# Patient Record
Sex: FEMALE | Race: WHITE | NOT HISPANIC OR LATINO | Employment: FULL TIME
[De-identification: names, ages, dates, MRNs, and addresses within clinical notes are randomized per-mention and may not be internally consistent; named-entity substitution may affect disease eponyms.]

---

## 2017-12-24 ENCOUNTER — HEALTH MAINTENANCE LETTER (OUTPATIENT)
Age: 22
End: 2017-12-24

## 2020-03-10 ENCOUNTER — HEALTH MAINTENANCE LETTER (OUTPATIENT)
Age: 25
End: 2020-03-10

## 2020-11-13 ENCOUNTER — HOSPITAL ENCOUNTER (INPATIENT)
Facility: CLINIC | Age: 25
LOS: 5 days | Discharge: HOME | DRG: 885 | End: 2020-11-18
Attending: EMERGENCY MEDICINE | Admitting: PSYCHIATRY & NEUROLOGY
Payer: COMMERCIAL

## 2020-11-13 DIAGNOSIS — F60.3 BORDERLINE PERSONALITY DISORDER (H): ICD-10-CM

## 2020-11-13 DIAGNOSIS — F41.9 ANXIETY: ICD-10-CM

## 2020-11-13 DIAGNOSIS — R45.851 SUICIDAL IDEATION: ICD-10-CM

## 2020-11-13 DIAGNOSIS — F33.9 EPISODE OF RECURRENT MAJOR DEPRESSIVE DISORDER, UNSPECIFIED DEPRESSION EPISODE SEVERITY (H): Primary | ICD-10-CM

## 2020-11-13 LAB
ALBUMIN SERPL-MCNC: 4.1 G/DL (ref 3.4–5)
ALP SERPL-CCNC: 55 U/L (ref 40–150)
ALT SERPL W P-5'-P-CCNC: 42 U/L (ref 0–50)
ANION GAP SERPL CALCULATED.3IONS-SCNC: 8 MMOL/L (ref 3–14)
AST SERPL W P-5'-P-CCNC: 27 U/L (ref 0–45)
BASOPHILS # BLD AUTO: 0.1 10E9/L (ref 0–0.2)
BASOPHILS NFR BLD AUTO: 0.6 %
BILIRUB SERPL-MCNC: 0.5 MG/DL (ref 0.2–1.3)
BUN SERPL-MCNC: 6 MG/DL (ref 7–30)
CALCIUM SERPL-MCNC: 9.3 MG/DL (ref 8.5–10.1)
CHLORIDE SERPL-SCNC: 104 MMOL/L (ref 94–109)
CO2 SERPL-SCNC: 26 MMOL/L (ref 20–32)
CREAT SERPL-MCNC: 0.67 MG/DL (ref 0.52–1.04)
DIFFERENTIAL METHOD BLD: NORMAL
EOSINOPHIL # BLD AUTO: 0.2 10E9/L (ref 0–0.7)
EOSINOPHIL NFR BLD AUTO: 1.8 %
ERYTHROCYTE [DISTWIDTH] IN BLOOD BY AUTOMATED COUNT: 11.9 % (ref 10–15)
GFR SERPL CREATININE-BSD FRML MDRD: >90 ML/MIN/{1.73_M2}
GLUCOSE SERPL-MCNC: 75 MG/DL (ref 70–99)
HCT VFR BLD AUTO: 42.3 % (ref 35–47)
HGB BLD-MCNC: 14.4 G/DL (ref 11.7–15.7)
IMM GRANULOCYTES # BLD: 0 10E9/L (ref 0–0.4)
IMM GRANULOCYTES NFR BLD: 0.3 %
LYMPHOCYTES # BLD AUTO: 3.9 10E9/L (ref 0.8–5.3)
LYMPHOCYTES NFR BLD AUTO: 37.9 %
MCH RBC QN AUTO: 30.2 PG (ref 26.5–33)
MCHC RBC AUTO-ENTMCNC: 34 G/DL (ref 31.5–36.5)
MCV RBC AUTO: 89 FL (ref 78–100)
MONOCYTES # BLD AUTO: 0.7 10E9/L (ref 0–1.3)
MONOCYTES NFR BLD AUTO: 6.6 %
NEUTROPHILS # BLD AUTO: 5.5 10E9/L (ref 1.6–8.3)
NEUTROPHILS NFR BLD AUTO: 52.8 %
NRBC # BLD AUTO: 0 10*3/UL
NRBC BLD AUTO-RTO: 0 /100
PLATELET # BLD AUTO: 358 10E9/L (ref 150–450)
POTASSIUM SERPL-SCNC: 3.8 MMOL/L (ref 3.4–5.3)
PROT SERPL-MCNC: 7.7 G/DL (ref 6.8–8.8)
RBC # BLD AUTO: 4.77 10E12/L (ref 3.8–5.2)
SARS-COV-2 RNA SPEC QL NAA+PROBE: NORMAL
SODIUM SERPL-SCNC: 138 MMOL/L (ref 133–144)
SPECIMEN SOURCE: NORMAL
TSH SERPL DL<=0.005 MIU/L-ACNC: 2.05 MU/L (ref 0.4–4)
WBC # BLD AUTO: 10.4 10E9/L (ref 4–11)

## 2020-11-13 PROCEDURE — U0003 INFECTIOUS AGENT DETECTION BY NUCLEIC ACID (DNA OR RNA); SEVERE ACUTE RESPIRATORY SYNDROME CORONAVIRUS 2 (SARS-COV-2) (CORONAVIRUS DISEASE [COVID-19]), AMPLIFIED PROBE TECHNIQUE, MAKING USE OF HIGH THROUGHPUT TECHNOLOGIES AS DESCRIBED BY CMS-2020-01-R: HCPCS | Performed by: EMERGENCY MEDICINE

## 2020-11-13 PROCEDURE — C9803 HOPD COVID-19 SPEC COLLECT: HCPCS

## 2020-11-13 PROCEDURE — 36415 COLL VENOUS BLD VENIPUNCTURE: CPT | Performed by: PSYCHIATRY & NEUROLOGY

## 2020-11-13 PROCEDURE — 99222 1ST HOSP IP/OBS MODERATE 55: CPT | Performed by: PHYSICIAN ASSISTANT

## 2020-11-13 PROCEDURE — 85025 COMPLETE CBC W/AUTO DIFF WBC: CPT | Performed by: PSYCHIATRY & NEUROLOGY

## 2020-11-13 PROCEDURE — 84443 ASSAY THYROID STIM HORMONE: CPT | Performed by: PSYCHIATRY & NEUROLOGY

## 2020-11-13 PROCEDURE — 80053 COMPREHEN METABOLIC PANEL: CPT | Performed by: EMERGENCY MEDICINE

## 2020-11-13 PROCEDURE — 90791 PSYCH DIAGNOSTIC EVALUATION: CPT

## 2020-11-13 PROCEDURE — 124N000001 HC R&B MH

## 2020-11-13 PROCEDURE — 250N000013 HC RX MED GY IP 250 OP 250 PS 637: Performed by: PSYCHIATRY & NEUROLOGY

## 2020-11-13 PROCEDURE — 99285 EMERGENCY DEPT VISIT HI MDM: CPT | Mod: 25

## 2020-11-13 RX ORDER — MULTIVIT-MIN/IRON/FOLIC ACID/K 18-600-40
1000 CAPSULE ORAL DAILY
Status: ON HOLD | COMMUNITY
End: 2021-07-20

## 2020-11-13 RX ORDER — DULOXETIN HYDROCHLORIDE 30 MG/1
30 CAPSULE, DELAYED RELEASE ORAL AT BEDTIME
Status: ON HOLD | COMMUNITY
End: 2020-11-18

## 2020-11-13 RX ORDER — QUETIAPINE FUMARATE 50 MG/1
50 TABLET, FILM COATED ORAL 3 TIMES DAILY PRN
Status: DISCONTINUED | OUTPATIENT
Start: 2020-11-13 | End: 2020-11-16

## 2020-11-13 RX ORDER — DULOXETIN HYDROCHLORIDE 60 MG/1
60 CAPSULE, DELAYED RELEASE ORAL DAILY
Status: ON HOLD | COMMUNITY
End: 2020-11-18

## 2020-11-13 RX ORDER — DULOXETIN HYDROCHLORIDE 30 MG/1
30 CAPSULE, DELAYED RELEASE ORAL AT BEDTIME
Status: DISCONTINUED | OUTPATIENT
Start: 2020-11-13 | End: 2020-11-14

## 2020-11-13 RX ORDER — LURASIDONE HYDROCHLORIDE 80 MG/1
80 TABLET, FILM COATED ORAL AT BEDTIME
Status: DISCONTINUED | OUTPATIENT
Start: 2020-11-13 | End: 2020-11-18 | Stop reason: HOSPADM

## 2020-11-13 RX ORDER — LURASIDONE HYDROCHLORIDE 80 MG/1
80 TABLET, FILM COATED ORAL AT BEDTIME
Status: ON HOLD | COMMUNITY
End: 2021-07-20

## 2020-11-13 RX ORDER — DULOXETIN HYDROCHLORIDE 60 MG/1
60 CAPSULE, DELAYED RELEASE ORAL AT BEDTIME
Status: DISCONTINUED | OUTPATIENT
Start: 2020-11-13 | End: 2020-11-18 | Stop reason: HOSPADM

## 2020-11-13 RX ORDER — GABAPENTIN 300 MG/1
300-600 CAPSULE ORAL 3 TIMES DAILY PRN
Status: ON HOLD | COMMUNITY
End: 2021-06-28

## 2020-11-13 RX ORDER — GABAPENTIN 300 MG/1
300-600 CAPSULE ORAL
Status: DISCONTINUED | OUTPATIENT
Start: 2020-11-13 | End: 2020-11-14

## 2020-11-13 RX ORDER — POLYETHYLENE GLYCOL 3350 17 G/17G
17 POWDER, FOR SOLUTION ORAL DAILY
Status: DISCONTINUED | OUTPATIENT
Start: 2020-11-14 | End: 2020-11-18 | Stop reason: HOSPADM

## 2020-11-13 RX ORDER — POLYETHYLENE GLYCOL 3350 17 G/17G
17 POWDER, FOR SOLUTION ORAL DAILY
COMMUNITY
End: 2021-06-08

## 2020-11-13 RX ORDER — VITAMIN B COMPLEX
1000 TABLET ORAL DAILY
Status: DISCONTINUED | OUTPATIENT
Start: 2020-11-14 | End: 2020-11-18 | Stop reason: HOSPADM

## 2020-11-13 RX ADMIN — LURASIDONE HYDROCHLORIDE 80 MG: 80 TABLET, FILM COATED ORAL at 21:48

## 2020-11-13 RX ADMIN — GABAPENTIN 600 MG: 300 CAPSULE ORAL at 23:14

## 2020-11-13 RX ADMIN — DULOXETINE HYDROCHLORIDE 60 MG: 60 CAPSULE, DELAYED RELEASE ORAL at 21:48

## 2020-11-13 RX ADMIN — DULOXETINE HYDROCHLORIDE 30 MG: 30 CAPSULE, DELAYED RELEASE ORAL at 21:47

## 2020-11-13 ASSESSMENT — MIFFLIN-ST. JEOR
SCORE: 1205.81
SCORE: 1216.69

## 2020-11-13 ASSESSMENT — ACTIVITIES OF DAILY LIVING (ADL)
DRESS: SCRUBS (BEHAVIORAL HEALTH)
HYGIENE/GROOMING: INDEPENDENT
ORAL_HYGIENE: INDEPENDENT

## 2020-11-13 ASSESSMENT — ENCOUNTER SYMPTOMS: DYSPHORIC MOOD: 1

## 2020-11-13 NOTE — ED PROVIDER NOTES
History   Chief Complaint  Suicidal     The history is provided by the patient.      Corinne Nesmith is a 25 year old female with a history of anxiety, anorexia, self injurious behavior, borderline personality disorder, and depression who presents for evaluation of increased suicidal ideation over the last day. The patient describes that she saw her psychiatrist yesterday who cornered her into either telling her  to purge her stock pile of pills (that she plans to use to suicide attempt) or present to the ED. She decided to tell her  about the pills and throw them away. However, she was very upset by this interaction. Last night she began cutting herself over her wrists more and starting planning other ways to commit suicide (including cutting and jumping off a bridge). Her  and her called the crisis hotline today who recommended she come to the ED. 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 He has a history of anorexia with a goal weight of 115 lbs and her current weight is 113 lbs. Last week her psychiatrist increased one of her medication doses, but does not feel this has effected her suicidal thoughts. She denies any physical symptoms or COVID-19 symptoms. She has previously been hospitalized for suicidal thoughts.    Allergies  Morphine  Amoxicillin  Sulfa Drugs    Medications  Abilify  Bupropion  Effexor   Cymbalta  Gabapentin  Latuda       Past Medical History  OCD  Anxiety  Anorexia  Self injurious behavior  Borderline personality disorder  Depression    Past Surgical History  Cleft palate repair x12    Family History  Father - CHING  Mother - diabetes     Social History  The patient was accompanied to the ED by her .  Smoking Status: never  Smokeless Tobacco: never  Alcohol Use: no  Drug Use: no    Review of Systems   Psychiatric/Behavioral: Positive for dysphoric mood, self-injury and suicidal ideas.   All other systems reviewed and are negative.      Physical Exam     Patient  "Vitals for the past 24 hrs:   BP Temp Temp src Pulse Resp SpO2 Height Weight   11/13/20 1638 (!) 140/88 97.2  F (36.2  C) Temporal 76 16 99 % 1.6 m (5' 3\") 51.3 kg (113 lb)       Physical Exam  GENERAL: well developed, pleasant, thin  HEAD: atraumatic  EYES: pupils reactive, extraocular muscles intact, conjunctivae normal  ENT:  mucus membranes moist  NECK:  trachea midline, normal range of motion  RESPIRATORY: no tachypnea, breath sounds clear to auscultation   CVS: normal S1/S2, no murmurs, intact distal pulses  ABDOMEN: soft, nontender, nondistention  MUSCULOSKELETAL: no deformities  SKIN: a few small superficial cuts to right wrist, warm and dry, no acute rashes  NEURO: GCS 15, cranial nerves intact, alert and oriented x3  PSYCH:  Flat affect, no response to external stimuli    Emergency Department Course   Laboratory:  Laboratory findings were communicated with the patient who voiced understanding of the findings.    Asymptomatic COVID-19 virus by PCR: pending    CMP: BUN 6 (L) o/w WNL (Creatinine 0.67)    Emergency Department Course:  Past medical records, nursing notes, and vitals reviewed.    1640 I physically examined the patient as documented above.    1813 I consulted with DEC.     IV was inserted and blood was drawn for laboratory testing, results above.    A Nasopharyngeal swab was obtained here in the ED.     Findings and plan explained to the Patient who consents to admission. I personally reviewed the laboratory results with the Patient and answered all related questions prior to admission.     Impression & Plan   Covid-19  Corinne Nesmith was evaluated during a global COVID-19 pandemic, which necessitated consideration that the patient might be at risk for infection with the SARS-CoV-2 virus that causes COVID-19.   Applicable protocols for evaluation were followed during the patient's care.   COVID-19 was considered as part of the patient's evaluation. The plan for testing is:  a test was obtained " during this visit.    Medical Decision Making:  Patient with suicidal thoughts and notes psychiatry suggesting being admitted yesterday as she was having thoughts of overdosing and saving up pills and is now With new ideas for suicide including jumping off a bridge or cutting.  Patient was seen by DEC as well as myself.  She was placed on a 72-hour hold.  Be admitted here to [address].    Diagnosis:    ICD-10-CM    1. Suicidal ideation  R45.851      Disposition:  Admitted to mental health bed.    Scribe Disclosure:  AILEEN, Annette Harmon, am serving as a scribe at 5:49 PM on 11/13/2020 to document services personally performed by Wesley Mathis MD based on my observations and the provider's statements to me.      Wesley Mathis MD  11/13/20 8970

## 2020-11-14 LAB
LABORATORY COMMENT REPORT: NORMAL
SARS-COV-2 RNA SPEC QL NAA+PROBE: NEGATIVE
SPECIMEN SOURCE: NORMAL

## 2020-11-14 PROCEDURE — 99207 PR NO CHARGE LOS: CPT | Performed by: PHYSICIAN ASSISTANT

## 2020-11-14 PROCEDURE — 124N000001 HC R&B MH

## 2020-11-14 PROCEDURE — 250N000013 HC RX MED GY IP 250 OP 250 PS 637: Performed by: PSYCHIATRY & NEUROLOGY

## 2020-11-14 RX ORDER — LAMOTRIGINE 25 MG/1
25 TABLET ORAL DAILY
Status: DISCONTINUED | OUTPATIENT
Start: 2020-11-14 | End: 2020-11-18 | Stop reason: HOSPADM

## 2020-11-14 RX ORDER — GABAPENTIN 300 MG/1
300-600 CAPSULE ORAL 3 TIMES DAILY
Status: DISCONTINUED | OUTPATIENT
Start: 2020-11-14 | End: 2020-11-14

## 2020-11-14 RX ORDER — GABAPENTIN 300 MG/1
300 CAPSULE ORAL 3 TIMES DAILY PRN
Status: DISCONTINUED | OUTPATIENT
Start: 2020-11-14 | End: 2020-11-16

## 2020-11-14 RX ORDER — ACETAMINOPHEN 325 MG/1
TABLET ORAL
Status: DISPENSED
Start: 2020-11-14 | End: 2020-11-15

## 2020-11-14 RX ORDER — ACETAMINOPHEN 325 MG/1
650 TABLET ORAL EVERY 6 HOURS PRN
Status: DISCONTINUED | OUTPATIENT
Start: 2020-11-14 | End: 2020-11-18 | Stop reason: HOSPADM

## 2020-11-14 RX ORDER — LORAZEPAM 0.5 MG/1
0.5 TABLET ORAL 3 TIMES DAILY PRN
Status: DISCONTINUED | OUTPATIENT
Start: 2020-11-14 | End: 2020-11-18 | Stop reason: HOSPADM

## 2020-11-14 RX ADMIN — LAMOTRIGINE 25 MG: 25 TABLET ORAL at 09:25

## 2020-11-14 RX ADMIN — ACETAMINOPHEN 650 MG: 325 TABLET, FILM COATED ORAL at 14:41

## 2020-11-14 RX ADMIN — LURASIDONE HYDROCHLORIDE 80 MG: 80 TABLET, FILM COATED ORAL at 21:23

## 2020-11-14 RX ADMIN — POLYETHYLENE GLYCOL 3350 17 G: 17 POWDER, FOR SOLUTION ORAL at 09:25

## 2020-11-14 RX ADMIN — Medication 1000 UNITS: at 09:25

## 2020-11-14 RX ADMIN — GABAPENTIN 600 MG: 300 CAPSULE ORAL at 09:30

## 2020-11-14 RX ADMIN — DULOXETINE HYDROCHLORIDE 60 MG: 60 CAPSULE, DELAYED RELEASE ORAL at 21:23

## 2020-11-14 RX ADMIN — QUETIAPINE FUMARATE 50 MG: 50 TABLET ORAL at 18:00

## 2020-11-14 ASSESSMENT — ACTIVITIES OF DAILY LIVING (ADL)
ORAL_HYGIENE: INDEPENDENT
HYGIENE/GROOMING: INDEPENDENT
DRESS: STREET CLOTHES

## 2020-11-14 NOTE — ED NOTES
Corinne N Palm  November 13, 2020    25 year old female reporting worsening depression, anxiety, eating disorder (anoerexia) with SI ideation.  Patient saw psychiatrist yesterday and admitted to having stockpile of medications.  Psychiatrist told her yesterday to get rid of pills or was going to hospitalize her.  She and her  got rid of pills, but patient having SI presently of wanting to jump a bridge or cut herself.  Is in DBT and using skills, but not effective to help with current emotional dsyregulation. Patient has hx of cutting in the past.  2 previous inpatient psychiatric admissions.  Patient being admitted on 72 hour Emergency Hold to psychiatry.       Current Suicidal Ideation/Self-Injurious Concerns/Methods: Cutting and Jumping (from building, into traffic, etc.)    Inappropriate Sexual Behavior: No    Aggression/Homicidal Ideation: None - N/A      For additional details see full DEC assessment.       Issac Aviles

## 2020-11-14 NOTE — PLAN OF CARE
Patient pleasant and cooperative. Spent some time in [place] playing [employer] and [employer] with peer. Requested that her gabapentin be three times a day as that is how she has been taking it. MD changed order to reflect that. Went to [facility] on [facility]. Complained of headache, rated pain '5'. Tylenol given per prn order.

## 2020-11-14 NOTE — PROGRESS NOTES
Hospitalist chart check    Labs reviewed and unremarkable TSH, CBC. COVID19 neg.    Hospitalist to sign off.     Lesley Fleming), PA-C  Hospitalist [facility]ERT

## 2020-11-14 NOTE — H&P
"Admitted:     11/13/2020      PSYCHIATRY HOSPITAL ADMISSION NOTE      CHIEF COMPLAINT:  Depression and suicidal ideation.      HISTORY OF PRESENT ILLNESS:  Ms. 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  Notably, she had had an appointment with her outpatient psychiatrist before admission.  Admitted to having stockpiled medications.  Her  was going to dispose of this, but this was somewhat destabilizing for the patient as she had some psychological comfort from the idea of having the stockpile on hand.  Described that she started having more suicidal ideation including thoughts about jumping off a bridge or cutting herself.  She and her  called the crisis line.  She was eventually brought to the emergency room.  She was quite ambivalent about admission and was placed on a 72-hour hold as she was deemed to meet criteria despite her ambivalence about coming into the hospital.  She was admitted to station 77 in the [facility].      On interview, the patient reports that she has had worsening depression over the past 2-3 months.  She acknowledges might be in the setting of ongoing stressors as well as the seasonal change.  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  They did have a patient suicide in recent weeks that was quite \"triggering\" for her.  She notes that she has been struggling with depression, anhedonia, low energy and motivation, excessive sleep and poor appetite.  Reports that she has anxiety with weekly panic attacks and generalized worry as well as social anxiety.  She denies any history of pedro phase symptoms historically or any history of psychosis symptoms.  She acknowledges that she has been " diagnosed with cluster B borderline personality traits, but states that psychological testing did not show a full disorder.  However, she does acknowledge having substantial abandonment sensitivity, chronic suicidal thoughts and self-harm ideation, chronic empty mood and identity instability as well as relationship instability.  She has had prior cutting attempts at suicide.  She was hospitalized at [facility] a few times in the last few years and acknowledges that she would cut while in the hospital as a suicide attempt.  At this time here during the current admission, she is having ongoing suicidal thoughts that come and go including thoughts about pouring water in the electrical outlets (or outlets are off in her room).  At this time, she is reporting that she would be able to notify nursing if she does feel unsafe, but she remains ambivalent about staying in the hospital, would like to go home so that she can work on Sunday or Monday.      PAST PSYCHIATRIC HISTORY:  Prior diagnoses of depression, anxiety, anorexia.  She follows with a psychiatrist at [place], Dr. Gómez.  She has a therapist at [place] as well as an individual therapist and a DBT program through [facility].  She has been in the program for the last year,   there for about a year and a half.  She did have some initial benefit with her DBT program.  She has had numerous medication trials including several of the SSRIs, she has been on Effexor as well as duloxetine (recently increased to 90 mg a few weeks ago).  She has tried Lamictal, although potentially not a dose above 100 or 50 mg.  Notably, it was discontinued about 3 months ago, so we discussed the possibility that it might have contributed to some recent worsening depression.  No trials of lithium, MAOIs, TCAs or ECT or TMS.  She has tried Abilify, quetiapine and currently is on Latuda.      PAST MEDICAL HISTORY:  No significant medical history noted in the EMR.      PAST SURGICAL HISTORY:   Cleft palate with numerous repairs, x 12.   The patient actually notes that there is a component of medical trauma from her several repeated surgeries including noted that she had surgeries against her will when she was younger.      FAMILY HISTORY:  Father with obstructive sleep and mother diabetes.      SOCIAL HISTORY:  Resides with her  whom she has been with for 12 years and  for the past 2.  They do not have any children.  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.  She works in their partial hospitalization program.  She does consider work somewhat stressful, especially because she has been a patient of her colleagues in the past.  No alcohol use or drug use and is a nonsmoker with no nicotine or tobacco use.  No  experience.      REVIEW OF SYSTEMS:  Ten-point review of systems negative other than described in HPI.      VITAL SIGNS:  Blood pressure 122/83, pulse 97.  BMI 19.53.  SpO2 of 100% on room air, temperature 99.1.      MENTAL STATUS EXAMINATION:  She is dressed in hospital gown, seated upright in her bed.  She is cooperative.  She is not demonstrating any aggression or agitated behavior.  No tremulousness or involuntary movements.  Speech is nonpressured, but has increased latency and flat prosody and slow rate.  Mood is depressed.  Affect is markedly dysphoric, somewhat tense from anxiety as well.  Thought form linear, logical, and goal directed.  No flight of ideas.  Not responding to internal stimuli.  Denies perceptual disturbances.  No paranoid ideation.  She is having intermittent suicidal ideation with thoughts of ways to harm herself here in the hospital.  She is willing to notify nursing if this increases.  Denies thoughts of harming others.  There is no fluctuation in cognition or deficits in cognitive processing.  Short and long-term memory intact based on conversation.  Insight and judgment poor in the setting of recent depression and suicidal  ideation, perhaps improving a bit as she is willing to collaborate this morning in terms of making a plan with her depression care.      IMPRESSION, REPORT, PLAN:   1.  Major depressive disorder, recurrent, severe with anxious distress.   2.  Generalized anxiety disorder.   3.  Anorexia nervosa.   4.  Borderline personality disorder.      FORMULATION:  This is a 25-year-old young woman, , working as a music therapist at [employer], history of anorexia, depression with borderline personality features, presents in the setting of worsening suicidal ideation with thoughts of overdosing and then later having thoughts of jumping off a bridge, cutting after her  dispose of her medication stash.  She has several symptoms consistent with both major depressive disorder that has been treatment resistant with features of dysthymia, but also has features consistent with borderline personality disorder.  She has had partial benefit with DBT treatment in the past year at [facility].  She was on lamotrigine up until a few months ago and it was discontinued due to concern that maybe it was not effective for her.  However, she is not certain she was on a dose above 100 or 150 mg and she acknowledges the possibility that discontinuing that several months ago might have coincided as well with worsening depression over the same time period.  She is an agnostic about whether or not the duloxetine is effective.  It was increased to 90 mg a few weeks ago, so we agreed to lower that back to 60 mg in case it has become over activating.        RISK LEVEL:  Elevated in the setting of recent suicidal ideation with some intent and planning in the setting of worsening depression and history of prior suicide attempts.  Recent stressors include season changing and they had a patient suicide at their program that she works at that was very upsetting and triggered her own suicidal ideation.  Denies access to firearms.  Denies thoughts of harming  others or history of being violent toward others, so acute risk of violence to others is assessed to be low.      RESULTS REVIEW:  Complete metabolic panel unremarkable.  CBC unremarkable.  TSH 2.05.  HCG negative.  COVID not yet resulted.  No urine drug screen available at this time.      PLAN:     1.  Lower Cymbalta to 60 mg daily in case the 90 mg dosing was over activating.   2.  Reinitiate lamotrigine 25 mg daily.  Reviewed the risks of Ocasio-Walter syndrome again in detail, although the patient's current depression episode might have coincided with going off this medication several months ago.  She is very resistant to considering options such as lithium.  Much of this due to concern that she has want to be on anything that could cause weight gain, although we discussed in detail that lithium should be considered over time for her chronic suicidal ideation and chronic treatment resistant depression.   3.  Continue Latuda 80 mg daily.   4.  Will initiate Ativan p.r.n. for anxiety, which is quite high at this time.   5.  Estimated length of stay 3-7 days.  She is already in a DBT program, likely will be able to go back to that once she is discharged.  Hopefully will coordinate with her psychiatry team during the week to establish a discharge plan.  She remains on a 72-hour hold at this time.         CATALINA MONAHAN MD             D: 2020   T: 2020   MT: DARLING      Name:     PALM, CORINNE   MRN:      [id number]        Account:      [id number]   :      1995        Admitted:     2020                   Document: [id number]       cc: Catalina Monahan MD

## 2020-11-14 NOTE — PROGRESS NOTES
11/13/20 2017   Patient Belongings   Did you bring any home meds/supplements to the hospital?  No   Patient Belongings locker   Patient Belongings Put in Hospital Secure Location (Security or Locker, etc.) other (see comments)   Belongings Search Yes   Clothing Search Yes   Second Staff Rehana VELASCO     Phone in case  Watch   Mask  Bandar pins  Purse  Wallet lanyard with keychains and 2 keys  [place] 's license   Insurance cards  [employer] student ID  Various business cards and rewards card  Notebook   Lotion x3  chapstick  Pens  Tweezers  Phone   Sweater with string  Leggings  Thinking putty   Gloves  Underwear   Pepper spray  Socks  Shoes  Sweater with string  Bra  Jacket  Leggings    Security Envelope #[id number]  [employer] Giftcard [phone]  [employer] Giftcard [phone]  [employer] giftcard [phone]  mastercard [phone]  Visa [phone]  Visa [phone]  redcard [phone]  Visa [phone]    Admission:  I am responsible for any personal items that are not sent to the safe or pharmacy.  [facility] is not responsible for loss, theft or damage of any property in my possession.    Signature:  _________________________________ Date: _______  Time: _____                                              Staff Signature:  ____________________________ Date: ________  Time: _____      2nd Staff person, if patient is unable/unwilling to sign:    Signature: ________________________________ Date: ________  Time: _____     Discharge:  [facility] has returned all of my personal belongings:    Signature: _________________________________ Date: ________  Time: _____                                          Staff Signature:  ____________________________ Date: ________  Time: _____

## 2020-11-14 NOTE — PROGRESS NOTES
Pt woke up once around 4:30am and requested to write in her journal. Sat by the nursing station for visibility. Pt returned to her room half an hour later with journal in hand. No acute distress. Staff will continue to monitor for safety.

## 2020-11-14 NOTE — H&P
[facility]    History and Physical  Hospitalist       Date of Admission:  11/13/2020    Assessment & Plan   Corinne N Palm is a 25 year old female who presents with suicidal ideation.    Suicidal ideation.  OCD.  Anorexia.  Chart review diagnosis cluster B traits.  History and labs obtained thus far do not suggest organic cause for mental health decompensation.  Denies illicit drug use.  Recent dose increase of duloxetine approximately 1 week ago.  -Obtain thyroid studies, urine pregnancy, and will ensure no blood dyscrasias on CBC.  Barring no unexpected / significant abnormalities hospitalist service will sign off, please contact if acute concerns arise.  -Management per inpatient psychiatry.    Asymptomatic COVID-19 admission screening.  Obtained 11/13.    DVT Prophylaxis: Low Risk/Ambulatory with no VTE prophylaxis indicated  Code Status: Full Code    This patient was discussed with Dr. Antoine of the Hospitalist Service who agrees with current plans as outlined above.    Disposition: discharge per psych.    JoAnna K. Barthell, PA-C    Primary Care Physician   Physician No Ref-Primary    Chief Complaint   Suicidal ideation     History is obtained from the patient and chart review    History of Present Illness   Corinne N Palm is a 25 year old female with chart review diagnoses OCD, anorexia, cluster B traits, and depression/anxiety who is admitted under care of inpatient psychiatry with worsening thoughts of suicidal ideation.  Patient informs she has chronic thoughts of suicidal ideation and admitted to her psychiatrist that she had a stockpile of pills with the intent of overdosing.  She discarded the pills but then cut her right wrist.    Recent dose increase of her duloxetine approximately 1 week ago otherwise no medication changes.  Compliant with her duloxetine, gabapentin, Latuda.  She follows with a psychiatrist and therapist through the [facility] through [facility].  Denies chance of pregnancy.    Denies illicit drug use and alcohol use.  No recent fever, chills, myalgias, URI symptoms, chest pain, coughing, difficulty breathing, abdominal pain, nausea, vomiting, diarrhea.  States she uses MiraLAX daily to keep her bowel movements regular and her last BM was on the day prior to admission.    PAST MEDICAL HISTORY  Anorexia.  OCD.  Cluster B personality traits.  Depression/anxiety.    PAST SURGICAL HISTORY  Cleft lip and palate repair.    HOME MEDICATIONS  Prior to Admission medications    Medication Sig Last Dose Taking? Auth Provider   DULoxetine (CYMBALTA) 30 MG capsule Take 30 mg by mouth At Bedtime Give with 60mg = 90mg 11/12/2020 at hs Yes Unknown, Entered By History   DULoxetine (CYMBALTA) 60 MG capsule Take 60 mg by mouth daily Give with 30mg = 90mg 11/12/2020 at hs Yes Unknown, Entered By History   gabapentin (NEURONTIN) 300 MG capsule Take 300-600 mg by mouth nightly as needed (anxiety) prn Yes Unknown, Entered By History   lurasidone (LATUDA) 80 MG TABS tablet Take 80 mg by mouth At Bedtime 11/12/2020 at hs Yes Unknown, Entered By History   polyethylene glycol (MIRALAX) 17 GM/Dose powder Take 17 g by mouth daily 11/13/2020 at am Yes Unknown, Entered By History   Vitamin D, Cholecalciferol, 25 MCG (1000 UT) TABS Take 1,000 Units by mouth daily  11/13/2020 at am Yes Unknown, Entered By History       ALLERGIES  Allergies   Allergen Reactions     Morphine Other (See Comments)     Twitching     Amoxicillin Rash     Sulfa Drugs Rash       SOCIAL HISTORY   reports that she has never smoked. She does not have any smokeless tobacco history on file. She reports that she does not drink alcohol or use drugs.    FAMILY HISTORY   maternal uncle passed away from pancreatic cancer.    REVIEW OF SYSTEMS  A 10 point ROS was negative other than the symptoms noted above in the HPI.    Physical Exam   Nursing Notes Reviewed.  /86   Pulse 93   Temp 99.5  F (37.5  C) (Oral)   Resp 16  "  Ht 1.6 m (5' 3\")   Wt 50.2 kg (110 lb 9.6 oz)   SpO2 100%   BMI 19.59 kg/m     General:  Appears stated age.  Crying underneath bed covers.  Cooperative with removing covers and answering questions.  Skin:  Warm, dry.  2 superficial appearing cuts to right wrist approximately 1-1.5 cm in length each.  No underlying swelling or skin discoloration.  HEENT:  Normocephalic, atraumatic; EOMs grossly intact.  Neck:  Supple.  Chest:  Breath sounds CTA and no increased work of breathing.  Cardiovascular:  RRR, no rub or murmur. No peripheral edema.  Abdomen:  Soft, non-tender, non-distended.  Musculoskeletal:  Moves all four extremities.  Neurological:  CN 2-12 grossly intact.    Data   Data reviewed today:  I personally reviewed no images or EKG's today.  Recent Labs   Lab 11/13/20  1854      POTASSIUM 3.8   CHLORIDE 104   CO2 26   BUN 6*   CR 0.67   ANIONGAP 8   OSCAR 9.3   GLC 75   ALBUMIN 4.1   PROTTOTAL 7.7   BILITOTAL 0.5   ALKPHOS 55   ALT 42   AST 27       Imaging:  No results found for this or any previous visit (from the past 24 hour(s)).    "

## 2020-11-14 NOTE — PHARMACY-ADMISSION MEDICATION HISTORY
Pharmacy Medication History  Admission medication history interview status for the 11/13/2020  admission is complete. See EPIC admission navigator for prior to admission medications       Medication history sources: Patient and Care Everywhere  Location of interview: phone  Medication history source reliability: Good  Adherence assessment: unable to assess    Significant changes made to the medication list:  Added all meds to list. Removed abilify, wellbutrin, effexor      Additional medication history information:   none    Medication reconciliation completed by provider prior to medication history? No    Time spent in this activity: 10 minutes      Prior to Admission medications    Medication Sig Last Dose Taking? Auth Provider   DULoxetine (CYMBALTA) 30 MG capsule Take 30 mg by mouth At Bedtime Give with 60mg = 90mg 11/12/2020 at hs Yes Unknown, Entered By History   DULoxetine (CYMBALTA) 60 MG capsule Take 60 mg by mouth daily Give with 30mg = 90mg 11/12/2020 at hs Yes Unknown, Entered By History   gabapentin (NEURONTIN) 300 MG capsule Take 300-600 mg by mouth nightly as needed (anxiety) prn Yes Unknown, Entered By History   lurasidone (LATUDA) 80 MG TABS tablet Take 80 mg by mouth At Bedtime 11/12/2020 at hs Yes Unknown, Entered By History   polyethylene glycol (MIRALAX) 17 GM/Dose powder Take 17 g by mouth daily 11/13/2020 at am Yes Unknown, Entered By History   Vitamin D, Cholecalciferol, 25 MCG (1000 UT) TABS Take 1,000 Units by mouth daily  11/13/2020 at am Yes Unknown, Entered By History

## 2020-11-14 NOTE — PLAN OF CARE
Pt was admitted on a 72 hour hold from [facility] ED for suicidal ideation.  Recent suicidal ideation well documented in ED note. Reports her plan is to cut or jump from something now that she had to get rid of the meds she was stockpiling. Pt has a history of suicide attempts X 2 via cutting, once while she was hospitalized at [facility] where she broke an item to cut.  Hx of cutting.  She has 2 small cuts on her right wrist.  Verbally contracts for safety while IP.  Denies any auditory or visual hallucinations.  Pt does not feel as though this is real and dissociative. Reports feeling like this when she is stressed.  Pt does not feel that she is holdable. Sad, anxious, and seemed tearful but with no tears.  Curled up in her bed. Denies any recent substance use, UTOX still needs to be collected.  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     Denied any pain or any other current physical complaints.    Nursing assessment complete including patient and medication profiles. Risk assessments completed addressing suicide,fall,skin,nutrition and safety issues. Care plan initiated. Assessments reviewed with physician and admit orders received. Video monitoring in progress, Patient Informed.  Welcome packet reviewed with patient. Information reviewed includes getting emergency help, preventing infections, understanding your care, using medication safely, reducing falls, preventing pressure ulcers, smoking cessation, powerful choices and Patients Bill of Rights. Pt. given tour of the unit and instruction on use of facility including emergency call light. Program schedule reviewed with patient. Questions regarding the unit addressed. Pt. Search completed and belongings inventoried.

## 2020-11-15 LAB
ALBUMIN UR-MCNC: NEGATIVE MG/DL
AMPHETAMINES UR QL SCN: NEGATIVE
APPEARANCE UR: CLEAR
BARBITURATES UR QL: NEGATIVE
BENZODIAZ UR QL: NEGATIVE
BILIRUB UR QL STRIP: NEGATIVE
CANNABINOIDS UR QL SCN: NEGATIVE
COCAINE UR QL: NEGATIVE
COLOR UR AUTO: YELLOW
GLUCOSE UR STRIP-MCNC: NEGATIVE MG/DL
HCG UR QL: NEGATIVE
HGB UR QL STRIP: NEGATIVE
KETONES UR STRIP-MCNC: 10 MG/DL
LEUKOCYTE ESTERASE UR QL STRIP: NEGATIVE
NITRATE UR QL: NEGATIVE
OPIATES UR QL SCN: NEGATIVE
PCP UR QL SCN: NEGATIVE
PH UR STRIP: 6.5 PH (ref 5–7)
SOURCE: ABNORMAL
SP GR UR STRIP: 1.02 (ref 1–1.03)
UROBILINOGEN UR STRIP-MCNC: 2 MG/DL (ref 0–2)

## 2020-11-15 PROCEDURE — 124N000001 HC R&B MH

## 2020-11-15 PROCEDURE — 81003 URINALYSIS AUTO W/O SCOPE: CPT | Performed by: PSYCHIATRY & NEUROLOGY

## 2020-11-15 PROCEDURE — 250N000013 HC RX MED GY IP 250 OP 250 PS 637: Performed by: PSYCHIATRY & NEUROLOGY

## 2020-11-15 PROCEDURE — 80307 DRUG TEST PRSMV CHEM ANLYZR: CPT | Performed by: PSYCHIATRY & NEUROLOGY

## 2020-11-15 PROCEDURE — 81025 URINE PREGNANCY TEST: CPT | Performed by: PSYCHIATRY & NEUROLOGY

## 2020-11-15 RX ADMIN — LURASIDONE HYDROCHLORIDE 80 MG: 80 TABLET, FILM COATED ORAL at 21:31

## 2020-11-15 RX ADMIN — DULOXETINE HYDROCHLORIDE 60 MG: 60 CAPSULE, DELAYED RELEASE ORAL at 21:31

## 2020-11-15 RX ADMIN — Medication 1000 UNITS: at 08:44

## 2020-11-15 RX ADMIN — LORAZEPAM 0.5 MG: 0.5 TABLET ORAL at 16:06

## 2020-11-15 RX ADMIN — LAMOTRIGINE 25 MG: 25 TABLET ORAL at 08:44

## 2020-11-15 RX ADMIN — GABAPENTIN 300 MG: 300 CAPSULE ORAL at 11:56

## 2020-11-15 RX ADMIN — POLYETHYLENE GLYCOL 3350 17 G: 17 POWDER, FOR SOLUTION ORAL at 08:44

## 2020-11-15 ASSESSMENT — ACTIVITIES OF DAILY LIVING (ADL)
ORAL_HYGIENE: INDEPENDENT
HYGIENE/GROOMING: INDEPENDENT
DRESS: SCRUBS (BEHAVIORAL HEALTH)

## 2020-11-15 ASSESSMENT — MIFFLIN-ST. JEOR: SCORE: 1205.35

## 2020-11-15 NOTE — PLAN OF CARE
Pt slept for most of the shift. No acute distress noted. Staff will continue to monitor for safety.

## 2020-11-15 NOTE — PROGRESS NOTES
[facility] Psychiatric Progress Note      Interval History:   Pt seen, team meeting held with nursing staff. According to nursing staff the patient did have an episode of cutting superficially yesterday afternoon/evening.  We have kept her on a more strict monitoring protocol keeping staff in the [facility] able to visually see into her room although you have held off a one-to-one so as not to exacerbate the underlying borderline personality disorder.  My interview this morning the patient reports that she is feeling slightly better today.  Reports that mood and anxiety are slightly improved.  Denies that she is feeling suicidal today.  Self-harm urges reported as improved today.  She is more concerned about her eating disorder symptoms noting that she tends to struggle with maintaining adequate intake while she is in the hospital.  She was interested in finding out more about when she could anticipate discharging as she again reiterates that she would like to keep her admission as brief as possible.  When we discussed possible discharge Monday evening or Tuesday she became noticeably more engaged in the discussion and affect was much improved.     Review of systems:   10 point Review of Systems conducted by Dr Aviles is negative, other than noted in the HPI     Medications:       DULoxetine  60 mg Oral At Bedtime     lamoTRIgine  25 mg Oral Daily     lurasidone  80 mg Oral At Bedtime     polyethylene glycol  17 g Oral Daily     Vitamin D3  1,000 Units Oral Daily     acetaminophen, gabapentin, LORazepam, QUEtiapine    Mental Status Examination:     Appearance:  awake, alert  Eye Contact:  fair  Speech:  clear, coherent  Language:Normal  Psychomotor Behavior:  no evidence of tardive dyskinesia, dystonia, or tics  Mood:  better  Affect:  mood congruent, restricted range and improved with treatment planning  Thought Process:  logical, linear and goal oriented no loose associations  Thought Content:  no  evidence of suicidal ideation or homicidal ideation  Oriented to:  time, person, and place  Attention Span and Concentration:  intact  Recent and Remote Memory:  intact  Fund of Knowledge: appropriate  Muscle Strength and Tone: normal  Gait and Station: Normal  Insight:  partial  Judgment:  fair        Labs/Vitals:   No results found for this or any previous visit (from the past 24 hour(s)).  B/P: 113/79, T: 98.1, P: 112, R: 16  1.  Major depressive disorder, recurrent, severe with anxious distress.   2.  Generalized anxiety disorder.   3.  Anorexia nervosa.   4.  Borderline personality disorder.      FORMULATION:  This is a 25-year-old young woman, , working as a music therapist at [employer], history of anorexia, depression with borderline personality features, presents in the setting of worsening suicidal ideation with thoughts of overdosing and then later having thoughts of jumping off a bridge, cutting after her  dispose of her medication stash.  She has several symptoms consistent with both major depressive disorder that has been treatment resistant with features of dysthymia, but also has features consistent with borderline personality disorder.  She has had partial benefit with DBT treatment in the past year at [facility].  She was on lamotrigine up until a few months ago and it was discontinued due to concern that maybe it was not effective for her.  However, she is not certain she was on a dose above 100 or 150 mg and she acknowledges the possibility that discontinuing that several months ago might have coincided as well with worsening depression over the same time period.  She is an agnostic about whether or not the duloxetine is effective.  It was increased to 90 mg a few weeks ago, so we agreed to lower that back to 60 mg in case it has become over activating.                 Plan:   1.   Cymbalta previously lowered to 60 mg daily in case the 90 mg dosing was over activating.   2.  Reinitiated  lamotrigine 25 mg daily on 11/14/20.  Reviewed potential for more robust efficacy if she is able to advance to target dose of 200 mg over the typical 5 week titration schedule  3.  Continue Latuda 80 mg daily.   4.    Initiated Ativan p.r.n. for anxiety, which was quite high at admission. Anxiety improved so far in the hospital  5.  Estimated length of stay: At this time with manifestation of Borderline personality disorder presenting itself brief stay would be preferred. Ideally we could try to collaborate with her outpatient psychiatrist and DBT therapist on Monday and consider her progress over the weekend and discuss discharge planning. I believe Monday afternoon/evening or sometime on Tuesday is possible if she has steady improvement though did not guarantee as we agreed the crisis would need to have stabilized prior to discharging.          Attestation:  Patient has been seen and evaluated by me,  Ry Aviles MD

## 2020-11-15 NOTE — PROGRESS NOTES
Pt sat in tv area for duration of evening, stated feeling tired, sluggish and like everything was in slow motion since taking Seroquel.  Gave some crackers and juice and took vitals.  Vital WNL and stated felt a little better after the snack.  Will continue to monitor.

## 2020-11-15 NOTE — PLAN OF CARE
"[id number]. VSS. A&O. Denies pain. Reports of SI but contracts for safety. Denies hallucinations. Pt asked to speak to a staff member. Pt requested to go home. States she feels she would do better at home since she has not been eating since she has been here. She report of \"cutting\" herself with a plastic knife that she got from her food tray. Pt informed RN that she hid the knife in her admission folder. RN removed knife from room, assessed skin where pt report of \"cutting.\" Right lower arm appeared to have three pink scratch marks. No blood or puncture marks noted. A small, healing scabbing jessica was also noted. Pt was asked to come sit in the lounge. Staff member completed room search. Pt's bedroom and all bathroom locked. MD notified. Food orders changed to finger foods. Increased staff.         "

## 2020-11-15 NOTE — PROGRESS NOTES
Around 1930 pt notified staff that she felt groggy, tired and that her stomach hurt; feels like it was from taking Seroquel earlier in the day. Nurse took vitals and all looked normal. Monitor situation.

## 2020-11-15 NOTE — PROGRESS NOTES
Pt presented with calm mood and flat affect. Spent most of the shift in the lounge watching tv and socializing with staff and peers. Around  noon, she asked to talk to the staff in her room. She reported she is feeling trapped and her self harm thoughts are so strong that she doesn't know what to do about them. She was encouraged to stay in the lounge and keep herself beasy. She agreed and spent the rest of the shift in the lounge. She had one egg white and some apple juice for breakfast and only a cup of coffee for lunch. She reported she still endorses suicidal thoughts.

## 2020-11-16 PROCEDURE — 250N000013 HC RX MED GY IP 250 OP 250 PS 637: Performed by: PSYCHIATRY & NEUROLOGY

## 2020-11-16 PROCEDURE — 250N000013 HC RX MED GY IP 250 OP 250 PS 637: Performed by: NURSE PRACTITIONER

## 2020-11-16 PROCEDURE — 124N000001 HC R&B MH

## 2020-11-16 RX ORDER — OLANZAPINE 10 MG/2ML
5-10 INJECTION, POWDER, FOR SOLUTION INTRAMUSCULAR EVERY 6 HOURS PRN
Status: DISCONTINUED | OUTPATIENT
Start: 2020-11-16 | End: 2020-11-18 | Stop reason: HOSPADM

## 2020-11-16 RX ORDER — OLANZAPINE 5 MG/1
5-10 TABLET, ORALLY DISINTEGRATING ORAL EVERY 6 HOURS PRN
Status: DISCONTINUED | OUTPATIENT
Start: 2020-11-16 | End: 2020-11-18 | Stop reason: HOSPADM

## 2020-11-16 RX ADMIN — LURASIDONE HYDROCHLORIDE 80 MG: 80 TABLET, FILM COATED ORAL at 20:27

## 2020-11-16 RX ADMIN — OLANZAPINE 5 MG: 5 TABLET, ORALLY DISINTEGRATING ORAL at 18:48

## 2020-11-16 RX ADMIN — POLYETHYLENE GLYCOL 3350 17 G: 17 POWDER, FOR SOLUTION ORAL at 08:23

## 2020-11-16 RX ADMIN — Medication 1000 UNITS: at 08:23

## 2020-11-16 RX ADMIN — DULOXETINE HYDROCHLORIDE 60 MG: 60 CAPSULE, DELAYED RELEASE ORAL at 20:27

## 2020-11-16 RX ADMIN — LORAZEPAM 0.5 MG: 0.5 TABLET ORAL at 15:05

## 2020-11-16 RX ADMIN — LAMOTRIGINE 25 MG: 25 TABLET ORAL at 08:23

## 2020-11-16 RX ADMIN — LORAZEPAM 0.5 MG: 0.5 TABLET ORAL at 08:23

## 2020-11-16 ASSESSMENT — ACTIVITIES OF DAILY LIVING (ADL)
ORAL_HYGIENE: INDEPENDENT
HYGIENE/GROOMING: INDEPENDENT
DRESS: SCRUBS (BEHAVIORAL HEALTH)
ORAL_HYGIENE: INDEPENDENT
HYGIENE/GROOMING: INDEPENDENT
DRESS: SCRUBS (BEHAVIORAL HEALTH)
LAUNDRY: UNABLE TO COMPLETE

## 2020-11-16 NOTE — PLAN OF CARE
"Pt presenting with depressed, flat affect, isolative, tearful and hiding under blanket in room this AM. Pt engaging in self-injurious cutting behavior x2 this shift, once at beginning of shift with earring, then again this afternoon with piece of plastic off of butter container at lunch. She disclosed both occurrences to staff after attending OT group, tearful, \"I feel so guilty, I just can't get out of my own head. I can't stop looking for things and thinking of how I would use them. I keep thinking of things to do to myself in here (referring to her room), like slamming my head against the wall or doing pushups till I pass out\". Asking to be locked out of room, stating \"this is where I get myself in trouble\". Endorses suicidal ideation, \"I keep thinking that if I were to leave right now I'd jump off of a bridge\", \"I'm telling you all of this with a clear, level mind, but I'll probably regret it later. But if this is how it's going to be for me, I just don't want to live\". Verbally bryanna for safety at this time. Continues to refuse food, drinking coffee and apple juice with encouragement, order obtained for Boost shake for nutrition. Small pink scratches visible on R wrist and medial leg above ankle.   "

## 2020-11-16 NOTE — PLAN OF CARE
BEHAVIORAL TEAM DISCUSSION    Participants:MD, OT, RN, SW  Progress: superficial cutting, wants discharge, states eating disorder getting worse inpatient  Anticipated length of stay: one day  Continued Stay Criteria/Rationale:  must show she can have 24 hours with no cutting, medication management  Medical/Physical: NA  Precautions:   Behavioral Orders   Procedures    Code 1 - Restrict to Unit    Routine Programming     As clinically indicated    Self Injury Precaution    Status 15     Every 15 minutes.    Suicide precautions     Patients on Suicide Precautions should have a Combination Diet ordered that includes a Diet selection(s) AND a Behavioral Tray selection for Safe Tray - with utensils, or Safe Tray - NO utensils       Plan: one to one when in group, medication management  Rationale for change in precautions or plan: NA

## 2020-11-16 NOTE — PLAN OF CARE
"[id number]. Has spent most of time in the lounge watching t.v. She denies hallucinations. Reports of having SI and thinking of \"throwing water into the outlet.\" Pt  had empty plastic medication cup in room, cup appeared to have been folded causing it to have sharp edges. RN removed plastic cup. She reports of thinking of \"different things\" she can use to cut. She appears to be in better mood later on the shift. 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 She says she feels weird because one of the pt here has been a pt at the facility she works at.   "

## 2020-11-16 NOTE — PROGRESS NOTES
"[facility], [place]   Psychiatric Progress Note  Hospital Day: 3        Interim History:   The patient's care was discussed with the treatment team during the daily team meeting and/or staff's chart notes were reviewed.  Staff report patient patient has appeared anxious, feeling trapped, having ongoing self-harm thoughts. This morning, scratched self with her earrings.     Patient seen in her room today for interview. Reports feeling \"trapped,\" stating that being locked in the hospital is leading to self-harm thoughts and urges, although acknowledges that she had been having these thoughts prior to coming into the hospital. She is unsure what has been helpful during previous hospitalizations. Does not appear to have insight into skills she could utilize. Says this year has been quite difficult, given her changes at work. 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 Says they had a patient suicide a few weeks ago. Says other staff were crying about the patient; she was unsure how to feel about this. Says the situation was triggering to her. She asks about when she will be discharged. Discussed that she needs to be able to demonstrate safe behavior. Encouraged to maintain safe behavior for 24 hours prior to discharge. Tolerating duloxetine and lamotrigine well.           Medications:       DULoxetine  60 mg Oral At Bedtime     lamoTRIgine  25 mg Oral Daily     lurasidone  80 mg Oral At Bedtime     polyethylene glycol  17 g Oral Daily     Vitamin D3  1,000 Units Oral Daily          Allergies:     Allergies   Allergen Reactions     Morphine Other (See Comments)     Twitching     Amoxicillin Rash     Sulfa Drugs Rash          Labs:     Recent Results (from the past 48 hour(s))   HCG qualitative urine    Collection Time: 11/15/20  8:30 PM   Result Value Ref Range    HCG Qual Urine Negative NEG^Negative   Drug abuse screen 77 " "urine (FL, RH, SH)    Collection Time: 11/15/20  8:30 PM   Result Value Ref Range    Amphetamine Qual Urine Negative NEG^Negative    Barbiturates Qual Urine Negative NEG^Negative    Benzodiazepine Qual Urine Negative NEG^Negative    Cannabinoids Qual Urine Negative NEG^Negative    Cocaine Qual Urine Negative NEG^Negative    Opiates Qualitative Urine Negative NEG^Negative    PCP Qual Urine Negative NEG^Negative   UA reflex to Microscopic    Collection Time: 11/15/20  8:30 PM   Result Value Ref Range    Color Urine Yellow     Appearance Urine Clear     Glucose Urine Negative NEG^Negative mg/dL    Bilirubin Urine Negative NEG^Negative    Ketones Urine 10 (A) NEG^Negative mg/dL    Specific Gravity Urine 1.018 1.003 - 1.035    Blood Urine Negative NEG^Negative    pH Urine 6.5 5.0 - 7.0 pH    Protein Albumin Urine Negative NEG^Negative mg/dL    Urobilinogen mg/dL 2.0 0.0 - 2.0 mg/dL    Nitrite Urine Negative NEG^Negative    Leukocyte Esterase Urine Negative NEG^Negative    Source Midstream Urine           Psychiatric Examination:     /85   Pulse 68   Temp 99.2  F (37.3  C) (Oral)   Resp 14   Ht 1.6 m (5' 3\")   Wt 50.1 kg (110 lb 8 oz)   SpO2 98%   BMI 19.57 kg/m    Weight is 110 lbs 8 oz  Body mass index is 19.57 kg/m .    Orthostatic Vitals     None        Appearance: age-appearing, wearing hospital scrubs, adequate hygiene and grooming, neat hair, in no acute distress  Behavior: cooperative and calm, demonstrating intermittent eye contact  Orientation: alert and oriented to time, place and person  Movements: did not observe abnormal or involuntary muscle movements; gait and station within normal limits.  Mood: \"I feel trapped\"  Affect: mood-congruent, mildly anxious, restricted in range  Speech: Normal rate, rhythm, tone  Memory: no impairment in immediate, recent, or remote memory based on conversation  Intellectual capacity: Average for development based on word choice and education  Concentration: " "attentive to interview  Thought process and content: organized, coherent; no loosening of associations. Denies auditory or visual hallucinations. No delusions or paranoia endorsed or elicited on exam.   Insight: fair  Judgement: poor, given ongoing self-harm  Safety: continues to have self harm urges, thinking about crushing self-with table or sticking wet finger into an outlet. She did scratch with an earring earlier today. No thoughts to harm others.          Precautions:     Behavioral Orders   Procedures     Code 1 - Restrict to Unit     Routine Programming     As clinically indicated     Self Injury Precaution     Status 15     Every 15 minutes.     Suicide precautions     Patients on Suicide Precautions should have a Combination Diet ordered that includes a Diet selection(s) AND a Behavioral Tray selection for Safe Tray - with utensils, or Safe Tray - NO utensils            Diagnoses:   1.  Major depressive disorder, recurrent, severe with anxious distress.   2.  Generalized anxiety disorder.   3.  Anorexia nervosa.   4.  Borderline personality disorder.          Assessment & Plan:   Assessment and hospital summary:  Ms. Lawrence is a 25-year-old female with history of anorexia, depression, borderline personality traits, and anxiety, who presents with worsening suicidal ideation with thoughts of overdosing and then later having thoughts to jump off bridge.  apparently disposed of her medication stash, which patient described as destabilizing. Is currently in outpatient treatment through [facility] for her eating disorder and is also enrolled in a DBT program through [facility].     Currently, Ms. Lawrence is asking about discharge, stating she feels \"trapped\" here and this makes her want to self-harm. She had been scratching with earrings this morning, which were taken away. Discussed with her that if she can demonstrate 24 hours of safe behaviors she can be discharged. Will target tomorrow for discharge if able to " maintain safety.    In terms of medications, duloxetine has been decreased from 90 mg to 60 mg due to concern that 90 mg may be leading to activation. In addition, lamotrigine was initiated over the weekend for mood stabilization. She had been on this previously but does not appear she had taken a dose over 100 mg. Would target 150 to 200 mg dose.     Plan  1) Continue duloxetine 60 mg daily  2) Continue lamotrigine 25 mg daily x 2 weeks, then increase to 50 mg daily x 2 weeks, then increase to 100 mg. Further titration per her outpatient psychiatrist.   3) Patient to follow-up with outpatient providers at [facility] and DBT program  4) Target 11/17 for discharge    Medical Problems and Treatments:  N/A    Behavioral/Psychological/Social:  Encourage unit programming  SW consult    Disposition Plan   Reason for ongoing admission: poses an imminent risk to self  Discharge location: home with family  Discharge Medications: not ordered  Follow-up Appointments: not scheduled  Legal Status: 72 hour hold  Entered by: Marcellus Méndez on 11/16/2020 at 1:27 PM

## 2020-11-16 NOTE — PROGRESS NOTES
[id number] Pt in [facility] watching T.V. and conversing with staff member.  Talked with  on phone, sated he is very supportive.  Reports having SI r/t feeling isolated and trapped in the hospital here and along with the quarantine type atmosphere outside the hospital.  Still struggling with eating disorder especially in the hospital.  Stated Qian has been helping and felt better not taking Seroquel which she had a bad reaction to yesterday.

## 2020-11-16 NOTE — PROGRESS NOTES
"   11/16/20 1400   General Information   Date Initially Attended OT 11/16/20     With MIN encouragement, pt participated in therapeutic group activity and discussion addressing illness management through healthy coping. Educated pt on benefits of therapeutic journaling with pt verbalizing understanding. With task set up, pt created personalized journal for use in the future. Facilitated use of journal with pt writing response to the prompt \"write the words you need to hear.\" More observation needed to complete initial evaluation at this time. Pt will continue to benefit from OT intervention to address implementation of positive functional coping skills, role performance, and community reintegration.         "

## 2020-11-17 PROCEDURE — 124N000001 HC R&B MH

## 2020-11-17 PROCEDURE — 250N000013 HC RX MED GY IP 250 OP 250 PS 637: Performed by: NURSE PRACTITIONER

## 2020-11-17 PROCEDURE — 250N000013 HC RX MED GY IP 250 OP 250 PS 637: Performed by: PSYCHIATRY & NEUROLOGY

## 2020-11-17 RX ADMIN — LURASIDONE HYDROCHLORIDE 80 MG: 80 TABLET, FILM COATED ORAL at 20:42

## 2020-11-17 RX ADMIN — LORAZEPAM 0.5 MG: 0.5 TABLET ORAL at 10:42

## 2020-11-17 RX ADMIN — LORAZEPAM 0.5 MG: 0.5 TABLET ORAL at 15:36

## 2020-11-17 RX ADMIN — OLANZAPINE 5 MG: 5 TABLET, ORALLY DISINTEGRATING ORAL at 18:54

## 2020-11-17 RX ADMIN — Medication 1000 UNITS: at 08:48

## 2020-11-17 RX ADMIN — LAMOTRIGINE 25 MG: 25 TABLET ORAL at 08:48

## 2020-11-17 RX ADMIN — DULOXETINE HYDROCHLORIDE 60 MG: 60 CAPSULE, DELAYED RELEASE ORAL at 20:41

## 2020-11-17 RX ADMIN — POLYETHYLENE GLYCOL 3350 17 G: 17 POWDER, FOR SOLUTION ORAL at 08:47

## 2020-11-17 ASSESSMENT — ACTIVITIES OF DAILY LIVING (ADL)
LAUNDRY: WITH SUPERVISION
ORAL_HYGIENE: INDEPENDENT
HYGIENE/GROOMING: INDEPENDENT
DRESS: SCRUBS (BEHAVIORAL HEALTH);INDEPENDENT

## 2020-11-17 ASSESSMENT — MIFFLIN-ST. JEOR: SCORE: 1189.93

## 2020-11-17 NOTE — PROGRESS NOTES
"Pt was tearful and felt \"hopeless\" this morning, but has since brightened and been more visible on the unit. She continues to experience persistent suicidal thoughts, such as ideas to \"jump off a bridge\", but with no intent to act. Her insight and judgement are poor. Thought processes are organized. Pt talked about her work and the happiness it brings her, and was able to identify some of her positive coping strategies. She hopes to be discharged soon, but understands that she will remain here due to her safety. Med-compliant.   "

## 2020-11-17 NOTE — PROGRESS NOTES
Behavioral Health  Note    Behavioral Health  Spirituality Group Note    UNIT [address]    Name: Corinne N Palm YOB: 1995   MRN: [id number] Age: 25 year old      Patient attended -led group, which included discussion of spirituality, coping with illness and building resilience.    Patient attended group for 1.0 hrs.    The patient actively participated in group discussion and patient demonstrated an appreciation of topic's application for their personal circumstances.      Annie Foster  Associate   Pager  [phone]  Phone [phone]

## 2020-11-17 NOTE — PROGRESS NOTES
Patient was in her room and 2 staff members were talking with her.  She voluntarily gave staff a straw she had hid in her pillow case.

## 2020-11-17 NOTE — PROGRESS NOTES
"Patient informed me during one of her \"very sad episode\" that she had done a \"bad thing\", that she hid a plastic knife in her pillow case. Ativan second PRN dose given. The \"episodes\" happened at around 10.30 am, 3.30 pm and sometime 7 pm.  "

## 2020-11-17 NOTE — PLAN OF CARE
"Patient is A&O. Presents as sad and anxious in mood, with flat affect. Speech is soft spoken. Thought process is evidence of self harm urges. Patient was observed hiding utensils and container parts from her meal tray, especially plastic edge of a single serving of butter container. Patient admitted this was with intention of cutting herself. She was encouraged to be visible in the [facility] lounge where staff can better monitor her activity. Patient was agreeable and has been visible on the unit and minimally social with peers. She did not attend any unit programming tonight. Insight and judgment remain limited. Patient endorses chronic suicidal ideation. Says she \"would jump off a bridge if I were to get out of here right now.\" She verbally contracts for safety. Patient denied any visual or auditory hallucinations. She signed ROIs for her outpatient DBT providers as well as her significant other. Patient only had minimal food and fluid intake this evening. Only took 2 -3 sips of lemonade and did not eat her dinner. She was offered boost, but the patient declined, then changed her mind and drank half of the container. Staff kept promoting fluid intake. Nursing will continue to monitor for safety.  "

## 2020-11-17 NOTE — PLAN OF CARE
Work Completed: Participated in team meeting  Met with patient in-person   Provided Acceptance and Commitment Therapy as well as CBT handouts to rehab staff to give to patient as daily homework   Chart review  Completed initial psychosocial assessment  Started AVS    Discharge plan or goal: Follow up with outpatient providers                Barriers to discharge: Sx stabilization, safe discharge plan

## 2020-11-17 NOTE — PLAN OF CARE
Initial Psychosocial Assessment    I have reviewed the chart, met with the patient, and developed Care Plan.  Information for assessment was obtained from: Patient and Medical chart    Presenting Problem:  Admitted on a 72 hour hold to  [facility] on 11/13/20 (note created 11/17 due to gaps in coverage) due to suicidal ideation    She endorsed worsening depression (over 2-3 months), anxiety, eating disorder (anoerexia) with SI ideation.  Pt saw psychiatrist day before admission.  While there admitted having stockpile of medications. Pt/ got rid of pills.  SI continued with plan to jump a bridge or cut herself         History of Mental Health and Chemical Dependency:  Dx hx includes: OCD, Anxiety (with weekly panic attacks), Anorexia, Self injurious behavior, Borderline personality disorder, Depression.  Hospitalized at [facility] a few times in the last few years and acknowledges that she would cut while in the hospital as a suicide attempt. ongoing suicidal thoughts that come and go including thoughts about pouring water in the electrical outlets (or outlets are off in her room).  No hx of TMS or ECT.  Hospitalist assessment/labs this admit do not suggest organic cause for mental health decompensation.  Denies illicit drug use. Utox given at admit: NEG    Family Description (Constellation, Family Psychiatric History):  . No kids.      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     Living Situation:  Lives in [facility]). Resides with her  whom she has been with for 12 years and  for the past 2.    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 Dalia Newberry  DBT Therapy: [facility]  Individual therapy: [facility]    Social Service Assessment/Plan:  Patient will have psychiatric assessment and medication management by the psychiatrist. Medications will be reviewed and adjusted per MD as indicated. The treatment team will continue to assess and stabilize the patient's mental health symptoms with the use of medications and therapeutic programming. Hospital staff will provide a safe environment and a therapeutic milieu. Staff will continue to assess patient as needed. Patient will participate in unit groups and activities. Patient will receive individual and group support on the unit.     CTC will do individual inpatient treatment planning and after care planning. CTC will discuss options for increasing community supports with the patient. CTC will coordinate with outpatient providers and will place referrals to ensure appropriate follow up care is in place.

## 2020-11-17 NOTE — PROGRESS NOTES
"[facility], [place]   Psychiatric Progress Note  Hospital Day: 4        Interim History:   The patient's care was discussed with the treatment team during the daily team meeting and/or staff's chart notes were reviewed.  Staff report patient patient has appeared anxious, complaining of ongoing suicidal ideation with a plan to jump off a bridge. Up several times during the night.     On interview today, patient is seen lying in bed. She reports that she wants to go home but continues to endorse thoughts of suicide with a plan to jump off a bridge by the [facility] after her , Robin, goes to sleep. She says if that plan fell through, she would plan to starve herself. She replies, \"I don't know\" when asked what DBT skills she could be using. Denies current plan to end her life here in the hospital but continues to have self harm urges. Continues to report feeling trapped. Encouraged her to review and practice her skills. Encouraged deep breathing, mindfulness, and relaxation techniques. She appeared to become increasingly anxious as the interview progressed.     Spoke to her , Robin, by phone. He reports that patient has been struggling over the last year. Has been struggling with not being able to see her family during the pandemic. Has been dealing with work stress. He is unable to identify other stressors that could be contributing to her presentation. Reports that her suicidal ideation and intent had been worsening over the past 1-2 weeks prior to admission. He does not quite feel safe with her at home. Would like a good safety plan in place. Encouraged Robin to give patient encouragement to utilize her skills when he speaks to her.           Medications:       DULoxetine  60 mg Oral At Bedtime     lamoTRIgine  25 mg Oral Daily     lurasidone  80 mg Oral At Bedtime     polyethylene glycol  17 g Oral Daily     Vitamin D3  1,000 Units Oral Daily          Allergies:     Allergies " "  Allergen Reactions     Morphine Other (See Comments)     Twitching     Amoxicillin Rash     Sulfa Drugs Rash          Labs:     Recent Results (from the past 48 hour(s))   HCG qualitative urine    Collection Time: 11/15/20  8:30 PM   Result Value Ref Range    HCG Qual Urine Negative NEG^Negative   Drug abuse screen 77 urine (FL, RH, SH)    Collection Time: 11/15/20  8:30 PM   Result Value Ref Range    Amphetamine Qual Urine Negative NEG^Negative    Barbiturates Qual Urine Negative NEG^Negative    Benzodiazepine Qual Urine Negative NEG^Negative    Cannabinoids Qual Urine Negative NEG^Negative    Cocaine Qual Urine Negative NEG^Negative    Opiates Qualitative Urine Negative NEG^Negative    PCP Qual Urine Negative NEG^Negative   UA reflex to Microscopic    Collection Time: 11/15/20  8:30 PM   Result Value Ref Range    Color Urine Yellow     Appearance Urine Clear     Glucose Urine Negative NEG^Negative mg/dL    Bilirubin Urine Negative NEG^Negative    Ketones Urine 10 (A) NEG^Negative mg/dL    Specific Gravity Urine 1.018 1.003 - 1.035    Blood Urine Negative NEG^Negative    pH Urine 6.5 5.0 - 7.0 pH    Protein Albumin Urine Negative NEG^Negative mg/dL    Urobilinogen mg/dL 2.0 0.0 - 2.0 mg/dL    Nitrite Urine Negative NEG^Negative    Leukocyte Esterase Urine Negative NEG^Negative    Source Midstream Urine           Psychiatric Examination:     /87   Pulse 106   Temp 97.7  F (36.5  C) (Oral)   Resp 16   Ht 1.6 m (5' 3\")   Wt 48.6 kg (107 lb 1.6 oz)   SpO2 99%   BMI 18.97 kg/m    Weight is 107 lbs 1.6 oz  Body mass index is 18.97 kg/m .    Orthostatic Vitals     None        Appearance: age-appearing, wearing hospital scrubs, adequate hygiene and grooming, in no acute distress  Behavior: overall cooperative, but becoming increasingly anxious, demonstrating poor eye contact  Orientation: alert and oriented to time, place and person  Movements: did not observe abnormal or involuntary muscle movements; gait " "and station within normal limits; normal muscle strength and tone on observation  Mood: \"I don't know\"  Affect: mood-congruent, anxious, restricted in range  Speech: Normal rate, rhythm, tone  Memory: no impairment in immediate, recent, or remote memory based on conversation  Intellectual capacity: Average for development based on word choice and education  Concentration: attentive to interview  Thought process and content: organized, coherent; no loosening of associations. Denies auditory or visual hallucinations. No delusions or paranoia endorsed or elicited on exam.   Insight: limited   Judgement: limited  Safety: continues to have self harm urges. Continues to endorse SI with plan to jump off bridge or starve self. Endorses intent if she were to leave the hospital today. No thoughts to harm others.          Precautions:     Behavioral Orders   Procedures     Code 1 - Restrict to Unit     Routine Programming     As clinically indicated     Self Injury Precaution     Status 15     Every 15 minutes.     Suicide precautions     Patients on Suicide Precautions should have a Combination Diet ordered that includes a Diet selection(s) AND a Behavioral Tray selection for Safe Tray - with utensils, or Safe Tray - NO utensils            Diagnoses:   1.  Major depressive disorder, recurrent, severe with anxious distress.   2.  Generalized anxiety disorder.   3.  Anorexia nervosa.   4.  Borderline personality disorder.          Assessment & Plan:   Assessment and hospital summary:  Ms. Lawrence is a 25-year-old female with history of anorexia, depression, borderline personality traits, and anxiety, who presents with worsening suicidal ideation with thoughts of overdosing and then later having thoughts to jump off bridge.  apparently disposed of her medication stash, which patient described as destabilizing. Is currently in outpatient treatment through [facility] for her eating disorder and is also enrolled in a DBT program " through [facility].     Ms. Lawrence continues to struggle with suicidal ideation. Reports intent on my interview today, states she would jump off the bridge by the [facility] or starve herself. After telling me this, she then says she wants to go home. She was encouraged to utilize her DBT skills. Discussed finding ways to manage her suicidal ideation without needing to act.     Spoke to . He is not sure if he feels safe with patient coming home with active suicidal ideation with intent. Would like a good safety plan in place. Encouraged Robin to call patient, give encouragement to utilize her skills.     No med changes today.     Plan  1) Continue duloxetine 60 mg daily  2) Continue lamotrigine 25 mg daily x 2 weeks, then increase to 50 mg daily x 2 weeks, then increase to 100 mg. Further titration per her outpatient psychiatrist.   3) Encourage patient to utilize coping strategies. Encourage groups as staffing allows. Needs supervision in groups. Encourage intense individual work.   4) Patient to follow-up with outpatient providers at [facility] and DBT program  5) Target 11/18 for discharge if suicidal intent has resolved    Medical Problems and Treatments:  N/A    Behavioral/Psychological/Social:  Encourage unit programming  SW consult    Disposition Plan   Reason for ongoing admission: poses an imminent risk to self  Discharge location: home with family  Discharge Medications: not ordered  Follow-up Appointments: not scheduled  Legal Status: 72 hour hold  Entered by: Marcellus Méndez on 11/16/2020 at 1:40 PM

## 2020-11-18 VITALS
OXYGEN SATURATION: 97 % | HEART RATE: 88 BPM | HEIGHT: 63 IN | WEIGHT: 107.1 LBS | DIASTOLIC BLOOD PRESSURE: 78 MMHG | TEMPERATURE: 97.5 F | BODY MASS INDEX: 18.98 KG/M2 | RESPIRATION RATE: 16 BRPM | SYSTOLIC BLOOD PRESSURE: 112 MMHG

## 2020-11-18 PROCEDURE — 250N000013 HC RX MED GY IP 250 OP 250 PS 637: Performed by: NURSE PRACTITIONER

## 2020-11-18 PROCEDURE — 99207 PR CDG-CODE INCORRECT PER BILLING BASED ON TIME: CPT | Performed by: NURSE PRACTITIONER

## 2020-11-18 PROCEDURE — 250N000013 HC RX MED GY IP 250 OP 250 PS 637: Performed by: PSYCHIATRY & NEUROLOGY

## 2020-11-18 PROCEDURE — 99238 HOSP IP/OBS DSCHRG MGMT 30/<: CPT | Performed by: NURSE PRACTITIONER

## 2020-11-18 RX ORDER — DULOXETIN HYDROCHLORIDE 60 MG/1
60 CAPSULE, DELAYED RELEASE ORAL AT BEDTIME
Qty: 30 CAPSULE | Refills: 0 | Status: ON HOLD | OUTPATIENT
Start: 2020-11-18 | End: 2021-06-28

## 2020-11-18 RX ORDER — LAMOTRIGINE 25 MG/1
25 TABLET ORAL DAILY
Qty: 51 TABLET | Refills: 0 | Status: SHIPPED | OUTPATIENT
Start: 2020-11-19 | End: 2021-06-08

## 2020-11-18 RX ADMIN — POLYETHYLENE GLYCOL 3350 17 G: 17 POWDER, FOR SOLUTION ORAL at 08:08

## 2020-11-18 RX ADMIN — Medication 1000 UNITS: at 08:08

## 2020-11-18 RX ADMIN — OLANZAPINE 5 MG: 5 TABLET, ORALLY DISINTEGRATING ORAL at 13:52

## 2020-11-18 RX ADMIN — LAMOTRIGINE 25 MG: 25 TABLET ORAL at 08:08

## 2020-11-18 ASSESSMENT — ACTIVITIES OF DAILY LIVING (ADL)
ORAL_HYGIENE: INDEPENDENT
DRESS: INDEPENDENT
DRESS: INDEPENDENT
ORAL_HYGIENE: INDEPENDENT
LAUNDRY: WITH SUPERVISION
HYGIENE/GROOMING: INDEPENDENT
LAUNDRY: WITH SUPERVISION
HYGIENE/GROOMING: INDEPENDENT

## 2020-11-18 NOTE — DISCHARGE SUMMARY
[facility]  Psychiatric Discharge Summary    Corinne N Palm MRN# [id number]   Age: 25 year old YOB: 1995     Date of Admission:  11/13/2020  Date of Discharge:  11/18/2020  Admitting Physician:  Ry Aviles MD  Discharge Physician:  Marcellus Méndez CNP (Contact: [phone])         Event Leading to Hospitalization:   Ms. 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  Notably, she had had an appointment with her outpatient psychiatrist before admission.  Admitted to having stockpiled medications.  Her  was going to dispose of this, but this was somewhat destabilizing for the patient as she had some psychological comfort from the idea of having the stockpile on hand.  Described that she started having more suicidal ideation including thoughts about jumping off a bridge or cutting herself.  She and her  called the crisis line.  She was eventually brought to the emergency room.  She was quite ambivalent about admission and was placed on a 72-hour hold as she was deemed to meet criteria despite her ambivalence about coming into the hospital.  She was admitted to station 77 in the [facility].          DIagnoses:   1.  Major depressive disorder, recurrent, severe with anxious distress.   2.  Generalized anxiety disorder.   3.  Anorexia nervosa.   4.  Borderline personality disorder.          Labs:     Results for orders placed or performed during the hospital encounter of 11/13/20   Asymptomatic COVID-19 Virus (Coronavirus) by PCR     Status: None    Specimen: Nasopharyngeal   Result Value Ref Range    COVID-19 Virus PCR to [facility] - Source Nasopharyngeal     COVID-19 Virus PCR to [facility] - Result       Test received-See reflex to IDDL  test SARS CoV2 (COVID-19) Virus RT-PCR   Comprehensive metabolic panel     Status: Abnormal   Result Value Ref Range    Sodium 138 133 - 144 mmol/L    Potassium 3.8 3.4 - 5.3 mmol/L    Chloride 104 94 - 109 mmol/L    Carbon Dioxide 26 20 - 32 mmol/L    Anion Gap 8 3 - 14 mmol/L    Glucose 75 70 - 99 mg/dL    Urea Nitrogen 6 (L) 7 - 30 mg/dL    Creatinine 0.67 0.52 - 1.04 mg/dL    GFR Estimate >90 >60 mL/min/[1.73_m2]    GFR Estimate If Black >90 >60 mL/min/[1.73_m2]    Calcium 9.3 8.5 - 10.1 mg/dL    Bilirubin Total 0.5 0.2 - 1.3 mg/dL    Albumin 4.1 3.4 - 5.0 g/dL    Protein Total 7.7 6.8 - 8.8 g/dL    Alkaline Phosphatase 55 40 - 150 U/L    ALT 42 0 - 50 U/L    AST 27 0 - 45 U/L   HCG qualitative urine     Status: None   Result Value Ref Range    HCG Qual Urine Negative NEG^Negative   TSH with free T4 reflex and/or T3 as indicated     Status: None   Result Value Ref Range    TSH 2.05 0.40 - 4.00 mU/L   CBC with platelets differential     Status: None   Result Value Ref Range    WBC 10.4 4.0 - 11.0 10e9/L    RBC Count 4.77 3.8 - 5.2 10e12/L    Hemoglobin 14.4 11.7 - 15.7 g/dL    Hematocrit 42.3 35.0 - 47.0 %    MCV 89 78 - 100 fl    MCH 30.2 26.5 - 33.0 pg    MCHC 34.0 31.5 - 36.5 g/dL    RDW 11.9 10.0 - 15.0 %    Platelet Count 358 150 - 450 10e9/L    Diff Method Automated Method     % Neutrophils 52.8 %    % Lymphocytes 37.9 %    % Monocytes 6.6 %    % Eosinophils 1.8 %    % Basophils 0.6 %    % Immature Granulocytes 0.3 %    Nucleated RBCs 0 0 /100    Absolute Neutrophil 5.5 1.6 - 8.3 10e9/L    Absolute Lymphocytes 3.9 0.8 - 5.3 10e9/L    Absolute Monocytes 0.7 0.0 - 1.3 10e9/L    Absolute Eosinophils 0.2 0.0 - 0.7 10e9/L    Absolute Basophils 0.1 0.0 - 0.2 10e9/L    Abs Immature Granulocytes 0.0 0 - 0.4 10e9/L    Absolute Nucleated RBC 0.0    SARS-CoV-2 COVID-19 Virus (Coronavirus) RT-PCR Nasopharyngeal     Status: None    Specimen: Nasopharyngeal   Result Value Ref Range    SARS-CoV-2 Virus Specimen Source  Nasopharyngeal     SARS-CoV-2 PCR Result NEGATIVE     SARS-CoV-2 PCR Comment       Testing was performed using the Aptima SARS-CoV-2 Assay on the [employer] Instrument System.   Additional information about this Emergency Use Authorization (EUA) assay can be found via   the Lab Guide.     Drug abuse screen 77 urine (FL, RH, SH)     Status: None   Result Value Ref Range    Amphetamine Qual Urine Negative NEG^Negative    Barbiturates Qual Urine Negative NEG^Negative    Benzodiazepine Qual Urine Negative NEG^Negative    Cannabinoids Qual Urine Negative NEG^Negative    Cocaine Qual Urine Negative NEG^Negative    Opiates Qualitative Urine Negative NEG^Negative    PCP Qual Urine Negative NEG^Negative   UA reflex to Microscopic     Status: Abnormal   Result Value Ref Range    Color Urine Yellow     Appearance Urine Clear     Glucose Urine Negative NEG^Negative mg/dL    Bilirubin Urine Negative NEG^Negative    Ketones Urine 10 (A) NEG^Negative mg/dL    Specific Gravity Urine 1.018 1.003 - 1.035    Blood Urine Negative NEG^Negative    pH Urine 6.5 5.0 - 7.0 pH    Protein Albumin Urine Negative NEG^Negative mg/dL    Urobilinogen mg/dL 2.0 0.0 - 2.0 mg/dL    Nitrite Urine Negative NEG^Negative    Leukocyte Esterase Urine Negative NEG^Negative    Source Midstream Urine               Consults:   No consultations were requested during this admission         Hospital Course:   Corinne N Palm was admitted to [facility] with attending Marcellus Méndez CNP on a 72 hour mental health hold. The patient was placed under status 15 (15 minute checks) to ensure patient safety. CBC, BMP, TSH and utox obtained. Ms. Lawrence did participate in groups and was visible in the milieu. During her hospitalization, duloxetine was decreased from 90 mg to 60 mg out of concern that this could be causing more activation. Lamotrigine was initiated at 25 mg daily for mood stabilization. Ms. Lawrence tolerated these changes well without report of side effects.  Lamotrigine will need to be titrated. After 2 weeks at 25 mg, the dose will be increased to 50 mg daily. After two weeks at 50 mg daily, the dose may be increased to 100 mg daily. Further adjustments will occur at the direction of Ms. Lawrence's outpatient provider. Recommend a dose of around 200 mg daily of lamotrigine. She was counseled on the risk Ocasio-Walter syndrome upon initiation of the medication. Ms. Lawrence did have several episodes of self-injurious behavior on the unit. After extensive conversation with Ms. Lawrence and her , it was determined that her needs would be best met at home while attending her outpatient DBT groups and eating disorder treatment.  agrees to remain with patient for the next week. Patient was provided with a return to work letter with a return to work date of 11/29/20.     Ms. Lawrence was released to home with . At the time of discharge, a safety plan was procured and Ms. Lawrence was bryanna for safety, agreeing to reach out to supports if suicidal ideation worsened. Suicidal risk mitigated by commitment to family, ability to volunteer a safety plan, history of seeking help when needed, and robust outpatient supports.         Discharge Medications:     Current Discharge Medication List      START taking these medications    Details   lamoTRIgine (LAMICTAL) 25 MG tablet Take 1 tablet (25 mg) by mouth daily for 9 days, then increase to 2 tablets (50 mg) by mouth daily.  Qty: 51 tablet, Refills: 0    Associated Diagnoses: Suicidal ideation; Episode of recurrent major depressive disorder, unspecified depression episode severity (H); Borderline personality disorder (H); Anxiety         CONTINUE these medications which have CHANGED    Details   DULoxetine (CYMBALTA) 60 MG capsule Take 1 capsule (60 mg) by mouth At Bedtime  Qty: 30 capsule, Refills: 0    Associated Diagnoses: Suicidal ideation; Episode of recurrent major depressive disorder, unspecified depression episode  "severity (H); Borderline personality disorder (H); Anxiety         CONTINUE these medications which have NOT CHANGED    Details   gabapentin (NEURONTIN) 300 MG capsule Take 300-600 mg by mouth nightly as needed (anxiety)      lurasidone (LATUDA) 80 MG TABS tablet Take 80 mg by mouth At Bedtime      polyethylene glycol (MIRALAX) 17 GM/Dose powder Take 17 g by mouth daily      Vitamin D, Cholecalciferol, 25 MCG (1000 UT) TABS Take 1,000 Units by mouth daily                   Psychiatric Examination:   Appearance: age-appearing, wearing hospital scrubs, adequate hygiene and grooming, in no acute distress  Behavior: overall calm, cooperative, demonstrating fair eye contact  Orientation: alert and oriented to time, place and person  Movements: did not observe abnormal or involuntary muscle movements; gait and station within normal limits; normal muscle strength and tone on observation  Mood: \"I don't know\"  Affect: mood-congruent, stable, restricted in range  Speech: Normal rate, rhythm, tone  Memory: no impairment in immediate, recent, or remote memory based on conversation  Intellectual capacity: Average for development based on word choice and education  Concentration: attentive to interview  Thought process and content: organized, coherent, future-oriented; no loosening of associations. Denies auditory or visual hallucinations. No delusions or paranoia endorsed or elicited on exam.   Insight: fair  Judgement: fair  Safety: continues to have self harm urges. Continues to endorse SI with plan to jump off bridge. Intent fluctuates. She agrees to reach out for help if this were to worsen. She is future-oriented and will be discharging to her  with a safety plan in place.          Discharge Plan:   Health Care Follow-up Appointments:   Psychiatry  Date/Time:  Monday Nov 30th at 8:40am.  This is a video appointment.      Provider: Dr Giulia Gómez at [facility]  Address: [facility] [address], [place], [place] " [address]  Phone: [phone]      Fax: [phone]     Individual Therapy  Therapist:   Deandra Banda  Date/Time: Thursday Nov 19th at 9am with Deandra Banda     Provider: Dr Gómez at [facility]  Address: [address] [place] [address] , [place] [address]  Phone: [phone]      DBT therapy at [facility] 3 day/week groups       Attestation:  The patient has been seen and evaluated by me,  Marcellus Méndez, CNP

## 2020-11-18 NOTE — PROGRESS NOTES
Pt slept intermittently during the night. No acute distress noted. Staff will continue to monitor for safety.

## 2020-11-18 NOTE — PROGRESS NOTES
Patient told staff that while in her room  to scratched her left wrist with a lose sharp nail sticking out from the vent in her room. Left inner wrist was cleaned with alcohol and some bacitracin applied to area. Pt also told staff that she had thought of slamming her head on the door with intention her self. Pt also attempted hitting her ankle and wrist against the door to break the bones but did not do it hard enough she told staff.

## 2020-11-18 NOTE — DISCHARGE INSTRUCTIONS
Behavioral Discharge Planning and Instructions      Summary:  You were admitted to [facility] at [facility] on 11/13/2020  due to Suicidal Ideations and Self Injurious Behaviors.  You were treated by Marcellus Méndez CNP and discharged on 11/18/20 to home, in [place].       Diagnoses:   1.  Major depressive disorder, recurrent, severe with anxious distress.   2.  Generalized anxiety disorder.   3.  Anorexia nervosa.   4.  Borderline personality disorder.     Health Care Follow-up Appointments:   Psychiatry  Date/Time:  Monday Nov 30th at 8:40am.  This is a video appointment.      Provider: Dr Giulia Gómez at [facility]  Address: [facility] [address], [place] [address]  Phone: [phone]      Fax: [phone]    Individual Therapy  Therapist:   Deandra Banda  Date/Time: Thursday Nov 19th at 9am with Deandra Banda     Provider: Dr Gómez [facility]  Address: [facility] [address], [place] [address]  Phone: [phone]     DBT therapy      Attend all scheduled appointments with your outpatient providers. Call at least 24 hours in advance if you need to reschedule an appointment to ensure continued access to your outpatient providers.   Major Treatments, Procedures and Findings:  You were provided with: {Major Treatments:835298}    Symptoms to Report: feeling more aggressive, increased confusion, losing more sleep, mood getting worse or thoughts of suicide    Early warning signs can include: increased depression or anxiety sleep disturbances increased thoughts or behaviors of suicide or self-harm  increased unusual thinking, such as paranoia or hearing voices    Safety and Wellness:  Take all medicines as directed.  Make no changes unless your doctor suggests them.      Follow treatment recommendations.  Refrain from alcohol and non-prescribed drugs.  Ask your support system to help you reduce your access to items that could harm yourself or others. If there is a concern for safety, call  "911.    Resources:   Crisis Intervention: 892.198.5789 or 456-038-8306 (TTY: 691.192.6596).  Call anytime for help.  National [place] on Mental Illness (www.[place].tanja.org): 763.394.2324 or 619-182-0018.  Suicide Awareness Voices of Education (SAVE) (www.save.org): 589-546-SAVE (8581)  Mental Health Consumer/Survivor Network of [place] (www.mhcsn.net): 183.910.6108 or 922-605-7810  Mental Health Association of [place] (www.mentalhealth.org): 592.580.7966 or 174-427-2093  [facility] Crisis Response 003-067-0876  Crisis text line: Text \"[place]\" to 140963. Free, confidential, 24/7.  Crisis Intervention: 883.417.6994 or 596-275-4468. Call anytime for help.       The treatment team has appreciated the opportunity to work with you.     Corinne,  please take care and make your recovery a daily recovery. If you would like to obtain any specific documentation regarding your hospitalization after your discharge, contact [facility] of Information/Medical Records at:   [phone].          "

## 2020-11-18 NOTE — PROGRESS NOTES
"Patient endorses suicidal ideation and acts on urges to self cut by using pens and plastic utensils. Patient knows these are restricted items for her and when asked if she self cuts she gives up her self injurious items. Patient is discharging later today but has persistent thoughts of \"jumping off a bridge.\" Patient attend morning groups but upon staff discovering her self injurious items groups privileges were revoked. Patient appears sad and depressed and presents with a flat affect.   "

## 2020-11-18 NOTE — PROGRESS NOTES
"[facility] [place]   Psychiatric Progress Note  Hospital Day: 5        Interim History:   The patient's care was discussed with the treatment team during the daily team meeting and/or staff's chart notes were reviewed. Per staff, patient slept well overnight with no acute events. Patient has been engaged in several attempts at self-harm, mainly by scratching self.    On interview today, patient doing okay today. She confirms having completed individual assignments yesterday and reports already reviewing the work with staff last evening. She continues to report ambivalence about being alive. Reports ambivalence about her plan of care moving forward. She continues to report that she wants to be at home but then goes on to state that she has considered whether she needs psychiatric residential treatment. Discussed her goals for the future. She wants to maintain her independence. She wants to start a family in the next 3 years, says she needs to get her physical and mental health stabilized before this can happen. Discussed her stressors with work as well as the pandemic, has not seen her parents for 8 months. She is very close to them and this has been extremely difficult. She does feel comfortable reaching out for help at home if she were having thoughts of wanting to hurt herself, stating \"yeah, I reached out for help last Friday before I came in.\"     Discussed the situation with her , Robin. He agrees that a prolonged hospital stay is not in Ms. Lawrence's best interest. Agrees that her behavior may worsen the longer she is here. He would like to have her back at home. He feels comfortable managing her self-injurious behaviors. Confirms that her previous suicide attempts have been via superficial cutting. He plans to take the next week off from work to be home with patient and have a \"stay-cation.\" He says he would be able to pick patient up this evening.          Medications:       " "DULoxetine  60 mg Oral At Bedtime     lamoTRIgine  25 mg Oral Daily     lurasidone  80 mg Oral At Bedtime     polyethylene glycol  17 g Oral Daily     Vitamin D3  1,000 Units Oral Daily          Allergies:     Allergies   Allergen Reactions     Morphine Other (See Comments)     Twitching     Amoxicillin Rash     Sulfa Drugs Rash          Labs:     No results found for this or any previous visit (from the past 48 hour(s)).       Psychiatric Examination:     /83   Pulse 105   Temp 98.5  F (36.9  C) (Oral)   Resp 16   Ht 1.6 m (5' 3\")   Wt 48.6 kg (107 lb 1.6 oz)   SpO2 98%   BMI 18.97 kg/m    Weight is 107 lbs 1.6 oz  Body mass index is 18.97 kg/m .    Orthostatic Vitals     None        Appearance: age-appearing, wearing hospital scrubs, adequate hygiene and grooming, in no acute distress  Behavior: overall calm, cooperative, demonstrating fair eye contact  Orientation: alert and oriented to time, place and person  Movements: did not observe abnormal or involuntary muscle movements; gait and station within normal limits; normal muscle strength and tone on observation  Mood: \"I don't know\"  Affect: mood-congruent, stable, restricted in range  Speech: Normal rate, rhythm, tone  Memory: no impairment in immediate, recent, or remote memory based on conversation  Intellectual capacity: Average for development based on word choice and education  Concentration: attentive to interview  Thought process and content: organized, coherent, future-oriented; no loosening of associations. Denies auditory or visual hallucinations. No delusions or paranoia endorsed or elicited on exam.   Insight: fair  Judgement: fair  Safety: continues to have self harm urges. Continues to endorse SI with plan to jump off bridge. Intent fluctuates. She agrees to reach out for help if this were to worsen. She is future-oriented and will be discharging to her  with a safety plan in place.          Precautions:     Behavioral Orders "   Procedures     Code 1 - Restrict to Unit     Routine Programming     As clinically indicated     Self Injury Precaution     Status 15     Every 15 minutes.     Suicide precautions     Patients on Suicide Precautions should have a Combination Diet ordered that includes a Diet selection(s) AND a Behavioral Tray selection for Safe Tray - with utensils, or Safe Tray - NO utensils            Diagnoses:   1.  Major depressive disorder, recurrent, severe with anxious distress.   2.  Generalized anxiety disorder.   3.  Anorexia nervosa.   4.  Borderline personality disorder.          Assessment & Plan:   Assessment and hospital summary:  Ms. Lawrence is a 25-year-old female with history of anorexia, depression, borderline personality traits, and anxiety, who presents with worsening suicidal ideation with thoughts of overdosing and then later having thoughts to jump off bridge.  apparently disposed of her medication stash, which patient described as destabilizing. Is currently in outpatient treatment through [facility] for her eating disorder and is also enrolled in a DBT program through [facility].     Ms. Lawrence is seen today for follow-up. Discussed with her today future goals. Stress of not being able to see her parents for the last 8 months. She continues to report suicidal ideation with thoughts of jumping off a bridge. Intent appears to fluctuate. She has engaged in some superficial scratching on the unit. Spoke to , Robin. He would like her home. Patient would like to be home. Robin will be taking off work for the next week to be home with patient and will be able to monitor her. Patient has a safety plan in place and agrees to reach out for help if feeling unsafe. She does have a history of being able to reach out for help. Past suicidal attempts have been via cutting. No history of serious attempts. Risk mitigated by 's familiarity with patient's condition and the fact that he will be with her for the next  week. Return to work letter was signed and placed in patient's chart for return to work on 11/29. Patient will be discharged to the care of her  this evening who is in agreement with plan. Will discontinue 72 hour hold and have patient sign in voluntarily prior to dismissal.     Plan  1) Continue duloxetine 60 mg daily  2) Continue lamotrigine 25 mg daily x 2 weeks, then increase to 50 mg daily x 2 weeks, then increase to 100 mg. Further titration per her outpatient psychiatrist.   3) Patient to follow-up with outpatient providers at [place] and DBT program  4) Safety plan  5) Discharge this evening to care of     Medical Problems and Treatments:  N/A    Behavioral/Psychological/Social:  Encourage unit programming  SW consult    Disposition Plan   Reason for ongoing admission: poses an imminent risk to self  Discharge location: home with family  Discharge Medications: ordered.  Follow-up Appointments: Scheduled  Legal Status: Voluntary  Entered by: Marcellus Méndez on 11/16/2020 at 3:08 PM

## 2020-11-19 NOTE — PROGRESS NOTES
Pt continues to endorse Suicide ideation but stated she is not going to act on it. Pt has been havne some SIB behaviors in the unit. Pt psychiatrist notes they did think patient was ready for discharge, but after the psychiatrist spoke with the  he agreed that patient's condition might only get worst in the hospital and that she might benefit being discharge. While in the hospital patient was on close monitoring as she keeps attempting to hurt self with any thing she could find. Pt could not be allow to go to groups because she keeps snicking sharp instruments to hurt self. Pt was med compliant and plans to continue with groups. Pt plans to use her coping skills from DBT if urges come up to hurt self. Pt is discharge at 1805 and accompanies by staff down stairs to door 6 where  is waiting to pick her up in a private transport to Home

## 2020-11-23 ENCOUNTER — HOSPITAL ENCOUNTER (OUTPATIENT)
Dept: BEHAVIORAL HEALTH | Facility: CLINIC | Age: 25
Discharge: HOME | End: 2020-11-23
Attending: FAMILY MEDICINE | Admitting: FAMILY MEDICINE
Payer: COMMERCIAL

## 2020-11-23 PROCEDURE — 90791 PSYCH DIAGNOSTIC EVALUATION: CPT | Mod: 95 | Performed by: SOCIAL WORKER

## 2020-11-23 ASSESSMENT — PATIENT HEALTH QUESTIONNAIRE - PHQ9: SUM OF ALL RESPONSES TO PHQ QUESTIONS 1-9: 21

## 2020-11-23 ASSESSMENT — ANXIETY QUESTIONNAIRES
5. BEING SO RESTLESS THAT IT IS HARD TO SIT STILL: SEVERAL DAYS
2. NOT BEING ABLE TO STOP OR CONTROL WORRYING: NEARLY EVERY DAY
6. BECOMING EASILY ANNOYED OR IRRITABLE: SEVERAL DAYS
GAD7 TOTAL SCORE: 15
4. TROUBLE RELAXING: MORE THAN HALF THE DAYS
7. FEELING AFRAID AS IF SOMETHING AWFUL MIGHT HAPPEN: NEARLY EVERY DAY
1. FEELING NERVOUS, ANXIOUS, OR ON EDGE: MORE THAN HALF THE DAYS
3. WORRYING TOO MUCH ABOUT DIFFERENT THINGS: NEARLY EVERY DAY

## 2020-11-23 NOTE — PROGRESS NOTES
[facility] Adult Partial Hospitalization Program  Provider Name:  Patricia Brian, [facility], [phone]      PATIENT'S NAME: Corinne N Palm  PREFERRED NAME: Lida  PRONOUNS:     She/Her  MRN: [id number]  : 1995   ACCT. NUMBER:  [id number]  DATE OF SERVICE: 20  START TIME: 8:30AM  END TIME: 9:30AM  PREFERRED PHONE:   May we leave a program related message: Yes  SERVICE MODALITY:  Video Visit:      Provider verified identity through the following two step process.  Patient provided:  Patient     Telemedicine Visit: The patient's condition can be safely assessed and treated via synchronous audio and visual telemedicine encounter.      Reason for Telemedicine Visit: Patient has requested telehealth visit    Originating Site (Patient Location): Patient's home    Distant Site (Provider Location): Provider Remote Setting    Consent:  The patient/guardian has verbally consented to: the potential risks and benefits of telemedicine (video visit) versus in person care; bill my insurance or make self-payment for services provided; and responsibility for payment of non-covered services.     Patient would like the video invitation sent by: Send to e-mail at: [email or web address]    Mode of Communication:  Video Conference via [facility]    As the provider I attest to compliance with applicable laws and regulations related to telemedicine.    UNIVERSAL ADULT Mental Health DIAGNOSTIC ASSESSMENT      Identifying Information:  Patient is a 25 year old, .    The pronoun use throughout this assessment reflects the patient's chosen pronoun.    Patient was referred for an assessment by DBT therapist referred..    Patient attended the session alone.     Chief Complaint:     From record review of discharge summary from unit [address] [place] [address] eDv Degroot Lucas, CNP dated, 20:  Ms. 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  Notably, she had had an appointment with her outpatient psychiatrist before admission.  Admitted to having stockpiled medications.  Her  was going to dispose of this, but this was somewhat destabilizing for the patient as she had some psychological comfort from the idea of having the stockpile on hand.  Described that she started having more suicidal ideation including thoughts about jumping off a bridge or cutting herself.  She and her  called the crisis line.  She was eventually brought to the emergency room.  She was quite ambivalent about admission and was placed on a 72-hour hold as she was deemed to meet criteria despite her ambivalence about coming into the hospital.  She was admitted to station 77 in the [facility].     The reason for seeking services at this time is: \" To stabilize my mental health symptoms because I feel like I am in crisis a lot of the time\".    The problem(s) began the patient reports that she has had worsening depression over the past 2-3 months.  She acknowledges might be in the setting of ongoing stressors as well as the seasonal change.  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  They did have a patient suicide in recent weeks that was quite \"triggering\" for her.  She notes that she has been struggling with depression, anhedonia, low energy and motivation, excessive sleep and poor appetite.  Reports that she has anxiety with weekly panic attacks and generalized worry as well as social anxiety.  She denies any history of pedro phase symptoms historically or any history of psychosis symptoms.  She acknowledges that she has been diagnosed with cluster B borderline personality traits, but states that psychological testing did not show a full disorder.  However, she " "does acknowledge having substantial abandonment sensitivity, chronic suicidal thoughts and self-harm ideation, chronic empty mood and identity instability as well as relationship instability.  She has had prior cutting attempts at suicide.  She was hospitalized at [facility] a few times in the last few years and acknowledges that she would cut while in the hospital as a suicide attempt.  At this time here during the current admission, she is having ongoing suicidal thoughts that come and go including thoughts about pouring water in the electrical outlets (or outlets are off in her room).  At this time, she is reporting that she would be able to notify nursing if she does feel unsafe, but she remains ambivalent about staying in the hospital, would like to go home so that she can work on Sunday or Monday.       PAST SURGICAL HISTORY:  Cleft palate with numerous repairs, x 12.   The patient actually notes that there is a component of medical trauma from her several repeated surgeries including noted that she had surgeries against her will when she was younger.      . Patient has attempted to resolve these concerns in the past through inpatient admissions, psychiatry, DBT, [facility], individual therapy.. She reported right now she has a lot of support including an OT, dietician and psychiatry. Feels she has a lot of programming but her DBT therapist feels it is not enough    Social/Family History:  Patient reported they grew up in [place].    They were raised by biological parents.     Patient reported that their childhood was \" she reported it was fine for the most part, has some medical trauma from having multiple surgeries for cleft open palete\"..    Patient described their current relationships with family of origin as she reported she is really close to her family and .    The patient describes their cultural background as none identified    Cultural influences and impact on patient's life structure, " values, norms, and healthcare: Racial or Ethnic Self-Identification , Verbal / Non-verbal Communication Style: coherent and linear and Locus of Control: feeling overwhelmed..      Contextual influences on patient's health include: Individual Factors feeling isolated due to COVID., Family Factors feels supported., Community Factors used to go to coffee shops and now unable to due COVID., Societal Factors COVID and feeling isolated., Economic Factors did not indicate stressors. and Health- Seeking Factors has multiple supports DBT and [facility]..    Stressors:  -work.  -COVID- not seeing parents for a long time.  -moved in March to a new house- in March so many things changed including job moved to telehealth.   -In March was in an adherent DBT program and then COVID really destabilized her mental health.      These factors will be addressed in the Preliminary Treatment plan.   atient identified their preferred language to be English.   Patient reported they does not need the assistance of an  or other support involved in therapy.     Patient reported had no significant delays in developmental tasks.     Patient's highest education level was college graduate.   Patient identified the following learning problems: none reported.    Modifications will not be used to assist communication in therapy.   Patient reports they are  able to understand written materials.    Patient reported the following relationship history Resides with her  whom she has been with for 12 years and  for the past 2.  They do not have any children.  The patient works as a music therapist in Psychiatry at [employer].  She works in their partial hospitalization program.  She does consider work somewhat stressful. .  No alcohol use or drug use and is a nonsmoker with no nicotine or tobacco use.  No  experience.     Patient's current relationship status is  for 2 years.     Patient identified their  sexual orientation as heterosexual   .  Patient reported having zero child(ashley).   Patient identified partner and parents as part of their support system.  and parents.   Patient identified the quality of these relationships as stable and meaningful.      Patient's current living/housing situation involves staying in own home/apartment.    They live with  and 3 cats and they report that housing is stable.     Patient is currently employed part time and reports they are not able to function appropriately at work..    Patient reports their finances are obtained through employment.  Patient does not identify finances as a current stressor.      Patient reported that they have not been involved with the legal system.   Patient denies being on probation / parole / under the jurisdiction of the court.    Patient's Strengths and Limitations:  Patient identified the following strengths or resources that will help them succeed in treatment: family support, intelligence, motivation, strong social skills and work ethic. Things that may interfere with the patient's success in treatment include: work stress..     Personal and Family Medical History:   Patient does report a family history of mental health concerns.  Patient reports family history is not on file.. Uncle has schizophrenia and bipolar and cousin has SEEMA and OCD and another cousin has an eating disorder.  Patient does not report Mental Health Diagnosis or Treatment.  .  Major depressive disorder, recurrent, severe with anxious distress.   2.  Generalized anxiety disorder.   3.  Anorexia nervosa.   4.  Borderline personality traits     Health Care Follow-up Appointments:   Psychiatry  Date/Time:  Monday Nov 30th at 8:40am.  This is a video appointment.      Provider: Dr Giulia Gómez at [facility]  Address: [facility] [address], [place] [address]  Phone: [phone]      Fax: [phone]     Individual Therapy  Therapist:   Deandra  "Veronika  Date/Time: Thursday Nov 19th at 9am with Deandra Banda     Provider: Dr Gmóez at [facility]  Address: [address], [place] [address]  Phone: [phone]      DBT therapy at [facility] - continue 3 day/week groups       Past mental health history includes:  2016 - [facility] and 2017- [facility] inpatient psychiatric hospitalizations due to suicidal ideation and depression.  Inpatient and residential eating disorder treatment. Inpatient admission for eating disorder 2018 @ [facility]  Past PHP.- 2017 at [facility]  Currently in DBT at [facility] 3x a week. Has been attending for the past 1 1/2 years.   Currently sees providers in the outpatient eating disorder program at [facility]. Has a therapist, psychiatrist, nutritionist, OT.  [facility] inpatient program 2016 in [place]- for depression and also for OCD as she was having difficulties at that time. She reported it was helpful and was there for 2 to 3 months.    Patient has not had a physical exam to rule out medical causes for current symptoms.    Date of last physical exam was greater than a year ago and client was encouraged to schedule an exam with PCP.   The patient does not have a Primary Care Provider and was encouraged to establish care with a PCP..    Patient reports the following current medical concerns: eating disorder..    There are significant appetite / nutritional concerns / weight changes. She reported that the eating disorder is \"consumming right now\", she reported she is close to her goal weight at [facility] but slightly underweight.   She reported that her own eating goal is \" to lose a bunch of weight and she is distressed when she eats normally\".   She reported she has done [facility] 5 times and 1 time inpatient at [facility] but reported that she continues to struggle with symptoms and reported the symptoms started at age 13.   She reported depressive symptoms started around age 13. "   [place] and did not have a lot of mental health services started to see a therapist for OCD symptoms which started age 9 or 10. Denied active OCD symptoms currently.   She did reported that she avoids the color red, does not like the number 6, avoids cracks, she has to pick out the best box of Mac and Cheese at grocery or something bad will happen.   Patient does not report a history of head injury / trauma / cognitive impairment.      Patient reports current meds as:   Outpatient Medications Marked as Taking for the 11/23/20 encounter (Hospital Encounter) with Aure Brian, [facility]   Medication Sig     DULoxetine (CYMBALTA) 60 MG capsule Take 1 capsule (60 mg) by mouth At Bedtime     gabapentin (NEURONTIN) 300 MG capsule Take 300-600 mg by mouth nightly as needed (anxiety)     lamoTRIgine (LAMICTAL) 25 MG tablet Take 1 tablet (25 mg) by mouth daily for 9 days, then increase to 2 tablets (50 mg) by mouth daily.     lurasidone (LATUDA) 80 MG TABS tablet Take 80 mg by mouth At Bedtime     polyethylene glycol (MIRALAX) 17 GM/Dose powder Take 17 g by mouth daily     Vitamin D, Cholecalciferol, 25 MCG (1000 UT) TABS Take 1,000 Units by mouth daily        Medication Adherence:  Patient reports reported sometimes she stops as she thinks they are not working..For the most part but sometimes she stops as she does not think she needs them.    Patient Allergies:    Allergies   Allergen Reactions     Morphine Other (See Comments)     Twitching     Amoxicillin Rash     Sulfa Drugs Rash       Medical History:    Past Medical History:   Diagnosis Date     OCD (obsessive compulsive disorder)          Current Mental Status Exam:   Appearance:  Appropriate    Eye Contact:  Good   Psychomotor:  Normal       Gait / station:  no problem  Attitude / Demeanor: Cooperative   Speech      Rate / Production: Normal/ Responsive      Volume:  Normal  volume      Language:  intact  Mood:   Anxious  Depressed    Affect:   Constricted    Thought Content: Clear   Thought Process: Coherent       Associations: No loosening of associations  Insight:   Fair   Judgment:  Intact   Orientation:  All  Attention/concentration: Good    Rating Scales:    PHQ9:    PHQ-9 SCORE 11/23/2020   PHQ-9 Total Score 21   ;    GAD7:    PRECIOUS-7 SCORE 11/23/2020   Total Score 15     CGI:     First:Considering your total clinical experience with this particular patient population, how severe are the patient's symptoms at this time?: 5 (11/23/2020 10:50 AM)  ;    Most recentCompared to the patient's condition at the START of treatment, this patient's condition is: 4 (11/23/2020 10:50 AM)      Substance Use:  Patient did report a family history of substance use concerns; see medical history section for details. A cousin.   Patient has not received chemical dependency treatment in the past.  Patient has not ever been to detox.      Patient is not currently receiving any chemical dependency treatment. Patient reported the following problems as a result of their substance use: none identified.    Patient denies using alcohol.   Patient denies using tobacco.  Patient denies using marijuana.  Patient REPORTS CAFFEINE:- drink coffee and coke a lot.  Patient reports using/abusing the following substance(s). Patient reported no other substance use.     CAGE- AID:  No flowsheet data found.    Substance Use: No symptoms  CAGE-AID (CAGE Questions Adapted to Include Drugs)    1. Have you ever felt you ought to cut down on your drinking or drug use?  no  2. Have people annoyed you by criticizing your drinking or drug use?  no  3. Have you felt bad or guilty about your drinking or drug use?  no  4. Have you ever had a drink or used drugs first thing in the morning to steady your nerves or to get rid of a hangover?  no  Based on the negative CAGE score and clinical interview there  are not indications of drug or alcohol abuse.      Significant Losses / Trauma / Abuse  / Neglect Issues:   Patient did serve in the .  There are indications or report of significant loss, trauma, abuse or neglect issues related to: major medical problems as child had multiple surgeries due to cleft palate..  -past medical surgeries due to cleft palate and has past trauma- is currently during [facility] work with the psychologist at [facility].    Concerns for possible neglect are not present.     Safety Assessment: Currently continues to endorse suicidal thinking, continues to have thoughts of jumping off a bridge or cutting self, had been stockpiling meds and  got rid of old meds. She continues to perseverate on suicidal ideation. Feels trapped in general. Has struggled with all these issues for so long and feels it has impacted her life in so many ways and feels frustrated that she does not feel better. She denied imminent plan or thoughts of harming self or others and engaged in creating a safety plan in which her  is at home with her this week. She did report she is able to follow her safety plan and is able to use her resources and keep herself safe, she will discuss attending PHP with her DBT therapist today at 3:00PM and call this writer tomorrow with a decision.    Current Safety Concerns:  [place] Suicide Severity Rating Scale (Short Version)  [place] Suicide Severity Rating (Short Version) 11/13/2020 11/23/2020   Over the past 2 weeks have you felt down, depressed, or hopeless? yes yes   Over the past 2 weeks have you had thoughts of killing yourself? yes yes   Have you ever attempted to kill yourself? yes yes   When did this last happen? more than 6 months ago more than 6 months ago   Q1 Wished to be Dead (Past Month) - yes   Q2 Suicidal Thoughts (Past Month) - yes   Q3 Suicidal Thought Method - yes   Q4 Suicidal Intent without Specific Plan - yes   Q5 Suicide Intent with Specific Plan - yes   Q6 Suicide Behavior (Lifetime) - yes     Patient denies current  homicidal ideation and behaviors.  Patient SELF-INJURIOUS IDEATION:  Has a history of cutting and last did that in the hospital and used the sharp side of plastic butter container. Felt very trapped in the hospital and feels that was the trigger for the cutting. She reported in last hospitalizations did the same thing and feels upset about in engaging in doing that. She reported she started cutting around age 18. She reported it occurs about 1 a week or every other week.  Patient denied risk behaviors associated with substance use.  Patient cutting on self associated with mental health symptoms.  Patient reports the following current concerns for their personal safety: None.  Patient reports there are not  firearms in the house. .     History of Safety Concerns:  Patient denied a history of homicidal ideation.     Patient denied a history of personal safety concerns.    Patient denied a history of assaultive behaviors.    Patient denied a history of sexual assault behaviors.     Patient denied a history of risk behaviors associated with substance use.  Patient reported a history of self injury. associated with mental health symptoms.  Patient reports the following protective factors: dedication to family/friends, safe and stable environment, help seeking behaviors when distressed entered ED on recommendation., abstinence from substances, adherence with prescribed medication, agreement to use safety plan, living with other people, daily obligations, uses community crisis resources, positive social skills, financial stability, access to a variety of clinical interventions and pets    Risk Plan:  See Recommendations for Safety and Risk Management Plan        LOCUS Worksheet     Name: Corinne N Palm MRN: [id number]    : 1995      Gender:  female    PMI:     [facility]   Provider NPI:  [id number]    Actual level of Care Provided:  DA    Service(s) receiving or referred to:  PHP    Reason for  Variance: in need for higher level of care to assist with mood stabilization.      Rating completed by: Aure Brian [facility]      I. Risk of Harm:   3      Moderate Risk of Harm    II. Functional Status:   3      Moderate Impairment    III. Co-Morbidity:   3      Significant Co-Morbidity    IV - A. Recovery Environment - Level of Stress:   3      Moderately Stress Environment    IV - B. Recovery Environment - Level of Support:   2      Supportive Environment    V. Treatment and Recovery History:   3      Moderate to Equivocal Response to Treatment and Recovery Management    VI. Engagement and Recovery Project:   3      Limited Engagement and Recovery       20 Composite Score    Level of Care Recommendation:   20 to 22       Medically Monitored Non-Residential Services          Outpatient Mental Health Services - Adult    MY COPING PLAN FOR SAFETY    PATIENT'S NAME: Corinne N Palm  MRN:   [id number]  SAFETY PLAN:  Step 1: Warning signs / cues (Thoughts, images, mood, situation, behavior) that a crisis may be developing:    Thoughts: related they are obsessive and when she feels trapped or that she has failed she will have thougths of cutting or jumping off a bridge or overdosing.    Images: no    Thinking Processes: intrusive thoughts (bothersome, unwanted thoughts that come out of nowhere): feeling like she somehow failed. and highly critical and negative thoughts: gets very down on self.    Mood: worsening depression, hopelessness, helplessness, intense worry and mood swings    Behaviors: sleeping too much and asking for reassurance and perseverating on suicide and self injury.    Situations: trauma   Has had about 12 or 13 surgeries including age 14 she had to have her jaw wired shut. Also with the OCD felt she had to do certain things or she would not make it out of surgery.  Step 2: Coping strategies - Things I can do to take my mind off of my problems without contacting another person (relaxation technique,  "physical activity):    Distress Tolerance Strategies:  weighted blanket, tries to distract with other skills but reported having a hard time using them. Journaling and sleeping to reset brain.    Physical Activities: yoga but then gets obsessive about yoga and starts to focus on burning calories.    Focus on helpful thoughts:  It will go away eventually and just have to make it though the episode.  Step 3: People and social settings that provide distraction:   Name:  Robin pakrer Phone: [phone]      would go and get a coffee and sit and journal but cannot do it and it is distressing.   Step 4: Remind myself of people and things that are important to me and worth living for:  \"my  and parents and family and cats and I like my job and helping people, and music\".  Step 5: When I am in crisis, I can ask these people to help me use my safety plan:   Name: hyacinth Villanueva   Phone:  Wants to work on asking for help when need it.  [phone]  Step 6: Making the environment safe:     be around others  Step 7: Professionals or agencies I can contact during a crisis:    Suicide Prevention Lifeline: 4-306-462-TALK (0874)    Crisis Text Line Service (available 24 hours a day, 7 days a week): Text [place] to 684173    Call  **CRISIS (336913) from a cell phone to talk to a team of professionals who can help you.  Crisis Services By [facility]: Phone Number:   [place]     [phone]   [place]    [phone]   [place]    [phone]   [place]    [phone]   [place]    [phone]   [place] [phone]   [place]     [phone]       Call 911 or go to my nearest emergency department.     I helped develop this safety plan and agree to use it when needed.  I have been given a copy of this plan.      Client signature _________________________________________________________________  Today s date:  11/23/2020  Adapted from Safety Plan Template 2008 Maryan Hernandez and Ramiro Arteaga is reprinted with the express " permission of the authors.  No portion of the Safety Plan Template may be reproduced without the express, written permission.  You can contact the authors at bhs@[email or web address] or [email or web address].    Review of Symptoms per patient report:  Depression: No symptoms, Change in sleep, Lack of interest, Change in appetite, Psychomotor slowing or agitation, Suicidal ideation, Feelings of hopelessness, Feelings of helplessness, Low self-worth, Ruminations, Feeling sad, down, or depressed and Self-injurious behavior  Clarisa:  No Symptoms  Psychosis: No Symptoms  Anxiety: Excessive worry, Nervousness, Physical complaints, such as headaches, stomachaches, muscle tension, Social anxiety, Fears/phobias fear of cracks other OCD traits., Sleep disturbance, Psychomotor agitation and Ruminations  Panic:  Palpitations, Tremors, Shortness of breath and Sense of impending doom  Post Traumatic Stress Disorder:  Experienced traumatic event multiple surgeries, Hypervigilance, Increased arousal, Impaired functioning and Dissociation   Eating Disorder: Restriction  ADD / ADHD:  No symptoms  Conduct Disorder: No symptoms  Autism Spectrum Disorder: No symptoms  Obsessive Compulsive Disorder: avoids the color red, used to have to turn on light switch over and over, repetition and counting.    Patient reports the following compulsive behaviors and treatment history: no.      Diagnostic Criteria:   A. Excessive anxiety and worry about a number of events or activities (such as work or school performance).   B. The person finds it difficult to control the worry.   - Restlessness or feeling keyed up or on edge.    - Being easily fatigued.    - Difficulty concentrating or mind going blank.    - Muscle tension.    - Sleep disturbance (difficulty falling or staying asleep, or restless unsatisfying sleep).   D. The focus of the anxiety and worry is not confined to features of an Axis I disorder.  E. The anxiety, worry, or physical symptoms  cause clinically significant distress or impairment in social, occupational, or other important areas of functioning.   F. The disturbance is not due to the direct physiological effects of a substance (e.g., a drug of abuse, a medication) or a general medical condition (e.g., hyperthyroidism) and does not occur exclusively during a Mood Disorder, a Psychotic Disorder, or a Pervasive Developmental Disorder.   - Depressed mood. Note: In children and adolescents, can be irritable mood.     - Diminished interest or pleasure in all, or almost all, activities.    - Significant weight gaindecrease in appetite.    - Fatigue or loss of energy.    - Feelings of worthlessness or excessive guilt.    - Diminished ability to think or concentrate, or indecisiveness.    - Recurrent thoughts of death (not just fear of dying), recurrent suicidal ideation without a specific plan, or a suicide attempt or a specific plan for committing suicide.   B) The symptoms cause clinically significant distress or impairment in social, occupational, or other important areas of functioning  C) The episode is not attributable to the physiological effects of a substance or to another medical condition  Anorexia Nervosa- per record review and pt report.    Functional Status:  Patient reports the following functional impairments: health maintenance, social interactions and work / vocational responsibilities.     WHODAS:   WHODAS 2.0 Total Score 11/23/2020   Total Score 22       Clinical Summary:  1. Reason for assessment: referred to the PHP by her DBT therapist following an inpatient admission for suicidal plan to overdose on medications in context of worsening anxiety and depression and other stressors.  .  2. 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  .  3. Principal DSM5 Diagnoses  (Sustained by DSM5 Criteria Listed Above):   296.33 (F33.2) Major Depressive Disorder, Recurrent Episode, Severe _ and With anxious distress  300.02 (F41.1) Generalized Anxiety Disorder.  4. Other Diagnoses that is relevant to services:   307.1 Anorexia Nervosa  (F50.01) Restricting type    5. Provisional Diagnosis: No other symptoms were reported during the assessment that would indicate alternate diagnoses.  Should symptoms arise during the course of treatment the diagnoses can be updated at that time.   6. Prognosis: Relieve Acute Symptoms.  7. Likely consequences of symptoms if not treated: Without treatment patient more than likely will experience a continuation of symptoms with decreased daily functioning, requiring an increased level of care.  .  8. Client strengths include:  goal-focused, has a previous history of therapy, intelligent, open to learning, open to suggestions / feedback, support of family, friends and providers, wants to learn, willing to ask questions, willing to relate to others and work history .     Recommendations:     1. Plan for Safety and Risk Management:   A safety and risk management plan has been developed including: Patient consented to co-developed safety plan.  Safety and risk management plan was completed.  Patient agreed to use safety plan should any safety concerns arise.  A copy was given to the patient..          Report to child / adult protection services was NA.     2. Patient's identified no cultural concerns identified..     3. Initial Treatment will focus on:    Depressed Mood - provide support and structure in which to teach coping skills and improve level of safety and functioning.  Anxiety - provide support and structure in which to teach coping skills and improve level of safety and functioning..     4. Resources/Service Plan:    services are not indicated.   Modifications to assist communication are not  indicated.   Additional disability accommodations are not indicated.      5. Collaboration:   Collaboration / coordination of treatment will be initiated with the following  support professionals: TBD.      6.  Referrals:   The following referral(s) will be initiated: Partial Hospitalization Program. Next Scheduled Appointment: TBD.     A Release of Information has been obtained for the following: outpatient therapist and psychiatry.    7. SEEMA:    SEEMA:  Discussed the general effects of drugs and alcohol on health and well-being. Provider gave patient printed information about the effects of chemical use on their health and well being. Recommendations:  NA .     8. Records:   These were reviewed at time of assessment.   Information in this assessment was obtained from the medical record and  provided by patient who is a good historian.    Patient will have open access to their mental health medical record.      Provider Name/ Credentials:  Patricia Brian [facility], [phone]  November 23, 2020

## 2020-11-24 ENCOUNTER — BEH TREATMENT PLAN (OUTPATIENT)
Dept: BEHAVIORAL HEALTH | Facility: CLINIC | Age: 25
End: 2020-11-24
Attending: PSYCHIATRY & NEUROLOGY

## 2020-11-24 DIAGNOSIS — F33.2 MDD (MAJOR DEPRESSIVE DISORDER), RECURRENT SEVERE, WITHOUT PSYCHOSIS (H): ICD-10-CM

## 2020-11-24 ASSESSMENT — ANXIETY QUESTIONNAIRES: GAD7 TOTAL SCORE: 15

## 2020-11-24 NOTE — PROGRESS NOTES
Admission Date: 11/24/2020    Identify any current concerns with potential impact to admission:     medication/medical concerns: Patient reported she is open to trying new medications and doesn't want to gain weight.      immediate safety concerns: None reported, reported consistent suicidal ideation and consented to utilizing her safety plan    Does patient have safety plan? Yes  Note: Please copy safety plan copied into BE[facility] Encounter     Other (insurance/childcare/transportation/housing/planned absences/etc): Patient will be meeting with her individual therapist at 9:00 Tuesday and Thursday.     Patient's insurance is: [employer] .     Does patient need appointment with provider? Patient meets with Dr. Tompkins    Review patient's program schedule and inform them of any variation due to late days or holidays.                                                                          Completed by: MP Thakur on 11/24/2020 at 2:48 PM

## 2020-11-25 ENCOUNTER — HOSPITAL ENCOUNTER (OUTPATIENT)
Dept: BEHAVIORAL HEALTH | Facility: CLINIC | Age: 25
Discharge: HOME | End: 2020-11-25
Attending: PSYCHIATRY & NEUROLOGY | Admitting: PSYCHIATRY & NEUROLOGY
Payer: COMMERCIAL

## 2020-11-25 ENCOUNTER — HOSPITAL ENCOUNTER (OUTPATIENT)
Dept: BEHAVIORAL HEALTH | Facility: CLINIC | Age: 25
End: 2020-11-25
Attending: PSYCHIATRY & NEUROLOGY
Payer: COMMERCIAL

## 2020-11-25 ENCOUNTER — TELEPHONE (OUTPATIENT)
Dept: BEHAVIORAL HEALTH | Facility: CLINIC | Age: 25
End: 2020-11-25

## 2020-11-25 PROCEDURE — H0035 MH PARTIAL HOSP TX UNDER 24H: HCPCS | Mod: GT | Performed by: OCCUPATIONAL THERAPIST

## 2020-11-25 PROCEDURE — 90792 PSYCH DIAG EVAL W/MED SRVCS: CPT | Mod: GT | Performed by: PSYCHIATRY & NEUROLOGY

## 2020-11-25 PROCEDURE — H0035 MH PARTIAL HOSP TX UNDER 24H: HCPCS | Mod: GT

## 2020-11-25 PROCEDURE — H0035 MH PARTIAL HOSP TX UNDER 24H: HCPCS | Mod: 95

## 2020-11-25 PROCEDURE — H0035 MH PARTIAL HOSP TX UNDER 24H: HCPCS | Mod: GT | Performed by: SOCIAL WORKER

## 2020-11-25 NOTE — GROUP NOTE
Psychoeducation Group Note    PATIENT'S NAME: Corinne N Palm  MRN:   [id number]  :   1995  ACCT. NUMBER: [id number]  DATE OF SERVICE: 20  START TIME:  2:00 PM  END TIME:  2:50 PM  FACILITATOR: Marlene Alonzo RN  TOPIC: NANDINI RN Group: Health [facility] Adult Partial Hospitalization Program  TRACK: 1      NUMBER OF PARTICIPANTS: 6    Summary of Group / Topics Discussed:  Health Maintenance: Weekend planning: Patients were given time to complete a weekend plan of what they will do to promote wellness and sobriety over the weekend when they do not have the structure of group. Patients were encouraged to review progress on their treatment goals and were challenged to identify ways to work toward meeting them. Patients identified and discussed foreseeable barriers to success over the weekend and then developed a plan to overcome them. Patients reviewed their distress coping skills and social support network and discussed this with the group.       Patient Session Goals / Objectives:    ?    Identified activities to engage in that promote balance in wellness  ?    Distinguished possible barriers to success over the weekend and created a plan to overcome them  ?    Listed distress coping skills and identified social support network to utilize if in crisis during the weekend    Telemedicine Visit: The patient's condition can be safely assessed and treated via synchronous audio and visual telemedicine encounter.      Reason for Telemedicine Visit: Services only offered telehealth    Originating Site (Patient Location): Patient's home    Distant Site (Provider Location): Provider Remote Setting    Consent:  The patient/guardian has verbally consented to: the potential risks and benefits of telemedicine (video visit) versus in person care; bill my insurance or make self-payment for services provided; and responsibility for payment of non-covered services.     Mode of Communication:  Video  Conference via [employer]    As the provider I attest to compliance with applicable laws and regulations related to telemedicine.       Patient Participation / Response:  Fully participated with the group by sharing personal reflections / insights and openly received / provided feedback with other participants.    Demonstrated understanding of topics discussed through group discussion and participation    Treatment Plan:  Patient has a current master individualized treatment plan.  See Epic treatment plan for more information.    Marlene Alonzo RN

## 2020-11-25 NOTE — GROUP NOTE
Process Group Note    PATIENT'S NAME: Corinne N Palm  MRN:   [id number]  :   1995  ACCT. NUMBER: [id number]  DATE OF SERVICE: 20  START TIME: 11:00 AM  END TIME: 11:50 AM  FACILITATOR: Dana Guerrero LICSW  TOPIC:  Process Group    Diagnoses:  DSM5  Diagnosis:  1.  Major depressive disorder, recurrent, severe without psychosis  2.  Generalized anxiety disorder.   3.  Anorexia nervosa.   4.  Borderline personality disorder.      [facility] Adult Partial Hospitalization Program  TRACK: 1    NUMBER OF PARTICIPANTS: 8  Telemedicine Visit: The patient's condition can be safely assessed and treated via synchronous audio and visual telemedicine encounter.      Reason for Telemedicine Visit: Services only offered telehealth and covid19    Originating Site (Patient Location): Patient's home    Distant Site (Provider Location): Provider Remote Setting    Consent:  The patient/guardian has verbally consented to: the potential risks and benefits of telemedicine (video visit) versus in person care; bill my insurance or make self-payment for services provided; and responsibility for payment of non-covered services.     Mode of Communication:  Video Conference via [employer]    As the provider I attest to compliance with applicable laws and regulations related to telemedicine.           Data:    Session content: At the start of this group, patients were invited to check in by identifying themselves, describing their current emotional status, and identifying issues to address in this group.   Area(s) of treatment focus addressed in this session included Symptom Management and Personal Safety.  Lida introduced self to group today.  She shared that she was feeling anxious starting group and is taking time to getting oriented.  She is practising mindfulness today.  She shared with the group that she has recently been hospitalized and that her therapist had recommended php.    Therapeutic Interventions/Treatment  Strategies:  Psychotherapist offered support, feedback and validation and reinforced use of skills. Treatment modalities used include Dialectical Behavioral Therapy.    Assessment:    Patient response:   Patient responded to session by accepting feedback, listening, being attentive, appearing alert and verbalizing understanding    Possible barriers to participation / learning include: and no barriers identified    Health Issues:   None reported       Substance Use Review:   Substance Use: No active concerns identified.    Mental Status/Behavioral Observations  Appearance:   Appropriate   Eye Contact:   Good   Psychomotor Behavior: Normal   Attitude:   Cooperative   Orientation:   All  Speech   Rate / Production: Normal    Volume:  Normal   Mood:    Anxious  Normal  Affect:    Appropriate   Thought Content:   Clear  Thought Form:  Coherent  Goal Directed  Logical     Insight:    Good     Plan:     Safety Plan: No current safety concerns identified.  Recommended that patient call 911 or go to the local ED should there be a change in any of these risk factors.     Barriers to treatment: None identified    Patient Contracts (see media tab):  None    Substance Use: Not addressed in session     Continue or Discharge: Patient will continue in Adult Partial Hospitalization Program (PHP)  as planned. Patient is likely to benefit from learning and using skills as they work toward the goals identified in their treatment plan.      Dana Guerrero, [facility]  November 25, 2020

## 2020-11-25 NOTE — TELEPHONE ENCOUNTER
"RN Review of Medical History / Physical Health Screen  Outpatient Mental Health Programs - [facility] Adult Partial Hospitalization Program    PATIENT'S NAME: Corinne N Palm  MRN:   [id number]  :   1995  ACCT. NUMBER: [id number]  CURRENT AGE:  25 year old    DATE OF DIAGNOSTIC ASSESSMENT:   DATE OF ADMISSION: 20     Please see Diagnostic Assessment for additional Medical History.     General Health:   Have you had any exposure to any communicable disease in the past 2-3 weeks? no     Are you aware of safe sex practices? yes       Nutrition:    Are you on a special diet? If yes, please explain:  no   Do you have any concerns regarding your nutritional status? If yes, please explain:  yes ongoing. anorexia   Have you had any appetite changes in the last 3 months?  Yes, \"always a struggle\"     Have you had any weight loss or weight gain in the last 3 months?  Not asked     Do you have a history of an eating disorder? yes anorexia   Do you have a history of being in an eating disorder program? yes roaslie Newberry, [facility]     Patient height and weight recorded by RN in epic flowsheet: no    No; Unable to measure  Because of temporary in-person programmatic suspension due to COVID-19 pandemic, all pt weights and heights will be collected through patient self-report an recorded in physical health screening progress note upon admission to the program.                            Height/Weight Review:  Patient reported height:  5'3\"      Patient reports weight:  Date last checked:  112lb   Any referrals/needs identified?  no        BMI Review:  Was the patient informed of BMI? no      Findings  No Intervention         Fall Risk:   Have you had any falls in the past 3 months? no     Do you currently use any assistive devices for mobility?   no      Additional Comments/Assessment: denies outstanding medical concerns at this time    Per completion of the Medical History / Physical Health " Screen, is there a recommendation to see / follow up with a primary care physician/clinic or dentist?    No.      Marlene Alonzo RN  11/25/2020

## 2020-11-25 NOTE — PROGRESS NOTES
"Outpatient Psychiatric Evaluation- Standard  Adult    Name:  Corinne N Palm  : 1995    Psychiatrist or PCP:: Dr Giulia Gómez, has one next week  Current Psychotherapist: Deandra Banda, usually twice weekly; also DBT therapist, Shelton Jordan, weekly    Identifying Data:  Patient is a 25 year old,   White Not  or  female  who presents for initial visit with me.  Patient is currently employed part time. Patient attended the phone/video session alone. Patient prefers to be called: \"Cori\"    Chief Complaint:  \"I feel depressed.\"     HPI:  Corinne N Palm is a 25 year old female with past history including depression and eating disorder who presents today with depression. Has been on Lamictal recently. Not sure if she wants to continue. Wasn't helpful. Been on Cymbalta for 3 or 4 months. Has been feeling very depressed. Has had suicidal thoughts and plans. No current urges. Has attempted suicide twice. Most recently two years ago. Feels safe with  there. Got out of hospital and  was off work for the week. But now he'll be back at work on Monday. Feels hopeful that it will be a safe plan. History of cutting. Recently in hospital. No cutting since then. Does have SIB urges every day. Been the same. Tends to over exercise. Eating is better but not the best.         Past diagnoses include: MDD, PRECIOUS, BPD  Current medications include:   Current Outpatient Medications   Medication     DULoxetine (CYMBALTA) 60 MG capsule     gabapentin (NEURONTIN) 300 MG capsule     lamoTRIgine (LAMICTAL) 25 MG tablet     lurasidone (LATUDA) 80 MG TABS tablet     polyethylene glycol (MIRALAX) 17 GM/Dose powder     Vitamin D, Cholecalciferol, 25 MCG (1000 UT) TABS     No current facility-administered medications for this visit.       Medication side effects: Denies  Current stressors include: Covid, feels trapped  Coping mechanisms and supports include: Therapy and Family    Psychiatric Review of " Symptoms:  Depression: Depressed Mood   PHQ-9 scores:   PHQ-9 SCORE 11/23/2020 11/24/2020   PHQ-9 Total Score MyChart - 20 (Severe depression)   PHQ-9 Total Score 21 20     Clarisa:  No symptoms  Anxiety: Uncontrolled worrying    PRECIOUS-7 scores:    PRECIOUS-7 SCORE 11/23/2020 11/24/2020   Total Score - 11 (moderate anxiety)   Total Score 15 11     Panic:  No symptoms  had one a few weeks ago   Agoraphobia:  Yes   PTSD:  No symptoms   OCD:  Checking  Symmetry  Ego dystonic thoughts   Psychosis: No symptoms   ADD / ADHD: No symptoms  Gambling or shoplifting: No   Eating Disorder:  Restriction  Sleep:   Trouble staying asleep     All other ROS negative.     Medical Review of Systems:  10 systems (general, cardiovascular, respiratory, eyes, ENT, endocrine, GI, , M/S, neurological) were reviewed. Most pertinent finding(s) is/are: some constipation. The remaining systems are all unremarkable.    A 12-item WHODAS 2.0 assessment was not completed. See Epic for any previously completed WHODAS assessments.    Psychiatric History:   Hospitalizations: [facility] in past  History of Commitment? No   Past Treatment: counseling, day treatment, psychiatry and DBT  Suicide Attempts: Yes   Current Suicide Risk: Suicide Assessment Completed Today.  Self-injurious Behavior: Cutting or Carving of Skin  Electroconvulsive Therapy (ECT) or Transcranial Magnetic Stimulation (TMS): No   GeneSight Genetic Testing: Yes     Past medication trials include but are not limited to:   Lamictal    Substance Use History:  Current Use of Drugs/Alcohol: Denies   Past Use of Drugs/Alcohol: Denies  Patient reports no problems as a result of their drinking / drug use.   Patient has not received chemical dependency treatment in the past  Recovery Programming Involvement: Not Applicable    Tobacco use: No    Based on the clinical interview, there  are not indications of drug or alcohol abuse. Continue to monitor.   Discussed effect of substance use on overall  health.     Past Medical History:  Past Medical History:   Diagnosis Date     OCD (obsessive compulsive disorder)       Surgery:   Past Surgical History:   Procedure Laterality Date     ENT SURGERY       Food and Medicine Allergies:     Allergies   Allergen Reactions     Morphine Other (See Comments)     Twitching     Amoxicillin Rash     Sulfa Drugs Rash     Seizures or Head Injury: No  Diet: No Restrictions  Exercise: tends to overexercise  Supplements: Reviewed per Electronic Medical Record Today    Vital Signs:  None since this is a phone/video visit.     Labs:  Most recent laboratory results reviewed and the pertinent results include:   No visits with results within 1 Year(s) from this visit.   Latest known visit with results is:   No results found for any previous visit.     Most recent labs reviewed and no new labs.     Family History:   Patient reported family history includes: No family history on file.  Mental Illness History: Yes: Uncle with schizoaffective disorder  Substance Abuse History: Yes: cousin  Suicide History: Uncle attempted  Medications: Unknown     Social History:   Birth place: [place], [place]  Childhood: Yes intact home  Siblings:  zero Brother(s),  zero Sister(s)  Highest education level was college graduate.   Employment Status: Part time  Current Living situation: Feels safe at home.  Children: zero   Firearms/Weapons Access: No: Patient denies   Service: No    Legal History:  No: Patient denies any legal history    Significant Losses / Trauma / Abuse / Neglect Issues:  There are medical trauma.   Issues of possible neglect are not present.   Recommended that patient call 911 or go to the local ED should there be a change in any of these risk factors.    Comprehensive Examination (limited due to virtual visit format, phone/video):  Vital Signs:  Vitals: There were no vitals taken for this visit.  General/Constitutional:  Appearance: awake, alert, adequately groomed, appeared  stated age and no apparent distress  Attitude:  cooperative   Eye Contact:  good  Musculoskeletal:  Muscle Strength and Tone: no gross abnormalities by observation  Psychomotor Behavior:  no evidence of tardive dyskinesia, dystonia, or tics  Gait and Station: normal, no gross abnormalities noted by observation  Psychiatric:  Speech:  clear, coherent, regular rate, rhythm, and volume  Associations:  no loose associations  Thought Process:  logical, linear and goal oriented  Thought Content:  Patient does have suicidal ideation with plans. No urges currently. no evidence of homicidal ideation, no evidence of psychotic thought, no auditory hallucinations present and no visual hallucinations present  Mood:  depressed  Affect:  mood congruent  Insight:  fair  Judgment:  intact, adequate for safety  Impulse Control:  intact  Neurological:  Oriented to:  person, place, time, and situation  Attention Span and Concentration:  normal  Language: intact  Recent and Remote Memory:  Intact to interview. Not formally assessed. No amnesia.  Fund of Knowledge: appropriate    Strengths and Opportunities:   Corinne N Palm identified the following strengths or resources that may help she succeed in counseling: commitment to health and well being, exercise routine, family support and insight. Things that may interfere with the patient's success include:  none noted at this time.    There are no language or communication issues or need for modification in treatment.   There are no ethnic, cultural or Mormonism factors that may be relevant for therapy.  Client identified their preferred language to be English.  Client does not need the assistance of an  or other support involved in therapy.    Suicide Risk Assessment:  Today Corinne N Palm reports depressed mood. In addition, there are notable risk factors for self-harm, including age, anxiety, previous history of suicide attempts and suicidal ideation. However, risk is  mitigated by commitment to family, sobriety, ability to volunteer a safety plan, history of seeking help when needed, no access to firearms or weapons and denies homicidal ideation, intent, or plan. Therefore, based on all available evidence including the factors cited above, Corinne N Palm does not appear to be at imminent risk for self-harm, does not meet criteria for a 72-hr hold, and therefore remains appropriate for ongoing outpatient level of care.  A thorough assessment of risk factors related to suicide and self-harm have been reviewed and are noted above. The patient convincingly denies acute suicidality on several occasions. Local community safety resources reviewed and printed for patient to use if needed. There was no deceit detected, and the patient presented in a manner that was believable.     DSM5  Diagnosis:  1.  Major depressive disorder, recurrent, severe without psychosis  2.  Generalized anxiety disorder.   3.  Anorexia nervosa.   4.  Borderline personality disorder.     Medical Comorbidities Include:   Patient Active Problem List    Diagnosis Date Noted     MDD (major depressive disorder), recurrent severe, without psychosis (H) 11/25/2020     Priority: Medium     Suicidal ideation 02/02/2016     Priority: Medium       Impression:  Corinne N Palm is a 24yo female with a history of depression, anxiety and eating disorder who feels safe at home at this time and feels safe to start our PHP.       Medication side effects and alternatives reviewed. Health promotion activities recommended and reviewed today. All questions addressed. Education and counseling completed regarding risks and benefits of medications and psychotherapy options. Recommend therapy for additional support.     Treatment Plan:    Continue Cymbalta, Latuda, and gabapentin    Discontinue - may speak to her outpatient provider about stopping Lamictal    Continue therapy as planned - PHP    Continue all other medications as reviewed  per electronic medical record today.     Safety plan reviewed. To the Emergency Department as needed or call after hours crisis line at [phone] or [phone]. [place] Crisis Text Line: Text [place] to 392263  or  Suicide LifeLine Chat: suicidepre[employer].org/chat   Call [facility] at [phone] to schedule.    Follow up with outpatient provider as planned or sooner if needed for acute medical concerns.    Call the psychiatric nurse line with medication questions or concerns at [phone].    Mobitt[employer]art may be used to communicate with your provider, but this is not intended to be used for emergencies.    Community Resources:    National Suicide Prevention Lifeline: 560.341.2343 (TTY: 946.565.7839). Call anytime for help.  (www.suicidepreventionlifeline.org)  National Winner on Mental Illness (www.tanja.org): 585.120.5469 or 322-965-8678.   Mental Health Association (www.mentalhealth.org): 573.319.6911 or 005-636-6449.  [place] Crisis Text Line: Text [place] to 482007  Suicide LifeLine Chat: suicideVin[facility]is Laboratories.org/chat    Administrative Billing:     Video-Visit Details    Type of service:  Video Visit    Video Start Time (time video started): 1301    Video End Time (time video stopped): 1321    Originating Location (pt. Location): Home    Distant Location (provider location): Provider remote location    Mode of Communication:  Video Conference via [employer]    Physician has received verbal consent for a Video Visit from the patient? Yes      Complexity of Care: Level 3    Signed:   Anthony Wing MD

## 2020-11-25 NOTE — GROUP NOTE
Psychoeducation Group Note    PATIENT'S NAME: Corinne N Palm  MRN:   [id number]  :   1995  ACCT. NUMBER: [id number]  DATE OF SERVICE: 20  START TIME: 10:00 AM  END TIME: 10:50 AM  FACILITATOR: Erika Crump OTR/L  TOPIC: MH PHP OT Group: Self- Regulation Skills  [facility] Adult Partial Hospitalization Program  TRACK: 1    NUMBER OF PARTICIPANTS: 8    Telemedicine Visit: The patient's condition can be safely assessed and treated via synchronous audio and visual telemedicine encounter.      Reason for Telemedicine Visit: Services only offered telehealth    Originating Site (Patient Location): Patient's home    Distant Site (Provider Location): [facility] Outpatient Setting: Partial [facility]    Consent:  The patient/guardian has verbally consented to: the potential risks and benefits of telemedicine (video visit) versus in person care; bill my insurance or make self-payment for services provided; and responsibility for payment of non-covered services.     Mode of Communication:  Video Conference via ZIIBRA    As the provider I attest to compliance with applicable laws and regulations related to telemedicine.     Summary of Group / Topics Discussed:  Self-Regulation Skills: Coping Through the Senses Introduction: Part 1:  Patients were introduced and taught about neurosensory based skills and strategies related to supporting effective self regulation skills.  Patients were taught about the external sensory systems and how they can be used for coping with mental health symptoms and stressors.  Patients were provided with an opportunity to increase self-awareness of helpful sensory input and self care activities. Patients were introduced on how to create supportive environments that encourage use of these skills.    Patient Session Goals / Objectives:    Review/learn the external sensory systems and how they relate to self-regulation    Identified specific and individualized  neurosensory skills to help when distressed      Identified skills learned and how this applies to current daily life    Established a plan for practice of these skills in their own environments      Patient Participation / Response:  Fully participated with the group by sharing personal reflections / insights and openly received / provided feedback with other participants.    Patient presentation: constricted affect; anxious mood observed; active in group discussion, Demonstrated understanding of content through identifying some external sensory input she finds helpful  and Patient would benefit from additional opportunities to practice the content to be able to generalize it to their everyday life with increased intentionality, consistency, and efficacy in support of their psychiatric recovery    Lida began the Partial Program today.  She was welcomed and briefly oriented to Occupational Therapy groups.      Treatment Plan:  Patient has an initial individualized treatment plan that was created as part of their diagnostic assessment / admission process.  A master individualized treatment plan is in the process of being developed with the patient and multi-disciplinary care team.    Erika Crump, FELICIANOR/L

## 2020-11-25 NOTE — GROUP NOTE
Psychoeducation Group Note    PATIENT'S NAME: Corinne N Palm  MRN:   [id number]  :   1995  ACCT. NUMBER: [id number]  DATE OF SERVICE: 20  START TIME:  1:00 PM  END TIME:  1:50 PM  FACILITATOR: Mor Soni OT  TOPIC: [facility] OT Group: Self- Regulation Skills  [facility] Adult Partial Hospitalization Program  TRACK: 1    Telemedicine Visit: The patient's condition can be safely assessed and treated via synchronous audio and visual telemedicine encounter.      Reason for Telemedicine Visit: Services only offered telehealth and Covid 19    Originating Site (Patient Location): Patient's home    Distant Site (Provider Location): Provider Remote Setting    Consent:  The patient/guardian has verbally consented to: the potential risks and benefits of telemedicine (video visit) versus in person care; bill my insurance or make self-payment for services provided; and responsibility for payment of non-covered services.     Mode of Communication:  Video Conference via [employer]    As the provider I attest to compliance with applicable laws and regulations related to telemedicine.      NUMBER OF PARTICIPANTS:7    Summary of Group / Topics Discussed:  Self-Regulation Skills: Coping Through the Senses: Patients were introduced and taught about neurosensory based skills and strategies related to supporting effective self regulation skills.  Patients were taught about three internal sensory systems (Proprioception, deep pressure, and vestibular) and how they can be used for coping with mental health symptoms and stressors.  Patients were provided with an experiential opportunity to increase self-awareness of helpful sensory input and self care activities. Patients explored their own sensory preferences and patterns to develop self awareness around the potential of using bottom up techniques for self regulation.Patients were introduced on how to create supportive environments that encourage use of these  skills.    Patient Session Goals / Objectives:    Review/learn the 3 internal sensory systems and how they relate to self-regulation    Identified specific and individualized neurosensory skills to help when distressed      Identified skills learned and how this applies to current daily life    Established a plan for practice of these skills in their own environments      Patient Participation / Response:  Fully participated with the group by sharing personal reflections / insights and openly received / provided feedback with other participants.    Patient presentation: First day in program. Shares easily, affect congruent to mood. will continue to monitor and assess and work with Cori to set OT tx goals. , Verbalized understanding of content and Patient would benefit from additional opportunities to practice the content to be able to generalize it to their everyday life with increased intentionality, consistency, and efficacy in support of their psychiatric recovery    Treatment Plan:  Patient has an initial individualized treatment plan that was created as part of their diagnostic assessment / admission process.  A master individualized treatment plan is in the process of being developed with the patient and multi-disciplinary care team.    Mor Soni, OT

## 2020-11-25 NOTE — GROUP NOTE
Psychoeducation Group Note    PATIENT'S NAME: Corinne N Palm  MRN:   [id number]  :   1995  ACCT. NUMBER: [id number]  DATE OF SERVICE: 20  START TIME:  9:00 AM  END TIME:  9:50 AM  FACILITATOR: Marlene Alonzo RN  TOPIC: NANDINI RN Group: Brain [facility] Adult Partial Hospitalization Program  TRACK: 1    NUMBER OF PARTICIPANTS: 7    Summary of Group / Topics Discussed:  Brain Health:  Pathophysiology of stress and anxiety: Patients were educated on the difference between stress, chronic stress, and anxiety. The anatomy and pathophysiology of the body/brain were reviewed to illustrate the immediate effects of stress/anxiety in the body and the long term effects and increased risks for chronic disease that come from unmanaged stress/anxiety. Self-coping strategies to manage symptoms of stress were reviewed and pharmacologic, psychotherapeutic, and complementary treatment options were discussed.    Patient Session Goals / Objectives:  ? Described the differences between stress and anxiety and how the body responds to it  ? Listed the long term effects and increased risks for chronic disease that can arise from unmanaged stress/anxiety  ? Identified and described pharmacologic, psychotherapeutic, and complementary treatment options  Telemedicine Visit: The patient's condition can be safely assessed and treated via synchronous audio and visual telemedicine encounter.      Reason for Telemedicine Visit: Services only offered telehealth    Originating Site (Patient Location): Patient's home    Distant Site (Provider Location): Provider Remote Setting    Consent:  The patient/guardian has verbally consented to: the potential risks and benefits of telemedicine (video visit) versus in person care; bill my insurance or make self-payment for services provided; and responsibility for payment of non-covered services.     Mode of Communication:  Video Conference via [employer]    As the provider I attest  to compliance with applicable laws and regulations related to telemedicine.       Patient Participation / Response:  Moderately participated, sharing some personal reflections / insights and adequately adequately received / provided feedback with other participants.    Demonstrated understanding of topics discussed through group discussion and participation    Treatment Plan:  Patient has a current master individualized treatment plan.  See Epic treatment plan for more information.    Marlene Alonzo RN

## 2020-11-30 ENCOUNTER — HOSPITAL ENCOUNTER (OUTPATIENT)
Dept: BEHAVIORAL HEALTH | Facility: CLINIC | Age: 25
Discharge: HOME | End: 2020-11-30
Attending: PSYCHIATRY & NEUROLOGY | Admitting: PSYCHIATRY & NEUROLOGY
Payer: COMMERCIAL

## 2020-11-30 ENCOUNTER — HOSPITAL ENCOUNTER (OUTPATIENT)
Dept: BEHAVIORAL HEALTH | Facility: CLINIC | Age: 25
End: 2020-11-30
Attending: PSYCHIATRY & NEUROLOGY
Payer: COMMERCIAL

## 2020-11-30 PROCEDURE — H0035 MH PARTIAL HOSP TX UNDER 24H: HCPCS | Mod: GT | Performed by: OCCUPATIONAL THERAPIST

## 2020-11-30 PROCEDURE — H0035 MH PARTIAL HOSP TX UNDER 24H: HCPCS | Mod: 95

## 2020-11-30 PROCEDURE — H0035 MH PARTIAL HOSP TX UNDER 24H: HCPCS | Mod: GT | Performed by: COUNSELOR

## 2020-11-30 PROCEDURE — H0035 MH PARTIAL HOSP TX UNDER 24H: HCPCS | Mod: GT

## 2020-11-30 NOTE — GROUP NOTE
Psychoeducation Group Note    PATIENT'S NAME: Corinne N Palm  MRN:   [id number]  :   1995  ACCT. NUMBER: [id number]  DATE OF SERVICE: 20  START TIME:  1:00 PM  END TIME:  1:50 PM  FACILITATOR: Erika Crump OTR/L  TOPIC:  PHP OT Group: Partial Hospitalization Program- Occupational Therapy  [facility] Adult Partial Hospitalization Program  TRACK: 1    NUMBER OF PARTICIPANTS: 7    Telemedicine Visit: The patient's condition can be safely assessed and treated via synchronous audio and visual telemedicine encounter.      Reason for Telemedicine Visit: Services only offered telehealth    Originating Site (Patient Location): Patient's home    Distant Site (Provider Location): [facility] Outpatient Setting: [facility]    Consent:  The patient/guardian has verbally consented to: the potential risks and benefits of telemedicine (video visit) versus in person care; bill my insurance or make self-payment for services provided; and responsibility for payment of non-covered services.     Mode of Communication:  Video Conference via [employer]    As the provider I attest to compliance with applicable laws and regulations related to telemedicine.     Summary of Group / Topics Discussed: Motivation and Procrastination  Focus on the group is to support patients in identifying barriers to task completion, challenge negative self talk, and how their mental health symptoms impact this.  Provided psychoeducation and helped patients identify skills they may employ to assist with task follow through.  Facilitated supportive discussion providing validation and support.     Patient Session Goals / Objectives:    Patient will be able to better identify barriers to task completion and how their mental health symptoms impact this    Patients will identify 1-2 ways they can challenge these barriers to help with task completion          Patient Participation / Response:  Moderately participated,  sharing some personal reflections / insights and adequately adequately received / provided feedback with other participants.    Patient presentation: quiet in group today; constricted affect; appeared attentive to the discussion and Patient would benefit from additional opportunities to practice the content to be able to generalize it to their everyday life with increased intentionality, consistency, and efficacy in support of their psychiatric recovery    Treatment Plan:  Patient has an initial individualized treatment plan that was created as part of their diagnostic assessment / admission process.  A master individualized treatment plan is in the process of being developed with the patient and multi-disciplinary care team.    Erika Crump, OTR/L

## 2020-11-30 NOTE — GROUP NOTE
Psychoeducation Group Note    PATIENT'S NAME: Corinne N Palm  MRN:   [id number]  :   1995  ACCT. NUMBER: [id number]  DATE OF SERVICE: 20  START TIME:  9:00 AM  END TIME:  9:50 AM  FACILITATOR: Marlene Alonzo RN  TOPIC: NANDINI RN Group: Mental Health Maintenance  [facility] Adult Partial Hospitalization Program  TRACK: 1    NUMBER OF PARTICIPANTS: 6    Summary of Group / Topics Discussed:  Mental Health Maintenance:  Assessments of Strengths: Patients completed a self-reflection on personal strengths worksheet. The concept of personal strength as it relates to resilience were explored. Patients shared responses with the group and participated in discussion.     Patient Session Goals / Objectives:  ? Patients identified 1-3 qualities they consider a personal strength.  ? Patients understood the concept of personal strengths and the connection it has to resiliency  Telemedicine Visit: The patient's condition can be safely assessed and treated via synchronous audio and visual telemedicine encounter.      Reason for Telemedicine Visit: Services only offered telehealth    Originating Site (Patient Location): Patient's home    Distant Site (Provider Location): Provider Remote Setting    Consent:  The patient/guardian has verbally consented to: the potential risks and benefits of telemedicine (video visit) versus in person care; bill my insurance or make self-payment for services provided; and responsibility for payment of non-covered services.     Mode of Communication:  Video Conference via [employer]    As the provider I attest to compliance with applicable laws and regulations related to telemedicine.       Patient Participation / Response:  Fully participated with the group by sharing personal reflections / insights and openly received / provided feedback with other participants.    Demonstrated understanding of topics discussed through group discussion and participation    Treatment Plan:  Patient  has a current master individualized treatment plan.  See Epic treatment plan for more information.    Marlene Alonzo RN

## 2020-11-30 NOTE — GROUP NOTE
Psychoeducation Group Note    PATIENT'S NAME: Corinne N Palm  MRN:   [id number]  :   1995  ACCT. NUMBER: [id number]  DATE OF SERVICE: 20  START TIME: 11:00 AM  END TIME: 11:50 AM  FACILITATOR: Mor Soni OT  TOPIC: [facility] OT Group: Lifestyle Balance and Structure  [facility] [place] [facility] Adult Partial Hospitalization Program  TRACK: *1    Telemedicine Visit: The patient's condition can be safely assessed and treated via synchronous audio and visual telemedicine encounter.      Reason for Telemedicine Visit: Services only offered telehealth and Covid 19    Originating Site (Patient Location): Patient's home    Distant Site (Provider Location): Provider Remote Setting    Consent:  The patient/guardian has verbally consented to: the potential risks and benefits of telemedicine (video visit) versus in person care; bill my insurance or make self-payment for services provided; and responsibility for payment of non-covered services.     Mode of Communication:  Video Conference via [employer]    As the provider I attest to compliance with applicable laws and regulations related to telemedicine.      NUMBER OF PARTICIPANTS: 7    Summary of Group / Topics Discussed:  Lifestyle Balance and Structure:  OT Goal-setting & integration: To support progress towards treatment goals and psychiatric recovery, group members were led through a weekly structured process to reflect on progress, integrate new learning/skills, and emerging self-awareness into their daily and weekly life. Provided psychoeducation on the neuroscience of change, self-compassion, and the anatomy of a SMART goal to the group. Facilitated the sharing of individual goals with validation, support, and feedback provided.    Patient Session Goals / Objectives:    Reflected on and create a vision for recovery and wellbeing    Identified and wrote 3 SMART goals for the week    Identified and problem solved barriers to achieving identified goals      Identified plan to support follow through on goals and reflection on progress made        Patient Participation / Response:  Fully participated with the group by sharing personal reflections / insights and openly received / provided feedback with other participants.    Verbalized understanding of content and Patient would benefit from additional opportunities to practice the content to be able to generalize it to their everyday life with increased intentionality, consistency, and efficacy in support of their psychiatric recovery    Treatment Plan:  Patient has an initial individualized treatment plan that was created as part of their diagnostic assessment / admission process.  A master individualized treatment plan is in the process of being developed with the patient and multi-disciplinary care team.    Mor Soni, OT

## 2020-11-30 NOTE — GROUP NOTE
Psychotherapy Group Note    PATIENT'S NAME: Corinne N Palm  MRN:   [id number]  :   1995  ACCT. NUMBER: [id number]  DATE OF SERVICE: 20  START TIME:  2:00 PM  END TIME:  2:50 PM  FACILITATOR: Joann Land LPCC  TOPIC: MH EBP Group: Self-Awareness  [facility] Adult Partial Hospitalization Program  TRACK: [facility]    NUMBER OF PARTICIPANTS: 7    Summary of Group / Topics Discussed:  Self-Awareness: Values: Patients identified personal values by examining development of their current values and how their values influence their daily functioning and life choices. Patients explored the impact of their values on their thoughts, feelings, and actions. Patients discussed definition of personal values and how they develop and change over time. The goal is to help patients reconcile value conflicts and achieve balance and flexibility to improve mood and daily functioning.     Patient Session Goals / Objectives:    Examined development of values and impact of values on functioning    Identified and prioritized important values related to current well-being     Identified strategies to change or enhance values to positively impact symptoms    Assisted patients to find ways to adapt functioning to better fit their values    Telemedicine Visit: The patient's condition can be safely assessed and treated via synchronous audio and visual telemedicine encounter.      Reason for Telemedicine Visit: Services only offered telehealth and due to COVID-19    Originating Site (Patient Location): Patient's home    Distant Site (Provider Location): Provider Remote Setting    Consent:  The patient/guardian has verbally consented to: the potential risks and benefits of telemedicine (video visit) versus in person care; bill my insurance or make self-payment for services provided; and responsibility for payment of non-covered services.     Mode of Communication:  Video Conference via [employer]    As the provider I attest to  compliance with applicable laws and regulations related to telemedicine.          Patient Participation / Response:  Moderately participated, sharing some personal reflections / insights and adequately adequately received / provided feedback with other participants.    Demonstrated understanding of values, strengths, and challenges to learn about themselves    Treatment Plan:  Patient has an initial individualized treatment plan that was created as part of their diagnostic assessment / admission process.  A master individualized treatment plan is in the process of being developed with the patient and multi-disciplinary care team.    Joann Land, [facility]

## 2020-11-30 NOTE — GROUP NOTE
"Process Group Note    PATIENT'S NAME: Corinne N Palm  MRN:   [id number]  :   1995  ACCT. NUMBER: [id number]  DATE OF SERVICE: 20  START TIME: 10:00 AM  END TIME: 10:50 AM  FACILITATOR: Joann Land LPCC  TOPIC:  Process Group    Diagnoses:  DSM5  Diagnosis:  1.  Major depressive disorder, recurrent, severe without psychosis  2.  Generalized anxiety disorder.   3.  Anorexia nervosa.   4.  Borderline personality disorder.    [facility] Adult Partial Hospitalization Program  TRACK: [facility]    NUMBER OF PARTICIPANTS: 7    Telemedicine Visit: The patient's condition can be safely assessed and treated via synchronous audio and visual telemedicine encounter.      Reason for Telemedicine Visit: Services only offered telehealth and due to COVID-19    Originating Site (Patient Location): Patient's home    Distant Site (Provider Location): Provider Remote Setting    Consent:  The patient/guardian has verbally consented to: the potential risks and benefits of telemedicine (video visit) versus in person care; bill my insurance or make self-payment for services provided; and responsibility for payment of non-covered services.     Mode of Communication:  Video Conference via [employer]    As the provider I attest to compliance with applicable laws and regulations related to telemedicine.            Data:    Session content: At the start of this group, patients were invited to check in by identifying themselves, describing their current emotional status, and identifying issues to address in this group.   Area(s) of treatment focus addressed in this session included Symptom Management, Personal Safety and Community Resources/Discharge Planning.    Patient reported feeling down. Patient discussed working toward filling out paperwork. Patient identified assertiveness as skills they will use to address their goal(s). Patient reported feeling \"shitty\" may be a barrier to working toward their goal(s) and/or " "addressing mental health symptoms. Patient reported yes safety concerns and/or self-injurious behaviors. Patient reported no substance use. Patient reported they are taking their medications as prescribed. Patient reported feeling proud that they came to group. Patient discussed feeling \"weird\" after a long weekend with her  home with the treatment group.    Therapeutic Interventions/Treatment Strategies:  Psychotherapist offered support, feedback and validation and reinforced use of skills. Treatment modalities used include Motivational Interviewing and Cognitive Behavioral Therapy. Interventions include Coping Skills: Discussed use of self-soothe skills to decrease distress in the body and Assisted patient in identifying 1-2 healthy distraction skills to reduce overall distress, Mindfulness: Facilitated discussion of when/how to use mindfulness skills to benefit general health, mental health symptoms, and stressors and Symptoms Management: Promoted understanding of their diagnoses and how it impacts their functioning.    Assessment:    Patient response:   Patient responded to session by accepting feedback, listening and being attentive    Possible barriers to participation / learning include: and no barriers identified    Health Issues:   None reported       Substance Use Review:   Substance Use: No active concerns identified.    Mental Status/Behavioral Observations  Appearance:   Appropriate   Eye Contact:   Good   Psychomotor Behavior: Normal   Attitude:   Cooperative   Orientation:   All  Speech   Rate / Production: Normal/ Responsive Normal    Volume:  Normal   Mood:    Anxious  Depressed  Normal  Affect:    Appropriate   Thought Content:   Clear and Safety reports  presence of suicidal ideation passive suicidal ideation   Thought Form:  Coherent  Logical     Insight:    Good     Plan:     Safety Plan: Patient consented to co-developed safety plan.  Safety and risk management plan was completed.  " Patient agreed to use safety plan should any safety concerns arise.  A copy was given to the patient.     Barriers to treatment: None identified    Patient Contracts (see media tab):  None    Substance Use: Provided encouragement towards sobriety     Continue or Discharge: Patient will continue in Adult Partial Hospitalization Program (PHP)  as planned. Patient is likely to benefit from learning and using skills as they work toward the goals identified in their treatment plan.      Joann Land, [facility]  November 30, 2020

## 2020-11-30 NOTE — PROGRESS NOTES
[facility]   Dr. Tompkins's Psychiatric Progress Note  2020      Patient:  Corinne N Palm   Medical Record Number:  [id number]  :  1995          Interim History:   The patient's care was discussed with the treatment team and chart notes were reviewed.  Today Corinne N Palm reports depressed mood. In addition, there are notable risk factors for self-harm, including age, anxiety, previous history of suicide attempts and suicidal ideation. However, risk is mitigated by commitment to family, sobriety, ability to volunteer a safety plan, history of seeking help when needed, no access to firearms or weapons and denies homicidal ideation, intent, or plan.  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      Psych Dr Giulia Vigil at [employer].  Therapist Deandra Osorio at [employer].  DBT at [employer].    Mood is very bad: lots of depression and less anxious.  She quit Lamictal as she felt it made her appetite more.  Latuda helps mood.  Used to be on higher dose of Cymbalta.  She noted no problems on higher dose.  Previously higher Latuda dose did nothing.        Psychiatric ROS:  Mood: depressed  Sleep: initial and middle insomnia  Appetite:  low  Eatin meal plus one snack per day  Energy Level:LOW  Concentration/Memory Problems:  NO  Suicidal Thoughts:Yes has SI and SIB urges;  No acting on urges;  Feels safe;  No plan or intent;    Homicidal Thoughts:No  Psychotic Symptoms: No  Medication Side Effects:No  Medication Compliance:Yes   Physical Complaints:positive for lethary         Medications:     Current Outpatient Medications   Medication Sig     DULoxetine (CYMBALTA) 60 MG capsule Take 1 capsule (60 mg) by mouth At Bedtime     gabapentin (NEURONTIN) 300 MG capsule Take 300-600 mg by mouth nightly as needed (anxiety)     lamoTRIgine (LAMICTAL) 25 MG tablet Take 1 tablet (25 mg) by mouth daily for 9 days, then increase to 2 tablets (50 mg) by mouth daily.     lurasidone (LATUDA) 80 MG TABS  tablet Take 80 mg by mouth At Bedtime     polyethylene glycol (MIRALAX) 17 GM/Dose powder Take 17 g by mouth daily     Vitamin D, Cholecalciferol, 25 MCG (1000 UT) TABS Take 1,000 Units by mouth daily      No current facility-administered medications for this encounter.              Allergies:     Allergies   Allergen Reactions     Morphine Other (See Comments)     Twitching     Amoxicillin Rash     Sulfa Drugs Rash            Psychiatric Examination:   There were no vitals taken for this visit.  Weight is 0 lbs 0 oz  There is no height or weight on file to calculate BMI.    Appearance:  awake, alert and adequately groomed  Attitude:  cooperative  Eye Contact:  good  Mood:  depressed  Affect:  mood congruent  Speech:  clear, coherent  Psychomotor Behavior:  no evidence of tardive dyskinesia, dystonia, or tics  Throught Process:  logical  Associations:  no loose associations  Thought Content:  no evidence of psychotic thought and passive suicidal thoughts;  no plan or intent;  able to con tract for safety  Insight:  good  Judgement:  intact  Oriented to:  time, person, and place  Attention Span and Concentration:  intact  Recent and Remote Memory:  intact  Gait:Normal    Risk/Potential for Dangerousness:  Multiple Active Diagnoses:HIGH  Self Care:HIGH  Suicide:MODERATE  Assault:LOW  Self Injurious Behaviors:MODERATE  Inappropriate Sexual Behavior:LOW         Labs:   No results found for this or any previous visit (from the past 24 hour(s)).     Recent Results (from the past 1008 hour(s))   Asymptomatic COVID-19 Virus (Coronavirus) by PCR    Collection Time: 11/13/20  6:54 PM    Specimen: Nasopharyngeal   Result Value Ref Range    COVID-19 Virus PCR to [facility] - Source Nasopharyngeal     COVID-19 Virus PCR to [facility] - Result       Test received-See reflex to IDDL test SARS CoV2 (COVID-19) Virus RT-PCR   Comprehensive metabolic panel    Collection Time: 11/13/20  6:54 PM   Result Value Ref Range    Sodium 138 133 -  144 mmol/L    Potassium 3.8 3.4 - 5.3 mmol/L    Chloride 104 94 - 109 mmol/L    Carbon Dioxide 26 20 - 32 mmol/L    Anion Gap 8 3 - 14 mmol/L    Glucose 75 70 - 99 mg/dL    Urea Nitrogen 6 (L) 7 - 30 mg/dL    Creatinine 0.67 0.52 - 1.04 mg/dL    GFR Estimate >90 >60 mL/min/[1.73_m2]    GFR Estimate If Black >90 >60 mL/min/[1.73_m2]    Calcium 9.3 8.5 - 10.1 mg/dL    Bilirubin Total 0.5 0.2 - 1.3 mg/dL    Albumin 4.1 3.4 - 5.0 g/dL    Protein Total 7.7 6.8 - 8.8 g/dL    Alkaline Phosphatase 55 40 - 150 U/L    ALT 42 0 - 50 U/L    AST 27 0 - 45 U/L   SARS-CoV-2 COVID-19 Virus (Coronavirus) RT-PCR Nasopharyngeal    Collection Time: 11/13/20  6:54 PM    Specimen: Nasopharyngeal   Result Value Ref Range    SARS-CoV-2 Virus Specimen Source Nasopharyngeal     SARS-CoV-2 PCR Result NEGATIVE     SARS-CoV-2 PCR Comment       Testing was performed using the Aptima SARS-CoV-2 Assay on the [employer] Instrument System.   Additional information about this Emergency Use Authorization (EUA) assay can be found via   the Lab Guide.     TSH with free T4 reflex and/or T3 as indicated    Collection Time: 11/13/20 10:38 PM   Result Value Ref Range    TSH 2.05 0.40 - 4.00 mU/L   CBC with platelets differential    Collection Time: 11/13/20 10:38 PM   Result Value Ref Range    WBC 10.4 4.0 - 11.0 10e9/L    RBC Count 4.77 3.8 - 5.2 10e12/L    Hemoglobin 14.4 11.7 - 15.7 g/dL    Hematocrit 42.3 35.0 - 47.0 %    MCV 89 78 - 100 fl    MCH 30.2 26.5 - 33.0 pg    MCHC 34.0 31.5 - 36.5 g/dL    RDW 11.9 10.0 - 15.0 %    Platelet Count 358 150 - 450 10e9/L    Diff Method Automated Method     % Neutrophils 52.8 %    % Lymphocytes 37.9 %    % Monocytes 6.6 %    % Eosinophils 1.8 %    % Basophils 0.6 %    % Immature Granulocytes 0.3 %    Nucleated RBCs 0 0 /100    Absolute Neutrophil 5.5 1.6 - 8.3 10e9/L    Absolute Lymphocytes 3.9 0.8 - 5.3 10e9/L    Absolute Monocytes 0.7 0.0 - 1.3 10e9/L    Absolute Eosinophils 0.2 0.0 - 0.7 10e9/L    Absolute  Basophils 0.1 0.0 - 0.2 10e9/L    Abs Immature Granulocytes 0.0 0 - 0.4 10e9/L    Absolute Nucleated RBC 0.0    HCG qualitative urine    Collection Time: 11/15/20  8:30 PM   Result Value Ref Range    HCG Qual Urine Negative NEG^Negative   Drug abuse screen 77 urine (FL, RH, SH)    Collection Time: 11/15/20  8:30 PM   Result Value Ref Range    Amphetamine Qual Urine Negative NEG^Negative    Barbiturates Qual Urine Negative NEG^Negative    Benzodiazepine Qual Urine Negative NEG^Negative    Cannabinoids Qual Urine Negative NEG^Negative    Cocaine Qual Urine Negative NEG^Negative    Opiates Qualitative Urine Negative NEG^Negative    PCP Qual Urine Negative NEG^Negative   UA reflex to Microscopic    Collection Time: 11/15/20  8:30 PM   Result Value Ref Range    Color Urine Yellow     Appearance Urine Clear     Glucose Urine Negative NEG^Negative mg/dL    Bilirubin Urine Negative NEG^Negative    Ketones Urine 10 (A) NEG^Negative mg/dL    Specific Gravity Urine 1.018 1.003 - 1.035    Blood Urine Negative NEG^Negative    pH Urine 6.5 5.0 - 7.0 pH    Protein Albumin Urine Negative NEG^Negative mg/dL    Urobilinogen mg/dL 2.0 0.0 - 2.0 mg/dL    Nitrite Urine Negative NEG^Negative    Leukocyte Esterase Urine Negative NEG^Negative    Source Midstream Urine          Impression:   This is a 25 year old female with depression and anxiety continues PHP.  She quit Lamictal as she worried it made her eat more.  She's depressed          DIagnoses:     1.  Major depressive disorder, recurrent, severe without psychosis  2.  Generalized anxiety disorder.   3.  Anorexia nervosa.   4.  Borderline personality disorder.            Plan:     Continue Cymbalta, Latuda, and gabapentin  Discontinue Lamictal as she quit taking it  Continue therapy as planned - PHP  Continue all other medications as reviewed per electronic medical record today.   Safety plan reviewed. To the Emergency Department as needed or call after hours crisis line at  [phone] or [phone]. [place] Crisis Text Line: Text [place] to 517203  or  Suicide LifeLine Chat: [email or web address].org/chat  Call [facility] at [phone] to schedule.  Follow up with outpatient provider as planned or sooner if needed for acute medical concerns.  Call the psychiatric nurse line with medication questions or concerns at [phone].      Dann Tompkins MD

## 2020-12-01 ENCOUNTER — HOSPITAL ENCOUNTER (OUTPATIENT)
Dept: BEHAVIORAL HEALTH | Facility: CLINIC | Age: 25
End: 2020-12-01
Attending: PSYCHIATRY & NEUROLOGY
Payer: COMMERCIAL

## 2020-12-01 PROCEDURE — H0035 MH PARTIAL HOSP TX UNDER 24H: HCPCS | Mod: 95

## 2020-12-01 PROCEDURE — H0035 MH PARTIAL HOSP TX UNDER 24H: HCPCS | Mod: GT | Performed by: COUNSELOR

## 2020-12-01 NOTE — GROUP NOTE
Psychoeducation Group Note    PATIENT'S NAME: Corinne N Palm  MRN:   [id number]  :   1995  ACCT. NUMBER: [id number]  DATE OF SERVICE: 20  START TIME: 1000  END TIME: 1050  FACILITATOR: Marlene Alonzo RN  TOPIC: NANDINI RN Group: [facility] Adult Partial Hospitalization Program  TRACK: 1    NUMBER OF PARTICIPANTS: 6    Summary of Group / Topics Discussed:  Foundations of Health: Nutrition: Super Nutrients & Micronutrients: Super Nutrients are Foods that have high nutritional yield. Micronutrients are essential elements found in food or taken through supplements that are necessary for normal physiological functioning. This group was designed to complement the macronutrients group and build upon previous education. The changes that food makes on the brain (how the brain uses sugar) and nutrition as it relates to mental health was also discussed.       Patient Session Goals / Objectives:  ? Identified the health enhancing benefits to good nutrition  ? Verbalized ways in which the food we eat affects the brain  ? Explained the role of micronutrients in the body, how much we need, and how to get it  Telemedicine Visit: The patient's condition can be safely assessed and treated via synchronous audio and visual telemedicine encounter.      Reason for Telemedicine Visit: Services only offered telehealth    Originating Site (Patient Location): Patient's home    Distant Site (Provider Location): Provider Remote Setting    Consent:  The patient/guardian has verbally consented to: the potential risks and benefits of telemedicine (video visit) versus in person care; bill my insurance or make self-payment for services provided; and responsibility for payment of non-covered services.     Mode of Communication:  Video Conference via [employer]    As the provider I attest to compliance with applicable laws and regulations related to telemedicine.       Patient Participation / Response:  Fully  participated with the group by sharing personal reflections / insights and openly received / provided feedback with other participants.    Demonstrated understanding of topics discussed through group discussion and participation    Treatment Plan:  Patient has a current master individualized treatment plan.  See Epic treatment plan for more information.    Marlene Alonzo RN

## 2020-12-01 NOTE — GROUP NOTE
Psychotherapy Group Note    PATIENT'S NAME: Corinne N Palm  MRN:   [id number]  :   1995  ACCT. NUMBER: [id number]  DATE OF SERVICE: 20  START TIME:  2:00 PM  END TIME:  2:50 PM  FACILITATOR: Joann Land LPCC  TOPIC:  EBP Group: Specialty Awareness  [facility] Adult Partial Hospitalization Program  TRACK: [facility]    NUMBER OF PARTICIPANTS: 5    Summary of Group / Topics Discussed:  Specialty Topics: Grief/Transitions: Patients received an overview of the grief process.  Patients explored their relationship to loss and how that has affected their mental health symptoms.  Strategies for recognizing loss and ideas for engaging in the grief process was presented and discussed.  Patients identified needs for support and coping skills to manage loss.  The purpose of this specialty topic is to help patients identify the aspects of change due to loss that individuals experience in addition to mental health symptoms to better cope with the grief, loss, and life transitions.      Patient Session Goals / Objectives:    Identified grief, loss, and life transitions     Discussed how grief impacts mental health symptoms and disrupts usual functioning    Identified needs for support and coping with grief and planned further action for coping    Telemedicine Visit: The patient's condition can be safely assessed and treated via synchronous audio and visual telemedicine encounter.      Reason for Telemedicine Visit: Services only offered telehealth and due to COVID-19    Originating Site (Patient Location): Patient's home    Distant Site (Provider Location): Provider Remote Setting    Consent:  The patient/guardian has verbally consented to: the potential risks and benefits of telemedicine (video visit) versus in person care; bill my insurance or make self-payment for services provided; and responsibility for payment of non-covered services.     Mode of Communication:  Video Conference via [employer]    As  the provider I attest to compliance with applicable laws and regulations related to telemedicine.        Patient Participation / Response:  Moderately participated, sharing some personal reflections / insights and adequately adequately received / provided feedback with other participants.    Demonstrated understanding of topics discussed through group discussion and participation, Identified / Expressed readiness to act on skill suggestions discussed in topic and Verbalized understanding of ways to proactively manage illness    Treatment Plan:  Patient has an initial individualized treatment plan that was created as part of their diagnostic assessment / admission process.  A master individualized treatment plan is in the process of being developed with the patient and multi-disciplinary care team.    Joann Land, [facility]

## 2020-12-01 NOTE — GROUP NOTE
Psychoeducation Group Note    PATIENT'S NAME: Corinne N Palm  MRN:   [id number]  :   1995  ACCT. NUMBER: [id number]  DATE OF SERVICE: 20  START TIME: 11:00 AM  END TIME: 11:50 AM  FACILITATOR: Mor Soni OT  TOPIC: [facility] OT Group: Self- Regulation Skills  [facility] Adult Partial Hospitalization Program  TRACK: 1    Telemedicine Visit: The patient's condition can be safely assessed and treated via synchronous audio and visual telemedicine encounter.      Reason for Telemedicine Visit: Services only offered telehealth and Covid 19    Originating Site (Patient Location): Patient's home    Distant Site (Provider Location): Provider Remote Setting    Consent:  The patient/guardian has verbally consented to: the potential risks and benefits of telemedicine (video visit) versus in person care; bill my insurance or make self-payment for services provided; and responsibility for payment of non-covered services.     Mode of Communication:  Video Conference via [employer]    As the provider I attest to compliance with applicable laws and regulations related to telemedicine.      NUMBER OF PARTICIPANTS: 6    Summary of Group / Topics Discussed:  Occupational Therapy: Self-Regulation: The vagus nerve and autonomic regulation:   Patient's explored the neurosensory connections between the body and the mind including skills and techniques to help develop one's capacity to be self aware and self regulate. Patients were provided with psychoeducation on the Autonomic Nervous System and learned about the function of the vagus nerve. They were provided information on the ways to tap into the calming properties of the Vagus nerve (parasympathetic NS) to help with distress tolerance and manage the impact of stressors on the body over time. Concepts presented and discussed included: interoception, polyvagal theory (neuroception, ventral and dorsal vagal activity, sympathetic, vagal brake). Explored ways to  develop vagal tone and heart rate variability as strategy to support effective stress responses in specific contexts for improved functioning. Facilitated discussion around specific ways to they are doing this already and some possibilities for moving forward including breath work, cold input, meditation, physical activity, socializing & laughing, talking/singing/humming/gargling as based on current neurosensory science.    Patient Session Goals / Objectives:    [place] how the ANS works with an emphasis on the vagus nerve (parasympathetic NS) and importance of its' impact on functioning and mental wellbeing.    [place] about the Polyvagal theory as a framework to begin to understand stress responses and levels of autonomic regulation. (Dina)    [place] how developing interoceptive awareness can help one self-regulate sooner rather than later.    Identified specific and individualized neurosensory skills to help when distressed and for crisis management via the vagus nerve, specifically ways to develop/build vagal tone over time to support mental health management.     Established a plan for practice of these skills in their own environments aligned with their Occupational Therapy goals.        Patient Participation / Response:  Fully participated with the group by sharing personal reflections / insights and openly received / provided feedback with other participants.    Verbalized understanding of content and Patient would benefit from additional opportunities to practice the content to be able to generalize it to their everyday life with increased intentionality, consistency, and efficacy in support of their psychiatric recovery      Treatment Plan:  Patient has a current master individualized treatment plan.  See Epic treatment plan for more information.    Mor Soni, OT

## 2020-12-01 NOTE — GROUP NOTE
"Process Group Note    PATIENT'S NAME: Corinne N Palm  MRN:   [id number]  :   1995  ACCT. NUMBER: [id number]  DATE OF SERVICE: 20  START TIME:  1:00 PM  END TIME:  1:50 PM  FACILITATOR: Joann Land LPCC  TOPIC:  Process Group    Diagnoses:  DSM5  Diagnosis:  1.  Major depressive disorder, recurrent, severe without psychosis  2.  Generalized anxiety disorder.   3.  Anorexia nervosa.   4.  Borderline personality disorder.    [facility] Adult Partial Hospitalization Program  TRACK: [facility]    NUMBER OF PARTICIPANTS: 5    Telemedicine Visit: The patient's condition can be safely assessed and treated via synchronous audio and visual telemedicine encounter.      Reason for Telemedicine Visit: Services only offered telehealth and due to COVID-19    Originating Site (Patient Location): Patient's home    Distant Site (Provider Location): Provider Remote Setting    Consent:  The patient/guardian has verbally consented to: the potential risks and benefits of telemedicine (video visit) versus in person care; bill my insurance or make self-payment for services provided; and responsibility for payment of non-covered services.     Mode of Communication:  Video Conference via [employer]    As the provider I attest to compliance with applicable laws and regulations related to telemedicine.            Data:    Session content: At the start of this group, patients were invited to check in by identifying themselves, describing their current emotional status, and identifying issues to address in this group.   Area(s) of treatment focus addressed in this session included Symptom Management, Personal Safety and Community Resources/Discharge Planning.    Patient reported feeling \"not the best\" today. Patient discussed working toward making through appointments today. Patient identified willingness as skills they will use to address their goal(s). Patient reported feeling willful may be a barrier to working toward " "their goal(s) and/or addressing mental health symptoms. Patient reported yes safety concerns and/or self-injurious behaviors. Patient reported no substance use. Patient reported they are taking their medications as prescribed. Patient reported feeling proud that they came to group. Patient discussed feeling nervous about her psychiatrist appointment today with the treatment group.    Therapeutic Interventions/Treatment Strategies:  Psychotherapist offered support, feedback and validation and reinforced use of skills. Treatment modalities used include Motivational Interviewing and Cognitive Behavioral Therapy. Interventions include Coping Skills: Assisted patient in identifying 1-2 healthy distraction skills to reduce overall distress and Discussed how the use of intentional \"in the moment\" actions can help reduce distress.    Assessment:    Patient response:   Patient responded to session by accepting feedback, listening and focusing on goals    Possible barriers to participation / learning include: and no barriers identified    Health Issues:   None reported       Substance Use Review:   Substance Use: No active concerns identified.    Mental Status/Behavioral Observations  Appearance:   Appropriate   Eye Contact:   Good   Psychomotor Behavior: Normal   Attitude:   Cooperative   Orientation:   All  Speech   Rate / Production: Normal/ Responsive Normal    Volume:  Normal   Mood:    Anxious  Depressed  Normal  Affect:    Appropriate   Thought Content:   Clear and Safety reports  presence of suicidal ideation passive suicidal ideation  and thoughts of self-harm  Thought Form:  Coherent  Logical     Insight:    Good     Plan:     Safety Plan: Patient consented to co-developed safety plan.  Safety and risk management plan was completed.  Patient agreed to use safety plan should any safety concerns arise.  A copy was given to the patient.    Committed to safety and agreed to follow previously developed safety coping " plan.      Barriers to treatment: None identified    Patient Contracts (see media tab):  None    Substance Use: Provided encouragement towards sobriety     Continue or Discharge: Patient will continue in Adult Partial Hospitalization Program (PHP)  as planned. Patient is likely to benefit from learning and using skills as they work toward the goals identified in their treatment plan.      Joann Land, [facility]  December 1, 2020

## 2020-12-02 ENCOUNTER — HOSPITAL ENCOUNTER (OUTPATIENT)
Dept: BEHAVIORAL HEALTH | Facility: CLINIC | Age: 25
Discharge: HOME | End: 2020-12-02
Attending: PSYCHIATRY & NEUROLOGY | Admitting: PSYCHIATRY & NEUROLOGY
Payer: COMMERCIAL

## 2020-12-02 ENCOUNTER — HOSPITAL ENCOUNTER (OUTPATIENT)
Dept: BEHAVIORAL HEALTH | Facility: CLINIC | Age: 25
End: 2020-12-02
Attending: PSYCHIATRY & NEUROLOGY
Payer: COMMERCIAL

## 2020-12-02 PROCEDURE — H0035 MH PARTIAL HOSP TX UNDER 24H: HCPCS | Mod: GT | Performed by: OCCUPATIONAL THERAPIST

## 2020-12-02 PROCEDURE — H0035 MH PARTIAL HOSP TX UNDER 24H: HCPCS | Mod: GT

## 2020-12-02 PROCEDURE — H0035 MH PARTIAL HOSP TX UNDER 24H: HCPCS | Mod: 95 | Performed by: COUNSELOR

## 2020-12-02 NOTE — PROGRESS NOTES
Adult Partial Hospitalization Program:  Individualized Treatment Plan     Date of Plan: 20    Name: Corinne N Palm MRN: [id number]  :   1995    Programs: Adult Partial Hospitalization Program    DSM5 Primary Diagnosis:  DSM5  Diagnosis:  1.  Major depressive disorder, recurrent, severe without psychosis  2.  Generalized anxiety disorder.   3.  Anorexia nervosa.   4.  Borderline personality disorder.    Adult Partial Hospitalization Program Multidisciplinary Team Members: MP Perry, Dana Guerrero, [facility],  Vanessa Alonzo RN, Mor Soni, GUILLE/L; Dann Tompkins MD    Corinne N Palm will participate in the Adult Partial Hospitalization Program 5 days per week, 5 hours per day. Anticipated duration/discharge: 2 weeks    Due to COVID-19, services will be delivered via telemedicine until further notice.     Program Start Date: 2020  Anticipated Discharge Date: On or around 12/10/2020 (pending authorization/clinical changes)    NOTE: Complete CGI     Corinne N Palm would be at reasonable risk of requiring inpatient hospitalization in the absence of partial hospitalization.     Review Date: Does Corinne N Palm continue to meet criteria to participate in the Partial Hospitalization Program, 5 days per week; 5 hours per day?   2020 yes   2020 Yes  No-discharging to day treatment as scheduled        Client Strengths:  caring, educated, employed, good listener, has a previous history of therapy, insightful, motivated, support of family, friends and providers and supportive    Client Areas of Vulnerability:  Suicidal Ideation   Anxiety  Depressive symptoms   Eating disorder    Client Participation in Plan:  Contributed to goals and plan   Attended individual treatment plan meeting on 20  Agrees with plan     Long-Term Goals:  Knowledge about illness and management of symptoms   Maintenance of personal safety     Abuse Prevention Plan:  Safe, therapeutic environment   Safety  "coping plan as needed   Education regarding illness and skill development   Coordination with care providers     Discharge Criteria:  Satisfactory progress toward treatment goals   Improvement re: identified problems and symptoms   Ability to continue recovery at next level of service   Has a discharge plan in place   Has safety/coping plan in place        Areas of Treatment Focus       Area of Treatment Focus:  Personal Safety  Start Date:    12/2/2020    Problem Description:   The problem(s) began the patient reports that she has had worsening depression over the past 2-3 months.  She acknowledges might be in the setting of ongoing stressors as well as the seasonal change.  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  They did have a patient suicide in recent weeks that was quite \"triggering\" for her.  She notes that she has been struggling with depression, anhedonia, low energy and motivation, excessive sleep and poor appetite.  Reports that she has anxiety with weekly panic attacks and generalized worry as well as social anxiety.  She denies any history of pedro phase symptoms historically or any history of psychosis symptoms.  She acknowledges that she has been diagnosed with cluster B borderline personality traits, but states that psychological testing did not show a full disorder.  However, she does acknowledge having substantial abandonment sensitivity, chronic suicidal thoughts and self-harm ideation, chronic empty mood and identity instability as well as relationship instability.  She has had prior cutting attempts at suicide.  She was hospitalized at [facility] a few times in the last few years and acknowledges that she would cut while in the hospital as a suicide attempt.  At this time here during the current admission, she is having ongoing suicidal thoughts that come and go including thoughts about pouring water in the electrical outlets (or " outlets are off in her room).  At this time, she is reporting that she would be able to notify nursing if she does feel unsafe, but she remains ambivalent about staying in the hospital, would like to go home so that she can work on Sunday or Monday.     Goal: Target Date: 12/8/20, discharge Status: Completed  1) Safety:   Client will notify staff when needing assistance to develop or implement a coping plan to manage suicidal or self injurious urges.  Client will use coping plan for safety, as needed.    Progress:  12/02/20: Met with team members. Discussed program, process, and progress. Discussed and set treatment goals. Patient agrees with goal, continue until next review.   12/08/20: Patient consistently reports on daily suicidal ideation and engages in safety planning with treatment staff. Continue goal until discharge.   12/16/20: DISCHARGE: Patient consistently reports on daily suicidal ideation and engages in safety planning with treatment staff. It is recommended patient continue to address this treatment goal in Adult Day Treatment Programming.     Treatment Strategies:  Assist clients in establishing / strengthening support network  Assist to identify treatment goals  Assist with discharge planning  Engage in safety planning when indicated  Facilitate increased self-awareness  Provide education regarding distress tolerance skills     Area of Treatment Focus:  Symptom Management  Start Date:    12/02/2020    Description:  Frohreich, Marcellus,  CNP dated, 11.18.20:  Ms. Alpa Lawrence is a 25-year-old  young woman working as a music therapist at [employer] partial hospitalization program, with psychiatric history of depression, anxiety, anorexia, borderline personality features, who presented with depression and suicidal ideation.  Notably, she had had an appointment with her outpatient psychiatrist before admission.  Admitted to having stockpiled medications.  Her  was going to dispose of  this, but this was somewhat destabilizing for the patient as she had some psychological comfort from the idea of having the stockpile on hand.  Described that she started having more suicidal ideation including thoughts about jumping off a bridge or cutting herself.  She and her  called the crisis line.  She was eventually brought to the emergency room.  She was quite ambivalent about admission and was placed on a 72-hour hold as she was deemed to meet criteria despite her ambivalence about coming into the hospital.  She was admitted to station 77 in the [facility].     Goal: Target Date: 12/08/20, discharge Status: Completed  2) In Psychotherapy groups Beth will:   Identify and process depressive symptoms, suicidal ideation and emotions regarding recent visit to the emergency room; learn and utilize 1-2 coping skills to address mood symptoms.      As measured by self report and staff observation during groups daily.       Progress:  12/02/20: Met with team members. Discussed program, process, and progress. Discussed and set treatment goals. Patient agrees with goal, continue until next review.   12/08/20: Patient reports consistently utilizing grounding skills, art, and playing with her pets. Continue goal until discharge.   12/16/20: DISCHARGE: It is recommended patient continue to address this treatment goal in Adult Day Treatment Programming.     Treatment Strategies:  Assist clients in establishing / strengthening support network  Assist to identify treatment goals  Assist with discharge planning  Engage in safety planning when indicated  Facilitate increased self awareness  Provide education regarding how to use group process, thoughts/behaviors/emotions management, emotional regulation, autonomic nervous system (Polyvagal), core beliefs, values identification, distorted thinking, grief and loss, shame, self compassion, self awareness, mindfulness, radical acceptance, distress tolerance skills, problem  "solving. Communication/interpersonal effectiveness.      Area of Treatment Focus:  Develop / Improve Independent Living Skills  Start Date:    12/02/2020    Description:  From DA:  Functional Status:  Patient reports the following functional impairments: health maintenance, social interactions and work / vocational responsibilities.     Goal: Target Date: 12/08/2020, discharge Status: Completed  3) In Occupational Therapy Cori will:    Learn, practice, apply 2 skills/strategies that support lifestyle health with and emphasis on integrating comfortable physical activity into her daily routines to improve mental health management after discharge.     Learn, practice, apply 2 body based self regulation skills (sensory modulation, sensory enhanced mindfulness, attentional regulation, sensory grounding) for improved distress tolerance to support participation in meaningful roles and responsibilities at work.      As measured by self report and staff observation during groups daily.     Progress:  12/02/2020: Met with team members. Discussed program, process, and progress. Discussed and set treatment goals. Patient agrees with goal.   12/08/2020: Patient reported she has been engaging in yoga 1-2 times weekly. Patient reported she finds it difficult to utilize \"lighter\" exercises as she experiences thoughts of \"what's the point?\" and expressed feeling proud that she has been engaging in the lighter yoga.   12/16/20: DISCHARGE: It is recommended patient continue to address this treatment goal in Adult Day Treatment Programming.     Treatment Strategies:  Assist to identify treatment goals  Engage in safety planning when indicated  Facilitate increased self awareness  Provide education regarding:  Lifestyle health: balance/structure/routine, goal setting and integration, stress management, prioritizing, leisure values, behavior activation, cognitive skills and benefits of mindful focused activity.  Self regulation: sensory " modulation, window of tolerance, Autonomic regulation (ANS and Vagus nerve) self awareness, sensory enhanced mindfulness, distress tolerance skills.  Resiliency development: transitions, motivation, honoring energy, positive thought processes, neuroscience of change, self compassion, weaving a mindful lifestyle.       Area of Treatment Focus:  Community Resources/Discharge Planning  Start Date:    12/2/20    Description:   Psychiatrist/Med Mgmt: Dr. Giulia Quinonez at [place]   Individual Therapist: Yulissa Kennedy at [place]; Shelton Jordan at [facility]     Goal: Target Date: 12/8/20, discharge   Status: Completed    4) Wellness and Discharge preparation:     Will improve wellness related behaviors by engaging in wellness groups and asking related questions.     Will increase effective use of support / increase ability to ask for help. Invite someone to the Support Network Education group to discuss your support needs.    Will develop an aftercare / transition plan by scheduling on-going individual therapy appointments and psychiatric care.     Progress:  12/2/2020: Met with team members. Discussed program, process, and progress. Discussed and set treatment goals. Patient and staff discussed day treatment program to transition following discharge. Patient will discuss additional options with her therapist at [facility] today.   12/08/2020: Patient completed and intake assessment with [facility] yesterday for residential services and will be contacted by them this week to determine if she will go to residential treatment or [facility] Day treatment services in the  track on Monday, Tuesday, Thursday from 1:00pm-4:00pm. Patient will [facility] on 12/11.  12/16/20: Patient is scheduled to start in the adult day treatment program tomorrow and has individual therapy appointments this week and psychiatry on 12/22. It is recommended patient continue to address this  "treatment goal in Adult Day Treatment Programming.     Treatment Strategies:  Assist to identify treatment goals  Assist with discharge planning  Engage in safety planning when indicated  Facilitate increased self awareness  Provide education regarding eight dimensions of wellness. sleep hygiene. medication education and management. stigma. nutrition. signs and symptoms. community resources. support network education.      FELICIANO Arnett LPCC on 12/2/2020 at 12:14 PM  MP Thakur on 12/8/2020 at 10:07 AM  MP Thakur on 12/16/2020 at 12:11 PM      NOTE: Required signatures are completed manually and scanned into the electronic medical record. See \"Media\" tab in epic.    The Individualized Treatment Plan Signature Page has been routed to the provider for co-sign.     "

## 2020-12-02 NOTE — GROUP NOTE
Psychoeducation Group Note    PATIENT'S NAME: Corinne N Palm  MRN:   [id number]  :   1995  ACCT. NUMBER: [id number]  DATE OF SERVICE: 20  START TIME:  9:00 AM  END TIME:  9:50 AM  FACILITATOR: Marlene Alonzo RN  TOPIC: NANDINI RN Group: [facility] Adult Partial Hospitalization Program  TRACK: 1    NUMBER OF PARTICIPANTS: 5    Summary of Group / Topics Discussed:  [facility]:  [facility]: self compassion   Pts will watch a 15 minute video by Dr Stacey Henderson, to help facilitate a deeper understanding of what self compassion means.  A brief review of the research highlighting the mental health benefits of practicing self compassion will be discussed.  Pts will compare benefits of self compassion vs self esteem.  Pts will learn about neurochemicals oxytocin and cortisol and their roles when practicing self compassion.    Patient Session Goals / Objectives:  ? Pts will understand and discuss benefits of practicing self compassion  ? Pts will discuss 3 components of self compassion  ? Pts will generate a list of self compassion statements  Telemedicine Visit: The patient's condition can be safely assessed and treated via synchronous audio and visual telemedicine encounter.      Reason for Telemedicine Visit: Services only offered telehealth    Originating Site (Patient Location): Patient's home    Distant Site (Provider Location): Provider Remote Setting    Consent:  The patient/guardian has verbally consented to: the potential risks and benefits of telemedicine (video visit) versus in person care; bill my insurance or make self-payment for services provided; and responsibility for payment of non-covered services.     Mode of Communication:  Video Conference via [facility]    As the provider I attest to compliance with applicable laws and regulations related to telemedicine.               Patient Participation / Response:  Fully participated with the group by sharing  personal reflections / insights and openly received / provided feedback with other participants.    Identified / Expressed personal readiness to practice skills    Treatment Plan:  Patient has a current master individualized treatment plan.  See Epic treatment plan for more information.    Marlene Alonzo RN

## 2020-12-02 NOTE — GROUP NOTE
Psychoeducation Group Note    PATIENT'S NAME: Corinne N Palm  MRN:   [id number]  :   1995  ACCT. NUMBER: [id number]  DATE OF SERVICE: 20  START TIME: 11:00 AM  END TIME: 11:50 AM  FACILITATOR: Erika Crump OTR/L  TOPIC: [facility] OT Group: Lifestyle Balance and Structure  [facility] Adult Partial Hospitalization Program  TRACK: 1    NUMBER OF PARTICIPANTS: 5    Telemedicine Visit: The patient's condition can be safely assessed and treated via synchronous audio and visual telemedicine encounter.      Reason for Telemedicine Visit: Services only offered telehealth    Originating Site (Patient Location): Patient's home    Distant Site (Provider Location): [facility] Outpatient Setting: Partial [facility]    Consent:  The patient/guardian has verbally consented to: the potential risks and benefits of telemedicine (video visit) versus in person care; bill my insurance or make self-payment for services provided; and responsibility for payment of non-covered services.     Mode of Communication:  Video Conference via [employer]    As the provider I attest to compliance with applicable laws and regulations related to telemedicine.     Summary of Group / Topics Discussed:  Lifestyle Balance and Structure:  Leisure Values: Provided psychoeducation and discussion on benefits of leisure on stress management, parasympathetic NS activation, and wellness. Facilitated a structured self-reflective process where patients identified their leisure values: what is most important to them with regards to how they spend their time and energy on that promotes lifestyle balance to support mental wellbeing. Experiential process facilitated where patients reflected on past, present, and potential future leisure activities that fulfill their leisure values. Validation and support provided.    Patient Session Goals / Objectives:    [place] the mechanisms and benefits of leisure activity to create lifestyle  balance and improve mental health.     Identified their leisure values (how they want to spend their time and energy)    Identified past, present, and future leisure activities that demonstrate a connection to their leisure values    Identify first step to engaging in a new or old leisure activity again and problem solve barriers.         Patient Participation / Response:  Fully participated with the group by sharing personal reflections / insights and openly received / provided feedback with other participants.    Patient presentation: constricted affect; low mood; active in asking questions/seeking feedback from others, Verbalized understanding of content and Patient would benefit from additional opportunities to practice the content to be able to generalize it to their everyday life with increased intentionality, consistency, and efficacy in support of their psychiatric recovery    Treatment Plan:  Patient has a current master individualized treatment plan.  See Epic treatment plan for more information.    Erika Crump, OTR/L

## 2020-12-02 NOTE — DISCHARGE SUMMARY
Adult Mental Health Intensive Outpatient Discharge Summary/Instructions      Patient: Corinne N Palm MRN: [id number]   : 1995 Age: 25 year old Sex: female     Admission Date: 20  Discharge Date: 20  Diagnosis: DSM5  Diagnosis:  1.  Major depressive disorder, recurrent, severe without psychosis  2.  Generalized anxiety disorder.   3.  Anorexia nervosa.   4.  Borderline personality disorder.      Focus of Treatment / Progress    Personal Safety:      * Follow your safety plan     * Call crisis lines as needed:    [facility] [phone] [facility] [phone]  [facility] [phone] Crisis Connection [phone]  [facility] [phone] [facility] [phone]  [facility] [phone] National Suicide Prevention [phone]  [facility] [phone] Suicide Prevention [phone]  [facility] [phone]    Managing symptoms of:  Depression, anxiety, suicidal ideation    Community support/health:  [facility].[employer]    Managing Symptoms and Preventing Relapse    * Go to all of your appointments    * Take all medications as directed.      * Carry a current list if medication with you    * Do not use illicit (street) drugs.  Avoid alcohol    * Report these symptoms to your care team. These are early signs of relapse:   Thoughts of suicide   Losing more sleep   Increased confusion   Mood getting worse   Feeling more aggressive   Other:  Isolation     *Use these skills daily:  IMPROVE, breathing, art, play with pets, mindfulness, grounding, communicating with spouse     Copy of summary sent to: Patient via JoMaJa    Follow up with psychiatrist / main caregiver: Giulia Gómez    Next visit: 20    Follow up with your therapist: Yulissa Kennedy at [place] and Shelton Jordan at [facility]   Next visit: Shelton  at 2:30; Yulissa     Go to group therapy and / or support groups at: [facility], 2B track meets Monday, Tuesday and  Wednesday from 1:00pm-4:00pm. Program phone number: [phone].     See your medical doctor about:  Annual physical exams and any medical concerns that may arise.     Other:  Your treatment teams enjoyed the opportunity to work with you Cori!    Client Signature:_Unable to sign due to COVID-19  Staff Signature:_Joann Land [facility] on 12/16/2020 at 11:59 AM

## 2020-12-02 NOTE — PROGRESS NOTES
[facility]   Dr. Tompkins's Psychiatric Progress Note  2020      Patient:  Corinne N Palm   Medical Record Number:  [id number]  :  1995    Telemedicine Visit: The patient's condition can be safely assessed and treated via synchronous audio and visual telemedicine encounter.       Reason for Telemedicine Visit: COVID-19 lockdown     Originating Site (Patient Location):  HOME     Distant Site (Provider Location): [facility]: [place]     Consent:  The patient/guardian has verbally consented to: the potential risks and benefits of telemedicine (video visit) versus in person care; bill my insurance or make self-payment for services provided; and responsibility for payment of non-covered services           Interim History:   The patient's care was discussed with the treatment team and chart notes were reviewed.  Today Corinne N Palm reports depressed mood. In addition, there are notable risk factors for self-harm, including age, anxiety, previous history of suicide attempts and suicidal ideation. However, risk is mitigated by commitment to family, sobriety, ability to volunteer a safety plan, history of seeking help when needed, no access to firearms or weapons and denies homicidal ideation, intent, or plan.  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.      Psych Dr Giulia Vigil at [place].  Therapist Deandra Osorio at [place].  DBT at [facility].    Mood is very bad: lots of depression and less anxious.  She quit Lamictal as she felt it made her appetite more.  Latuda helps mood.  Used to be on higher dose of Cymbalta.  She noted no problems on higher dose.  Previously higher Latuda dose did nothing.      20  Pt saw her psychiatrist last night.  Her doc wanted her to hand over meds last night.  She's not allowed back in her DBT group at [facility].  Group is going ok.  Dr. Vigil increased Cymbalta to 90 mg/d.  She's starting Lamictal again at 25mg/d.        Psychiatric  ROS:  Mood: really depressed (worst it's been in a long time);  Anxious but less than depression;    Sleep: initial and middle insomnia  Appetite:  low  Eatin meal plus one snack per day  Energy Level:  Tired   Concentration/Memory Problems:  NO  Suicidal Thoughts:  Yes has SI and SIB urges;  No acting on urges;  Feels safe;  No plan or intent;    Homicidal Thoughts:No  Psychotic Symptoms: No  Medication Side Effects:No  Medication Compliance:Yes   Physical Complaints:  positive for lethary         Medications:     Current Outpatient Medications   Medication Sig    CYMBALTA 30MG Take 1 cap at night     DULoxetine (CYMBALTA) 60 MG capsule Take 1 capsule (60 mg) by mouth At Bedtime     gabapentin (NEURONTIN) 300 MG capsule Take 300-600 mg by mouth nightly as needed (anxiety)     lamoTRIgine (LAMICTAL) 25 MG tablet Take 1 tablet (25 mg) by mouth daily for 9 days, then increase to 2 tablets (50 mg) by mouth daily.     lurasidone (LATUDA) 80 MG TABS tablet Take 80 mg by mouth At Bedtime     polyethylene glycol (MIRALAX) 17 GM/Dose powder Take 17 g by mouth daily     Vitamin D, Cholecalciferol, 25 MCG (1000 UT) TABS Take 1,000 Units by mouth daily      No current facility-administered medications for this encounter.              Allergies:     Allergies   Allergen Reactions     Morphine Other (See Comments)     Twitching     Amoxicillin Rash     Sulfa Drugs Rash            Psychiatric Examination:   There were no vitals taken for this visit.  Weight is 0 lbs 0 oz  There is no height or weight on file to calculate BMI.    Appearance:  awake, alert and adequately groomed  Attitude:  cooperative  Eye Contact:  good  Mood:  depressed  Affect:  mood congruent  Speech:  clear, coherent  Psychomotor Behavior:  no evidence of tardive dyskinesia, dystonia, or tics  Throught Process:  logical  Associations:  no loose associations  Thought Content:  no evidence of psychotic thought and passive suicidal thoughts;  no plan or  intent;  able to con tract for safety  Insight:  good  Judgement:  intact  Oriented to:  time, person, and place  Attention Span and Concentration:  intact  Recent and Remote Memory:  intact  Gait:Normal    Risk/Potential for Dangerousness:  Multiple Active Diagnoses:HIGH  Self Care:HIGH  Suicide:MODERATE  Assault:LOW  Self Injurious Behaviors:MODERATE  Inappropriate Sexual Behavior:LOW         Labs:   No results found for this or any previous visit (from the past 24 hour(s)).     Recent Results (from the past 1008 hour(s))   Asymptomatic COVID-19 Virus (Coronavirus) by PCR    Collection Time: 11/13/20  6:54 PM    Specimen: Nasopharyngeal   Result Value Ref Range    COVID-19 Virus PCR to [facility] - Source Nasopharyngeal     COVID-19 Virus PCR to [facility] - Result       Test received-See reflex to IDDL test SARS CoV2 (COVID-19) Virus RT-PCR   Comprehensive metabolic panel    Collection Time: 11/13/20  6:54 PM   Result Value Ref Range    Sodium 138 133 - 144 mmol/L    Potassium 3.8 3.4 - 5.3 mmol/L    Chloride 104 94 - 109 mmol/L    Carbon Dioxide 26 20 - 32 mmol/L    Anion Gap 8 3 - 14 mmol/L    Glucose 75 70 - 99 mg/dL    Urea Nitrogen 6 (L) 7 - 30 mg/dL    Creatinine 0.67 0.52 - 1.04 mg/dL    GFR Estimate >90 >60 mL/min/[1.73_m2]    GFR Estimate If Black >90 >60 mL/min/[1.73_m2]    Calcium 9.3 8.5 - 10.1 mg/dL    Bilirubin Total 0.5 0.2 - 1.3 mg/dL    Albumin 4.1 3.4 - 5.0 g/dL    Protein Total 7.7 6.8 - 8.8 g/dL    Alkaline Phosphatase 55 40 - 150 U/L    ALT 42 0 - 50 U/L    AST 27 0 - 45 U/L   SARS-CoV-2 COVID-19 Virus (Coronavirus) RT-PCR Nasopharyngeal    Collection Time: 11/13/20  6:54 PM    Specimen: Nasopharyngeal   Result Value Ref Range    SARS-CoV-2 Virus Specimen Source Nasopharyngeal     SARS-CoV-2 PCR Result NEGATIVE     SARS-CoV-2 PCR Comment       Testing was performed using the Aptima SARS-CoV-2 Assay on the [employer] Imaging Instrument System.   Additional information about this Emergency Use Authorization  (EUA) assay can be found via   the Lab Guide.     TSH with free T4 reflex and/or T3 as indicated    Collection Time: 11/13/20 10:38 PM   Result Value Ref Range    TSH 2.05 0.40 - 4.00 mU/L   CBC with platelets differential    Collection Time: 11/13/20 10:38 PM   Result Value Ref Range    WBC 10.4 4.0 - 11.0 10e9/L    RBC Count 4.77 3.8 - 5.2 10e12/L    Hemoglobin 14.4 11.7 - 15.7 g/dL    Hematocrit 42.3 35.0 - 47.0 %    MCV 89 78 - 100 fl    MCH 30.2 26.5 - 33.0 pg    MCHC 34.0 31.5 - 36.5 g/dL    RDW 11.9 10.0 - 15.0 %    Platelet Count 358 150 - 450 10e9/L    Diff Method Automated Method     % Neutrophils 52.8 %    % Lymphocytes 37.9 %    % Monocytes 6.6 %    % Eosinophils 1.8 %    % Basophils 0.6 %    % Immature Granulocytes 0.3 %    Nucleated RBCs 0 0 /100    Absolute Neutrophil 5.5 1.6 - 8.3 10e9/L    Absolute Lymphocytes 3.9 0.8 - 5.3 10e9/L    Absolute Monocytes 0.7 0.0 - 1.3 10e9/L    Absolute Eosinophils 0.2 0.0 - 0.7 10e9/L    Absolute Basophils 0.1 0.0 - 0.2 10e9/L    Abs Immature Granulocytes 0.0 0 - 0.4 10e9/L    Absolute Nucleated RBC 0.0    HCG qualitative urine    Collection Time: 11/15/20  8:30 PM   Result Value Ref Range    HCG Qual Urine Negative NEG^Negative   Drug abuse screen 77 urine (FL, RH, SH)    Collection Time: 11/15/20  8:30 PM   Result Value Ref Range    Amphetamine Qual Urine Negative NEG^Negative    Barbiturates Qual Urine Negative NEG^Negative    Benzodiazepine Qual Urine Negative NEG^Negative    Cannabinoids Qual Urine Negative NEG^Negative    Cocaine Qual Urine Negative NEG^Negative    Opiates Qualitative Urine Negative NEG^Negative    PCP Qual Urine Negative NEG^Negative   UA reflex to Microscopic    Collection Time: 11/15/20  8:30 PM   Result Value Ref Range    Color Urine Yellow     Appearance Urine Clear     Glucose Urine Negative NEG^Negative mg/dL    Bilirubin Urine Negative NEG^Negative    Ketones Urine 10 (A) NEG^Negative mg/dL    Specific Gravity Urine 1.018 1.003 - 1.035     Blood Urine Negative NEG^Negative    pH Urine 6.5 5.0 - 7.0 pH    Protein Albumin Urine Negative NEG^Negative mg/dL    Urobilinogen mg/dL 2.0 0.0 - 2.0 mg/dL    Nitrite Urine Negative NEG^Negative    Leukocyte Esterase Urine Negative NEG^Negative    Source Midstream Urine          Impression:   This is a 25 year old female with depression and anxiety continues PHP.  She's depressed.          DIagnoses:     1.  Major depressive disorder, recurrent, severe without psychosis  2.  Generalized anxiety disorder.   3.  Anorexia nervosa.   4.  Borderline personality disorder.            Plan:     Continue Cymbalta, Latuda, and gabapentin  Increased Cymbalta to 90 mg/d  Restarted Lamictal.   Continue therapy as planned - PHP  Continue all other medications as reviewed per electronic medical record today.   Safety plan reviewed. To the Emergency Department as needed or call after hours crisis line at [phone] or [phone]. [place] Crisis Text Line: Text [place] to 385654  or  Suicide LifeLine Chat: suicidepreventionlifeline.org/chat  Call [facility] at [phone] to schedule.  Follow up with outpatient provider as planned or sooner if needed for acute medical concerns.  Call the psychiatric nurse line with medication questions or concerns at [phone].      Dann Tompkins MD

## 2020-12-02 NOTE — GROUP NOTE
Psychoeducation Group Note    PATIENT'S NAME: Corinne N Palm  MRN:   [id number]  :   1995  ACCT. NUMBER: [id number]  DATE OF SERVICE: 20  START TIME:  1:00 PM  END TIME:  1:50 PM  FACILITATOR: Mor Soni OT  TOPIC: [facility] OT Group: Self- Regulation Skills  [facility] Adult Partial Hospitalization Program  TRACK: 1    Telemedicine Visit: The patient's condition can be safely assessed and treated via synchronous audio and visual telemedicine encounter.      Reason for Telemedicine Visit: Services only offered telehealth and Covid 19    Originating Site (Patient Location): Patient's home    Distant Site (Provider Location): Provider Remote Setting    Consent:  The patient/guardian has verbally consented to: the potential risks and benefits of telemedicine (video visit) versus in person care; bill my insurance or make self-payment for services provided; and responsibility for payment of non-covered services.     Mode of Communication:  Video Conference via ice    As the provider I attest to compliance with applicable laws and regulations related to telemedicine.      NUMBER OF PARTICIPANTS: 6    Summary of Group / Topics Discussed:  Self-Regulation: Developing a self regulation plan: Patients were provided with education on Autonomic Nervous System activation and the importance of identifying their Window of Tolerance for effective self-regulation.  Patients were taught how to recognize specific signs and symptoms of their individualized state of arousal and how to make changes when needed.  Patient's explored body based skills (bottom up processing skills) and techniques to help manage symptoms and change level of arousal when needed to be in control and comfortable so they are able to function in different environments.         Patient Session Goals / Objectives:    Symonds how the ANS works and importance of its  impact on functioning and mental wellbeing    [place]onds how developing  interoceptive awareness can help one self-regulate sooner rather than later    Identified signs and symptoms of current level of arousal and ways to make changes in level of arousal when needed    Identified specific and individualized neurosensory skills to help when distressed and for crisis management    Established a plan for practice of these skills in their own environments        Patient Participation / Response:  Fully participated with the group by sharing personal reflections / insights and openly received / provided feedback with other participants.    Verbalized understanding of content and Patient would benefit from additional opportunities to practice the content to be able to generalize it to their everyday life with increased intentionality, consistency, and efficacy in support of their psychiatric recovery    Treatment Plan:  Patient has a current master individualized treatment plan.  See Epic treatment plan for more information.    Mor Soni, OT

## 2020-12-02 NOTE — PROGRESS NOTES
Acknowledgement of Current Treatment Plan       I have reviewed my treatment plan with my therapist / counselor on 12/2/20.   I agree with the plan as it is written in the electronic health record.    Name:      Signature:  Corinne N Palm Unable to sign due to COVID-19     Dr. Dann Tompkins MD  Psychiatrist    Dana Guerrero, MS, [facility], BC-DMT  Psychotherapist     Joann Land MA, [facility]  Psychotherapist  MP Thakur on 12/2/2020 at 12:18 PM

## 2020-12-02 NOTE — GROUP NOTE
"Process Group Note    PATIENT'S NAME: Corinne N Palm  MRN:   [id number]  :   1995  ACCT. NUMBER: [id number]  DATE OF SERVICE: 20  START TIME: 10:00 AM  END TIME: 10:50 AM  FACILITATOR: Joann Land LPCC  TOPIC:  Process Group    Diagnoses:  DSM5  Diagnosis:  1.  Major depressive disorder, recurrent, severe without psychosis  2.  Generalized anxiety disorder.   3.  Anorexia nervosa.   4.  Borderline personality disorder.      [facility] Adult Partial Hospitalization Program  TRACK: [facility]    NUMBER OF PARTICIPANTS: 6    Telemedicine Visit: The patient's condition can be safely assessed and treated via synchronous audio and visual telemedicine encounter.      Reason for Telemedicine Visit: Services only offered telehealth and due to COVID-19    Originating Site (Patient Location): Patient's home    Distant Site (Provider Location): Provider Remote Setting    Consent:  The patient/guardian has verbally consented to: the potential risks and benefits of telemedicine (video visit) versus in person care; bill my insurance or make self-payment for services provided; and responsibility for payment of non-covered services.     Mode of Communication:  Video Conference via [employer]    As the provider I attest to compliance with applicable laws and regulations related to telemedicine.            Data:    Session content: At the start of this group, patients were invited to check in by identifying themselves, describing their current emotional status, and identifying issues to address in this group.   Area(s) of treatment focus addressed in this session included Symptom Management, Personal Safety and Community Resources/Discharge Planning.    Patient reported feeling better than yesterday and still \"shitty.\" Patient discussed working toward opening up and being vulnerable in group. Patient identified mindfulness as skills they will use to address their goal(s). Patient reported feeling labile may be a " barrier to working toward their goal(s) and/or addressing mental health symptoms. Patient reported yes safety concerns and/or self-injurious behaviors. Patient reported no substance use. Patient reported they are taking their medications as prescribed. Patient reported feeling proud that they communicated with the group. Patient discussed not being able to return to her DBT program with the treatment group.     Therapeutic Interventions/Treatment Strategies:  Psychotherapist offered support, feedback and validation and reinforced use of skills. Treatment modalities used include Motivational Interviewing and Cognitive Behavioral Therapy. Interventions include Coping Skills: Discussed use of self-soothe skills to decrease distress in the body and Assisted patient in identifying 1-2 healthy distraction skills to reduce overall distress, Symptoms Management: Promoted understanding of their diagnoses and how it impacts their functioning and Emotions Management:  Discussed barriers to emotional regulation.    Assessment:    Patient response:   Patient responded to session by accepting feedback, listening, focusing on goals and being attentive    Possible barriers to participation / learning include: severity of symptoms    Health Issues:   None reported       Substance Use Review:   Substance Use: No active concerns identified.    Mental Status/Behavioral Observations  Appearance:   Appropriate   Eye Contact:   Good   Psychomotor Behavior: Normal   Attitude:   Cooperative   Orientation:   All  Speech   Rate / Production: Normal/ Responsive Normal    Volume:  Normal   Mood:    Anxious  Depressed  Normal Sad   Affect:    Appropriate   Thought Content:   Clear and Safety reports  presence of suicidal ideation passive suicidal ideation  and thoughts of self-harm  Thought Form:  Coherent  Logical     Insight:    Good     Plan:     Safety Plan: Patient consented to co-developed safety plan.  Safety and risk management plan was  completed.  Patient agreed to use safety plan should any safety concerns arise.  A copy was given to the patient.    Committed to safety and agreed to follow previously developed safety coping plan.      Barriers to treatment: None identified    Patient Contracts (see media tab):  None    Substance Use: Provided encouragement towards sobriety     Continue or Discharge: Patient will continue in Adult Partial Hospitalization Program (PHP)  as planned. Patient is likely to benefit from learning and using skills as they work toward the goals identified in their treatment plan.      Joann Land, [facility]  December 2, 2020

## 2020-12-03 ENCOUNTER — TELEPHONE (OUTPATIENT)
Dept: BEHAVIORAL HEALTH | Facility: CLINIC | Age: 25
End: 2020-12-03

## 2020-12-03 ENCOUNTER — HOSPITAL ENCOUNTER (OUTPATIENT)
Dept: BEHAVIORAL HEALTH | Facility: CLINIC | Age: 25
End: 2020-12-03
Attending: PSYCHIATRY & NEUROLOGY
Payer: COMMERCIAL

## 2020-12-03 PROCEDURE — H0035 MH PARTIAL HOSP TX UNDER 24H: HCPCS | Mod: GT

## 2020-12-03 PROCEDURE — H0035 MH PARTIAL HOSP TX UNDER 24H: HCPCS | Mod: 95

## 2020-12-03 PROCEDURE — H0035 MH PARTIAL HOSP TX UNDER 24H: HCPCS | Mod: GT | Performed by: COUNSELOR

## 2020-12-03 NOTE — GROUP NOTE
Psychotherapy Group Note    PATIENT'S NAME: Corinne N Palm  MRN:   [id number]  :   1995  ACCT. NUMBER: [id number]  DATE OF SERVICE: 20  START TIME:  2:00 PM  END TIME:  2:50 PM  FACILITATOR: Joann Land LPCC  TOPIC: MH EBP Group: Relationship Skills  [facility] Adult Partial Hospitalization Program  TRACK: [facility]    NUMBER OF PARTICIPANTS: 5    Summary of Group / Topics Discussed:  Relationship Skills: Boundaries: Patients were provided with a general overview of interpersonal boundaries and how lack of boundaries relates to symptoms and functioning. The purpose is to help patients identify boundary issues and gain awareness and skills to work towards healthier interpersonal boundaries. Current awareness of healthy boundary characteristics and barriers to establishing healthy boundaries were discussed.    Patient Session Goals / Objectives:    Familiarized patients with the concept of interpersonal boundaries and their characteristics    Discussed and practiced strategies to promote healthier interpersonal boundaries    Identified boundary issues and identified plan to improve boundaries    Telemedicine Visit: The patient's condition can be safely assessed and treated via synchronous audio and visual telemedicine encounter.      Reason for Telemedicine Visit: Services only offered telehealth and due to COVID-19    Originating Site (Patient Location): Patient's home    Distant Site (Provider Location): Provider Remote Setting    Consent:  The patient/guardian has verbally consented to: the potential risks and benefits of telemedicine (video visit) versus in person care; bill my insurance or make self-payment for services provided; and responsibility for payment of non-covered services.     Mode of Communication:  Video Conference via [employer]    As the provider I attest to compliance with applicable laws and regulations related to telemedicine.        Patient Participation /  Response:  Minimally participated, only when prompted / asked.    Demonstrated understanding of topics discussed through group discussion and participation    Treatment Plan:  Patient has a current master individualized treatment plan.  See Epic treatment plan for more information.    Joann Land, GRETAC

## 2020-12-03 NOTE — GROUP NOTE
Psychoeducation Group Note    PATIENT'S NAME: Corinne N Palm  MRN:   [id number]  :   1995  ACCT. NUMBER: [id number]  DATE OF SERVICE: 20  START TIME:  1:00 PM  END TIME:  1:50 PM  FACILITATOR: Marlene Alonzo RN  TOPIC: NANDINI RN Group: Brain [facility] Adult Partial Hospitalization Program  TRACK: 1    NUMBER OF PARTICIPANTS: 5    Summary of Group / Topics Discussed:  Brain Health:  Pathophysiology of Mood Disorders: Patients were educated on mood disorder etiology and neuroscience, risk factors, symptoms, and pharmacologic, psychotherapeutic, and complementary treatment options. Patients were guided on a discussion of mental, behavioral, and physical symptoms and shared their symptoms with the group.     Patient Session Goals / Objectives:  ? Described what mood disorders are and identified risk factors   ? Explained how chemical imbalances in the brain can cause symptoms and how medications work to reverse this imbalance   ? Identified and described pharmacologic, psychotherapeutic, and complementary treatment options  Telemedicine Visit: The patient's condition can be safely assessed and treated via synchronous audio and visual telemedicine encounter.      Reason for Telemedicine Visit: Services only offered telehealth    Originating Site (Patient Location): Patient's home    Distant Site (Provider Location): Provider Remote Setting    Consent:  The patient/guardian has verbally consented to: the potential risks and benefits of telemedicine (video visit) versus in person care; bill my insurance or make self-payment for services provided; and responsibility for payment of non-covered services.     Mode of Communication:  Video Conference via [employer]    As the provider I attest to compliance with applicable laws and regulations related to telemedicine.       Patient Participation / Response:  Fully participated with the group by sharing personal reflections / insights and openly  received / provided feedback with other participants.    Demonstrated understanding of topics discussed through group discussion and participation    Treatment Plan:  Patient has an initial individualized treatment plan that was created as part of their diagnostic assessment / admission process.  A master individualized treatment plan is in the process of being developed with the patient and multi-disciplinary care team.    Marlene Alonzo RN

## 2020-12-03 NOTE — TELEPHONE ENCOUNTER
During phone call to discuss STD paperwork, pt stated she was having urges to self harm.  She was able to distract by talking about her cat, and tv show Survivor.  She agreed to review her safety plan and we did so over the phone.  She decided to use coping skill of sleep, and take a nap on the couch with her cat until  gets home from work.  Agreed to call  or 911 if she feels unsafe.

## 2020-12-03 NOTE — PROGRESS NOTES
Outpatient Mental Health Services - Adult     MY COPING PLAN FOR SAFETY     PATIENT'S NAME:           Corinne N Palm  MRN:                                  [id number]  SAFETY PLAN:  Step 1: Warning signs / cues (Thoughts, images, mood, situation, behavior) that a crisis may be developing:  ? Thoughts: related they are obsessive and when she feels trapped or that she has failed she will have thougths of cutting or jumping off a bridge or overdosing.  ? Images: no  ? Thinking Processes: intrusive thoughts (bothersome, unwanted thoughts that come out of nowhere): feeling like she somehow failed. and highly critical and negative thoughts: gets very down on self.  ? Mood: worsening depression, hopelessness, helplessness, intense worry and mood swings  ? Behaviors: sleeping too much and asking for reassurance and perseverating on suicide and self injury.  ? Situations: trauma   Has had about 12 or 13 surgeries including age 14 she had to have her jaw wired shut. Also with the OCD felt she had to do certain things or she would not make it out of surgery.  Step 2: Coping strategies - Things I can do to take my mind off of my problems without contacting another person (relaxation technique, physical activity):  ? Distress Tolerance Strategies:  weighted blanket, tries to distract with other skills but reported having a hard time using them. Journaling and sleeping to reset brain.  ? Physical Activities: yoga but then gets obsessive about yoga and starts to focus on burning calories.  ? Focus on helpful thoughts:  It will go away eventually and just have to make it though the episode.  Step 3: People and social settings that provide distraction:                 Name:  Robin     Phone: [phone]                                  would go and get a coffee and sit and journal but cannot do it and it is distressing.               Step 4: Remind myself of people and things that are important to me and worth living for:   "\"my  and parents and family and cats and I like my job and helping people, and music\".  Step 5: When I am in crisis, I can ask these people to help me use my safety plan:                 Name: hyacinth Villanueva                 Phone:  Wants to work on asking for help when need it.  [phone]  Step 6: Making the environment safe:   ? be around others  Step 7: Professionals or agencies I can contact during a crisis:  ? Suicide Prevention Lifeline: 2-754-542-ITDH (0201)  ? Crisis Text Line Service (available 24 hours a day, 7 days a week): Text [place] to 568096  ? Call  **CRISIS (894311) from a cell phone to talk to a team of professionals who can help you.  Crisis Services By [facility]: Phone Number:   [place]     [phone]   [place]    [phone]   [place]    [phone]   [place]    [phone]   [place]    [phone]   [place] [phone]   [place]     [phone]      ? Call 811 or go to my nearest emergency department.              I helped develop this safety plan and agree to use it when needed.  I have been given a copy of this plan.       Client signature _________________________________________________________________  Today s date:  11/23/2020  Adapted from Safety Plan Template 2008 Maryan Hernandez and Ramiro Arteaga is reprinted with the express permission of the authors.  No portion of the Safety Plan Template may be reproduced without the express, written permission.  You can contact the authors at [email or web address] or [email or web address].    "

## 2020-12-03 NOTE — GROUP NOTE
Psychoeducation Group Note    PATIENT'S NAME: Corinne N Palm  MRN:   [id number]  :   1995  ACCT. NUMBER: [id number]  DATE OF SERVICE: 20  START TIME: 11:00 AM  END TIME: 11:50 AM  FACILITATOR: Mor Soni OT  TOPIC: [facility] OT Group: Self- Regulation Skills  [facility] Adult Partial Hospitalization Program  TRACK: 1    Telemedicine Visit: The patient's condition can be safely assessed and treated via synchronous audio and visual telemedicine encounter.      Reason for Telemedicine Visit: Services only offered telehealth and Covid 19    Originating Site (Patient Location): Patient's home    Distant Site (Provider Location): Provider Remote Setting    Consent:  The patient/guardian has verbally consented to: the potential risks and benefits of telemedicine (video visit) versus in person care; bill my insurance or make self-payment for services provided; and responsibility for payment of non-covered services.     Mode of Communication:  Video Conference via [employer]    As the provider I attest to compliance with applicable laws and regulations related to telemedicine.      NUMBER OF PARTICIPANTS: 4    Summary of Group / Topics Discussed:  Self-Regulation Skills: Sensory based grounding skills. Patients were introduced and taught about neurosensory based skills and strategies related to supporting effective grounding skills using sensory/body based input. Explored different states of ANS arousal states and signs for needing to ground oneself and definition of grounding based on individual group members experiences. Patients were taught about three body based grounding skills: effective and safe breathing including they neurophysiology of effective breathing when in panic or dissociation, 5 finger countdown (external senses) and PSPSP (push, pull, apply pressure, sway, stretch) using internal sensory systems as most effective grounding strategies to help regulate breathing.  Patients were  provided with an experiential opportunity to increase self-awareness of helpful sensory input and self care activities. Patients were introduced on how to create supportive environments that encourage use of these skills.    Patient Session Goals / Objectives:    Review/learn the body/sensory based grounding skills and how neurophysiologically how and why they work.     Identified specific and individualized neurosensory skills to help when distressed base on their sensory preferences and patterns.     Identified skills learned and how this applies to current daily life    Established a plan for practice of these skills in their own environments      Patient Participation / Response:  Fully participated with the group by sharing personal reflections / insights and openly received / provided feedback with other participants.    Verbalized understanding of content and Patient would benefit from additional opportunities to practice the content to be able to generalize it to their everyday life with increased intentionality, consistency, and efficacy in support of their psychiatric recovery    Treatment Plan:  Patient has a current master individualized treatment plan.  See Epic treatment plan for more information.    Mor Soni, OT

## 2020-12-03 NOTE — GROUP NOTE
Psychotherapy Group Note    PATIENT'S NAME: Corinne N Palm  MRN:   [id number]  :   1995  ACCT. NUMBER: [id number]  DATE OF SERVICE: 20  START TIME: 10:00 AM  END TIME: 10:50 AM  FACILITATOR: Joann Land LPCC  TOPIC:  EBP Group: Emotions Management  [facility] Adult Partial Hospitalization Program  TRACK: [facility]    NUMBER OF PARTICIPANTS: 5    Summary of Group / Topics Discussed:  Emotions Management: Guilt and Shame: Patients explored and shared personal experiences associated with feelings of guilt and shame.  Group explored how these feelings develop, what they mean to each individual, and how to increase acceptance and usefulness of these feelings.  Group members assisted one another to contextualize these concepts and promote healing.     Patient Session Goals / Objectives:    Discuss and review definitions and personal views/experiences with guilt and shame    Understand the differences between guilt and shame    Explore how feelings of guilt and shame impact functioning    Understand and practice strategies to manage difficult emotions and move towards healing    Understand and normalize difficult emotions through group discussion    Understand the utility of guilt and shame    Target  unwanted  emotions for change    Telemedicine Visit: The patient's condition can be safely assessed and treated via synchronous audio and visual telemedicine encounter.      Reason for Telemedicine Visit: Services only offered telehealth and due to COVID-19    Originating Site (Patient Location): Patient's home    Distant Site (Provider Location): Provider Remote Setting    Consent:  The patient/guardian has verbally consented to: the potential risks and benefits of telemedicine (video visit) versus in person care; bill my insurance or make self-payment for services provided; and responsibility for payment of non-covered services.     Mode of Communication:  Video Conference via [employer]    As the  provider I attest to compliance with applicable laws and regulations related to telemedicine.        Patient Participation / Response:  Fully participated with the group by sharing personal reflections / insights and openly received / provided feedback with other participants.    Demonstrated understanding of topics discussed through group discussion and participation, Expressed understanding of the relevance / importance of emotions management skills at distressing times in life and Self-aware of experiences with difficult emotions, and strategies to employ to manage them    Treatment Plan:  Patient has a current master individualized treatment plan.  See Epic treatment plan for more information.    Joann Land, [facility]

## 2020-12-04 ENCOUNTER — HOSPITAL ENCOUNTER (OUTPATIENT)
Dept: BEHAVIORAL HEALTH | Facility: CLINIC | Age: 25
Discharge: HOME | End: 2020-12-04
Attending: PSYCHIATRY & NEUROLOGY | Admitting: PSYCHIATRY & NEUROLOGY
Payer: COMMERCIAL

## 2020-12-04 ENCOUNTER — HOSPITAL ENCOUNTER (OUTPATIENT)
Dept: BEHAVIORAL HEALTH | Facility: CLINIC | Age: 25
End: 2020-12-04
Attending: PSYCHIATRY & NEUROLOGY
Payer: COMMERCIAL

## 2020-12-04 PROCEDURE — H0035 MH PARTIAL HOSP TX UNDER 24H: HCPCS | Mod: 95 | Performed by: COUNSELOR

## 2020-12-04 PROCEDURE — 90853 GROUP PSYCHOTHERAPY: CPT | Mod: 95 | Performed by: SOCIAL WORKER

## 2020-12-04 PROCEDURE — H0035 MH PARTIAL HOSP TX UNDER 24H: HCPCS | Mod: 95 | Performed by: OCCUPATIONAL THERAPIST

## 2020-12-04 PROCEDURE — H0035 MH PARTIAL HOSP TX UNDER 24H: HCPCS | Mod: GT

## 2020-12-04 NOTE — GROUP NOTE
Psychoeducation Group Note    PATIENT'S NAME: Corinne N Palm  MRN:   [id number]  :   1995  ACCT. NUMBER: [id number]  DATE OF SERVICE: 20  START TIME: 10:00 AM  END TIME: 10:50 AM  FACILITATOR: Mor Soni OT  TOPIC: [facility] OT Group: Lifestyle Balance and Structure  [facility] Adult Partial Hospitalization Program  TRACK: 1    Telemedicine Visit: The patient's condition can be safely assessed and treated via synchronous audio and visual telemedicine encounter.      Reason for Telemedicine Visit: Services only offered telehealth and Covid 19    Originating Site (Patient Location): Patient's home    Distant Site (Provider Location): Provider Remote Setting    Consent:  The patient/guardian has verbally consented to: the potential risks and benefits of telemedicine (video visit) versus in person care; bill my insurance or make self-payment for services provided; and responsibility for payment of non-covered services.     Mode of Communication:  Video Conference via [employer]    As the provider I attest to compliance with applicable laws and regulations related to telemedicine.      NUMBER OF PARTICIPANTS:5    Summary of Group / Topics Discussed:  Occupational Therapy: Lifestyle Health:  Weaving a Mindful Lifestyle.      To demystify mindfulness and leverage its benefits by identifying ways to make it more accessible, patients were provided with education on the why, what & how, and ways to practice mindfulness through chosen activities using the metaphor of the components of a weaving (warp/weft). To improve self-efficacy with mindfulness, group members explored and applied the concept of levels of brain activation to help them identify what type of mindful activities might be most helpful depending on context and what part of the brain is dominating their attention: (brainstem, midbrain, cerebellum, limbic, cortex). Group members were led through a structured experiential process where as a  group they worked to create a mindful weaving of a variety of activities in context for each level of brain activation. These included: grounding activities, activities of daily living, informal sensorimotor activities, connecting activities, focused cognitive activities, and lastly formal meditative activities such as guided imagery or mindful movement such as yoga or al chi. Patients shared their reflections and exchanged ideas and validation with each other.      Patient Session Goals / Objectives:    Learn about the why, what & how, and ways to create a lifestyle health by brainstorming and beginning to weave a mindful lifestyle.     Demystify the concept of mindfulness and apply curiosity and openness to practicing mindfulness in ways that honor context, are accessible and attune to individual patient values.     Identify two activities that they can apply taught concepts to in the development of a mindful lifestyle.    Identify strategies and skills to meet specific needs and address barriers.      Patient Participation / Response:  Fully participated with the group by sharing personal reflections / insights and openly received / provided feedback with other participants.    Verbalized understanding of content and Patient would benefit from additional opportunities to practice the content to be able to generalize it to their everyday life with increased intentionality, consistency, and efficacy in support of their psychiatric recovery    Treatment Plan:  Patient has a current master individualized treatment plan.  See Epic treatment plan for more information.    Mor Soni, OT

## 2020-12-04 NOTE — GROUP NOTE
Psychoeducation Group Note    PATIENT'S NAME: Corinne N Palm  MRN:   [id number]  :   1995  ACCT. NUMBER: [id number]  DATE OF SERVICE: 20  START TIME: 11:00 AM  END TIME: 11:50 AM  FACILITATOR: Erika Crump OTR/L  TOPIC:  PHP OT Group: Partial Hospitalization Program- Occupational Therapy  [facility] Adult Partial Hospitalization Program  TRACK: 1    NUMBER OF PARTICIPANTS: 5    Telemedicine Visit: The patient's condition can be safely assessed and treated via synchronous audio and visual telemedicine encounter.      Reason for Telemedicine Visit: Services only offered telehealth    Originating Site (Patient Location): Patient's home    Distant Site (Provider Location): [facility] Outpatient Setting: [facility]    Consent:  The patient/guardian has verbally consented to: the potential risks and benefits of telemedicine (video visit) versus in person care; bill my insurance or make self-payment for services provided; and responsibility for payment of non-covered services.     Mode of Communication:  Video Conference via Pinoccio    As the provider I attest to compliance with applicable laws and regulations related to telemedicine.     Summary of Group / Topics Discussed:  Lifestyle Balance and Structure:  OT Goal-setting & integration: Weekend Wellness: The group focused on reflecting on the past week and identifying insights and positive experiences that they want to build upon further.  The group also focused on identifying needs and any concerns for the upcoming weekend and created a list of activities and tasks that they might engage in to help support themselves and add structure their weekend.  Facilitated the sharing of individual insights and plans with validation, support, and feedback provided.    Patient Session Goals / Objectives:    Reflected on insights learned and positive experiences over the last week to help with their recovery and  wellbeing    Identified needs and concerns for the upcoming weekend and identified ways to address these    Created a written list of possible ways to structure their weekend    Identified plan to support follow through on plans and reflection on progress made             Patient Participation / Response:  Fully participated with the group by sharing personal reflections / insights and openly received / provided feedback with other participants.    Patient presentation: constricted affect; low mood observed; struggling with low interest in activities; able to identify some plans for the weekend for herself and ways to manage, Verbalized understanding of content and Patient would benefit from additional opportunities to practice the content to be able to generalize it to their everyday life with increased intentionality, consistency, and efficacy in support of their psychiatric recovery    Treatment Plan:  Patient has a current master individualized treatment plan.  See Epic treatment plan for more information.    Erika Crump, OTR/L

## 2020-12-04 NOTE — GROUP NOTE
"Process Group Note    PATIENT'S NAME: Corinne N Palm  MRN:   [id number]  :   1995  ACCT. NUMBER: [id number]  DATE OF SERVICE: 20  START TIME:  9:00 AM  END TIME:  9:50 AM  FACILITATOR: Joann Land LPCC  TOPIC:  Process Group      Diagnoses:  DSM5  Diagnosis:  1.  Major depressive disorder, recurrent, severe without psychosis  2.  Generalized anxiety disorder.   3.  Anorexia nervosa.   4.  Borderline personality disorder.    [facility] Adult Partial Hospitalization Program  TRACK: [facility]    NUMBER OF PARTICIPANTS: 5    Telemedicine Visit: The patient's condition can be safely assessed and treated via synchronous audio and visual telemedicine encounter.      Reason for Telemedicine Visit: Services only offered telehealth and due to COVID-19    Originating Site (Patient Location): Patient's home    Distant Site (Provider Location): Provider Remote Setting    Consent:  The patient/guardian has verbally consented to: the potential risks and benefits of telemedicine (video visit) versus in person care; bill my insurance or make self-payment for services provided; and responsibility for payment of non-covered services.     Mode of Communication:  Video Conference via Pharmaxis    As the provider I attest to compliance with applicable laws and regulations related to telemedicine.            Data:    Session content: At the start of this group, patients were invited to check in by identifying themselves, describing their current emotional status, and identifying issues to address in this group.   Area(s) of treatment focus addressed in this session included Symptom Management, Personal Safety and Community Resources/Discharge Planning.    Patient reported feeling better than yesterday. Patient discussed working toward \"not having a meltdown\" today. Patient identified weighted blanket and engaging with people as skills they will use to address their goal(s). Patient reported thoughts of engaging in " self-injurious behaviors may be a barrier to working toward their goal(s) and/or addressing mental health symptoms. Patient reported yes safety concerns and/or self-injurious behaviors. Patient reported no substance use. Patient reported they are taking their medications as prescribed. Patient reported feeling proud that they got through last night without going to the emergency department. Patient discussed experiencing intrusive thoughts last night after learning that she gained weight with the treatment group.     Therapeutic Interventions/Treatment Strategies:  Psychotherapist offered support, feedback and validation and reinforced use of skills. Treatment modalities used include Motivational Interviewing and Cognitive Behavioral Therapy. Interventions include Coping Skills: Assisted patient in identifying 1-2 healthy distraction skills to reduce overall distress, Symptoms Management: Promoted understanding of their diagnoses and how it impacts their functioning and Emotions Management:  Discussed barriers to emotional regulation.    Assessment:    Patient response:   Patient responded to session by accepting feedback, giving feedback, listening, focusing on goals, being attentive and accepting support    Possible barriers to participation / learning include: and no barriers identified    Health Issues:   None reported       Substance Use Review:   Substance Use: No active concerns identified.    Mental Status/Behavioral Observations  Appearance:   Appropriate   Eye Contact:   Good   Psychomotor Behavior: Normal   Attitude:   Cooperative   Orientation:   All  Speech   Rate / Production: Normal/ Responsive Normal    Volume:  Normal   Mood:    Anxious  Depressed  Normal  Affect:    Appropriate   Thought Content:   Clear and Safety reports  presence of suicidal ideation passive suicidal ideation  and thoughts of self-harm  Thought Form:  Coherent  Logical     Insight:    Good     Plan:     Safety Plan: Patient  consented to co-developed safety plan.  Safety and risk management plan was completed.  Patient agreed to use safety plan should any safety concerns arise.  A copy was given to the patient.    Committed to safety and agreed to follow previously developed safety coping plan.      Barriers to treatment: None identified    Patient Contracts (see media tab):  None    Substance Use: Provided encouragement towards sobriety     Continue or Discharge: Patient will continue in Adult Partial Hospitalization Program (PHP)  as planned. Patient is likely to benefit from learning and using skills as they work toward the goals identified in their treatment plan.      Joann Land, [facility]  December 4, 2020

## 2020-12-04 NOTE — PROGRESS NOTES
[facility]   Dr. Tompkins's Psychiatric Progress Note  2020      Patient:  Corinne N Palm   Medical Record Number:  [id number]  :  1995    Telemedicine Visit: The patient's condition can be safely assessed and treated via synchronous audio and visual telemedicine encounter.       Reason for Telemedicine Visit: COVID-19 lockdown     Originating Site (Patient Location):  HOME     Distant Site (Provider Location): [facility]: [place]     Consent:  The patient/guardian has verbally consented to: the potential risks and benefits of telemedicine (video visit) versus in person care; bill my insurance or make self-payment for services provided; and responsibility for payment of non-covered services           Interim History:   The patient's care was discussed with the treatment team and chart notes were reviewed.  Today Corinne N Palm reports depressed mood. In addition, there are notable risk factors for self-harm, including age, anxiety, previous history of suicide attempts and suicidal ideation. However, risk is mitigated by commitment to family, sobriety, ability to volunteer a safety plan, history of seeking help when needed, no access to firearms or weapons and denies homicidal ideation, intent, or plan.  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.      Psych Dr Giulia Vigil at [place].  Therapist Deandra Osorio at [place].  DBT at [facility].    Mood is very bad: lots of depression and less anxious.  She quit Lamictal as she felt it made her appetite more.  Latuda helps mood.  Used to be on higher dose of Cymbalta.  She noted no problems on higher dose.  Previously higher Latuda dose did nothing.      20  Pt saw her psychiatrist last night.  Her doc wanted her to hand over meds last night.  She's not allowed back in her DBT group at [facility].  Group is going ok.  Dr. Vigil increased Cymbalta to 90 mg/d.  She's starting Lamictal again at 25mg/d.      20:  She is  taking the higher doses of meds.  No SE.  Weekend plans: Face timing a friend in [facility].      Psychiatric ROS:  Mood: depressed, crying a lot, some anxiety  Sleep:   initial and middle insomnia; can't fall asleep to take naps  Appetite:  low  Eatin meal plus one snack per day  Energy Level:  Tired   Concentration/Memory Problems:  NO  Suicidal Thoughts:  Yes has SI and SIB urges; cut several times this week;  Feels safe;  No plan or intent;    Homicidal Thoughts:No  Psychotic Symptoms: No  Medication Side Effects:No  Medication Compliance:Yes   Physical Complaints:  positive for lethary         Medications:     Current Outpatient Medications   Medication Sig    CYMBALTA 30MG Take 1 cap at night     DULoxetine (CYMBALTA) 60 MG capsule Take 1 capsule (60 mg) by mouth At Bedtime     gabapentin (NEURONTIN) 300 MG capsule Take 300-600 mg by mouth nightly as needed (anxiety)     lamoTRIgine (LAMICTAL) 25 MG tablet Take 1 tablet (25 mg) by mouth daily for 9 days, then increase to 2 tablets (50 mg) by mouth daily.     lurasidone (LATUDA) 80 MG TABS tablet Take 80 mg by mouth At Bedtime     polyethylene glycol (MIRALAX) 17 GM/Dose powder Take 17 g by mouth daily     Vitamin D, Cholecalciferol, 25 MCG (1000 UT) TABS Take 1,000 Units by mouth daily      No current facility-administered medications for this encounter.              Allergies:     Allergies   Allergen Reactions     Morphine Other (See Comments)     Twitching     Amoxicillin Rash     Sulfa Drugs Rash            Psychiatric Examination:   There were no vitals taken for this visit.  Weight is 0 lbs 0 oz  There is no height or weight on file to calculate BMI.    Appearance:  awake, alert and adequately groomed  Attitude:  cooperative  Eye Contact:  good  Mood:  depressed  Affect:  mood congruent  Speech:  clear, coherent  Psychomotor Behavior:  no evidence of tardive dyskinesia, dystonia, or tics  Throught Process:  logical  Associations:  no loose  associations  Thought Content:  no evidence of psychotic thought and passive suicidal thoughts;  no plan or intent;  able to con tract for safety  Insight:  good  Judgement:  intact  Oriented to:  time, person, and place  Attention Span and Concentration:  intact  Recent and Remote Memory:  intact  Gait:Normal    Risk/Potential for Dangerousness:  Multiple Active Diagnoses:HIGH  Self Care:HIGH  Suicide:MODERATE  Assault:LOW  Self Injurious Behaviors:MODERATE  Inappropriate Sexual Behavior:LOW         Labs:   No results found for this or any previous visit (from the past 24 hour(s)).     Recent Results (from the past 1008 hour(s))   Asymptomatic COVID-19 Virus (Coronavirus) by PCR    Collection Time: 11/13/20  6:54 PM    Specimen: Nasopharyngeal   Result Value Ref Range    COVID-19 Virus PCR to [facility] - Source Nasopharyngeal     COVID-19 Virus PCR to [facility] - Result       Test received-See reflex to IDDL test SARS CoV2 (COVID-19) Virus RT-PCR   Comprehensive metabolic panel    Collection Time: 11/13/20  6:54 PM   Result Value Ref Range    Sodium 138 133 - 144 mmol/L    Potassium 3.8 3.4 - 5.3 mmol/L    Chloride 104 94 - 109 mmol/L    Carbon Dioxide 26 20 - 32 mmol/L    Anion Gap 8 3 - 14 mmol/L    Glucose 75 70 - 99 mg/dL    Urea Nitrogen 6 (L) 7 - 30 mg/dL    Creatinine 0.67 0.52 - 1.04 mg/dL    GFR Estimate >90 >60 mL/min/[1.73_m2]    GFR Estimate If Black >90 >60 mL/min/[1.73_m2]    Calcium 9.3 8.5 - 10.1 mg/dL    Bilirubin Total 0.5 0.2 - 1.3 mg/dL    Albumin 4.1 3.4 - 5.0 g/dL    Protein Total 7.7 6.8 - 8.8 g/dL    Alkaline Phosphatase 55 40 - 150 U/L    ALT 42 0 - 50 U/L    AST 27 0 - 45 U/L   SARS-CoV-2 COVID-19 Virus (Coronavirus) RT-PCR Nasopharyngeal    Collection Time: 11/13/20  6:54 PM    Specimen: Nasopharyngeal   Result Value Ref Range    SARS-CoV-2 Virus Specimen Source Nasopharyngeal     SARS-CoV-2 PCR Result NEGATIVE     SARS-CoV-2 PCR Comment       Testing was performed using the Aptima SARS-CoV-2  Assay on the [employer] Instrument System.   Additional information about this Emergency Use Authorization (EUA) assay can be found via   the Lab Guide.     TSH with free T4 reflex and/or T3 as indicated    Collection Time: 11/13/20 10:38 PM   Result Value Ref Range    TSH 2.05 0.40 - 4.00 mU/L   CBC with platelets differential    Collection Time: 11/13/20 10:38 PM   Result Value Ref Range    WBC 10.4 4.0 - 11.0 10e9/L    RBC Count 4.77 3.8 - 5.2 10e12/L    Hemoglobin 14.4 11.7 - 15.7 g/dL    Hematocrit 42.3 35.0 - 47.0 %    MCV 89 78 - 100 fl    MCH 30.2 26.5 - 33.0 pg    MCHC 34.0 31.5 - 36.5 g/dL    RDW 11.9 10.0 - 15.0 %    Platelet Count 358 150 - 450 10e9/L    Diff Method Automated Method     % Neutrophils 52.8 %    % Lymphocytes 37.9 %    % Monocytes 6.6 %    % Eosinophils 1.8 %    % Basophils 0.6 %    % Immature Granulocytes 0.3 %    Nucleated RBCs 0 0 /100    Absolute Neutrophil 5.5 1.6 - 8.3 10e9/L    Absolute Lymphocytes 3.9 0.8 - 5.3 10e9/L    Absolute Monocytes 0.7 0.0 - 1.3 10e9/L    Absolute Eosinophils 0.2 0.0 - 0.7 10e9/L    Absolute Basophils 0.1 0.0 - 0.2 10e9/L    Abs Immature Granulocytes 0.0 0 - 0.4 10e9/L    Absolute Nucleated RBC 0.0    HCG qualitative urine    Collection Time: 11/15/20  8:30 PM   Result Value Ref Range    HCG Qual Urine Negative NEG^Negative   Drug abuse screen 77 urine (FL, RH, SH)    Collection Time: 11/15/20  8:30 PM   Result Value Ref Range    Amphetamine Qual Urine Negative NEG^Negative    Barbiturates Qual Urine Negative NEG^Negative    Benzodiazepine Qual Urine Negative NEG^Negative    Cannabinoids Qual Urine Negative NEG^Negative    Cocaine Qual Urine Negative NEG^Negative    Opiates Qualitative Urine Negative NEG^Negative    PCP Qual Urine Negative NEG^Negative   UA reflex to Microscopic    Collection Time: 11/15/20  8:30 PM   Result Value Ref Range    Color Urine Yellow     Appearance Urine Clear     Glucose Urine Negative NEG^Negative mg/dL    Bilirubin Urine  Negative NEG^Negative    Ketones Urine 10 (A) NEG^Negative mg/dL    Specific Gravity Urine 1.018 1.003 - 1.035    Blood Urine Negative NEG^Negative    pH Urine 6.5 5.0 - 7.0 pH    Protein Albumin Urine Negative NEG^Negative mg/dL    Urobilinogen mg/dL 2.0 0.0 - 2.0 mg/dL    Nitrite Urine Negative NEG^Negative    Leukocyte Esterase Urine Negative NEG^Negative    Source Midstream Urine          Impression:   This is a 25 year old female with depression and anxiety continues PHP.  She's depressed.  She can't tell if meds help.            DIagnoses:     1.  Major depressive disorder, recurrent, severe without psychosis  2.  Generalized anxiety disorder.   3.  Anorexia nervosa.   4.  Borderline personality disorder.            Plan:     Completes PHP 12/11/20  She's considering a RTC.  She was in [place] in the past.    Continue Cymbalta, Latuda, and gabapentin  Increased Cymbalta to 90 mg/d  Restarted Lamictal.   Continue therapy as planned - PHP  Continue all other medications as reviewed per electronic medical record today.   Safety plan reviewed. To the Emergency Department as needed or call after hours crisis line at [phone] or [phone]. [place] Crisis Text Line: Text [place] to 843201  or  Suicide LifeLine Chat: suicidepreventionlifeline.org/chat  Call [facility] at [phone] to schedule.  Follow up with outpatient provider as planned or sooner if needed for acute medical concerns.  Call the psychiatric nurse line with medication questions or concerns at [phone].      Dann Tompkins MD

## 2020-12-04 NOTE — GROUP NOTE
Psychotherapy Group Note    PATIENT'S NAME: Corinne N Palm  MRN:   [id number]  :   1995  ACCT. NUMBER: [id number]  DATE OF SERVICE: 20  START TIME:  2:00 PM  END TIME:  2:50 PM  FACILITATOR: Dana Guerrero LICSW  TOPIC: MH EBP Group: Self-Awareness  [facility] Adult Partial Hospitalization Program  TRACK: 1    NUMBER OF PARTICIPANTS: 5    Summary of Group / Topics Discussed:  Self-Awareness: Self-Compassion: Patients received overview of key concepts in developing self-compassion. Patients discussed mindfulness, self-kindness, and finding common humanity. Patients identified their current approach to problems in their lives and learned skills for increasing self-compassion. Patients identified ways they can put self-compassion skills into practice and problem solve barriers to application of skills.     Patient Session Goals / Objectives:    Zeigler components of self-compassion    Identify ways to practice self-compassion in daily life    Problem solve barriers to self-compassion practice    Telemedicine Visit: The patient's condition can be safely assessed and treated via synchronous audio and visual telemedicine encounter.          Reason for Telemedicine Visit: Services only offered telehealth and covid19        Originating Site (Patient Location): Patient's home        Distant Site (Provider Location): Provider Remote Setting        Consent:  The patient/guardian has verbally consented to: the potential risks and benefits of telemedicine (video visit) versus in person care; bill my insurance or make self-payment for services provided; and responsibility for payment of non-covered services.         Mode of Communication:  Video Conference via Isabella Oliver        As the provider I attest to compliance with applicable laws and regulations related to telemedicine.       Patient Participation / Response:  Fully participated with the group by sharing personal reflections / insights and openly  received / provided feedback with other participants.    Demonstrated understanding of topics discussed through group discussion and participation, Identified / Expressed readiness to act intentionally, increase self-compassion, promote personal growth and Practiced skills in session    Treatment Plan:  Patient has a current master individualized treatment plan.  See Epic treatment plan for more information.    FRANCISCO CifuentesSW

## 2020-12-04 NOTE — GROUP NOTE
Psychotherapy Group Note    PATIENT'S NAME: Corinne N Palm  MRN:   [id number]  :   1995  ACCT. NUMBER: [id number]  DATE OF SERVICE: 20  START TIME:  1:00 PM  END TIME:  1:50 PM  FACILITATOR: Joann Land LPCC  TOPIC: MH EBP Group: Specialty Awareness  [facility] Adult Partial Hospitalization Program  TRACK: [facility]    NUMBER OF PARTICIPANTS: 5    Summary of Group / Topics Discussed:  Specialty Topics: Education Support Network: Patients worked on identifying the mild, moderate, and severe symptoms of their disorder.  Patients identified helpful supportive statements and actions they would appreciate from caregivers.  The goal is to gather information in preparation for the education support network for problem solving and planning for support with caregivers.    Education Support Network:  Patients and caregivers received an overview of diagnoses including physical, intellectual, and behavioral indicators of illness. Patients presented information created in the support network development group to engage caregivers in planning for help and support to manage mental health symptoms.  The goal is to help patients and caregivers create a plan for support and assistance after discharge.      Patient Session Goals / Objectives:    Understanding diagnoses and accompanying physical reactions, thoughts, and behaviors.      Explored options to support patients in their recovery     Formulated a plan with caregivers for assistance and support to aid in recovery     Problem solved barriers to follow through on plan    Telemedicine Visit: The patient's condition can be safely assessed and treated via synchronous audio and visual telemedicine encounter.      Reason for Telemedicine Visit: Services only offered telehealth and due to COVID-19    Originating Site (Patient Location): Patient's home    Distant Site (Provider Location): Provider Remote Setting    Consent:  The patient/guardian has verbally  consented to: the potential risks and benefits of telemedicine (video visit) versus in person care; bill my insurance or make self-payment for services provided; and responsibility for payment of non-covered services.     Mode of Communication:  Video Conference via [employer]com    As the provider I attest to compliance with applicable laws and regulations related to telemedicine.          Patient Participation / Response:  Fully participated with the group by sharing personal reflections / insights and openly received / provided feedback with other participants.    Demonstrated understanding of topics discussed through group discussion and participation and Identified / Expressed readiness to act on skill suggestions discussed in topic    Treatment Plan:  Patient has a current master individualized treatment plan.  See Epic treatment plan for more information.    Joann Land, [facility]

## 2020-12-07 ENCOUNTER — HOSPITAL ENCOUNTER (OUTPATIENT)
Dept: BEHAVIORAL HEALTH | Facility: CLINIC | Age: 25
Discharge: HOME | End: 2020-12-07
Attending: PSYCHIATRY & NEUROLOGY | Admitting: PSYCHIATRY & NEUROLOGY
Payer: COMMERCIAL

## 2020-12-07 ENCOUNTER — HOSPITAL ENCOUNTER (OUTPATIENT)
Dept: BEHAVIORAL HEALTH | Facility: CLINIC | Age: 25
End: 2020-12-07
Attending: PSYCHIATRY & NEUROLOGY
Payer: COMMERCIAL

## 2020-12-07 PROCEDURE — H0035 MH PARTIAL HOSP TX UNDER 24H: HCPCS | Mod: 95

## 2020-12-07 PROCEDURE — H0035 MH PARTIAL HOSP TX UNDER 24H: HCPCS | Mod: GT | Performed by: OCCUPATIONAL THERAPIST

## 2020-12-07 PROCEDURE — H0035 MH PARTIAL HOSP TX UNDER 24H: HCPCS | Mod: 95 | Performed by: COUNSELOR

## 2020-12-07 NOTE — GROUP NOTE
"Process Group Note    PATIENT'S NAME: Corinne N Palm  MRN:   [id number]  :   1995  ACCT. NUMBER: [id number]  DATE OF SERVICE: 20  START TIME: 10:00 AM  END TIME: 10:50 AM  FACILITATOR: Joann Land LPCC  TOPIC:  Process Group    Diagnoses:  DSM5  Diagnosis:  1.  Major depressive disorder, recurrent, severe without psychosis  2.  Generalized anxiety disorder.   3.  Anorexia nervosa.   4.  Borderline personality disorder.      [facility] Adult Partial Hospitalization Program  TRACK: [facility]    NUMBER OF PARTICIPANTS: 4    Telemedicine Visit: The patient's condition can be safely assessed and treated via synchronous audio and visual telemedicine encounter.      Reason for Telemedicine Visit: Services only offered telehealth and due to COVID-19    Originating Site (Patient Location): Patient's home    Distant Site (Provider Location): Provider Remote Setting    Consent:  The patient/guardian has verbally consented to: the potential risks and benefits of telemedicine (video visit) versus in person care; bill my insurance or make self-payment for services provided; and responsibility for payment of non-covered services.     Mode of Communication:  Video Conference via [employer]    As the provider I attest to compliance with applicable laws and regulations related to telemedicine.            Data:    Session content: At the start of this group, patients were invited to check in by identifying themselves, describing their current emotional status, and identifying issues to address in this group.   Area(s) of treatment focus addressed in this session included Symptom Management, Personal Safety and Community Resources/Discharge Planning.    Patient reported feeling \"blah\" and processing a difficult day last Friday. Patient discussed working toward making it through the day \"without a freak out or to use coping skills before I freak out.\" Patient identified grounding and engage in the program as " "skills they will use to address their goal(s). Patient reported feeling \"blah\" may be a barrier to working toward their goal(s) and/or addressing mental health symptoms. Patient reported yes safety concerns and/or self-injurious behaviors. Patient reported no substance use. Patient reported they are taking their medications as prescribed. Patient reported feeling proud that they got a [place] tree this weekend. Patient discussed her therapist \"tricking her\" into giving \"means to engage in self-harm\" to her  last Friday and feeling \"not in control\" with the treatment group.     Therapeutic Interventions/Treatment Strategies:  Psychotherapist offered support, feedback and validation and reinforced use of skills. Treatment modalities used include Motivational Interviewing and Cognitive Behavioral Therapy. Interventions include Coping Skills: Assisted patient in identifying 1-2 healthy distraction skills to reduce overall distress, Symptoms Management: Promoted understanding of their diagnoses and how it impacts their functioning and Emotions Management:  Discussed barriers to emotional regulation.    Assessment:    Patient response:   Patient responded to session by accepting feedback, giving feedback, listening, focusing on goals, being attentive and accepting support    Possible barriers to participation / learning include: and no barriers identified    Health Issues:   None reported       Substance Use Review:   Substance Use: No active concerns identified.    Mental Status/Behavioral Observations  Appearance:   Appropriate   Eye Contact:   Good   Psychomotor Behavior: Normal   Attitude:   Cooperative   Orientation:   All  Speech   Rate / Production: Normal/ Responsive Normal    Volume:  Normal   Mood:    Anxious  Depressed  Normal  Affect:    Appropriate   Thought Content:   Clear and Safety reports  presence of suicidal ideation passive suicidal ideation  and thoughts of self-harm  Thought Form:  Coherent "  Logical     Insight:    Good     Plan:     Safety Plan: Patient consented to co-developed safety plan.  Safety and risk management plan was completed.  Patient agreed to use safety plan should any safety concerns arise.  A copy was given to the patient.    Committed to safety and agreed to follow previously developed safety coping plan.      Barriers to treatment: None identified    Patient Contracts (see media tab):  None    Substance Use: Provided encouragement towards sobriety     Continue or Discharge: Patient will continue in Adult Partial Hospitalization Program (PHP)  as planned. Patient is likely to benefit from learning and using skills as they work toward the goals identified in their treatment plan.      Joann Land, [facility]  December 7, 2020

## 2020-12-07 NOTE — GROUP NOTE
Psychoeducation Group Note    PATIENT'S NAME: Corinne N Palm  MRN:   [id number]  :   1995  ACCT. NUMBER: [id number]  DATE OF SERVICE: 20  START TIME: 11:00 AM  END TIME: 11:50 AM  FACILITATOR: Mor Soni OT  TOPIC: [facility] OT Group: Partial Hospitalization Program- Occupational Therapy  [facility] Adult Partial Hospitalization Program  TRACK: 1    Telemedicine Visit: The patient's condition can be safely assessed and treated via synchronous audio and visual telemedicine encounter.      Reason for Telemedicine Visit: Services only offered telehealth and Covid 19    Originating Site (Patient Location): Patient's home    Distant Site (Provider Location): Provider Remote Setting    Consent:  The patient/guardian has verbally consented to: the potential risks and benefits of telemedicine (video visit) versus in person care; bill my insurance or make self-payment for services provided; and responsibility for payment of non-covered services.     Mode of Communication:  Video Conference via [employer]    As the provider I attest to compliance with applicable laws and regulations related to telemedicine.      NUMBER OF PARTICIPANTS: 4    Summary of Group / Topics Discussed:  Resiliency Development: Honoring Energy. Group members were provided with a skilled Occupational Therapy group intervention process focused on exploring strategies and individualized adaptations for managing their energy to support improved mental wellbeing. They were provided with psychoeducation on and explored the concept of energy management through the lens of self-compassion by embracing the complementary energies of Yin & Yang (leaning into/yielding to, doing/being, sympathetic/parasympathetic, etc) to embrace a rhythm of recovery (Beatriz 2019). They worked to improve self-awareness and develop a sense of agency through identification of specific meaningful and purposeful activities based on their own contextual energy  level. Facilitation of reflection, validation, and support provided.          Patient Session Goals / Objectives:     Learn and develop understanding of ways to manage energy that embraces self-compassion as they lean into their recovery.     Develop an understanding and practice of energy management through complementary energies to develop resiliency.     Deepen self-awareness about their own daily energy rhythms and apply taught concepts to their own plan for energy management strategies as they begin to re-engage in meaningful purposeful activities.     Identify one way to integrate content into their daily life to support recovery by identifying and honoring their energy level.              Patient Participation / Response:  Fully participated with the group by sharing personal reflections / insights and openly received / provided feedback with other participants.    Verbalized understanding of content and Patient would benefit from additional opportunities to practice the content to be able to generalize it to their everyday life with increased intentionality, consistency, and efficacy in support of their psychiatric recovery    Treatment Plan:  Patient has a current master individualized treatment plan.  See Epic treatment plan for more information.    Mor Soni, OT

## 2020-12-07 NOTE — GROUP NOTE
Psychotherapy Group Note    PATIENT'S NAME: Corinne N Palm  MRN:   [id number]  :   1995  ACCT. NUMBER: [id number]  DATE OF SERVICE: 20  START TIME:  2:00 PM  END TIME:  2:50 PM  FACILITATOR: Joann Land LPCC  TOPIC: MH EBP Group: Relationship Skills  [facility] Adult Partial Hospitalization Program  TRACK: [facility]    NUMBER OF PARTICIPANTS: 4    Summary of Group / Topics Discussed:  Relationship Skills: Basic Communication: Patients were provided with a general overview of interpersonal communication skills and information about how communication skills impact symptoms and functioning. The goal of this topic is to help patients identify communication issues and gain skills to work towards healthier interpersonal communication. Patients reviewed their current awareness of communication issues and how communication skills impact relationships and functioning.      Patient Session Goals / Objectives:    Identified and discussed patients individual challenges with basic communication    Presented and practiced effective communication skills in session    Assisted patients in implementing more effective communication skills in their relationships    Telemedicine Visit: The patient's condition can be safely assessed and treated via synchronous audio and visual telemedicine encounter.      Reason for Telemedicine Visit: Services only offered telehealth and due to COVID-19    Originating Site (Patient Location): Patient's home    Distant Site (Provider Location): Provider Remote Setting    Consent:  The patient/guardian has verbally consented to: the potential risks and benefits of telemedicine (video visit) versus in person care; bill my insurance or make self-payment for services provided; and responsibility for payment of non-covered services.     Mode of Communication:  Video Conference via [employer]    As the provider I attest to compliance with applicable laws and regulations related to  telemedicine.        Patient Participation / Response:  Fully participated with the group by sharing personal reflections / insights and openly received / provided feedback with other participants.    Demonstrated understanding of topics discussed through group discussion and participation, Demonstrated understanding of relationship skills and communication skills and Identified / Expressed personal readiness to incorporate effective communication skills    Treatment Plan:  Patient has a current master individualized treatment plan.  See Epic treatment plan for more information.    Joann Land, [facility]

## 2020-12-07 NOTE — PROGRESS NOTES
[facility]   Dr. Tompkins's Psychiatric Progress Note  2020      Patient:  Corinne N Palm   Medical Record Number:  [id number]  :  1995    Telemedicine Visit: The patient's condition can be safely assessed and treated via synchronous audio and visual telemedicine encounter.       Reason for Telemedicine Visit: COVID-19 lockdown     Originating Site (Patient Location):  HOME     Distant Site (Provider Location): [facility]: [place]     Consent:  The patient/guardian has verbally consented to: the potential risks and benefits of telemedicine (video visit) versus in person care; bill my insurance or make self-payment for services provided; and responsibility for payment of non-covered services           Interim History:   The patient's care was discussed with the treatment team and chart notes were reviewed.  Today Corinne N Palm reports depressed mood. In addition, there are notable risk factors for self-harm, including age, anxiety, previous history of suicide attempts and suicidal ideation. However, risk is mitigated by commitment to family, sobriety, ability to volunteer a safety plan, history of seeking help when needed, no access to firearms or weapons and denies homicidal ideation, intent, or plan.  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      Psych Dr Giulia Vigil at [employer].  Therapist Deandra Osorio at [employer].  DBT at [facility].    Mood is very bad: lots of depression and less anxious.  She quit Lamictal as she felt it made her appetite more.  Latuda helps mood.  Used to be on higher dose of Cymbalta.  She noted no problems on higher dose.  Previously higher Latuda dose did nothing.      20  Pt saw her psychiatrist last night.  Her doc wanted her to hand over meds last night.  She's not allowed back in her DBT group at [facility].  Group is going ok.  Dr. Vigil increased Cymbalta to 90 mg/d.  She's starting Lamictal again at 25mg/d.      20:  She is  "taking the higher doses of meds.  No SE.  Weekend plans: Face timing a friend in [facility].      20:  Weekend was \"just ok.\"  A renter/friend is moving in with them so him and and his dog.  She also laid around and relaxed.  Upset with her therapist.  Pt obsesses re: suicide and had plant.  Pt wants to give to  some of her means to suicide.  Therapist spoke to .   was told by therapist not to give her pills and a knife back to patient unless therapist says to.  Pt feels betrayed by therapist.  Pt feels loss of control.  Mood bounces some but is consistently down.      Psychiatric ROS:  Mood:   Fairly low  Sleep:   Wakes up some  Appetite:  low  Eatin meal plus one snack per day  Energy Level:   low  Concentration/Memory Problems:  NO  Suicidal Thoughts:  Yes has SI and SIB urges; no SIB behaviours;  Over weekend had SI.  Gave pills to , so she wouldn't act.  Contracts no self harm.  Homicidal Thoughts:No  Psychotic Symptoms: No  Medication Side Effects:No  Medication Compliance:Yes   Physical Complaints:  positive for lethary         Medications:     Current Outpatient Medications   Medication Sig    CYMBALTA 30MG Take 1 cap at night     DULoxetine (CYMBALTA) 60 MG capsule Take 1 capsule (60 mg) by mouth At Bedtime     gabapentin (NEURONTIN) 300 MG capsule Take 300-600 mg by mouth nightly as needed (anxiety)     lamoTRIgine (LAMICTAL) 25 MG tablet Take 1 tablet (25 mg) by mouth daily for 9 days, then increase to 2 tablets (50 mg) by mouth daily.     lurasidone (LATUDA) 80 MG TABS tablet Take 80 mg by mouth At Bedtime     polyethylene glycol (MIRALAX) 17 GM/Dose powder Take 17 g by mouth daily     Vitamin D, Cholecalciferol, 25 MCG (1000 UT) TABS Take 1,000 Units by mouth daily      No current facility-administered medications for this encounter.              Allergies:     Allergies   Allergen Reactions     Morphine Other (See Comments)     Twitching     Amoxicillin Rash     Sulfa " Drugs Rash            Psychiatric Examination:   There were no vitals taken for this visit.  Weight is 0 lbs 0 oz  There is no height or weight on file to calculate BMI.    Appearance:  awake, alert and adequately groomed  Attitude:  cooperative  Eye Contact:  good  Mood:  depressed  Affect:  mood congruent  Speech:  clear, coherent  Psychomotor Behavior:  no evidence of tardive dyskinesia, dystonia, or tics  Throught Process:  logical  Associations:  no loose associations  Thought Content:  no evidence of psychotic thought and passive suicidal thoughts;  no plan or intent;  able to con tract for safety  Insight:  good  Judgement:  intact  Oriented to:  time, person, and place  Attention Span and Concentration:  intact  Recent and Remote Memory:  intact  Gait:Normal    Risk/Potential for Dangerousness:  Multiple Active Diagnoses:HIGH  Self Care:HIGH  Suicide:MODERATE  Assault:LOW  Self Injurious Behaviors:MODERATE  Inappropriate Sexual Behavior:LOW         Labs:   No results found for this or any previous visit (from the past 24 hour(s)).     Recent Results (from the past 1008 hour(s))   Asymptomatic COVID-19 Virus (Coronavirus) by PCR    Collection Time: 11/13/20  6:54 PM    Specimen: Nasopharyngeal   Result Value Ref Range    COVID-19 Virus PCR to [facility] - Source Nasopharyngeal     COVID-19 Virus PCR to [facility] - Result       Test received-See reflex to IDDL test SARS CoV2 (COVID-19) Virus RT-PCR   Comprehensive metabolic panel    Collection Time: 11/13/20  6:54 PM   Result Value Ref Range    Sodium 138 133 - 144 mmol/L    Potassium 3.8 3.4 - 5.3 mmol/L    Chloride 104 94 - 109 mmol/L    Carbon Dioxide 26 20 - 32 mmol/L    Anion Gap 8 3 - 14 mmol/L    Glucose 75 70 - 99 mg/dL    Urea Nitrogen 6 (L) 7 - 30 mg/dL    Creatinine 0.67 0.52 - 1.04 mg/dL    GFR Estimate >90 >60 mL/min/[1.73_m2]    GFR Estimate If Black >90 >60 mL/min/[1.73_m2]    Calcium 9.3 8.5 - 10.1 mg/dL    Bilirubin Total 0.5 0.2 - 1.3 mg/dL     Albumin 4.1 3.4 - 5.0 g/dL    Protein Total 7.7 6.8 - 8.8 g/dL    Alkaline Phosphatase 55 40 - 150 U/L    ALT 42 0 - 50 U/L    AST 27 0 - 45 U/L   SARS-CoV-2 COVID-19 Virus (Coronavirus) RT-PCR Nasopharyngeal    Collection Time: 11/13/20  6:54 PM    Specimen: Nasopharyngeal   Result Value Ref Range    SARS-CoV-2 Virus Specimen Source Nasopharyngeal     SARS-CoV-2 PCR Result NEGATIVE     SARS-CoV-2 PCR Comment       Testing was performed using the Aptima SARS-CoV-2 Assay on the [employer] Instrument System.   Additional information about this Emergency Use Authorization (EUA) assay can be found via   the Lab Guide.     TSH with free T4 reflex and/or T3 as indicated    Collection Time: 11/13/20 10:38 PM   Result Value Ref Range    TSH 2.05 0.40 - 4.00 mU/L   CBC with platelets differential    Collection Time: 11/13/20 10:38 PM   Result Value Ref Range    WBC 10.4 4.0 - 11.0 10e9/L    RBC Count 4.77 3.8 - 5.2 10e12/L    Hemoglobin 14.4 11.7 - 15.7 g/dL    Hematocrit 42.3 35.0 - 47.0 %    MCV 89 78 - 100 fl    MCH 30.2 26.5 - 33.0 pg    MCHC 34.0 31.5 - 36.5 g/dL    RDW 11.9 10.0 - 15.0 %    Platelet Count 358 150 - 450 10e9/L    Diff Method Automated Method     % Neutrophils 52.8 %    % Lymphocytes 37.9 %    % Monocytes 6.6 %    % Eosinophils 1.8 %    % Basophils 0.6 %    % Immature Granulocytes 0.3 %    Nucleated RBCs 0 0 /100    Absolute Neutrophil 5.5 1.6 - 8.3 10e9/L    Absolute Lymphocytes 3.9 0.8 - 5.3 10e9/L    Absolute Monocytes 0.7 0.0 - 1.3 10e9/L    Absolute Eosinophils 0.2 0.0 - 0.7 10e9/L    Absolute Basophils 0.1 0.0 - 0.2 10e9/L    Abs Immature Granulocytes 0.0 0 - 0.4 10e9/L    Absolute Nucleated RBC 0.0    HCG qualitative urine    Collection Time: 11/15/20  8:30 PM   Result Value Ref Range    HCG Qual Urine Negative NEG^Negative   Drug abuse screen 77 urine (FL, RH, SH)    Collection Time: 11/15/20  8:30 PM   Result Value Ref Range    Amphetamine Qual Urine Negative NEG^Negative    Barbiturates Qual Urine  Negative NEG^Negative    Benzodiazepine Qual Urine Negative NEG^Negative    Cannabinoids Qual Urine Negative NEG^Negative    Cocaine Qual Urine Negative NEG^Negative    Opiates Qualitative Urine Negative NEG^Negative    PCP Qual Urine Negative NEG^Negative   UA reflex to Microscopic    Collection Time: 11/15/20  8:30 PM   Result Value Ref Range    Color Urine Yellow     Appearance Urine Clear     Glucose Urine Negative NEG^Negative mg/dL    Bilirubin Urine Negative NEG^Negative    Ketones Urine 10 (A) NEG^Negative mg/dL    Specific Gravity Urine 1.018 1.003 - 1.035    Blood Urine Negative NEG^Negative    pH Urine 6.5 5.0 - 7.0 pH    Protein Albumin Urine Negative NEG^Negative mg/dL    Urobilinogen mg/dL 2.0 0.0 - 2.0 mg/dL    Nitrite Urine Negative NEG^Negative    Leukocyte Esterase Urine Negative NEG^Negative    Source Midstream Urine          Impression:   This is a 25 year old female with depression and anxiety continues PHP.  Mood is labile.  She's looking at various treatment options including residential.            DIagnoses:     1.  Major depressive disorder, recurrent, severe without psychosis  2.  Generalized anxiety disorder.   3.  Anorexia nervosa.   4.  Borderline personality disorder.            Plan:     Completes PHP 12/11/20  She's considering a RTC.  She was in [employer] in the past.  Alternatively, she may do Day Treatment and sees regular therapist twice per week and DBT therapist once per week.    Continue Cymbalta, Latuda, and gabapentin  Increased Cymbalta to 90 mg/d  Restarted Lamictal.   Continue therapy as planned - PHP  Continue all other medications as reviewed per electronic medical record today.   Safety plan reviewed. To the Emergency Department as needed or call after hours crisis line at [phone] or [phone]. [place] Crisis Text Line: Text [place] to 003945  or  Suicide LifeLine Chat: suicidepreventionlifeline.org/chat  Call [facility] at [phone] to  schedule.  Follow up with outpatient provider as planned or sooner if needed for acute medical concerns.  Call the psychiatric nurse line with medication questions or concerns at [phone].      Dann Tompkins MD

## 2020-12-07 NOTE — GROUP NOTE
Psychoeducation Group Note    PATIENT'S NAME: Corinne N Palm  MRN:   [id number]  :   1995  ACCT. NUMBER: [id number]  DATE OF SERVICE: 20  START TIME:  1:00 PM  END TIME:  1:50 PM  FACILITATOR: Erika Crump OTR/L  TOPIC: [facility] OT Group: Lifestyle Balance and Structure  [facility] Partial Hospitalization Program  TRACK: 1    NUMBER OF PARTICIPANTS: 4    Summary of Group / Topics Discussed:  Lifestyle Balance and Structure:  OT Goal-setting & integration: To support progress towards treatment goals and psychiatric recovery, group members were led through a weekly structured process to reflect on progress, integrate new learning/skills, and emerging self-awareness into their daily and weekly life. Provided psychoeducation on the neuroscience of change, self-compassion, and the anatomy of a SMART goal to the group. Facilitated the sharing of individual goals with validation, support, and feedback provided.    Patient Session Goals / Objectives:    Reflected on and create a vision for recovery and wellbeing    Identified and wrote goals for the week    Identified and problem solved barriers to achieving identified goals     Identified plan to support follow through on goals and reflection on progress made        Patient Participation / Response:  Fully participated with the group by sharing personal reflections / insights and openly received / provided feedback with other participants.    Patient presentation: constricted affect; low mood observed; active in discussion, Demonstrated understanding of content through identifying skills she is using and some goals for the evening she wants to work on  and Patient would benefit from additional opportunities to practice the content to be able to generalize it to their everyday life with increased intentionality, consistency, and efficacy in support of their psychiatric recovery    Treatment Plan:  Patient has a current master individualized  treatment plan.  See Epic treatment plan for more information.    Erika Crump, OTR/L

## 2020-12-07 NOTE — GROUP NOTE
Psychoeducation Group Note    PATIENT'S NAME: Corinne N Palm  MRN:   [id number]  :   1995  ACCT. NUMBER: [id number]  DATE OF SERVICE: 20  START TIME:  9:00 AM  END TIME:  9:50 AM  FACILITATOR: Marlene Alonzo RN  TOPIC: NANDINI RN Group: Medication Education and Management  [facility] Adult Partial Hospitalization Program  TRACK: 1    NUMBER OF PARTICIPANTS: 4    Summary of Group / Topics Discussed:  Medication Educations and Management:  Medication Jeopardy: Patients provided education regarding medication safety, antidepressants, side effects, neuroleptics, expected medication outcomes, knowledge of diagnosis, symptoms, and symptom management through an engaging jeopardy-style format.     Patient Session Goals / Objectives:    ? Participated in team-based Jeopardy game  ? Identified strategies for safe use, handling, and disposal of medications  ? Discussed basic aspects of medication safety, side effects, adverse outcomes and contraindications  Telemedicine Visit: The patient's condition can be safely assessed and treated via synchronous audio and visual telemedicine encounter.      Reason for Telemedicine Visit: Services only offered telehealth    Originating Site (Patient Location): Patient's home    Distant Site (Provider Location): Provider Remote Setting    Consent:  The patient/guardian has verbally consented to: the potential risks and benefits of telemedicine (video visit) versus in person care; bill my insurance or make self-payment for services provided; and responsibility for payment of non-covered services.     Mode of Communication:  Video Conference via [employer]    As the provider I attest to compliance with applicable laws and regulations related to telemedicine.       Patient Participation / Response:  Fully participated with the group by sharing personal reflections / insights and openly received / provided feedback with other participants.     Demonstrated understanding of  topics discussed through group discussion and participation    Treatment Plan:  Patient has a current master individualized treatment plan.  See Epic treatment plan for more information.    Marleen Alonzo RN

## 2020-12-08 ENCOUNTER — HOSPITAL ENCOUNTER (OUTPATIENT)
Dept: BEHAVIORAL HEALTH | Facility: CLINIC | Age: 25
End: 2020-12-08
Attending: PSYCHIATRY & NEUROLOGY
Payer: COMMERCIAL

## 2020-12-08 PROCEDURE — H0035 MH PARTIAL HOSP TX UNDER 24H: HCPCS | Mod: GT

## 2020-12-08 PROCEDURE — H0035 MH PARTIAL HOSP TX UNDER 24H: HCPCS | Mod: 95 | Performed by: COUNSELOR

## 2020-12-08 PROCEDURE — H0035 MH PARTIAL HOSP TX UNDER 24H: HCPCS | Mod: GT | Performed by: COUNSELOR

## 2020-12-08 NOTE — GROUP NOTE
Psychotherapy Group Note    PATIENT'S NAME: Corinne N Palm  MRN:   [id number]  :   1995  ACCT. NUMBER: [id number]  DATE OF SERVICE: 20  START TIME:  4:00 PM  END TIME:  6:00 PM  FACILITATOR: Joann Land LPCC  TOPIC: MH EBP Group: Specialty Awareness  [facility] Adult Partial Hospitalization Program  TRACK: [facility]    NUMBER OF PARTICIPANTS: 4    Summary of Group / Topics Discussed:  Specialty Topics: Education Support Network: Patients worked on identifying the mild, moderate, and severe symptoms of their disorder.  Patients identified helpful supportive statements and actions they would appreciate from caregivers.  The goal is to gather information in preparation for the education support network for problem solving and planning for support with caregivers.    Education Support Network:  Patients and caregivers received an overview of diagnoses including physical, intellectual, and behavioral indicators of illness. Patients presented information created in the support network development group to engage caregivers in planning for help and support to manage mental health symptoms.  The goal is to help patients and caregivers create a plan for support and assistance after discharge.      Patient Session Goals / Objectives:    Understanding diagnoses and accompanying physical reactions, thoughts, and behaviors.      Explored options to support patients in their recovery     Formulated a plan with caregivers for assistance and support to aid in recovery     Problem solved barriers to follow through on plan    Telemedicine Visit: The patient's condition can be safely assessed and treated via synchronous audio and visual telemedicine encounter.      Reason for Telemedicine Visit: Services only offered telehealth and due to COVID-19    Originating Site (Patient Location): Patient's home    Distant Site (Provider Location): Provider Remote Setting    Consent:  The patient/guardian has verbally  consented to: the potential risks and benefits of telemedicine (video visit) versus in person care; bill my insurance or make self-payment for services provided; and responsibility for payment of non-covered services.     Mode of Communication:  Video Conference via [employer]    As the provider I attest to compliance with applicable laws and regulations related to telemedicine.      Patient Participation / Response:  Fully participated with the group by sharing personal reflections / insights and openly received / provided feedback with other participants.    Demonstrated understanding of topics discussed through group discussion and participation, Identified / Expressed readiness to act on skill suggestions discussed in topic and Verbalized understanding of ways to proactively manage illness    Treatment Plan:  Patient has a current master individualized treatment plan.  See Epic treatment plan for more information.    Joann Land, [facility]

## 2020-12-08 NOTE — GROUP NOTE
"Process Group Note    PATIENT'S NAME: Corinne N Palm  MRN:   [id number]  :   1995  ACCT. NUMBER: [id number]  DATE OF SERVICE: 20  START TIME:  1:00 PM  END TIME:  1:50 PM  FACILITATOR: Joann Land LPCC  TOPIC:  Process Group    Diagnoses:  DSM5  Diagnosis:  1.  Major depressive disorder, recurrent, severe without psychosis  2.  Generalized anxiety disorder.   3.  Anorexia nervosa.   4.  Borderline personality disorder.      [facility] Adult Partial Hospitalization Program  TRACK: [facility]    NUMBER OF PARTICIPANTS: 4    Telemedicine Visit: The patient's condition can be safely assessed and treated via synchronous audio and visual telemedicine encounter.      Reason for Telemedicine Visit: Services only offered telehealth and due to COVID-19    Originating Site (Patient Location): Patient's home    Distant Site (Provider Location): Provider Remote Setting    Consent:  The patient/guardian has verbally consented to: the potential risks and benefits of telemedicine (video visit) versus in person care; bill my insurance or make self-payment for services provided; and responsibility for payment of non-covered services.     Mode of Communication:  Video Conference via [employer]    As the provider I attest to compliance with applicable laws and regulations related to telemedicine.            Data:    Session content: At the start of this group, patients were invited to check in by identifying themselves, describing their current emotional status, and identifying issues to address in this group.   Area(s) of treatment focus addressed in this session included Symptom Management, Personal Safety and Community Resources/Discharge Planning.    Patient reported feeling \"blah and like I am floating through life.\" Patient discussed working toward returning a [place] tree and being open-minded about additional treatment options. Patient identified opposite to emotion as skills they will use to address " their goal(s). Patient reported feeling out of it may be a barrier to working toward their goal(s) and/or addressing mental health symptoms. Patient reported yes safety concerns and/or self-injurious behaviors. Patient reported no substance use. Patient reported they are taking their medications as prescribed. Patient reported feeling proud that they packaged the tree to return it to the store. Patient discussed memory issues since her hospitalization with the treatment group.     Therapeutic Interventions/Treatment Strategies:  Psychotherapist offered support, feedback and validation and reinforced use of skills. Treatment modalities used include Motivational Interviewing and Cognitive Behavioral Therapy. Interventions include Coping Skills: Assisted patient in identifying 1-2 healthy distraction skills to reduce overall distress, Symptoms Management: Promoted understanding of their diagnoses and how it impacts their functioning and Emotions Management:  Discussed barriers to emotional regulation.    Assessment:    Patient response:   Patient responded to session by accepting feedback, giving feedback, listening, focusing on goals, being attentive and accepting support    Possible barriers to participation / learning include: and no barriers identified    Health Issues:   None reported       Substance Use Review:   Substance Use: No active concerns identified.    Mental Status/Behavioral Observations  Appearance:   Appropriate   Eye Contact:   Good   Psychomotor Behavior: Normal   Attitude:   Cooperative   Orientation:   All  Speech   Rate / Production: Normal/ Responsive Normal    Volume:  Normal   Mood:    Anxious  Depressed  Normal  Affect:    Appropriate   Thought Content:   Clear and Safety reports  presence of suicidal ideation passive suicidal ideation  and thoughts of self-harm  Thought Form:  Coherent  Logical     Insight:    Good     Plan:     Safety Plan: Patient consented to co-developed safety plan.   Safety and risk management plan was completed.  Patient agreed to use safety plan should any safety concerns arise.  A copy was given to the patient.    Committed to safety and agreed to follow previously developed safety coping plan.      Barriers to treatment: None identified    Patient Contracts (see media tab):  None    Substance Use: Provided encouragement towards sobriety     Continue or Discharge: Patient will continue in Adult Partial Hospitalization Program (PHP)  as planned. Patient is likely to benefit from learning and using skills as they work toward the goals identified in their treatment plan.      Joann Land, [facility]  December 8, 2020

## 2020-12-08 NOTE — GROUP NOTE
Psychoeducation Group Note    PATIENT'S NAME: Corinne N Palm  MRN:   [id number]  :   1995  ACCT. NUMBER: [id number]  DATE OF SERVICE: 20  START TIME:  2:00 PM  END TIME:  2:50 PM  FACILITATOR: Marlene Alonzo RN  TOPIC: NANDINI RN Group: [facility] Adult Partial Hospitalization Program  TRACK: 1    NUMBER OF PARTICIPANTS: 4    Summary of Group / Topics Discussed:  Foundations of Health: Sleep: Pathophysiology of sleep disorders: The anatomy of sleep was reviewed including structures, stages, mechanisms, and the purpose that sleep has on the brain. Sleep disorders were discussed within the group with a focus on gaining knowledge about the etiology and pathophysiology of insomnia, sleep apnea, restless leg syndrome, narcolepsy, medications that can cause risk for sleeping disorders, and other symptoms/factors that may interfere with sleep. Risk factors for developing sleep disorders were discussed and treatments/pharmacological options were explored.     Patient Session Goals / Objectives:  ? Described the effect and purpose of sleep on the brain and identify the amount of sleep needed  ? Identified differences between sleep disorders and risks associated with sleep disorders  ? Described the connection between sleep disturbances and mental illness    Increased knowledge about treatments for sleep disorders  Telemedicine Visit: The patient's condition can be safely assessed and treated via synchronous audio and visual telemedicine encounter.      Reason for Telemedicine Visit: Services only offered telehealth    Originating Site (Patient Location): Patient's home    Distant Site (Provider Location): Provider Remote Setting    Consent:  The patient/guardian has verbally consented to: the potential risks and benefits of telemedicine (video visit) versus in person care; bill my insurance or make self-payment for services provided; and responsibility for payment of non-covered  services.     Mode of Communication:  Video Conference via [employer]    As the provider I attest to compliance with applicable laws and regulations related to telemedicine.       Patient Participation / Response:  Fully participated with the group by sharing personal reflections / insights and openly received / provided feedback with other participants.    Demonstrated understanding of topics discussed through group discussion and participation    Treatment Plan:  Patient has a current master individualized treatment plan.  See Epic treatment plan for more information.    Marlene Alonzo RN

## 2020-12-09 ENCOUNTER — HOSPITAL ENCOUNTER (OUTPATIENT)
Dept: BEHAVIORAL HEALTH | Facility: CLINIC | Age: 25
Discharge: HOME | End: 2020-12-09
Attending: PSYCHIATRY & NEUROLOGY | Admitting: PSYCHIATRY & NEUROLOGY
Payer: COMMERCIAL

## 2020-12-09 ENCOUNTER — HOSPITAL ENCOUNTER (OUTPATIENT)
Dept: BEHAVIORAL HEALTH | Facility: CLINIC | Age: 25
End: 2020-12-09
Attending: PSYCHIATRY & NEUROLOGY
Payer: COMMERCIAL

## 2020-12-09 PROCEDURE — H0035 MH PARTIAL HOSP TX UNDER 24H: HCPCS | Mod: 95 | Performed by: OCCUPATIONAL THERAPIST

## 2020-12-09 PROCEDURE — H0035 MH PARTIAL HOSP TX UNDER 24H: HCPCS | Mod: 95 | Performed by: COUNSELOR

## 2020-12-09 PROCEDURE — H0035 MH PARTIAL HOSP TX UNDER 24H: HCPCS | Mod: GT | Performed by: SOCIAL WORKER

## 2020-12-09 PROCEDURE — H0035 MH PARTIAL HOSP TX UNDER 24H: HCPCS | Mod: 95

## 2020-12-09 NOTE — GROUP NOTE
Psychoeducation Group Note    PATIENT'S NAME: Corinne N Palm  MRN:   [id number]  :   1995  ACCT. NUMBER: [id number]  DATE OF SERVICE: 20  START TIME:  9:00 AM  END TIME:  9:50 AM  FACILITATOR: Marlene Alonzo RN  TOPIC:  RN Group: Brain Health  NUMBER OF PARTICIPANTS:5    Patient Participation / Response:  Fully participated with the group by sharing personal reflections / insights and openly received / provided feedback with other participants.    Demonstrated understanding of topics discussed through group discussion and participation    Treatment Plan:  Patient has a current master individualized treatment plan.  See Epic treatment plan for more information.    Marlene Alonzo RN

## 2020-12-09 NOTE — GROUP NOTE
Psychoeducation Group Note    PATIENT'S NAME: Corinne N Palm  MRN:   [id number]  :   1995  ACCT. NUMBER: [id number]  DATE OF SERVICE: 20  START TIME:  1:00 PM  END TIME:  1:50 PM  FACILITATOR: Mor Soni OT  TOPIC: [facility] OT Group: Self- Regulation Skills  [facility] Adult Partial Hospitalization Program  TRACK: 1    Telemedicine Visit: The patient's condition can be safely assessed and treated via synchronous audio and visual telemedicine encounter.      Reason for Telemedicine Visit: Services only offered telehealth and Covid 19    Originating Site (Patient Location): Patient's home    Distant Site (Provider Location): Provider Remote Setting    Consent:  The patient/guardian has verbally consented to: the potential risks and benefits of telemedicine (video visit) versus in person care; bill my insurance or make self-payment for services provided; and responsibility for payment of non-covered services.     Mode of Communication:  Video Conference via [employer]    As the provider I attest to compliance with applicable laws and regulations related to telemedicine.      NUMBER OF PARTICIPANTS: 4    Summary of Group / Topics Discussed:  Self-Regulation Skills: Benefits of focused activity. Patients were provided with written and verbal psychoeducation on the concept of integrating mindfulness through chosen focused activities to develop self-awareness and skills for self-regulation.  Emphasis placed on the benefits of focused  through bottom neurosensory rich activities and how they can help to emotionally ground oneself or provide a healthy distraction to self-regulate when distressed. Patients worked to increase knowledge and skills during a group activity that utilized communication skills, cognitive skills including attention and recall.  Discussed and shared reflections to develop understanding of how focused activity can build resiliency and self-efficacy in doing so in the context of  their own roles, routines and relationships.     Patient Session Goals / Objectives:    Identified how using focused activities can be used for grounding, stress management, and self-regulation and important in regaining participation in meaningful/purposeful activities.      Improved awareness of different types of focused activities that assist with healthy coping of stress and symptoms      Established a plan for practice of these skills in their own environments    Practiced and reflected on how to generalize taught skills to their everyday life        Patient Participation / Response:  Fully participated with the group by sharing personal reflections / insights and openly received / provided feedback with other participants.    Verbalized understanding of content and Patient would benefit from additional opportunities to practice the content to be able to generalize it to their everyday life with increased intentionality, consistency, and efficacy in support of their psychiatric recovery    Treatment Plan:  Patient has a current master individualized treatment plan.  See Epic treatment plan for more information.    Mor Soni, OT

## 2020-12-09 NOTE — GROUP NOTE
"Process Group Note    PATIENT'S NAME: Corinne N Palm  MRN:   [id number]  :   1995  ACCT. NUMBER: [id number]  DATE OF SERVICE: 20  START TIME: 10:00 AM  END TIME: 10:50 AM  FACILITATOR: Joann Land LPCC  TOPIC:  Process Group    Diagnoses:  DSM5  Diagnosis:  1.  Major depressive disorder, recurrent, severe without psychosis  2.  Generalized anxiety disorder.   3.  Anorexia nervosa.   4.  Borderline personality disorder.      [facility] Adult Partial Hospitalization Program  TRACK: [facility]    NUMBER OF PARTICIPANTS: 5    Telemedicine Visit: The patient's condition can be safely assessed and treated via synchronous audio and visual telemedicine encounter.      Reason for Telemedicine Visit: Services only offered telehealth and due to COVID-19    Originating Site (Patient Location): Patient's home    Distant Site (Provider Location): Provider Remote Setting    Consent:  The patient/guardian has verbally consented to: the potential risks and benefits of telemedicine (video visit) versus in person care; bill my insurance or make self-payment for services provided; and responsibility for payment of non-covered services.     Mode of Communication:  Video Conference via [employer]    As the provider I attest to compliance with applicable laws and regulations related to telemedicine.            Data:    Session content: At the start of this group, patients were invited to check in by identifying themselves, describing their current emotional status, and identifying issues to address in this group.   Area(s) of treatment focus addressed in this session included Symptom Management, Personal Safety and Community Resources/Discharge Planning.    Patient reported feeling \"ok, well, and a little down.\" Patient discussed working toward discussing treatment options at [facility]. Patient identified attending her therapy appointment this afternoon as skills they will use to address their " goal(s). Patient reported feeling willful may be a barrier to working toward their goal(s) and/or addressing mental health symptoms. Patient reported yes safety concerns and/or self-injurious behaviors. Patient reported no substance use. Patient reported they are taking their medications as prescribed. Patient reported feeling proud that they returned a tree and purchased another one and set it up this morning before programming. Patient discussed feeling proud of herself today with the treatment group.     Therapeutic Interventions/Treatment Strategies:  Psychotherapist offered support, feedback and validation and reinforced use of skills. Treatment modalities used include Motivational Interviewing and Cognitive Behavioral Therapy. Interventions include Coping Skills: Assisted patient in identifying 1-2 healthy distraction skills to reduce overall distress, Mindfulness: Facilitated discussion of when/how to use mindfulness skills to benefit general health, mental health symptoms, and stressors and Symptoms Management: Promoted understanding of their diagnoses and how it impacts their functioning.    Assessment:    Patient response:   Patient responded to session by accepting feedback, giving feedback, listening, focusing on goals, being attentive and accepting support    Possible barriers to participation / learning include: and no barriers identified    Health Issues:   None reported       Substance Use Review:   Substance Use: No active concerns identified.    Mental Status/Behavioral Observations  Appearance:   Appropriate   Eye Contact:   Good   Psychomotor Behavior: Normal   Attitude:   Cooperative   Orientation:   All  Speech   Rate / Production: Normal/ Responsive Normal    Volume:  Normal   Mood:    Anxious  Depressed  Normal  Affect:    Appropriate   Thought Content:   Clear and Safety reports  presence of suicidal ideation passive suicidal ideation  and thoughts of self-harm  Thought Form:  Coherent   Logical     Insight:    Good     Plan:     Safety Plan: Patient consented to co-developed safety plan.  Safety and risk management plan was completed.  Patient agreed to use safety plan should any safety concerns arise.  A copy was given to the patient.    Committed to safety and agreed to follow previously developed safety coping plan.      Barriers to treatment: None identified    Patient Contracts (see media tab):  None    Substance Use: Provided encouragement towards sobriety     Continue or Discharge: Patient will continue in Adult Partial Hospitalization Program (PHP)  as planned. Patient is likely to benefit from learning and using skills as they work toward the goals identified in their treatment plan.      Joann Land, [facility]  December 9, 2020

## 2020-12-09 NOTE — PROGRESS NOTES
[facility]   Dr. Tompkins's Psychiatric Progress Note  2020      Patient:  Corinne N Palm   Medical Record Number:  [id number]  :  1995    Telemedicine Visit: The patient's condition can be safely assessed and treated via synchronous audio and visual telemedicine encounter.       Reason for Telemedicine Visit: COVID-19 lockdown     Originating Site (Patient Location):  HOME     Distant Site (Provider Location): [facility]: [place]     Consent:  The patient/guardian has verbally consented to: the potential risks and benefits of telemedicine (video visit) versus in person care; bill my insurance or make self-payment for services provided; and responsibility for payment of non-covered services           Interim History:   The patient's care was discussed with the treatment team and chart notes were reviewed.  Today Corinne N Palm reports depressed mood. In addition, there are notable risk factors for self-harm, including age, anxiety, previous history of suicide attempts and suicidal ideation. However, risk is mitigated by commitment to family, sobriety, ability to volunteer a safety plan, history of seeking help when needed, no access to firearms or weapons and denies homicidal ideation, intent, or plan.  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      Psych Dr Giulia Vigil at [facility].  Therapist Deandra Osorio at [facility].  DBT at [facility].    Mood is very bad: lots of depression and less anxious.  She quit Lamictal as she felt it made her appetite more.  Latuda helps mood.  Used to be on higher dose of Cymbalta.  She noted no problems on higher dose.  Previously higher Latuda dose did nothing.      20  Pt saw her psychiatrist last night.  Her doc wanted her to hand over meds last night.  She's not allowed back in her DBT group at [facility].  Group is going ok.  Dr. Vigil increased Cymbalta to 90 mg/d.  She's starting Lamictal again at 25mg/d.      20:  She is  "taking the higher doses of meds.  No SE.  Weekend plans: Face timing a friend in [facility].      20:  Weekend was \"just ok.\"  A renter/friend is moving in with them so him and and his dog.  She also laid around and relaxed.  Upset with her therapist.  Pt obsesses re: suicide and had plant.  Pt wants to give to  some of her means to suicide.  Therapist spoke to .   was told by therapist not to give her pills and a knife back to patient unless therapist says to.  Pt feels betrayed by therapist.  Pt feels loss of control.  Mood bounces some but is consistently down.      20:  Mood is now more down than anxious.      Psychiatric ROS:  Mood:   depressed  Sleep:   Woke a few times and eats  Appetite:  low  Eatin meal plus one snack per day  Energy Level:   low  Concentration/Memory Problems:  NO  Suicidal Thoughts:  Yes has SI and SIB urges; no SIB behaviours; less than last weekend;  Gave pills to , so she wouldn't act.  Contracts no self harm.  Homicidal Thoughts:No  Psychotic Symptoms: No  Medication Side Effects:No  Medication Compliance:Yes   Physical Complaints:  positive for lethary         Medications:     Current Outpatient Medications   Medication Sig    CYMBALTA 30MG Take 1 cap at night     DULoxetine (CYMBALTA) 60 MG capsule Take 1 capsule (60 mg) by mouth At Bedtime     gabapentin (NEURONTIN) 300 MG capsule Take 300-600 mg by mouth nightly as needed (anxiety)     lamoTRIgine (LAMICTAL) 25 MG tablet Take 1 tablet (25 mg) by mouth daily for 9 days, then increase to 2 tablets (50 mg) by mouth daily.     lurasidone (LATUDA) 80 MG TABS tablet Take 80 mg by mouth At Bedtime     polyethylene glycol (MIRALAX) 17 GM/Dose powder Take 17 g by mouth daily     Vitamin D, Cholecalciferol, 25 MCG (1000 UT) TABS Take 1,000 Units by mouth daily      No current facility-administered medications for this encounter.              Allergies:     Allergies   Allergen Reactions     Morphine " Other (See Comments)     Twitching     Amoxicillin Rash     Sulfa Drugs Rash            Psychiatric Examination:   There were no vitals taken for this visit.  Weight is 0 lbs 0 oz  There is no height or weight on file to calculate BMI.    Appearance:  awake, alert and adequately groomed  Attitude:  cooperative  Eye Contact:  good  Mood:  depressed  Affect:  mood congruent  Speech:  clear, coherent  Psychomotor Behavior:  no evidence of tardive dyskinesia, dystonia, or tics  Throught Process:  logical  Associations:  no loose associations  Thought Content:  no evidence of psychotic thought and passive suicidal thoughts;  no plan or intent;  able to con tract for safety  Insight:  good  Judgement:  intact  Oriented to:  time, person, and place  Attention Span and Concentration:  intact  Recent and Remote Memory:  intact  Gait:Normal    Risk/Potential for Dangerousness:  Multiple Active Diagnoses:HIGH  Self Care:HIGH  Suicide:MODERATE  Assault:LOW  Self Injurious Behaviors:MODERATE  Inappropriate Sexual Behavior:LOW         Labs:   No results found for this or any previous visit (from the past 24 hour(s)).     Recent Results (from the past 1008 hour(s))   Asymptomatic COVID-19 Virus (Coronavirus) by PCR    Collection Time: 11/13/20  6:54 PM    Specimen: Nasopharyngeal   Result Value Ref Range    COVID-19 Virus PCR to [facility] - Source Nasopharyngeal     COVID-19 Virus PCR to [facility] - Result       Test received-See reflex to IDDL test SARS CoV2 (COVID-19) Virus RT-PCR   Comprehensive metabolic panel    Collection Time: 11/13/20  6:54 PM   Result Value Ref Range    Sodium 138 133 - 144 mmol/L    Potassium 3.8 3.4 - 5.3 mmol/L    Chloride 104 94 - 109 mmol/L    Carbon Dioxide 26 20 - 32 mmol/L    Anion Gap 8 3 - 14 mmol/L    Glucose 75 70 - 99 mg/dL    Urea Nitrogen 6 (L) 7 - 30 mg/dL    Creatinine 0.67 0.52 - 1.04 mg/dL    GFR Estimate >90 >60 mL/min/[1.73_m2]    GFR Estimate If Black >90 >60 mL/min/[1.73_m2]    Calcium  9.3 8.5 - 10.1 mg/dL    Bilirubin Total 0.5 0.2 - 1.3 mg/dL    Albumin 4.1 3.4 - 5.0 g/dL    Protein Total 7.7 6.8 - 8.8 g/dL    Alkaline Phosphatase 55 40 - 150 U/L    ALT 42 0 - 50 U/L    AST 27 0 - 45 U/L   SARS-CoV-2 COVID-19 Virus (Coronavirus) RT-PCR Nasopharyngeal    Collection Time: 11/13/20  6:54 PM    Specimen: Nasopharyngeal   Result Value Ref Range    SARS-CoV-2 Virus Specimen Source Nasopharyngeal     SARS-CoV-2 PCR Result NEGATIVE     SARS-CoV-2 PCR Comment       Testing was performed using the [employer] SARS-CoV-2 Assay on the [employer] Instrument System.   Additional information about this Emergency Use Authorization (EUA) assay can be found via   the Lab Guide.     TSH with free T4 reflex and/or T3 as indicated    Collection Time: 11/13/20 10:38 PM   Result Value Ref Range    TSH 2.05 0.40 - 4.00 mU/L   CBC with platelets differential    Collection Time: 11/13/20 10:38 PM   Result Value Ref Range    WBC 10.4 4.0 - 11.0 10e9/L    RBC Count 4.77 3.8 - 5.2 10e12/L    Hemoglobin 14.4 11.7 - 15.7 g/dL    Hematocrit 42.3 35.0 - 47.0 %    MCV 89 78 - 100 fl    MCH 30.2 26.5 - 33.0 pg    MCHC 34.0 31.5 - 36.5 g/dL    RDW 11.9 10.0 - 15.0 %    Platelet Count 358 150 - 450 10e9/L    Diff Method Automated Method     % Neutrophils 52.8 %    % Lymphocytes 37.9 %    % Monocytes 6.6 %    % Eosinophils 1.8 %    % Basophils 0.6 %    % Immature Granulocytes 0.3 %    Nucleated RBCs 0 0 /100    Absolute Neutrophil 5.5 1.6 - 8.3 10e9/L    Absolute Lymphocytes 3.9 0.8 - 5.3 10e9/L    Absolute Monocytes 0.7 0.0 - 1.3 10e9/L    Absolute Eosinophils 0.2 0.0 - 0.7 10e9/L    Absolute Basophils 0.1 0.0 - 0.2 10e9/L    Abs Immature Granulocytes 0.0 0 - 0.4 10e9/L    Absolute Nucleated RBC 0.0    HCG qualitative urine    Collection Time: 11/15/20  8:30 PM   Result Value Ref Range    HCG Qual Urine Negative NEG^Negative   Drug abuse screen 77 urine (FL, RH, SH)    Collection Time: 11/15/20  8:30 PM   Result Value Ref Range     Amphetamine Qual Urine Negative NEG^Negative    Barbiturates Qual Urine Negative NEG^Negative    Benzodiazepine Qual Urine Negative NEG^Negative    Cannabinoids Qual Urine Negative NEG^Negative    Cocaine Qual Urine Negative NEG^Negative    Opiates Qualitative Urine Negative NEG^Negative    PCP Qual Urine Negative NEG^Negative   UA reflex to Microscopic    Collection Time: 11/15/20  8:30 PM   Result Value Ref Range    Color Urine Yellow     Appearance Urine Clear     Glucose Urine Negative NEG^Negative mg/dL    Bilirubin Urine Negative NEG^Negative    Ketones Urine 10 (A) NEG^Negative mg/dL    Specific Gravity Urine 1.018 1.003 - 1.035    Blood Urine Negative NEG^Negative    pH Urine 6.5 5.0 - 7.0 pH    Protein Albumin Urine Negative NEG^Negative mg/dL    Urobilinogen mg/dL 2.0 0.0 - 2.0 mg/dL    Nitrite Urine Negative NEG^Negative    Leukocyte Esterase Urine Negative NEG^Negative    Source Midstream Urine          Impression:   This is a 25 year old female with depression and anxiety continues PHP.  Mood is labile.  She's looking at various treatment options including residential.            DIagnoses:     1.  Major depressive disorder, recurrent, severe without psychosis  2.  Generalized anxiety disorder.   3.  Anorexia nervosa.   4.  Borderline personality disorder.            Plan:     Completes PHP 12/11/20  She's considering a RTC.  She was in [place] in the past.  She was accepted into [facility].   Alternatively, she may do Day Treatment and sees regular therapist twice per week and DBT therapist once per week.    Continue Cymbalta, Latuda, and gabapentin  Increased Cymbalta to 90 mg/d  Restarted Lamictal.   Continue therapy as planned - PHP  Continue all other medications as reviewed per electronic medical record today.   Safety plan reviewed. To the Emergency Department as needed or call after hours crisis line at [phone] or [phone]. [place] Crisis Text Line: Text [place] to 729759  or  Suicide  LifeLine Chat: suicidepreventionlifeline.org/chat  Call [facility] at [phone] to schedule.  Follow up with outpatient provider as planned or sooner if needed for acute medical concerns.  Call the psychiatric nurse line with medication questions or concerns at [phone].      Dann Tompkins MD

## 2020-12-09 NOTE — GROUP NOTE
Psychoeducation Group Note    PATIENT'S NAME: Corinne N Palm  MRN:   [id number]  :   1995  ACCT. NUMBER: [id number]  DATE OF SERVICE: 20  START TIME:  3:00 PM  END TIME:  3:50 PM  FACILITATOR: Mor Soni OT  TOPIC: [facility] OT Group: Self- Regulation Skills  [facility] Adult Partial Hospitalization Program  TRACK:1    Telemedicine Visit: The patient's condition can be safely assessed and treated via synchronous audio and visual telemedicine encounter.      Reason for Telemedicine Visit: Services only offered telehealth and Covid 19    Originating Site (Patient Location): Patient's home    Distant Site (Provider Location): Provider Remote Setting    Consent:  The patient/guardian has verbally consented to: the potential risks and benefits of telemedicine (video visit) versus in person care; bill my insurance or make self-payment for services provided; and responsibility for payment of non-covered services.     Mode of Communication:  Video Conference via [employer]    As the provider I attest to compliance with applicable laws and regulations related to telemedicine.      NUMBER OF PARTICIPANTS: 3    Summary of Group / Topics Discussed:  Self-Regulation Skills: Sensory Enhanced Mindfulness: Patients were provided with written and verbal psychoeducation on the concept of integrating mindfulness with a bottom up, sensory rich, experiential intervention activities to develop self-awareness and skills for self-regulation.  Emphasis placed on the benefits of mindfulness through bottom up (body based) activities and how they can help to emotionally ground oneself or provide a healthy distraction to self-regulate when distressed. Patients worked to increase knowledge and skills during a guided skilled structured sensory enhanced activity: focused activities, activities of daily living, quiet meditation and active meditation practices can build resiliency self-efficacy. Facilitation of reflection to  reinforce taught concepts was provided. Focus today was on safe/healthy/functional mindful breathing practice with added sensorimotor component to deepen the process.    Patient Session Goals / Objectives:    Identified how using sensory enhanced mindfulness practices can be used for grounding, stress management, and self-regulation      Improved awareness of different types of sensory enhanced mindfulness activities that assist with healthy coping of stress and symptoms      Established a plan for practice of these skills in their own environments    Practiced and reflected on how to generalize taught skills to their everyday life        Patient Participation / Response:  Fully participated with the group by sharing personal reflections / insights and openly received / provided feedback with other participants.    Verbalized understanding of content and Patient would benefit from additional opportunities to practice the content to be able to generalize it to their everyday life with increased intentionality, consistency, and efficacy in support of their psychiatric recovery    Treatment Plan:  Patient has a current master individualized treatment plan.  See Epic treatment plan for more information.    Mor Soni, OT

## 2020-12-09 NOTE — GROUP NOTE
Psychoeducation Group Note    PATIENT'S NAME: Corinne N Palm  MRN:   [id number]  :   1995  ACCT. NUMBER: [id number]  DATE OF SERVICE: 20  START TIME: 11:00 AM  END TIME: 11:50 AM  FACILITATOR: Erika Crump OTR/L  TOPIC: MH PHP OT Group: Self- Regulation Skills  [facility] Adult Partial Hospitalization Program  TRACK: 1    NUMBER OF PARTICIPANTS: 5    Telemedicine Visit: The patient's condition can be safely assessed and treated via synchronous audio and visual telemedicine encounter.      Reason for Telemedicine Visit: Services only offered telehealth    Originating Site (Patient Location): Patient's home    Distant Site (Provider Location): [facility] Outpatient Setting: Partial [facility]    Consent:  The patient/guardian has verbally consented to: the potential risks and benefits of telemedicine (video visit) versus in person care; bill my insurance or make self-payment for services provided; and responsibility for payment of non-covered services.     Mode of Communication:  Video Conference via [employer]    As the provider I attest to compliance with applicable laws and regulations related to telemedicine.     Summary of Group / Topics Discussed:  Self-Regulation Skills: Coping Through the Senses Introduction: Patients were introduced and taught about neurosensory based skills and strategies related to supporting effective self regulation skills.  Patients were taught about the eight sensory systems (external and internal) and how they can be used for coping with mental health symptoms and stressors.  Patients were provided with an opportunity to increase self-awareness of helpful sensory input and self care activities. Patients were introduced on how to create supportive environments that encourage use of these skills.    Patient Session Goals / Objectives:    Review/learn the 8 sensory systems and how they relate to self-regulation    Identified specific and  individualized neurosensory skills to help when distressed      Identified skills learned and how this applies to current daily life    Established a plan for practice of these skills in their own environments      Patient Participation / Response:  Fully participated with the group by sharing personal reflections / insights and openly received / provided feedback with other participants.    Patient presentation: congruent affect; active in group discussion sharing insights/examples with the group, Verbalized understanding of content and Patient would benefit from additional opportunities to practice the content to be able to generalize it to their everyday life with increased intentionality, consistency, and efficacy in support of their psychiatric recovery    Treatment Plan:  Patient has a current master individualized treatment plan.  See Epic treatment plan for more information.    Erika Crump, OTR/L

## 2020-12-09 NOTE — GROUP NOTE
Psychotherapy Group Note    PATIENT'S NAME: Corinne N Palm  MRN:   [id number]  :   1995  ACCT. NUMBER: [id number]  DATE OF SERVICE: 20  START TIME:  2:00 PM  END TIME:  2:50 PM  FACILITATOR: Dana Guerrero LICSW  TOPIC:  EBP Group: Enhanced Mindfulness  [facility] Adult Partial Hospitalization Program  TRACK: 1    NUMBER OF PARTICIPANTS: 5    Summary of Group / Topics Discussed:  Enhanced Mindfulness: Body and Mind Integration: Patients received an overview and education regarding the importance of including the body in the management of emotional health and self-care and as a direct route to mindfulness practice.  Patients discussed various ways in which the body can serve as an informant to their physical and emotional experiences/need. Patients discussed the body as a direct link to the present moment and to mindfulness practice.  Patients discussed current relationship with body, self-awareness, mindfulness practice and barriers to connection with body.  Patients were guided through breath work and movement to facilitate greater self-awareness, grounding, self-expression, and connection to other.  Patients discussed how the experiential could be applied to better manage mental health and develop child connection to self.    Patient Session Goals / Objectives:    Identify how movement awareness could be used for grounding, stress management, self-expression, connection to other and self-regulation    Improved awareness of breath and movement preferences    Identify how movement and the body is used in mindfulness practice    Reflect on use of these practices in everyday life.    Identify barriers to attending to body    Telemedicine Visit: The patient's condition can be safely assessed and treated via synchronous audio and visual telemedicine encounter.          Reason for Telemedicine Visit: Services only offered telehealth and covid19        Originating Site (Patient Location): Patient's  home        Distant Site (Provider Location): Provider Remote Setting        Consent:  The patient/guardian has verbally consented to: the potential risks and benefits of telemedicine (video visit) versus in person care; bill my insurance or make self-payment for services provided; and responsibility for payment of non-covered services.         Mode of Communication:  Video Conference via [employer]        As the provider I attest to compliance with applicable laws and regulations related to telemedicine.         Patient Participation / Response:  Moderately participated, sharing some personal reflections / insights and adequately adequately received / provided feedback with other participants.    Demonstrated understanding of topics discussed through group discussion and participation and Practiced skills in session    Treatment Plan:  Patient has a current master individualized treatment plan.  See Epic treatment plan for more information.    FRANCISCO CifuentesSW

## 2020-12-10 ENCOUNTER — HOSPITAL ENCOUNTER (OUTPATIENT)
Dept: BEHAVIORAL HEALTH | Facility: CLINIC | Age: 25
End: 2020-12-10
Attending: PSYCHIATRY & NEUROLOGY
Payer: COMMERCIAL

## 2020-12-10 PROCEDURE — H0035 MH PARTIAL HOSP TX UNDER 24H: HCPCS | Mod: GT

## 2020-12-10 PROCEDURE — H0035 MH PARTIAL HOSP TX UNDER 24H: HCPCS | Mod: 95

## 2020-12-10 PROCEDURE — H0035 MH PARTIAL HOSP TX UNDER 24H: HCPCS | Mod: 95 | Performed by: COUNSELOR

## 2020-12-10 NOTE — GROUP NOTE
Psychotherapy Group Note    PATIENT'S NAME: Corinne N Palm  MRN:   [id number]  :   1995  ACCT. NUMBER: [id number]  DATE OF SERVICE: 12/10/20  START TIME: 10:00 AM  END TIME: 10:50 AM  FACILITATOR: Joann Land LPCC  TOPIC: MH EBP Group: Cognitive Restructuring  [facility] Adult Partial Hospitalization Program  TRACK: [facility]    NUMBER OF PARTICIPANTS: 4    Summary of Group / Topics Discussed:  Cognitive Restructuring: Distortions: Patients received an overview of how to identify common cognitive distortions. Patients will explore alternatives to cognitive distortions and practice challenging their negative thought patterns. The goal is to help patients target modify ineffective thought patterns.     Patient Session Goals / Objectives:    Familiarized self with ineffective / unhealthy thoughts and how they develop.      Explored impact of ineffective thoughts / distortions on mood and activity    Formulated new neutral/positive alternatives to challenge less helpful / ineffective thoughts.    Practiced and plan to apply in daily life    Telemedicine Visit: The patient's condition can be safely assessed and treated via synchronous audio and visual telemedicine encounter.      Reason for Telemedicine Visit: Services only offered telehealth and due to COVID-19    Originating Site (Patient Location): Patient's home    Distant Site (Provider Location): Provider Remote Setting    Consent:  The patient/guardian has verbally consented to: the potential risks and benefits of telemedicine (video visit) versus in person care; bill my insurance or make self-payment for services provided; and responsibility for payment of non-covered services.     Mode of Communication:  Video Conference via [employer]    As the provider I attest to compliance with applicable laws and regulations related to telemedicine.             Patient Participation / Response:  Fully participated with the group by sharing personal  reflections / insights and openly received / provided feedback with other participants.    Demonstrated understanding of topics discussed through group discussion and participation, Expressed understanding of the relationship between behaviors, thoughts, and feelings and Demonstrated knowledge of personal thought patterns and how they impact their mood and behavior.    Treatment Plan:  Patient has a current master individualized treatment plan.  See Epic treatment plan for more information.    Joann Land, LPCC

## 2020-12-10 NOTE — GROUP NOTE
Psychoeducation Group Note    PATIENT'S NAME: Corinne N Palm  MRN:   [id number]  :   1995  ACCT. NUMBER: [id number]  DATE OF SERVICE: 12/10/20  START TIME: 11:00 AM  END TIME: 11:50 AM  FACILITATOR: Mor Soni OT  TOPIC: [facility] OT Group: Partial Hospitalization Program- Occupational Therapy  [facility] Adult Partial Hospitalization Program  TRACK: 1    Telemedicine Visit: The patient's condition can be safely assessed and treated via synchronous audio and visual telemedicine encounter.      Reason for Telemedicine Visit: Services only offered telehealth and Covid 19    Originating Site (Patient Location): Patient's home    Distant Site (Provider Location): Provider Remote Setting    Consent:  The patient/guardian has verbally consented to: the potential risks and benefits of telemedicine (video visit) versus in person care; bill my insurance or make self-payment for services provided; and responsibility for payment of non-covered services.     Mode of Communication:  Video Conference via [employer]    As the provider I attest to compliance with applicable laws and regulations related to telemedicine.      NUMBER OF PARTICIPANTS: 4    Summary of Group / Topics Discussed:  Occupational Therapy: Resiliency Development: Summoning Positive Seasonal Sensory Memories.  Patients were provided with psychoeducation and a skilled guided experiential intervention activity using  positive sensory memories as helpful strategy to improve a moment or low mood by encouraging a positive  thought process using the principle of brain neuroplasticity to support resiliency. Using the four seasons as a  framework, patients were guided through an experiential opportunity to explore and identify specific  soothing or engaging sensory input for each season. Group members then identified a positive sensory  memory for each season to summon in the future when they would benefit from a positive thought process to  help self  regulate. They spent time journaling on the specific sensory experiences of that memory and  reflecting on the emotions of that memory to help create a positive feedback loop. Also explored, was the  benefit of daily/weekly/seasonal activity rhythms and how they help us pass our time and organize our brain  to maintain mental health. Facilitated reflection with group members and validation provided.    Patient Session Goals/objectives:      Improve awareness of different types of sensory enhanced mindfulness activities that assist with healthy  coping of stress and symptoms.    Learn how to tap into brain neuroplasticity by connecting positive sensory memories and positive sensory  preferences with positive emotions to balance the brain s negativity bias and support resiliency.    Develop a mindful approach to managing mental health.    Establish a plan for practice of using these reflections and skills in their life          Patient Participation / Response:  Moderately participated, sharing some personal reflections / insights and adequately adequately received / provided feedback with other participants.    Patient presentation: At start of session feeling very anxious, spent time self regulating, able to rejoin group. , Verbalized understanding of content and Patient would benefit from additional opportunities to practice the content to be able to generalize it to their everyday life with increased intentionality, consistency, and efficacy in support of their psychiatric recovery    Treatment Plan:  Patient has a current master individualized treatment plan.  See Epic treatment plan for more information.    Mor Soni, OT

## 2020-12-10 NOTE — GROUP NOTE
Psychotherapy Group Note    PATIENT'S NAME: Corinne N Palm  MRN:   [id number]  :   1995  ACCT. NUMBER: [id number]  DATE OF SERVICE: 12/10/20  START TIME:  2:00 PM  END TIME:  2:50 PM  FACILITATOR: Joann Land LPCC  TOPIC:  EBP Group: Coping Skills  [facility] Adult Partial Hospitalization Program  TRACK: [facility]    NUMBER OF PARTICIPANTS: 4    Summary of Group / Topics Discussed:  Coping Skills: Radical Acceptance: Patients learned radical acceptance as a way to tolerate heightened stress in the moment.  Patients identified situations that necessitate radical acceptance.  They focused on applying acceptance of the moment to tolerate difficult emotions and events. Patients discussed how to distinguish when this can be useful in their lives and when other skills are more relevant or helpful.    Patient Session Goals / Objectives:    Understand that some amount of pain exists for each of us in our lives    Process the difficulty of acceptance in our lives and benefits of radical acceptance to mood and functioning.    Demonstrate understanding of when to use the radical acceptance to tolerate distress by providing examples of situations where this could be applied.    Identify barriers to applying radical acceptance to difficult situations and explore strategies to overcome them    Telemedicine Visit: The patient's condition can be safely assessed and treated via synchronous audio and visual telemedicine encounter.      Reason for Telemedicine Visit: Services only offered telehealth and due to COVID-19    Originating Site (Patient Location): Patient's home    Distant Site (Provider Location): Provider Remote Setting    Consent:  The patient/guardian has verbally consented to: the potential risks and benefits of telemedicine (video visit) versus in person care; bill my insurance or make self-payment for services provided; and responsibility for payment of non-covered services.     Mode of Communication:   Video Conference via [employer]    As the provider I attest to compliance with applicable laws and regulations related to telemedicine.          Patient Participation / Response:  Fully participated with the group by sharing personal reflections / insights and openly received / provided feedback with other participants.    Demonstrated understanding of topics discussed through group discussion and participation, Expressed understanding of the relevance / importance of coping skills at distressing times in life and Demonstrated knowledge of when to consider using a variety of coping skills in daily life    Treatment Plan:  Patient has a current master individualized treatment plan.  See Epic treatment plan for more information.    Joann Land, [facility]

## 2020-12-10 NOTE — GROUP NOTE
Psychoeducation Group Note    PATIENT'S NAME: Corinne N Palm  MRN:   [id number]  :   1995  ACCT. NUMBER: [id number]  DATE OF SERVICE: 12/10/20  START TIME:  1:00 PM  END TIME:  1:50 PM  FACILITATOR: Marlene Alonzo RN  TOPIC:  RN Group: Mind/Body Practice & Complementary  [facility] Adult Partial Hospitalization Program  TRACK: 1    NUMBER OF PARTICIPANTS: 4    Summary of Group / Topics Discussed:  Mind/Body Practice & Complementary Therapies:  Progressive Muscle Relaxation: In addition to affecting our mood and behavior, psychological stress can cause a myriad of physical symptoms in our body. Patients were educated on these effects and guided to increased self-awareness of how stress affects their body. The purpose, benefits, history, and techniques of progressive muscle relaxation were discussed. In an instructor guided experiential, patients were guided to practice PMR to help reduce physical symptoms of psychological stress and achieve a more balanced feeling of well-being.    Patient Session Goals / Objectives:  ? Identified physiological symptoms of stress on the body  ? Listed & Explained the purpose and benefits to practicing PMR   ? Practiced progressive muscle relaxation experiential  Telemedicine Visit: The patient's condition can be safely assessed and treated via synchronous audio and visual telemedicine encounter.      Reason for Telemedicine Visit: Services only offered telehealth    Originating Site (Patient Location): Patient's home    Distant Site (Provider Location): Provider Remote Setting    Consent:  The patient/guardian has verbally consented to: the potential risks and benefits of telemedicine (video visit) versus in person care; bill my insurance or make self-payment for services provided; and responsibility for payment of non-covered services.     Mode of Communication:  Video Conference via [employer]    As the provider I attest to compliance with applicable laws and  regulations related to telemedicine.       Patient Participation / Response:  Fully participated with the group by sharing personal reflections / insights and openly received / provided feedback with other participants.    Demonstrated understanding of topics discussed through group discussion and participation    Treatment Plan:  Patient has a current master individualized treatment plan.  See Epic treatment plan for more information.    Marlene Alonzo RN

## 2020-12-10 NOTE — GROUP NOTE
Process Group Note    PATIENT'S NAME: Corinne N Palm  MRN:   [id number]  :   1995  ACCT. NUMBER: [id number]  DATE OF SERVICE: 12/10/20  START TIME:  9:00 AM  END TIME:  9:50 AM  FACILITATOR: Joann Land LPCC  TOPIC:  Process Group    Diagnoses:  DSM5  Diagnosis:  1.  Major depressive disorder, recurrent, severe without psychosis  2.  Generalized anxiety disorder.   3.  Anorexia nervosa.   4.  Borderline personality disorder.      [facility] Adult Partial Hospitalization Program  TRACK: [facility]    NUMBER OF PARTICIPANTS: 3    Telemedicine Visit: The patient's condition can be safely assessed and treated via synchronous audio and visual telemedicine encounter.      Reason for Telemedicine Visit: Services only offered telehealth and due to COVID-19    Originating Site (Patient Location): Patient's home    Distant Site (Provider Location): Provider Remote Setting    Consent:  The patient/guardian has verbally consented to: the potential risks and benefits of telemedicine (video visit) versus in person care; bill my insurance or make self-payment for services provided; and responsibility for payment of non-covered services.     Mode of Communication:  Video Conference via [employer]    As the provider I attest to compliance with applicable laws and regulations related to telemedicine.            Data:    Session content: At the start of this group, patients were invited to check in by identifying themselves, describing their current emotional status, and identifying issues to address in this group.   Area(s) of treatment focus addressed in this session included Symptom Management, Personal Safety and Community Resources/Discharge Planning.    Patient reported feeling anxious, dazed and worried about requesting off time for work if she goes to residential programming. Patient discussed working toward figuring out a plan to attend residential treatment. Patient identified communicating with providers as  "skills they will use to address their goal(s). Patient reported feeling \"out of it\" may be a barrier to working toward their goal(s) and/or addressing mental health symptoms. Patient reported yes safety concerns and/or self-injurious behaviors. Patient reported no substance use. Patient reported they are taking their medications as prescribed. Patient reported feeling proud that they made a decision about treatment. Patient discussed increase in symptoms and feeling hopeful for residential servces with the treatment group.     Therapeutic Interventions/Treatment Strategies:  Psychotherapist offered support, feedback and validation and reinforced use of skills. Treatment modalities used include Motivational Interviewing and Cognitive Behavioral Therapy. Interventions include Coping Skills: Assisted patient in identifying 1-2 healthy distraction skills to reduce overall distress, Symptoms Management: Promoted understanding of their diagnoses and how it impacts their functioning and Emotions Management:  Discussed barriers to emotional regulation.    Assessment:    Patient response:   Patient responded to session by accepting feedback, giving feedback, listening, focusing on goals, being attentive and accepting support    Possible barriers to participation / learning include: and no barriers identified    Health Issues:   None reported       Substance Use Review:   Substance Use: No active concerns identified.    Mental Status/Behavioral Observations  Appearance:   Appropriate   Eye Contact:   Good   Psychomotor Behavior: Normal   Attitude:   Cooperative   Orientation:   All  Speech   Rate / Production: Normal/ Responsive Normal    Volume:  Normal   Mood:    Anxious  Depressed  Normal  Affect:    Appropriate   Thought Content:   Clear and Safety reports  presence of suicidal ideation passive suicidal ideation  and thoughts of self-harm  Thought Form:  Coherent  Logical     Insight:    Good     Plan:     Safety Plan: " Patient consented to co-developed safety plan.  Safety and risk management plan was completed.  Patient agreed to use safety plan should any safety concerns arise.  A copy was given to the patient.    Committed to safety and agreed to follow previously developed safety coping plan.      Barriers to treatment: None identified    Patient Contracts (see media tab):  None    Substance Use: Provided encouragement towards sobriety     Continue or Discharge: Patient will continue in Adult Partial Hospitalization Program (PHP)  as planned. Patient is likely to benefit from learning and using skills as they work toward the goals identified in their treatment plan.      Joann Land, [facility]  December 10, 2020

## 2020-12-11 ENCOUNTER — HOSPITAL ENCOUNTER (OUTPATIENT)
Dept: BEHAVIORAL HEALTH | Facility: CLINIC | Age: 25
End: 2020-12-11
Attending: PSYCHIATRY & NEUROLOGY
Payer: COMMERCIAL

## 2020-12-11 ENCOUNTER — HOSPITAL ENCOUNTER (OUTPATIENT)
Dept: BEHAVIORAL HEALTH | Facility: CLINIC | Age: 25
Discharge: HOME | End: 2020-12-11
Attending: PSYCHIATRY & NEUROLOGY | Admitting: PSYCHIATRY & NEUROLOGY
Payer: COMMERCIAL

## 2020-12-11 PROCEDURE — H0035 MH PARTIAL HOSP TX UNDER 24H: HCPCS | Mod: 95 | Performed by: COUNSELOR

## 2020-12-11 PROCEDURE — H0035 MH PARTIAL HOSP TX UNDER 24H: HCPCS | Mod: GT

## 2020-12-11 NOTE — GROUP NOTE
Psychoeducation Group Note    PATIENT'S NAME: Corinne N Palm  MRN:   [id number]  :   1995  ACCT. NUMBER: [id number]  DATE OF SERVICE: 20  START TIME: 11:00 AM  END TIME: 11:50 AM  FACILITATOR: Mor Soni OT  TOPIC: [facility] OT Group: Lifestyle Balance and Structure  [facility] Adult Partial Hospitalization Program  TRACK: 1    NUMBER OF PARTICIPANTS: 6    Summary of Group / Topics Discussed:  Lifestyle Balance and Structure:  Weekly reflection and weekend wellness planning: To support progress towards treatment goals and psychiatric recovery, group members were led through a weekly structured process to reflect on progress, integrate new learning/skills, and emerging self-awareness into their daily and weekly life. Provided psychoeducation and guided reflection on the neuroscience of change, self-compassion through a reflection of what went well for them this week. Worked to identify ways they will embrace wellness this weekend and identifying potential challenges/barriers and problem solved with group members ways to over come them and embrace self compassion. \    Patient Session Goals / Objectives:    Reflected on what went well for them this week.    Identified ways they will support wellbeing over the weekend reflecting on structure/routine/balance all through a self compassionate filter.    Identified and problem solved barriers to wellbeing over the weekend.     Identified plan to support follow through on goals and reflection on progress made        Patient Participation / Response:  Fully participated with the group by sharing personal reflections / insights and openly received / provided feedback with other participants.    Patient presentation: Endorses improved mood and sense of agency as she made an important decision for herself. Shares her experiences and provides support and validates other group members. Worried about a new room mate moving in but able to identify specific  skills and strategies that will help her over the weekend. , Verbalized understanding of content and Patient would benefit from additional opportunities to practice the content to be able to generalize it to their everyday life with increased intentionality, consistency, and efficacy in support of their psychiatric recovery    Treatment Plan:  Patient has a current master individualized treatment plan.  See Epic treatment plan for more information.    Mor Soni, OT

## 2020-12-11 NOTE — GROUP NOTE
Psychotherapy Group Note    PATIENT'S NAME: Corinne N Palm  MRN:   [id number]  :   1995  ACCT. NUMBER: [id number]  DATE OF SERVICE: 20  START TIME:  1:00 PM  END TIME:  1:50 PM  FACILITATOR: Joann Land LPCC  TOPIC: MH EBP Group: Coping Skills  [facility] Adult Partial Hospitalization Program  TRACK: [facility]    NUMBER OF PARTICIPANTS: 6    Summary of Group / Topics Discussed:  Coping Skills: Improve the Moment: Patients learned to tolerate distress, by applying strategies to effect positive change in the present moment.  Patients will identified situations where they would benefit from applying strategies to improve the moment and reduce distress. Patients discussed how to distinguish when this can be useful in their lives or when other strategies would be more relevant or helpful.    Patient Session Goals / Objectives:    Discuss how the use of intentional  in the moment  actions can help reduce distress.    Review patients current practices and discuss a more formal way of practicing and accessing skills.    Increase ability to decide when to use improve the moment strategies    Choose 1-2 in the moment actions to apply during times of distress.      Telemedicine Visit: The patient's condition can be safely assessed and treated via synchronous audio and visual telemedicine encounter.      Reason for Telemedicine Visit: Services only offered telehealth and due to COVID-19    Originating Site (Patient Location): Patient's home    Distant Site (Provider Location): Provider Remote Setting    Consent:  The patient/guardian has verbally consented to: the potential risks and benefits of telemedicine (video visit) versus in person care; bill my insurance or make self-payment for services provided; and responsibility for payment of non-covered services.     Mode of Communication:  Video Conference via [employer]    As the provider I attest to compliance with applicable laws and regulations related  to telemedicine.        Patient Participation / Response:  Fully participated with the group by sharing personal reflections / insights and openly received / provided feedback with other participants.    Demonstrated understanding of topics discussed through group discussion and participation, Expressed understanding of the relevance / importance of coping skills at distressing times in life, Demonstrated knowledge of when to consider using a variety of coping skills in daily life, Identified barriers to applying coping skills and Identified 2-3 positive coping strategies that have helped maintain / improve symptoms in the past    Treatment Plan:  Patient has a current master individualized treatment plan.  See Epic treatment plan for more information.    Joann Land, [facility]

## 2020-12-11 NOTE — GROUP NOTE
Psychoeducation Group Note    PATIENT'S NAME: Corinne N Palm  MRN:   [id number]  :   1995  ACCT. NUMBER: [id number]  DATE OF SERVICE: 20  START TIME: 10:00 AM  END TIME: 10:50 AM  FACILITATOR: Mor Soni OT  TOPIC: [facility] OT Group: Self- Regulation Skills  [facility] Adult Partial Hospitalization Program  TRACK: 1    Telemedicine Visit: The patient's condition can be safely assessed and treated via synchronous audio and visual telemedicine encounter.      Reason for Telemedicine Visit: Services only offered telehealth and Covid 19    Originating Site (Patient Location): Patient's home    Distant Site (Provider Location): Provider Remote Setting    Consent:  The patient/guardian has verbally consented to: the potential risks and benefits of telemedicine (video visit) versus in person care; bill my insurance or make self-payment for services provided; and responsibility for payment of non-covered services.     Mode of Communication:  Video Conference via [employer]    As the provider I attest to compliance with applicable laws and regulations related to telemedicine.      NUMBER OF PARTICIPANTS: 5    Summary of Group / Topics Discussed:  Self-Regulation Skills: Sensory Enhanced Mindfulness: Patients were provided with written and verbal psychoeducation on the concept of integrating mindfulness with a bottom up, sensory rich, experiential intervention activities to develop self-awareness and skills for self-regulation.  Emphasis placed on the benefits of mindfulness through bottom up (body based) activities and how they can help to emotionally ground oneself or provide a healthy distraction to self-regulate when distressed. Patients worked to increase knowledge and skills during a guided skilled structured sensory enhanced intervention activity: focused cognitive group activity and self care activity to build resiliency self-efficacy. Facilitation of reflection to reinforce taught concepts  was provided.     Patient Session Goals / Objectives:    Identified how using sensory enhanced mindfulness practices can be used for grounding, stress management, and self-regulation      Improved awareness of different types of sensory enhanced mindfulness activities that assist with healthy coping of stress and symptoms      Established a plan for practice of these skills in their own environments    Practiced and reflected on how to generalize taught skills to their everyday life        Patient Participation / Response:  Fully participated with the group by sharing personal reflections / insights and openly received / provided feedback with other participants.    Verbalized understanding of content and Patient would benefit from additional opportunities to practice the content to be able to generalize it to their everyday life with increased intentionality, consistency, and efficacy in support of their psychiatric recovery    Treatment Plan:  Patient has a current master individualized treatment plan.  See Epic treatment plan for more information.    Mor Soni, OT

## 2020-12-11 NOTE — GROUP NOTE
Process Group Note    PATIENT'S NAME: Corinne N Palm  MRN:   [id number]  :   1995  ACCT. NUMBER: [id number]  DATE OF SERVICE: 20  START TIME:  9:00 AM  END TIME:  9:50 AM  FACILITATOR: Joann Land LPCC  TOPIC:  Process Group    Diagnoses:  DSM5  Diagnosis:  1.  Major depressive disorder, recurrent, severe without psychosis  2.  Generalized anxiety disorder.   3.  Anorexia nervosa.   4.  Borderline personality disorder.      [facility] Adult Partial Hospitalization Program  TRACK: [facility]    NUMBER OF PARTICIPANTS: 4    Telemedicine Visit: The patient's condition can be safely assessed and treated via synchronous audio and visual telemedicine encounter.      Reason for Telemedicine Visit: Services only offered telehealth and due to COVID-19    Originating Site (Patient Location): Patient's home    Distant Site (Provider Location): Provider Remote Setting    Consent:  The patient/guardian has verbally consented to: the potential risks and benefits of telemedicine (video visit) versus in person care; bill my insurance or make self-payment for services provided; and responsibility for payment of non-covered services.     Mode of Communication:  Video Conference via [employer]    As the provider I attest to compliance with applicable laws and regulations related to telemedicine.            Data:    Session content: At the start of this group, patients were invited to check in by identifying themselves, describing their current emotional status, and identifying issues to address in this group.   Area(s) of treatment focus addressed in this session included Symptom Management, Personal Safety and Community Resources/Discharge Planning.    Patient reported feeling hopeful and depressed. Patient discussed working toward planning for the weekend. Patient identified engaging in the weekend planning group today as skills they will use to address their goal(s). Patient reported depression and low energy  may be a barrier to working toward their goal(s) and/or addressing mental health symptoms. Patient reported yes safety concerns and/or self-injurious behaviors. Patient reported no substance use. Patient reported they are taking their medications as prescribed. Patient reported feeling proud that they completed a Lego task and maintained boundaries with her parents. Patient discussed an interaction with her parents that was triggering last night with the treatment group.     Therapeutic Interventions/Treatment Strategies:  Psychotherapist offered support, feedback and validation and reinforced use of skills. Treatment modalities used include Motivational Interviewing and Cognitive Behavioral Therapy. Interventions include Coping Skills: Assisted patient in identifying 1-2 healthy distraction skills to reduce overall distress, Symptoms Management: Promoted understanding of their diagnoses and how it impacts their functioning and Emotions Management:  Discussed barriers to emotional regulation.    Assessment:    Patient response:   Patient responded to session by accepting feedback, giving feedback, listening, focusing on goals, being attentive and accepting support    Possible barriers to participation / learning include: and no barriers identified    Health Issues:   None reported       Substance Use Review:   Substance Use: No active concerns identified.    Mental Status/Behavioral Observations  Appearance:   Appropriate   Eye Contact:   Good   Psychomotor Behavior: Normal   Attitude:   Cooperative   Orientation:   All  Speech   Rate / Production: Normal/ Responsive Normal    Volume:  Normal   Mood:    Anxious  Depressed  Normal  Affect:    Appropriate   Thought Content:   Clear and Safety reports  thoughts of self-harm  Thought Form:  Coherent  Logical     Insight:    Good     Plan:     Safety Plan: Patient consented to co-developed safety plan.  Safety and risk management plan was completed.  Patient agreed to  use safety plan should any safety concerns arise.  A copy was given to the patient.    Committed to safety and agreed to follow previously developed safety coping plan.      Barriers to treatment: None identified    Patient Contracts (see media tab):  None    Substance Use: Provided encouragement towards sobriety     Continue or Discharge: Patient will continue in Adult Partial Hospitalization Program (PHP)  as planned. Patient is likely to benefit from learning and using skills as they work toward the goals identified in their treatment plan.      Joann Land, [facility]  December 11, 2020

## 2020-12-11 NOTE — PROGRESS NOTES
[facility]   Dr. Tompkins's Psychiatric Progress Note  2020      Patient:  Corinne N Palm   Medical Record Number:  [id number]  :  1995    Telemedicine Visit: The patient's condition can be safely assessed and treated via synchronous audio and visual telemedicine encounter.       Reason for Telemedicine Visit: COVID-19 lockdown     Originating Site (Patient Location):  HOME     Distant Site (Provider Location): [facility]: [place]     Consent:  The patient/guardian has verbally consented to: the potential risks and benefits of telemedicine (video visit) versus in person care; bill my insurance or make self-payment for services provided; and responsibility for payment of non-covered services           Interim History:   The patient's care was discussed with the treatment team and chart notes were reviewed.  Today Corinne N Palm reports depressed mood. In addition, there are notable risk factors for self-harm, including age, anxiety, previous history of suicide attempts and suicidal ideation. However, risk is mitigated by commitment to family, sobriety, ability to volunteer a safety plan, history of seeking help when needed, no access to firearms or weapons and denies homicidal ideation, intent, or plan.  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      Psych Dr Giulia Vigil at [place].  Therapist Deandra Osorio at [facility].  DBT at [facility].    Mood is very bad: lots of depression and less anxious.  She quit Lamictal as she felt it made her appetite more.  Latuda helps mood.  Used to be on higher dose of Cymbalta.  She noted no problems on higher dose.  Previously higher Latuda dose did nothing.      20  Pt saw her psychiatrist last night.  Her doc wanted her to hand over meds last night.  She's not allowed back in her DBT group at [facility].  Group is going ok.  Dr. Vigil increased Cymbalta to 90 mg/d.  She's starting Lamictal again at 25mg/d.      20:  She is  "taking the higher doses of meds.  No SE.  Weekend plans: Face timing a friend in [facility].      20:  Weekend was \"just ok.\"  A renter/friend is moving in with them so him and and his dog.  She also laid around and relaxed.  Upset with her therapist.  Pt obsesses re: suicide and had plant.  Pt wants to give to  some of her means to suicide.  Therapist spoke to .   was told by therapist not to give her pills and a knife back to patient unless therapist says to.  Pt feels betrayed by therapist.  Pt feels loss of control.  Mood bounces some but is consistently down.      20:  Mood is now more down than anxious.      20:  Pt decides yes on going to [facility].   May get in before as.  May be in PHP or in Day Tx til goes to [place].  Weekend plans:D&D tonight with  and his friends;  Roommate moves in tomorrow.      Psychiatric ROS:  Mood:   Really  Numb and out of it  Sleep:   Still waking a few times and eats  Appetite:  Low during day and then eats at night;  Sometimes makes self eat;    Eatin meal plus one snack per day  Energy Level:   low  Concentration/Memory Problems:  NO  Suicidal Thoughts:  Yes has SI and SIB urges; Gave pills to , so she wouldn't act.  Contracts no self harm. She did some cutting this week.   Homicidal Thoughts:No  Psychotic Symptoms: No  Medication Side Effects:No  Medication Compliance:Yes   Physical Complaints:  tired         Medications:     Current Outpatient Medications   Medication Sig    CYMBALTA 30MG Take 1 cap at night     DULoxetine (CYMBALTA) 60 MG capsule Take 1 capsule (60 mg) by mouth At Bedtime     gabapentin (NEURONTIN) 300 MG capsule Take 300-600 mg by mouth nightly as needed (anxiety)     lamoTRIgine (LAMICTAL) 25 MG tablet Take 1 tablet (25 mg) by mouth daily for 9 days, then increase to 2 tablets (50 mg) by mouth daily.     lurasidone (LATUDA) 80 MG TABS tablet Take 80 mg by mouth At Bedtime     polyethylene glycol " (MIRALAX) 17 GM/Dose powder Take 17 g by mouth daily     Vitamin D, Cholecalciferol, 25 MCG (1000 UT) TABS Take 1,000 Units by mouth daily      No current facility-administered medications for this encounter.              Allergies:     Allergies   Allergen Reactions     Morphine Other (See Comments)     Twitching     Amoxicillin Rash     Sulfa Drugs Rash            Psychiatric Examination:   There were no vitals taken for this visit.  Weight is 0 lbs 0 oz  There is no height or weight on file to calculate BMI.    Appearance:  awake, alert and adequately groomed  Attitude:  cooperative  Eye Contact:  good  Mood:  depressed  Affect:  mood congruent  Speech:  clear, coherent  Psychomotor Behavior:  no evidence of tardive dyskinesia, dystonia, or tics  Throught Process:  logical  Associations:  no loose associations  Thought Content:  no evidence of psychotic thought and passive suicidal thoughts;  no plan or intent;  able to con tract for safety  Insight:  good  Judgement:  intact  Oriented to:  time, person, and place  Attention Span and Concentration:  intact  Recent and Remote Memory:  intact  Gait:Normal    Risk/Potential for Dangerousness:  Multiple Active Diagnoses:HIGH  Self Care:HIGH  Suicide:MODERATE  Assault:LOW  Self Injurious Behaviors:MODERATE  Inappropriate Sexual Behavior:LOW         Labs:   No results found for this or any previous visit (from the past 24 hour(s)).     Recent Results (from the past 1008 hour(s))   Asymptomatic COVID-19 Virus (Coronavirus) by PCR    Collection Time: 11/13/20  6:54 PM    Specimen: Nasopharyngeal   Result Value Ref Range    COVID-19 Virus PCR to [facility] - Source Nasopharyngeal     COVID-19 Virus PCR to [facility] - Result       Test received-See reflex to IDDL test SARS CoV2 (COVID-19) Virus RT-PCR   Comprehensive metabolic panel    Collection Time: 11/13/20  6:54 PM   Result Value Ref Range    Sodium 138 133 - 144 mmol/L    Potassium 3.8 3.4 - 5.3 mmol/L    Chloride 104 94  - 109 mmol/L    Carbon Dioxide 26 20 - 32 mmol/L    Anion Gap 8 3 - 14 mmol/L    Glucose 75 70 - 99 mg/dL    Urea Nitrogen 6 (L) 7 - 30 mg/dL    Creatinine 0.67 0.52 - 1.04 mg/dL    GFR Estimate >90 >60 mL/min/[1.73_m2]    GFR Estimate If Black >90 >60 mL/min/[1.73_m2]    Calcium 9.3 8.5 - 10.1 mg/dL    Bilirubin Total 0.5 0.2 - 1.3 mg/dL    Albumin 4.1 3.4 - 5.0 g/dL    Protein Total 7.7 6.8 - 8.8 g/dL    Alkaline Phosphatase 55 40 - 150 U/L    ALT 42 0 - 50 U/L    AST 27 0 - 45 U/L   SARS-CoV-2 COVID-19 Virus (Coronavirus) RT-PCR Nasopharyngeal    Collection Time: 11/13/20  6:54 PM    Specimen: Nasopharyngeal   Result Value Ref Range    SARS-CoV-2 Virus Specimen Source Nasopharyngeal     SARS-CoV-2 PCR Result NEGATIVE     SARS-CoV-2 PCR Comment       Testing was performed using the Aptima SARS-CoV-2 Assay on the [employer] Instrument System.   Additional information about this Emergency Use Authorization (EUA) assay can be found via   the Lab Guide.     TSH with free T4 reflex and/or T3 as indicated    Collection Time: 11/13/20 10:38 PM   Result Value Ref Range    TSH 2.05 0.40 - 4.00 mU/L   CBC with platelets differential    Collection Time: 11/13/20 10:38 PM   Result Value Ref Range    WBC 10.4 4.0 - 11.0 10e9/L    RBC Count 4.77 3.8 - 5.2 10e12/L    Hemoglobin 14.4 11.7 - 15.7 g/dL    Hematocrit 42.3 35.0 - 47.0 %    MCV 89 78 - 100 fl    MCH 30.2 26.5 - 33.0 pg    MCHC 34.0 31.5 - 36.5 g/dL    RDW 11.9 10.0 - 15.0 %    Platelet Count 358 150 - 450 10e9/L    Diff Method Automated Method     % Neutrophils 52.8 %    % Lymphocytes 37.9 %    % Monocytes 6.6 %    % Eosinophils 1.8 %    % Basophils 0.6 %    % Immature Granulocytes 0.3 %    Nucleated RBCs 0 0 /100    Absolute Neutrophil 5.5 1.6 - 8.3 10e9/L    Absolute Lymphocytes 3.9 0.8 - 5.3 10e9/L    Absolute Monocytes 0.7 0.0 - 1.3 10e9/L    Absolute Eosinophils 0.2 0.0 - 0.7 10e9/L    Absolute Basophils 0.1 0.0 - 0.2 10e9/L    Abs Immature Granulocytes 0.0 0 - 0.4  10e9/L    Absolute Nucleated RBC 0.0    HCG qualitative urine    Collection Time: 11/15/20  8:30 PM   Result Value Ref Range    HCG Qual Urine Negative NEG^Negative   Drug abuse screen 77 urine (FL, RH, SH)    Collection Time: 11/15/20  8:30 PM   Result Value Ref Range    Amphetamine Qual Urine Negative NEG^Negative    Barbiturates Qual Urine Negative NEG^Negative    Benzodiazepine Qual Urine Negative NEG^Negative    Cannabinoids Qual Urine Negative NEG^Negative    Cocaine Qual Urine Negative NEG^Negative    Opiates Qualitative Urine Negative NEG^Negative    PCP Qual Urine Negative NEG^Negative   UA reflex to Microscopic    Collection Time: 11/15/20  8:30 PM   Result Value Ref Range    Color Urine Yellow     Appearance Urine Clear     Glucose Urine Negative NEG^Negative mg/dL    Bilirubin Urine Negative NEG^Negative    Ketones Urine 10 (A) NEG^Negative mg/dL    Specific Gravity Urine 1.018 1.003 - 1.035    Blood Urine Negative NEG^Negative    pH Urine 6.5 5.0 - 7.0 pH    Protein Albumin Urine Negative NEG^Negative mg/dL    Urobilinogen mg/dL 2.0 0.0 - 2.0 mg/dL    Nitrite Urine Negative NEG^Negative    Leukocyte Esterase Urine Negative NEG^Negative    Source Midstream Urine          Impression:   This is a 25 year old female with depression and anxiety continues PHP.  Mood is labile.  She's looking at various treatment options including residential.            DIagnoses:     1.  Major depressive disorder, recurrent, severe without psychosis  2.  Generalized anxiety disorder.   3.  Anorexia nervosa.   4.  Borderline personality disorder.            Plan:     Completes PHP 12/11/20  She's considering a RTC.  She was in [place] in the past.  She was accepted into [facility].   Alternatively, she may do Day Treatment and sees regular therapist twice per week and DBT therapist once per week.    Continue Cymbalta, Latuda, and gabapentin  Increased Cymbalta to 90 mg/d  Restarted Lamictal.  Titrate now to  50mg/d  Continue therapy as planned - PHP  Continue all other medications as reviewed per electronic medical record today.   Safety plan reviewed. To the Emergency Department as needed or call after hours crisis line at [phone] or [phone]. [place] Crisis Text Line: Text [place] to 207326  or  Suicide LifeLine Chat: suicidepreventionFalco [facility].org/chat  Call [facility] at [phone] to schedule.  Follow up with outpatient provider as planned or sooner if needed for acute medical concerns.  Call the psychiatric nurse line with medication questions or concerns at [phone].      Dann Tompkins MD

## 2020-12-14 ENCOUNTER — HOSPITAL ENCOUNTER (OUTPATIENT)
Dept: BEHAVIORAL HEALTH | Facility: CLINIC | Age: 25
Discharge: HOME | End: 2020-12-14
Attending: PSYCHIATRY & NEUROLOGY | Admitting: PSYCHIATRY & NEUROLOGY
Payer: COMMERCIAL

## 2020-12-14 ENCOUNTER — TELEPHONE (OUTPATIENT)
Dept: BEHAVIORAL HEALTH | Facility: CLINIC | Age: 25
End: 2020-12-14

## 2020-12-14 ENCOUNTER — HOSPITAL ENCOUNTER (OUTPATIENT)
Dept: BEHAVIORAL HEALTH | Facility: CLINIC | Age: 25
End: 2020-12-14
Attending: PSYCHIATRY & NEUROLOGY
Payer: COMMERCIAL

## 2020-12-14 PROCEDURE — H0035 MH PARTIAL HOSP TX UNDER 24H: HCPCS | Mod: 95

## 2020-12-14 PROCEDURE — H0035 MH PARTIAL HOSP TX UNDER 24H: HCPCS | Mod: GT

## 2020-12-14 PROCEDURE — H0035 MH PARTIAL HOSP TX UNDER 24H: HCPCS | Mod: GT | Performed by: COUNSELOR

## 2020-12-14 NOTE — GROUP NOTE
Psychoeducation Group Note    PATIENT'S NAME: Corinne N Palm  MRN:   [id number]  :   1995  ACCT. NUMBER: [id number]  DATE OF SERVICE: 20  START TIME: 11:00 AM  END TIME: 11:50 AM  FACILITATOR: Mor Soni OT  TOPIC: [facility] OT Group: Partial Hospitalization Program- Occupational Therapy  [facility] Partial Hospitalization Program  TRACK: 1    Telemedicine Visit: The patient's condition can be safely assessed and treated via synchronous audio and visual telemedicine encounter.      Reason for Telemedicine Visit: Services only offered telehealth and Covid 19    Originating Site (Patient Location): Patient's home    Distant Site (Provider Location): Provider Remote Setting    Consent:  The patient/guardian has verbally consented to: the potential risks and benefits of telemedicine (video visit) versus in person care; bill my insurance or make self-payment for services provided; and responsibility for payment of non-covered services.     Mode of Communication:  Video Conference via [employer]    As the provider I attest to compliance with applicable laws and regulations related to telemedicine.      NUMBER OF PARTICIPANTS: 7      Summary of Group / Topics Discussed: Resiliency Development: Motivation and Procrastination  Focus on the group is to support patients in identifying barriers to task completion, challenge negative self talk, and how their mental health symptoms impact this.  Provided psychoeducation and helped patients identify skills they may employ to assist with task follow through.  Facilitated supportive discussion providing validation and support.     Patient Session Goals / Objectives:    Patient will be able to better identify barriers to task completion and how their mental health symptoms impact this    Patients will identify 1-2 ways they can challenge these barriers to help with task completion            Patient Participation / Response:  Fully participated with the  group by sharing personal reflections / insights and openly received / provided feedback with other participants.    Verbalized understanding of content and Patient would benefit from additional opportunities to practice the content to be able to generalize it to their everyday life with increased intentionality, consistency, and efficacy in support of their psychiatric recovery    Treatment Plan:  Patient has a current master individualized treatment plan.  See Epic treatment plan for more information.    Mor Soni, OT

## 2020-12-14 NOTE — TELEPHONE ENCOUNTER
Writer and patient discussed patient's call from insurance notifying her that residential treatment will not be covered. Patient and writer engaged in safety planning (being home with , resting, playing with pets) as patient reported feeling unsafe. Patient consented to safety plan during call. Patient and writer discussed transitioning from the [facility] to adult day treatment; patient will attend [facility] through 12/16 and begin day treatment on 12/17.     Joann Land [facility] on 12/14/2020 at 3:19 PM

## 2020-12-14 NOTE — PROGRESS NOTES
[facility]   Dr. Tompkins's Psychiatric Progress Note  2020      Patient:  Corinne N Palm   Medical Record Number:  [id number]  :  1995    Telemedicine Visit: The patient's condition can be safely assessed and treated via synchronous audio and visual telemedicine encounter.       Reason for Telemedicine Visit: COVID-19 lockdown     Originating Site (Patient Location):  HOME     Distant Site (Provider Location): [facility]: [place]     Consent:  The patient/guardian has verbally consented to: the potential risks and benefits of telemedicine (video visit) versus in person care; bill my insurance or make self-payment for services provided; and responsibility for payment of non-covered services           Interim History:   The patient's care was discussed with the treatment team and chart notes were reviewed.  Today Corinne N Palm reports depressed mood. In addition, there are notable risk factors for self-harm, including age, anxiety, previous history of suicide attempts and suicidal ideation. However, risk is mitigated by commitment to family, sobriety, ability to volunteer a safety plan, history of seeking help when needed, no access to firearms or weapons and denies homicidal ideation, intent, or plan.  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      Psych Dr Giulia Vigil at [facility].  Therapist Deandra Osorio at [facility].  DBT at [facility].    Mood is very bad: lots of depression and less anxious.  She quit Lamictal as she felt it made her appetite more.  Latuda helps mood.  Used to be on higher dose of Cymbalta.  She noted no problems on higher dose.  Previously higher Latuda dose did nothing.      20  Pt saw her psychiatrist last night.  Her doc wanted her to hand over meds last night.  She's not allowed back in her DBT group at [facility].  Group is going ok.  Dr. Vigil increased Cymbalta to 90 mg/d.  She's starting Lamictal again at 25mg/d.      20:  She is  "taking the higher doses of meds.  No SE.  Weekend plans: Face timing a friend in [facility].      20:  Weekend was \"just ok.\"  A renter/friend is moving in with them so him and and his dog.  She also laid around and relaxed.  Upset with her therapist.  Pt obsesses re: suicide and had plant.  Pt wants to give to  some of her means to suicide.  Therapist spoke to .   was told by therapist not to give her pills and a knife back to patient unless therapist says to.  Pt feels betrayed by therapist.  Pt feels loss of control.  Mood bounces some but is consistently down.      20:  Mood is now more down than anxious.      20:  Pt decides yes on going to [facility].   May get in before as.  May be in PHP or in Day Tx til goes to [place].  Weekend plans:D&D tonight with  and his friends;  Roommate moves in tomorrow.      20:  Weekend was distracted by having a friend of 's move in with them.  Group today was ok til found out insurance might not cover [place].  She was told that her plan does not cover adult  residential care.  She is still in PHP.  Trying to stay in group til Wed. And then start Day Tx on Thurs.      Psychiatric ROS:  Mood:   Really  Numb and out of it  Sleep:   Still waking during the night to eat  Appetite:  Better than it was but not the best;  Urges to restrict  Eatin meal and a few snacks/d;  Feels like she's gaining wt but is not;    Energy Level:  low  Concentration/Memory Problems:  NO  Suicidal Thoughts:  Yes has SI and SIB urges; Gave pills to , so she wouldn't act.  Contracts no self harm.  Some cutting today when upset re: eating.    Homicidal Thoughts:No  Psychotic Symptoms: No  Medication Side Effects:No  Medication Compliance:Yes   Physical Complaints:  tired         Medications:     Current Outpatient Medications   Medication Sig    CYMBALTA 30MG Take 1 cap at night     DULoxetine (CYMBALTA) 60 MG capsule Take 1 capsule (60 " mg) by mouth At Bedtime     gabapentin (NEURONTIN) 300 MG capsule Take 300-600 mg by mouth nightly as needed (anxiety)     lamoTRIgine (LAMICTAL) 25 MG tablet Take 1 tablet (25 mg) by mouth daily for 9 days, then increase to 2 tablets (50 mg) by mouth daily.     lurasidone (LATUDA) 80 MG TABS tablet Take 80 mg by mouth At Bedtime     polyethylene glycol (MIRALAX) 17 GM/Dose powder Take 17 g by mouth daily     Vitamin D, Cholecalciferol, 25 MCG (1000 UT) TABS Take 1,000 Units by mouth daily      No current facility-administered medications for this encounter.              Allergies:     Allergies   Allergen Reactions     Morphine Other (See Comments)     Twitching     Amoxicillin Rash     Sulfa Drugs Rash            Psychiatric Examination:   There were no vitals taken for this visit.  Weight is 0 lbs 0 oz  There is no height or weight on file to calculate BMI.    Appearance:  awake, alert and adequately groomed  Attitude:  cooperative  Eye Contact:  good  Mood:  depressed  Affect:  mood congruent  Speech:  clear, coherent  Psychomotor Behavior:  no evidence of tardive dyskinesia, dystonia, or tics  Throught Process:  logical  Associations:  no loose associations  Thought Content:  no evidence of psychotic thought and passive suicidal thoughts;  no plan or intent;  able to con tract for safety  Insight:  good  Judgement:  intact  Oriented to:  time, person, and place  Attention Span and Concentration:  intact  Recent and Remote Memory:  intact  Gait:Normal    Risk/Potential for Dangerousness:  Multiple Active Diagnoses:HIGH  Self Care:HIGH  Suicide:MODERATE  Assault:LOW  Self Injurious Behaviors:MODERATE  Inappropriate Sexual Behavior:LOW         Labs:   No results found for this or any previous visit (from the past 24 hour(s)).     Recent Results (from the past 1008 hour(s))   Asymptomatic COVID-19 Virus (Coronavirus) by PCR    Collection Time: 11/13/20  6:54 PM    Specimen: Nasopharyngeal   Result Value Ref Range     COVID-19 Virus PCR to [facility] - Source Nasopharyngeal     COVID-19 Virus PCR to [facility] - Result       Test received-See reflex to IDDL test SARS CoV2 (COVID-19) Virus RT-PCR   Comprehensive metabolic panel    Collection Time: 11/13/20  6:54 PM   Result Value Ref Range    Sodium 138 133 - 144 mmol/L    Potassium 3.8 3.4 - 5.3 mmol/L    Chloride 104 94 - 109 mmol/L    Carbon Dioxide 26 20 - 32 mmol/L    Anion Gap 8 3 - 14 mmol/L    Glucose 75 70 - 99 mg/dL    Urea Nitrogen 6 (L) 7 - 30 mg/dL    Creatinine 0.67 0.52 - 1.04 mg/dL    GFR Estimate >90 >60 mL/min/[1.73_m2]    GFR Estimate If Black >90 >60 mL/min/[1.73_m2]    Calcium 9.3 8.5 - 10.1 mg/dL    Bilirubin Total 0.5 0.2 - 1.3 mg/dL    Albumin 4.1 3.4 - 5.0 g/dL    Protein Total 7.7 6.8 - 8.8 g/dL    Alkaline Phosphatase 55 40 - 150 U/L    ALT 42 0 - 50 U/L    AST 27 0 - 45 U/L   SARS-CoV-2 COVID-19 Virus (Coronavirus) RT-PCR Nasopharyngeal    Collection Time: 11/13/20  6:54 PM    Specimen: Nasopharyngeal   Result Value Ref Range    SARS-CoV-2 Virus Specimen Source Nasopharyngeal     SARS-CoV-2 PCR Result NEGATIVE     SARS-CoV-2 PCR Comment       Testing was performed using the Aptima SARS-CoV-2 Assay on the [employer] Learning Instrument System.   Additional information about this Emergency Use Authorization (EUA) assay can be found via   the Lab Guide.     TSH with free T4 reflex and/or T3 as indicated    Collection Time: 11/13/20 10:38 PM   Result Value Ref Range    TSH 2.05 0.40 - 4.00 mU/L   CBC with platelets differential    Collection Time: 11/13/20 10:38 PM   Result Value Ref Range    WBC 10.4 4.0 - 11.0 10e9/L    RBC Count 4.77 3.8 - 5.2 10e12/L    Hemoglobin 14.4 11.7 - 15.7 g/dL    Hematocrit 42.3 35.0 - 47.0 %    MCV 89 78 - 100 fl    MCH 30.2 26.5 - 33.0 pg    MCHC 34.0 31.5 - 36.5 g/dL    RDW 11.9 10.0 - 15.0 %    Platelet Count 358 150 - 450 10e9/L    Diff Method Automated Method     % Neutrophils 52.8 %    % Lymphocytes 37.9 %    % Monocytes 6.6 %    %  Eosinophils 1.8 %    % Basophils 0.6 %    % Immature Granulocytes 0.3 %    Nucleated RBCs 0 0 /100    Absolute Neutrophil 5.5 1.6 - 8.3 10e9/L    Absolute Lymphocytes 3.9 0.8 - 5.3 10e9/L    Absolute Monocytes 0.7 0.0 - 1.3 10e9/L    Absolute Eosinophils 0.2 0.0 - 0.7 10e9/L    Absolute Basophils 0.1 0.0 - 0.2 10e9/L    Abs Immature Granulocytes 0.0 0 - 0.4 10e9/L    Absolute Nucleated RBC 0.0    HCG qualitative urine    Collection Time: 11/15/20  8:30 PM   Result Value Ref Range    HCG Qual Urine Negative NEG^Negative   Drug abuse screen 77 urine (FL, RH, SH)    Collection Time: 11/15/20  8:30 PM   Result Value Ref Range    Amphetamine Qual Urine Negative NEG^Negative    Barbiturates Qual Urine Negative NEG^Negative    Benzodiazepine Qual Urine Negative NEG^Negative    Cannabinoids Qual Urine Negative NEG^Negative    Cocaine Qual Urine Negative NEG^Negative    Opiates Qualitative Urine Negative NEG^Negative    PCP Qual Urine Negative NEG^Negative   UA reflex to Microscopic    Collection Time: 11/15/20  8:30 PM   Result Value Ref Range    Color Urine Yellow     Appearance Urine Clear     Glucose Urine Negative NEG^Negative mg/dL    Bilirubin Urine Negative NEG^Negative    Ketones Urine 10 (A) NEG^Negative mg/dL    Specific Gravity Urine 1.018 1.003 - 1.035    Blood Urine Negative NEG^Negative    pH Urine 6.5 5.0 - 7.0 pH    Protein Albumin Urine Negative NEG^Negative mg/dL    Urobilinogen mg/dL 2.0 0.0 - 2.0 mg/dL    Nitrite Urine Negative NEG^Negative    Leukocyte Esterase Urine Negative NEG^Negative    Source Midstream Urine          Impression:   This is a 25 year old female with depression and anxiety continues PHP.  Mood is labile.           DIagnoses:     1.  Major depressive disorder, recurrent, severe without psychosis  2.  Generalized anxiety disorder.   3.  Anorexia nervosa.   4.  Borderline personality disorder.            Plan:     Completes PHP 12/11/20  She's considering a RTC.  She was in [facility] in  the past.  She was accepted into [facility].   Alternatively, she may do Day Treatment and sees regular therapist twice per week and DBT therapist once per week.    Continue Cymbalta, Latuda, and gabapentin  Increased Cymbalta to 90 mg/d  Restarted Lamictal.  Titrated to 50mg/d.   Continue therapy as planned - PHP  Continue all other medications as reviewed per electronic medical record today.   Safety plan reviewed. To the Emergency Department as needed or call after hours crisis line at [phone] or [phone]. [place] Crisis Text Line: Text [place] to 469825  or  Suicide LifeLine Chat: suicidepreventionlifeline.org/chat  Call [facility] at [phone] to schedule.  Follow up with outpatient provider as planned or sooner if needed for acute medical concerns.  Call the psychiatric nurse line with medication questions or concerns at [phone].      Dann Tompkins MD

## 2020-12-14 NOTE — GROUP NOTE
Process Group Note    PATIENT'S NAME: Corinne N Palm  MRN:   [id number]  :   1995  ACCT. NUMBER: [id number]  DATE OF SERVICE: 20  START TIME: 10:00 AM  END TIME: 10:50 AM  FACILITATOR: Joann Land LPCC  TOPIC:  Process Group    Diagnoses:  DSM5  Diagnosis:  1.  Major depressive disorder, recurrent, severe without psychosis  2.  Generalized anxiety disorder.   3.  Anorexia nervosa.   4.  Borderline personality disorder.      [facility] Adult Partial Hospitalization Program  TRACK: [facility]    NUMBER OF PARTICIPANTS: 6    Telemedicine Visit: The patient's condition can be safely assessed and treated via synchronous audio and visual telemedicine encounter.      Reason for Telemedicine Visit: Services only offered telehealth and due to COVID-19    Originating Site (Patient Location): Patient's home    Distant Site (Provider Location): Provider Remote Setting    Consent:  The patient/guardian has verbally consented to: the potential risks and benefits of telemedicine (video visit) versus in person care; bill my insurance or make self-payment for services provided; and responsibility for payment of non-covered services.     Mode of Communication:  Video Conference via [employer]    As the provider I attest to compliance with applicable laws and regulations related to telemedicine.            Data:    Session content: At the start of this group, patients were invited to check in by identifying themselves, describing their current emotional status, and identifying issues to address in this group.   Area(s) of treatment focus addressed in this session included Symptom Management, Personal Safety and Community Resources/Discharge Planning.    Patient reported feeling tired and down. Patient discussed working toward completing paperwork for residential treatment. Patient identified radical acceptance as skills they will use to address their goal(s). Patient reported feeling down may be a barrier to  working toward their goal(s) and/or addressing mental health symptoms. Patient reported yes safety concerns and/or self-injurious behaviors. Patient reported no substance use. Patient reported they are taking their medications as prescribed. Patient reported feeling proud that they got through the weekend with her 's friend moving in with them. Patient discussed feeling hopeful for residential treatment with the treatment group.    Therapeutic Interventions/Treatment Strategies:  Psychotherapist offered support, feedback and validation and reinforced use of skills. Treatment modalities used include Motivational Interviewing and Cognitive Behavioral Therapy. Interventions include Coping Skills: Assisted patient in identifying 1-2 healthy distraction skills to reduce overall distress, Symptoms Management: Promoted understanding of their diagnoses and how it impacts their functioning and Emotions Management:  Discussed barriers to emotional regulation.    Assessment:    Patient response:   Patient responded to session by accepting feedback, giving feedback, listening, focusing on goals, being attentive and accepting support    Possible barriers to participation / learning include: and no barriers identified    Health Issues:   None reported       Substance Use Review:   Substance Use: No active concerns identified.    Mental Status/Behavioral Observations  Appearance:   Appropriate   Eye Contact:   Good   Psychomotor Behavior: Normal   Attitude:   Cooperative   Orientation:   All  Speech   Rate / Production: Normal/ Responsive Normal    Volume:  Normal   Mood:    Anxious  Depressed  Normal  Affect:    Appropriate   Thought Content:   Clear and Safety reports  presence of suicidal ideation passive suicidal ideation  and thoughts of self-harm  Thought Form:  Coherent  Logical     Insight:    Good     Plan:     Safety Plan: Patient consented to co-developed safety plan.  Safety and risk management plan was  completed.  Patient agreed to use safety plan should any safety concerns arise.  A copy was given to the patient.    Committed to safety and agreed to follow previously developed safety coping plan.      Barriers to treatment: None identified    Patient Contracts (see media tab):  None    Substance Use: Provided encouragement towards sobriety     Continue or Discharge: Patient will continue in Adult Partial Hospitalization Program (PHP)  as planned. Patient is likely to benefit from learning and using skills as they work toward the goals identified in their treatment plan.      Joann Land, [facility]  December 14, 2020

## 2020-12-14 NOTE — GROUP NOTE
Psychoeducation Group Note    PATIENT'S NAME: Corinne N Palm  MRN:   [id number]  :   1995  ACCT. NUMBER: [id number]  DATE OF SERVICE: 20  START TIME:  9:00 AM  END TIME:  9:50 AM  FACILITATOR: Marlene Alonzo RN  TOPIC: NANDINI RN Group: Mental Health Maintenance  [facility] Adult Partial Hospitalization Program  TRACK: 1    NUMBER OF PARTICIPANTS: 6    Summary of Group / Topics Discussed:  Mental Health Maintenance:  Assessments of Strengths: Patients completed a self-reflection on personal strengths worksheet. The concept of personal strength as it relates to resilience were explored. Patients shared responses with the group and participated in discussion.     Patient Session Goals / Objectives:  ? Patients identified 1-3 qualities they consider a personal strength.  ? Patients understood the concept of personal strengths and the connection it has to resiliency  Telemedicine Visit: The patient's condition can be safely assessed and treated via synchronous audio and visual telemedicine encounter.      Reason for Telemedicine Visit: Services only offered telehealth    Originating Site (Patient Location): Patient's home    Distant Site (Provider Location): Provider Remote Setting    Consent:  The patient/guardian has verbally consented to: the potential risks and benefits of telemedicine (video visit) versus in person care; bill my insurance or make self-payment for services provided; and responsibility for payment of non-covered services.     Mode of Communication:  Video Conference via [employer] - is now [employer]    As the provider I attest to compliance with applicable laws and regulations related to telemedicine.       Patient Participation / Response:  Fully participated with the group by sharing personal reflections / insights and openly received / provided feedback with other participants.    Demonstrated understanding of topics discussed through group discussion and participation    Treatment Plan:  Patient  has a current master individualized treatment plan.  See Epic treatment plan for more information.    Marlene Alonzo RN

## 2020-12-14 NOTE — GROUP NOTE
Psychoeducation Group Note    PATIENT'S NAME: Corinne N Palm  MRN:   [id number]  :   1995  ACCT. NUMBER: [id number]  DATE OF SERVICE: 20  START TIME:  1:00 PM  END TIME:  1:50 PM  FACILITATOR: Mor Soni OT  TOPIC: [facility] OT Group: Lifestyle Balance and Structure  [facility] Adult Partial Hospitalization Program  TRACK:1    Telemedicine Visit: The patient's condition can be safely assessed and treated via synchronous audio and visual telemedicine encounter.      Reason for Telemedicine Visit: Services only offered telehealth and Covid 19    Originating Site (Patient Location): Patient's home    Distant Site (Provider Location): Provider Remote Setting    Consent:  The patient/guardian has verbally consented to: the potential risks and benefits of telemedicine (video visit) versus in person care; bill my insurance or make self-payment for services provided; and responsibility for payment of non-covered services.     Mode of Communication:  Video Conference via [employer]    As the provider I attest to compliance with applicable laws and regulations related to telemedicine.      NUMBER OF PARTICIPANTS: 6    Summary of Group / Topics Discussed:  Lifestyle Balance and Structure:  OT Goal-setting & integration: To support progress towards treatment goals and psychiatric recovery, group members were led through a weekly structured process to reflect on progress, integrate new learning/skills, and emerging self-awareness into their daily and weekly life. Provided psychoeducation on the neuroscience of change, self-compassion, and the anatomy of a SMART goal to the group. Facilitated the sharing of individual goals with validation, support, and feedback provided.    Patient Session Goals / Objectives:    Reflected on and create a vision for recovery and wellbeing    Identified and wrote 3 SMART goals for the week    Identified and problem solved barriers to achieving identified goals      Identified plan to support follow through on goals and reflection on progress made        Patient Participation / Response:  Fully participated with the group by sharing personal reflections / insights and openly received / provided feedback with other participants.    Verbalized understanding of content and Patient would benefit from additional opportunities to practice the content to be able to generalize it to their everyday life with increased intentionality, consistency, and efficacy in support of their psychiatric recovery    Treatment Plan:  Patient has a current master individualized treatment plan.  See Epic treatment plan for more information.    Mor Soni, OT

## 2020-12-14 NOTE — GROUP NOTE
Psychotherapy Group Note    PATIENT'S NAME: Corinne N Palm  MRN:   [id number]  :   1995  ACCT. NUMBER: [id number]  DATE OF SERVICE: 20  START TIME:  2:00 PM  END TIME:  2:50 PM  FACILITATOR: Joann Land LPCC  TOPIC: MH EBP Group: Self-Awareness  [facility] Adult [facility] Hospitalization Program  TRACK: [facility]    NUMBER OF PARTICIPANTS: 6    Summary of Group / Topics Discussed:  Self-Awareness: Values: Patients identified personal values by examining development of their current values and how their values influence their daily functioning and life choices. Patients explored the impact of their values on their thoughts, feelings, and actions. Patients discussed definition of personal values and how they develop and change over time. The goal is to help patients reconcile value conflicts and achieve balance and flexibility to improve mood and daily functioning.     Patient Session Goals / Objectives:    Examined development of values and impact of values on functioning    Identified and prioritized important values related to current well-being     Identified strategies to change or enhance values to positively impact symptoms    Assisted patients to find ways to adapt functioning to better fit their values    Telemedicine Visit: The patient's condition can be safely assessed and treated via synchronous audio and visual telemedicine encounter.      Reason for Telemedicine Visit: Services only offered telehealth and due to COVID-19    Originating Site (Patient Location): Patient's home    Distant Site (Provider Location): Provider Remote Setting    Consent:  The patient/guardian has verbally consented to: the potential risks and benefits of telemedicine (video visit) versus in person care; bill my insurance or make self-payment for services provided; and responsibility for payment of non-covered services.     Mode of Communication:  Video Conference via [email or web address]    As the provider I attest to  compliance with applicable laws and regulations related to telemedicine.        Patient Participation / Response:  Fully participated with the group by sharing personal reflections / insights and openly received / provided feedback with other participants.    Demonstrated understanding of topics discussed through group discussion and participation, Demonstrated understanding of values, strengths, and challenges to learn about themselves and Identified / Expressed readiness to act intentionally, increase self-compassion, promote personal growth    Treatment Plan:  Patient has a current master individualized treatment plan.  See Epic treatment plan for more information.    Joann Land, [facility]

## 2020-12-15 ENCOUNTER — HOSPITAL ENCOUNTER (OUTPATIENT)
Dept: BEHAVIORAL HEALTH | Facility: CLINIC | Age: 25
End: 2020-12-15
Attending: PSYCHIATRY & NEUROLOGY
Payer: COMMERCIAL

## 2020-12-15 PROCEDURE — H0035 MH PARTIAL HOSP TX UNDER 24H: HCPCS | Mod: GT

## 2020-12-15 PROCEDURE — H0035 MH PARTIAL HOSP TX UNDER 24H: HCPCS | Mod: GT | Performed by: COUNSELOR

## 2020-12-15 PROCEDURE — H0035 MH PARTIAL HOSP TX UNDER 24H: HCPCS | Mod: GT | Performed by: OCCUPATIONAL THERAPIST

## 2020-12-15 NOTE — GROUP NOTE
Psychotherapy Group Note    PATIENT'S NAME: Corinne N Palm  MRN:   [id number]  :   1995  ACCT. NUMBER: [id number]  DATE OF SERVICE: 12/15/20  START TIME:  2:00 PM  END TIME:  2:50 PM  FACILITATOR: Joann Land LPCC  TOPIC:  EBP Group: Emotions Management  [facility] Adult Partial Hospitalization Program  TRACK: [facility]    NUMBER OF PARTICIPANTS: 6    Summary of Group / Topics Discussed:  Emotions Management: Guilt and Shame: Patients explored and shared personal experiences associated with feelings of guilt and shame.  Group explored how these feelings develop, what they mean to each individual, and how to increase acceptance and usefulness of these feelings.  Group members assisted one another to contextualize these concepts and promote healing.     Patient Session Goals / Objectives:    Discuss and review definitions and personal views/experiences with guilt and shame    Understand the differences between guilt and shame    Explore how feelings of guilt and shame impact functioning    Understand and practice strategies to manage difficult emotions and move towards healing    Understand and normalize difficult emotions through group discussion    Understand the utility of guilt and shame    Target  unwanted  emotions for change    Telemedicine Visit: The patient's condition can be safely assessed and treated via synchronous audio and visual telemedicine encounter.      Reason for Telemedicine Visit: Services only offered telehealth and due to COVID-19    Originating Site (Patient Location): Patient's home    Distant Site (Provider Location): Provider Remote Setting    Consent:  The patient/guardian has verbally consented to: the potential risks and benefits of telemedicine (video visit) versus in person care; bill my insurance or make self-payment for services provided; and responsibility for payment of non-covered services.     Mode of Communication:  Video Conference via [employer]    As the  provider I attest to compliance with applicable laws and regulations related to telemedicine.        Patient Participation / Response:  Fully participated with the group by sharing personal reflections / insights and openly received / provided feedback with other participants.    Demonstrated understanding of topics discussed through group discussion and participation, Expressed understanding of the relevance / importance of emotions management skills at distressing times in life and Self-aware of experiences with difficult emotions, and strategies to employ to manage them    Treatment Plan:  Patient has a current master individualized treatment plan.  See Epic treatment plan for more information.    Joann Land, [facility]

## 2020-12-15 NOTE — GROUP NOTE
Psychoeducation Group Note    PATIENT'S NAME: Corinne N Palm  MRN:   [id number]  :   1995  ACCT. NUMBER: [id number]  DATE OF SERVICE: 12/15/20  START TIME:  9:00 AM  END TIME:  9:50 AM  FACILITATOR: Erika Crump OTR/L  TOPIC:  PHP OT Group: Partial Hospitalization Program- Occupational Therapy  [facility] Adult Partial Hospitalization Program  TRACK: 1    NUMBER OF PARTICIPANTS: 6    Telemedicine Visit: The patient's condition can be safely assessed and treated via synchronous audio and visual telemedicine encounter.      Reason for Telemedicine Visit: Services only offered telehealth    Originating Site (Patient Location): Patient's home    Distant Site (Provider Location): [facility] Outpatient Setting: [facility]    Consent:  The patient/guardian has verbally consented to: the potential risks and benefits of telemedicine (video visit) versus in person care; bill my insurance or make self-payment for services provided; and responsibility for payment of non-covered services.     Mode of Communication:  Video Conference via Zoom    As the provider I attest to compliance with applicable laws and regulations related to telemedicine.     Summary of Group / Topics Discussed:  Transitions:  Facilitated a discussion around the concept of recognizing, honoring, and coping with transitions in their lives and the impact their mental health symptoms may have on this.  Provided psychoeducation, validation and support on transitions and coping with change.  Assisted patients in beginning to look at resources and needs once they have completed the Partial Program for ongoing care and support.  Facilitated discussion and patients worked on creating personalized aftercare coping cards related to warning signs, coping skills, important phone numbers, and positive thinking/gratitude.             Patient Session Goals / Objectives:    To identify current transitions in their lives that are  currently experiencing    To assist with a smooth transition to the next level of care with needed resources and support    To assist with the application of skills taught in the program to everyday life, planning and problem solving as needed          Patient Participation / Response:  Fully participated with the group by sharing personal reflections / insights and openly received / provided feedback with other participants.    Patient presentation: congruent affect; active in group discussion sharing examples and asking questions, Demonstrated understanding of content through identifying some helpful coping skills she uses; open to feedback from others  and Patient would benefit from additional opportunities to practice the content to be able to generalize it to their everyday life with increased intentionality, consistency, and efficacy in support of their psychiatric recovery    Treatment Plan:  Patient has a current master individualized treatment plan.  See Epic treatment plan for more information.    Erika Crump, OTR/L

## 2020-12-15 NOTE — GROUP NOTE
Psychoeducation Group Note    PATIENT'S NAME: Corinne N Palm  MRN:   [id number]  :   1995  ACCT. NUMBER: [id number]  DATE OF SERVICE: 12/15/20  START TIME: 11:00 AM  END TIME: 11:50 AM  FACILITATOR: Mor Soni OT  TOPIC: [facility] OT Group: Self- Regulation Skills  [facility] Adult Partial Hospitalization Program  TRACK: 1    Telemedicine Visit: The patient's condition can be safely assessed and treated via synchronous audio and visual telemedicine encounter.      Reason for Telemedicine Visit: Services only offered telehealth and Covid 19    Originating Site (Patient Location): Patient's home    Distant Site (Provider Location): Provider Remote Setting    Consent:  The patient/guardian has verbally consented to: the potential risks and benefits of telemedicine (video visit) versus in person care; bill my insurance or make self-payment for services provided; and responsibility for payment of non-covered services.     Mode of Communication:  Video Conference via [employer]    As the provider I attest to compliance with applicable laws and regulations related to telemedicine.      NUMBER OF PARTICIPANTS: 6    Summary of Group / Topics Discussed:  Occupational Therapy: Self-Regulation Skills: The vagus nerve and autonomic regulation:   Patient's explored the neurosensory connections between the body and the mind including skills and techniques to help develop one's capacity to be self aware and self regulate. Patients were provided with psychoeducation on the Autonomic Nervous System and explored the ways to tap into the calming properties of the Vagus nerve (parasympathetic NS) to help with distress tolerance and manage the impact of stressors on the body. Concepts presented and discussed included: applied polyvagal theory (neuroception, ventral and dorsal vagal activity, sympathetic, vagal brake). Explored ways to develop vagal tone and heart rate variability as strategy to support effective stress  responses in specific contexts for improved functioning. Facilitated discussion around specific ways to they are doing this already and some possibilities for moving forward including breath work, cold input, meditation, physical activity, socializing & laughing, talking/singing/humming/gargling as based on current neurosensory science.    Patient Session Goals / Objectives:    [place] how the ANS works with an emphasis on the vagus nerve and importance of its' impact on functioning and mental wellbeing.    [place] about the Polyvagal theory as a framework to begin to understand stress responses and autonomic regulation. (Dina)    [place] how developing interoceptive awareness can help one self-regulate sooner rather than later.    Identified specific and individualized neurosensory skills to help when distressed and for crisis management via the vagus nerve, specifically ways to develop/build vagal tone to support mental health management.     Established a plan for practice of these skills in their own environments aligned with their Occupational Therapy goals.         Patient Participation / Response:  Fully participated with the group by sharing personal reflections / insights and openly received / provided feedback with other participants.    Verbalized understanding of content and Patient would benefit from additional opportunities to practice the content to be able to generalize it to their everyday life with increased intentionality, consistency, and efficacy in support of their psychiatric recovery    Treatment Plan:  Patient has a current master individualized treatment plan.  See Epic treatment plan for more information.    Mor Soni, OT

## 2020-12-15 NOTE — GROUP NOTE
Psychoeducation Group Note    PATIENT'S NAME: Corinne N Palm  MRN:   [id number]  :   1995  ACCT. NUMBER: [id number]  DATE OF SERVICE: 12/15/20  START TIME: 10:00 AM  END TIME: 10:50 AM  FACILITATOR: Marlene Alonzo RN  TOPIC: NANDINI RN Group: Mental Health Maintenance  [facility] Adult Partial Hospitalization Program  TRACK: 1    NUMBER OF PARTICIPANTS: 5    Summary of Group / Topics Discussed:  Mental Health Maintenance:  Self -Compassion- Patients watched a brief video by Dr. Stacey Henderson on self-compassion. The group discussed the differences between self- esteem and self-compassion.  A brief review of the research highlighting mental health benefits of practicing self compassion were discussed.     Patient Session Goals/Objectives:  ? Pts will understand 3 core concepts of practicing self-compassion  ? Pts will gain an understanding of neurochemicals Oxytocin, Endorphins, and Cortisol  ? Pts will generate a short list and have the opportunity to practice expressing self-compassion statements          Patient Participation / Response:  Fully participated with the group by sharing personal reflections / insights and openly received / provided feedback with other participants.    Demonstrated understanding of topics discussed through group discussion and participation    Treatment Plan:  Patient has a current master individualized treatment plan.  See Epic treatment plan for more information.    Marlene Alonzo RN

## 2020-12-15 NOTE — GROUP NOTE
Process Group Note    PATIENT'S NAME: Corinne N Palm  MRN:   [id number]  :   1995  ACCT. NUMBER: [id number]  DATE OF SERVICE: 12/15/20  START TIME:  1:00 PM  END TIME:  1:50 PM  FACILITATOR: Joann Land LPCC  TOPIC:  Process Group    Diagnoses:  DSM5  Diagnosis:  1.  Major depressive disorder, recurrent, severe without psychosis  2.  Generalized anxiety disorder.   3.  Anorexia nervosa.   4.  Borderline personality disorder.      [facility] Adult Partial Hospitalization Program  TRACK: [facility]    NUMBER OF PARTICIPANTS: 6    Telemedicine Visit: The patient's condition can be safely assessed and treated via synchronous audio and visual telemedicine encounter.      Reason for Telemedicine Visit: Services only offered telehealth and due to COVID-19    Originating Site (Patient Location): Patient's home    Distant Site (Provider Location): Provider Remote Setting    Consent:  The patient/guardian has verbally consented to: the potential risks and benefits of telemedicine (video visit) versus in person care; bill my insurance or make self-payment for services provided; and responsibility for payment of non-covered services.     Mode of Communication:  Video Conference via [employer]    As the provider I attest to compliance with applicable laws and regulations related to telemedicine.            Data:    Session content: At the start of this group, patients were invited to check in by identifying themselves, describing their current emotional status, and identifying issues to address in this group.   Area(s) of treatment focus addressed in this session included Symptom Management, Personal Safety and Community Resources/Discharge Planning.    Patient reported feeling not the best. Patient discussed working toward regulating emotions. Patient identified grounding as skills they will use to address their goal(s). Patient reported feeling emotional may be a barrier to working toward their goal(s) and/or  addressing mental health symptoms. Patient reported yes safety concerns and/or self-injurious behaviors; engaged in safety planning. Patient reported no substance use. Patient reported they are taking their medications as prescribed. Patient reported feeling grateful for her . Patient discussed frustrations with not being able to attend programming at [facility] with the treatment group.     Therapeutic Interventions/Treatment Strategies:  Psychotherapist offered support, feedback and validation and reinforced use of skills. Treatment modalities used include Motivational Interviewing and Cognitive Behavioral Therapy. Interventions include Coping Skills: Assisted patient in identifying 1-2 healthy distraction skills to reduce overall distress, Symptoms Management: Promoted understanding of their diagnoses and how it impacts their functioning and Emotions Management:  Discussed barriers to emotional regulation.    Assessment:    Patient response:   Patient responded to session by accepting feedback, giving feedback, listening, focusing on goals, being attentive and accepting support    Possible barriers to participation / learning include: and no barriers identified    Health Issues:   None reported       Substance Use Review:   Substance Use: No active concerns identified.    Mental Status/Behavioral Observations  Appearance:   Appropriate   Eye Contact:   Good   Psychomotor Behavior: Normal   Attitude:   Cooperative   Orientation:   All  Speech   Rate / Production: Normal/ Responsive Normal    Volume:  Normal   Mood:    Anxious  Depressed  Normal  Affect:    Appropriate   Thought Content:   Clear and Safety reports  presence of suicidal ideation passive suicidal ideation  and thoughts of self-harm  Thought Form:  Coherent  Logical     Insight:    Good     Plan:     Safety Plan: Patient consented to co-developed safety plan.  Safety and risk management plan was completed.  Patient agreed to use  safety plan should any safety concerns arise.  A copy was given to the patient.    Committed to safety and agreed to follow previously developed safety coping plan.      Barriers to treatment: None identified    Patient Contracts (see media tab):  None    Substance Use: Provided encouragement towards sobriety     Continue or Discharge: Patient will continue in Adult Partial Hospitalization Program (PHP)  as planned. Patient is likely to benefit from learning and using skills as they work toward the goals identified in their treatment plan.      Joann Land, [facility]  December 15, 2020

## 2020-12-16 ENCOUNTER — HOSPITAL ENCOUNTER (OUTPATIENT)
Dept: BEHAVIORAL HEALTH | Facility: CLINIC | Age: 25
Discharge: HOME | End: 2020-12-16
Attending: PSYCHIATRY & NEUROLOGY | Admitting: PSYCHIATRY & NEUROLOGY
Payer: COMMERCIAL

## 2020-12-16 ENCOUNTER — HOSPITAL ENCOUNTER (OUTPATIENT)
Dept: BEHAVIORAL HEALTH | Facility: CLINIC | Age: 25
End: 2020-12-16
Attending: PSYCHIATRY & NEUROLOGY
Payer: COMMERCIAL

## 2020-12-16 DIAGNOSIS — F33.2 MDD (MAJOR DEPRESSIVE DISORDER), RECURRENT SEVERE, WITHOUT PSYCHOSIS (H): Primary | ICD-10-CM

## 2020-12-16 PROCEDURE — H0035 MH PARTIAL HOSP TX UNDER 24H: HCPCS | Mod: GT

## 2020-12-16 PROCEDURE — H0035 MH PARTIAL HOSP TX UNDER 24H: HCPCS | Mod: 95 | Performed by: OCCUPATIONAL THERAPIST

## 2020-12-16 PROCEDURE — H0035 MH PARTIAL HOSP TX UNDER 24H: HCPCS | Mod: GT | Performed by: SOCIAL WORKER

## 2020-12-16 PROCEDURE — H0035 MH PARTIAL HOSP TX UNDER 24H: HCPCS | Mod: GT | Performed by: COUNSELOR

## 2020-12-16 RX ORDER — LAMOTRIGINE 25 MG/1
50 TABLET ORAL DAILY
Qty: 60 TABLET | Refills: 0 | Status: SHIPPED | OUTPATIENT
Start: 2020-12-16 | End: 2021-06-08

## 2020-12-16 NOTE — GROUP NOTE
Psychoeducation Group Note    PATIENT'S NAME: Corinne N Palm  MRN:   [id number]  :   1995  ACCT. NUMBER: [id number]  DATE OF SERVICE: 20  START TIME:  1:00 PM  END TIME:  1:50 PM  FACILITATOR: Mor Soni OT  TOPIC: [facility] OT Group: Self- Regulation Skills  [facility] Adult Partial Hospitalization Program  TRACK: 1    Telemedicine Visit: The patient's condition can be safely assessed and treated via synchronous audio and visual telemedicine encounter.      Reason for Telemedicine Visit: Services only offered telehealth and Covid 19    Originating Site (Patient Location): Patient's home    Distant Site (Provider Location): Provider Remote Setting    Consent:  The patient/guardian has verbally consented to: the potential risks and benefits of telemedicine (video visit) versus in person care; bill my insurance or make self-payment for services provided; and responsibility for payment of non-covered services.     Mode of Communication:  Video Conference via [employer]    As the provider I attest to compliance with applicable laws and regulations related to telemedicine.      NUMBER OF PARTICIPANTS:6    Summary of Group / Topics Discussed:  Self-Regulation Skills: Sensory Modulation: Patients were provided with education on Autonomic Nervous System activation and the importance of identifying their Window of Tolerance for effective self-regulation.  Patients were taught how to recognize specific signs and symptoms of their individualized state of arousal and how to make changes when needed.  Patient's explored body based skills (bottom up processing skills) and techniques to help manage symptoms and change level of arousal when needed to be in control and comfortable so they are able to function in different environments.         Patient Session Goals / Objectives:    [place] how the ANS works and importance of its  impact on functioning and mental wellbeing    [place] how developing  interoceptive awareness can help one self-regulate sooner rather than later    Identified signs and symptoms of current level of arousal and ways to make changes in level of arousal when needed    Identified specific and individualized neurosensory skills to help when distressed and for crisis management    Established a plan for practice of these skills in their own environments        Patient Participation / Response:  Fully participated with the group by sharing personal reflections / insights and openly received / provided feedback with other participants.    Verbalized understanding of content and Patient would benefit from additional opportunities to practice the content to be able to generalize it to their everyday life with increased intentionality, consistency, and efficacy in support of their psychiatric recovery    Treatment Plan:  Patient has a current master individualized treatment plan.  See Epic treatment plan for more information.    Mor Soni, OT

## 2020-12-16 NOTE — GROUP NOTE
Psychoeducation Group Note    PATIENT'S NAME: Corinne N Palm  MRN:   [id number]  :   1995  ACCT. NUMBER: [id number]  DATE OF SERVICE: 20  START TIME:  9:00 AM  END TIME:  9:50 AM  FACILITATOR: Marlene Alonzo RN  TOPIC: NANDINI RN Group: [facility] Adult Partial Hospitalization Program  TRACK: 1    NUMBER OF PARTICIPANTS: 6    Summary of Group / Topics Discussed:  Foundations of Health: Nutrition: Super Nutrients & Micronutrients: Super Nutrients are Foods that have high nutritional yield. Micronutrients are essential elements found in food or taken through supplements that are necessary for normal physiological functioning. This group was designed to complement the macronutrients group and build upon previous education. The changes that food makes on the brain (how the brain uses sugar) and nutrition as it relates to mental health was also discussed.       Patient Session Goals / Objectives:  ? Identified the health enhancing benefits to good nutrition  ? Verbalized ways in which the food we eat affects the brain  ? Explained the role of micronutrients in the body, how much we need, and how to get it  Telemedicine Visit: The patient's condition can be safely assessed and treated via synchronous audio and visual telemedicine encounter.      Reason for Telemedicine Visit: Services only offered telehealth    Originating Site (Patient Location): Patient's home    Distant Site (Provider Location): Provider Remote Setting    Consent:  The patient/guardian has verbally consented to: the potential risks and benefits of telemedicine (video visit) versus in person care; bill my insurance or make self-payment for services provided; and responsibility for payment of non-covered services.     Mode of Communication:  Video Conference via [employer]    As the provider I attest to compliance with applicable laws and regulations related to telemedicine.       Patient Participation /  Response:  Fully participated with the group by sharing personal reflections / insights and openly received / provided feedback with other participants.    Demonstrated understanding of topics discussed through group discussion and participation    Treatment Plan:  Patient has a current master individualized treatment plan.  See Epic treatment plan for more information.    Marlene Alonzo RN

## 2020-12-16 NOTE — PROGRESS NOTES
Admission Date: 12/16/2020    Identify any current concerns with potential impact to admission:     medication/medical concerns: None reported     immediate safety concerns: Patient experiences passive suicidal and engages in consistent safety planning     Does patient have safety plan? Yes  Note: Please copy safety plan copied into BE[facility] Encounter     Other (insurance/childcare/transportation/housing/planned absences/etc): None reported    Patient's insurance is: [employer] .     Does patient need appointment with provider? N/A    Review patient's program schedule and inform them of any variation due to late days or holidays.          Completed by: MP Thakur on 12/16/2020 at 12:02 PM

## 2020-12-16 NOTE — GROUP NOTE
"Process Group Note    PATIENT'S NAME: Corinne N Palm  MRN:   [id number]  :   1995  ACCT. NUMBER: [id number]  DATE OF SERVICE: 20  START TIME: 10:00 AM  END TIME: 10:50 AM  FACILITATOR: Joann Land LPCC  TOPIC:  Process Group    Diagnoses:  DSM5  Diagnosis:  1.  Major depressive disorder, recurrent, severe without psychosis  2.  Generalized anxiety disorder.   3.  Anorexia nervosa.   4.  Borderline personality disorder.      [facility] Adult Partial Hospitalization Program  TRACK: [facility]    NUMBER OF PARTICIPANTS: 6    Telemedicine Visit: The patient's condition can be safely assessed and treated via synchronous audio and visual telemedicine encounter.      Reason for Telemedicine Visit: Services only offered telehealth and due to COVID-19    Originating Site (Patient Location): Patient's home    Distant Site (Provider Location): Provider Remote Setting    Consent:  The patient/guardian has verbally consented to: the potential risks and benefits of telemedicine (video visit) versus in person care; bill my insurance or make self-payment for services provided; and responsibility for payment of non-covered services.     Mode of Communication:  Video Conference via [employer]    As the provider I attest to compliance with applicable laws and regulations related to telemedicine.            Data:    Session content: At the start of this group, patients were invited to check in by identifying themselves, describing their current emotional status, and identifying issues to address in this group.   Area(s) of treatment focus addressed in this session included Symptom Management, Personal Safety and Community Resources/Discharge Planning.    Patient reported feeling low energy and frustrated. Patient discussed working toward figuring out next steps with treatment. Patient identified radical acceptance as skills they will use to address their goal(s). Patient reported having \"rough days\" may be a " barrier to working toward their goal(s) and/or addressing mental health symptoms. Patient reported yes safety concerns and/or self-injurious behaviors; patient engaged in safety planning with staff. Patient reported no substance use. Patient reported they are taking their medications as prescribed. Patient reported feeling proud that they completed the program today. Patient discussed feeling hopeful with the treatment group.     Therapeutic Interventions/Treatment Strategies:  Psychotherapist offered support, feedback and validation and reinforced use of skills. Treatment modalities used include Motivational Interviewing and Cognitive Behavioral Therapy. Interventions include Coping Skills: Assisted patient in identifying 1-2 healthy distraction skills to reduce overall distress, Symptoms Management: Promoted understanding of their diagnoses and how it impacts their functioning and Emotions Management:  Discussed barriers to emotional regulation.    Assessment:    Patient response:   Patient responded to session by accepting feedback, giving feedback, listening, focusing on goals, being attentive and accepting support    Possible barriers to participation / learning include: and no barriers identified    Health Issues:   None reported       Substance Use Review:   Substance Use: No active concerns identified.    Mental Status/Behavioral Observations  Appearance:   Appropriate   Eye Contact:   Good   Psychomotor Behavior: Normal   Attitude:   Cooperative   Orientation:   All  Speech   Rate / Production: Normal/ Responsive Normal    Volume:  Normal   Mood:    Anxious  Depressed  Normal  Affect:    Appropriate   Thought Content:   Clear and Safety reports  presence of suicidal ideation passive suicidal ideation  and thoughts of self-harm  Thought Form:  Coherent  Logical     Insight:    Good     Plan:     Safety Plan: Patient consented to co-developed safety plan.  Safety and risk management plan was completed.   Patient agreed to use safety plan should any safety concerns arise.  A copy was given to the patient.    Committed to safety and agreed to follow previously developed safety coping plan.      Barriers to treatment: None identified    Patient Contracts (see media tab):  None    Substance Use: Provided encouragement towards sobriety     Continue or Discharge: Patient is being discharged today. See Treatment Plan and Discharge Summary.       Joann Land, [facility]  December 16, 2020

## 2020-12-16 NOTE — GROUP NOTE
Psychotherapy Group Note    PATIENT'S NAME: Corinne N Palm  MRN:   [id number]  :   1995  ACCT. NUMBER: [id number]  DATE OF SERVICE: 20  START TIME:  2:00 PM  END TIME:  2:50 PM  FACILITATOR: Dana Guerrero LICSW  TOPIC:  EBP Group: Enhanced Mindfulness  [facility] Adult Partial Hospitalization Program  TRACK: 1    NUMBER OF PARTICIPANTS: 7    Summary of Group / Topics Discussed:  Enhanced Mindfulness: Body and Mind Integration: Patients received an overview and education regarding the importance of including the body in the management of emotional health and self-care and as a direct route to mindfulness practice.  Patients discussed various ways in which the body can serve as an informant to their physical and emotional experiences/need. Patients discussed the body as a direct link to the present moment and to mindfulness practice.  Patients discussed current relationship with body, self-awareness, mindfulness practice and barriers to connection with body.  Patients were guided through breath work and movement to facilitate greater self-awareness, grounding, self-expression, and connection to other.  Patients discussed how the experiential could be applied to better manage mental health and develop child connection to self.    Patient Session Goals / Objectives:    Identify how movement awareness could be used for grounding, stress management, self-expression, connection to other and self-regulation    Improved awareness of breath and movement preferences    Identify how movement and the body is used in mindfulness practice    Reflect on use of these practices in everyday life.    Identify barriers to attending to body    Telemedicine Visit: The patient's condition can be safely assessed and treated via synchronous audio and visual telemedicine encounter.          Reason for Telemedicine Visit: Services only offered telehealth and covid19        Originating Site (Patient Location): Patient's  home        Distant Site (Provider Location): Provider Remote Setting        Consent:  The patient/guardian has verbally consented to: the potential risks and benefits of telemedicine (video visit) versus in person care; bill my insurance or make self-payment for services provided; and responsibility for payment of non-covered services.         Mode of Communication:  Video Conference via [employer]        As the provider I attest to compliance with applicable laws and regulations related to telemedicine.         Patient Participation / Response:  Moderately participated, sharing some personal reflections / insights and adequately adequately received / provided feedback with other participants.    Demonstrated understanding of topics discussed through group discussion and participation and Practiced skills in session    Treatment Plan:  Patient has See Epic Treatment Plan - Patient is discharging.    Dana Guerrero, FRANCISCOSW

## 2020-12-16 NOTE — PROGRESS NOTES
Acknowledgement of Current Treatment Plan       I have reviewed my treatment plan with my therapist / counselor on 12/16/20.   I agree with the plan as it is written in the electronic health record.    Name:      Signature:  Corinne N Palm Unable to sign due to COVID-19.     Dr. Dann Tompkins MD  Psychiatrist    Dana Guerrero MS, [facility], BC-DMT  Psychotherapist     Joann Land MA, [facility]  Psychotherapist  MP Thakur on 12/16/2020 at 12:14 PM

## 2020-12-16 NOTE — GROUP NOTE
Psychoeducation Group Note    PATIENT'S NAME: Corinne N Palm  MRN:   [id number]  :   1995  ACCT. NUMBER: [id number]  DATE OF SERVICE: 20  START TIME: 11:00 AM  END TIME: 11:50 AM  FACILITATOR: Erika Crump OTR/L  TOPIC: [facility] OT Group: Lifestyle Balance and Structure  [facility] Adult Partial Hospitalization Program  TRACK: 1    NUMBER OF PARTICIPANTS: 6    Telemedicine Visit: The patient's condition can be safely assessed and treated via synchronous audio and visual telemedicine encounter.      Reason for Telemedicine Visit: Services only offered telehealth    Originating Site (Patient Location): Patient's home    Distant Site (Provider Location): [facility] Outpatient Setting: Partial [facility]    Consent:  The patient/guardian has verbally consented to: the potential risks and benefits of telemedicine (video visit) versus in person care; bill my insurance or make self-payment for services provided; and responsibility for payment of non-covered services.     Mode of Communication:  Video Conference via zoom    As the provider I attest to compliance with applicable laws and regulations related to telemedicine.     Summary of Group / Topics Discussed:  Lifestyle Balance and Structure:  Leisure Experiential: Provided skilled facilitation and clinical observation of patients in context during a leisure experiential designed to provide patients the opportunity to have a hands on social experience as an opportunity to gain self-awareness, build milieu cohesion and social skills, self-monitor personal performance skills, and identify the benefits of activity on their nervous system. Facilitated reflection and group discussion to deepen the meaning of the experience for participants.  Discussed importance of leisure engagement/participation as part of recovery and maintenance of health/wellness.      Patient Session Goals / Objectives:    Learn through an experiential intervention  activity the benefits of leisure    Identify and reflect on the process and share insight around their engagement in the group process.     Identify how leisure participation is important in the recovery process and the ongoing need to maintain health/wellness         Patient Participation / Response:  Fully participated with the group by sharing personal reflections / insights and openly received / provided feedback with other participants.    Patient presentation: congruent affect; adequate concentration observed; active in group discussion and experiential activity and Demonstrated understanding of content through identifying insights related to leisure engagement for her with the group     Treatment Plan:  Patient has See Epic Treatment Plan - Patient is discharging.  Cori will be transferring to Day Treatment for ongoing treatment. See discharge plan for additional information.     Erika Crump, OTR/L

## 2020-12-16 NOTE — PROGRESS NOTES
[facility]   Dr. Tompkins's Psychiatric Progress Note  2020      Patient:  Corinne N Palm   Medical Record Number:  [id number]  :  1995    Telemedicine Visit: The patient's condition can be safely assessed and treated via synchronous audio and visual telemedicine encounter.       Reason for Telemedicine Visit: COVID-19 lockdown     Originating Site (Patient Location):  HOME     Distant Site (Provider Location): [facility]: [place]     Consent:  The patient/guardian has verbally consented to: the potential risks and benefits of telemedicine (video visit) versus in person care; bill my insurance or make self-payment for services provided; and responsibility for payment of non-covered services           Interim History:   The patient's care was discussed with the treatment team and chart notes were reviewed.  Today Corinne N Palm reports depressed mood. In addition, there are notable risk factors for self-harm, including age, anxiety, previous history of suicide attempts and suicidal ideation. However, risk is mitigated by commitment to family, sobriety, ability to volunteer a safety plan, history of seeking help when needed, no access to firearms or weapons and denies homicidal ideation, intent, or plan.  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      Psych Dr Giulia Vigil at [employer].  Therapist Deandra Osorio at [employer].  DBT at [facility].    Mood is very bad: lots of depression and less anxious.  She quit Lamictal as she felt it made her appetite more.  Latuda helps mood.  Used to be on higher dose of Cymbalta.  She noted no problems on higher dose.  Previously higher Latuda dose did nothing.      20  Pt saw her psychiatrist last night.  Her doc wanted her to hand over meds last night.  She's not allowed back in her DBT group at [facility].  Group is going ok.  Dr. Vigil increased Cymbalta to 90 mg/d.  She's starting Lamictal again at 25mg/d.      20:  She is  "taking the higher doses of meds.  No SE.  Weekend plans: Face timing a friend in [facility].      12/7/20:  Weekend was \"just ok.\"  A renter/friend is moving in with them so him and and his dog.  She also laid around and relaxed.  Upset with her therapist.  Pt obsesses re: suicide and had plant.  Pt wants to give to  some of her means to suicide.  Therapist spoke to .   was told by therapist not to give her pills and a knife back to patient unless therapist says to.  Pt feels betrayed by therapist.  Pt feels loss of control.  Mood bounces some but is consistently down.      12/9/20:  Mood is now more down than anxious.      12/11/20:  Pt decides yes on going to [facility].   May get in before Xmas.  May be in PHP or in Day Tx til goes to [place].  Weekend plans:D&D tonight with  and his friends;  Roommate moves in tomorrow.      12/14/20:  Weekend was distracted by having a friend of 's move in with them.  Group today was ok til found out insurance might not cover [place].  She was told that her plan does not cover adult  residential care.  She is still in PHP.  Trying to stay in group til Wed. And then start Day Tx on Thurs.      12/16/20:  Today is going ok, not the best or worst.      Psychiatric ROS:  Mood:   Low and more anxious  Sleep:   Still waking during the night to eat  Appetite:  Pt plans to start restricting  Eating:  Not eating intentionally  Energy Level:  low  Concentration/Memory Problems:  NO  Suicidal Thoughts:  Yes has SI and SIB urges; Gave pills to , so she wouldn't act.  Contracts no self harm. Not cutting in 2 days;    Homicidal Thoughts:No  Psychotic Symptoms: No  Medication Side Effects:No  Medication Compliance:Yes   Physical Complaints:  none         Medications:     Current Outpatient Medications   Medication Sig    CYMBALTA 30MG Take 1 cap at night     DULoxetine (CYMBALTA) 60 MG capsule Take 1 capsule (60 mg) by mouth At Bedtime     gabapentin " (NEURONTIN) 300 MG capsule Take 300-600 mg by mouth nightly as needed (anxiety)     lamoTRIgine (LAMICTAL) 25 MG tablet Take 1 tablet (25 mg) by mouth daily for 9 days, then increase to 2 tablets (50 mg) by mouth daily.     lurasidone (LATUDA) 80 MG TABS tablet Take 80 mg by mouth At Bedtime     polyethylene glycol (MIRALAX) 17 GM/Dose powder Take 17 g by mouth daily     Vitamin D, Cholecalciferol, 25 MCG (1000 UT) TABS Take 1,000 Units by mouth daily      No current facility-administered medications for this encounter.              Allergies:     Allergies   Allergen Reactions     Morphine Other (See Comments)     Twitching     Amoxicillin Rash     Sulfa Drugs Rash            Psychiatric Examination:   There were no vitals taken for this visit.  Weight is 0 lbs 0 oz  There is no height or weight on file to calculate BMI.    Appearance:  awake, alert and adequately groomed  Attitude:  cooperative  Eye Contact:  good  Mood:  depressed  Affect:  mood congruent  Speech:  clear, coherent  Psychomotor Behavior:  no evidence of tardive dyskinesia, dystonia, or tics  Throught Process:  logical  Associations:  no loose associations  Thought Content:  no evidence of psychotic thought and passive suicidal thoughts;  no plan or intent;  able to con tract for safety  Insight:  good  Judgement:  intact  Oriented to:  time, person, and place  Attention Span and Concentration:  intact  Recent and Remote Memory:  intact  Gait:Normal    Risk/Potential for Dangerousness:  Multiple Active Diagnoses:HIGH  Self Care:HIGH  Suicide:MODERATE  Assault:LOW  Self Injurious Behaviors:MODERATE  Inappropriate Sexual Behavior:LOW         Labs:   No results found for this or any previous visit (from the past 24 hour(s)).     Recent Results (from the past 1008 hour(s))   Asymptomatic COVID-19 Virus (Coronavirus) by PCR    Collection Time: 11/13/20  6:54 PM    Specimen: Nasopharyngeal   Result Value Ref Range    COVID-19 Virus PCR to [facility] -  Source Nasopharyngeal     COVID-19 Virus PCR to [facility] - Result       Test received-See reflex to IDDL test SARS CoV2 (COVID-19) Virus RT-PCR   Comprehensive metabolic panel    Collection Time: 11/13/20  6:54 PM   Result Value Ref Range    Sodium 138 133 - 144 mmol/L    Potassium 3.8 3.4 - 5.3 mmol/L    Chloride 104 94 - 109 mmol/L    Carbon Dioxide 26 20 - 32 mmol/L    Anion Gap 8 3 - 14 mmol/L    Glucose 75 70 - 99 mg/dL    Urea Nitrogen 6 (L) 7 - 30 mg/dL    Creatinine 0.67 0.52 - 1.04 mg/dL    GFR Estimate >90 >60 mL/min/[1.73_m2]    GFR Estimate If Black >90 >60 mL/min/[1.73_m2]    Calcium 9.3 8.5 - 10.1 mg/dL    Bilirubin Total 0.5 0.2 - 1.3 mg/dL    Albumin 4.1 3.4 - 5.0 g/dL    Protein Total 7.7 6.8 - 8.8 g/dL    Alkaline Phosphatase 55 40 - 150 U/L    ALT 42 0 - 50 U/L    AST 27 0 - 45 U/L   SARS-CoV-2 COVID-19 Virus (Coronavirus) RT-PCR Nasopharyngeal    Collection Time: 11/13/20  6:54 PM    Specimen: Nasopharyngeal   Result Value Ref Range    SARS-CoV-2 Virus Specimen Source Nasopharyngeal     SARS-CoV-2 PCR Result NEGATIVE     SARS-CoV-2 PCR Comment       Testing was performed using the Aptima SARS-CoV-2 Assay on the [employer] Instrument System.   Additional information about this Emergency Use Authorization (EUA) assay can be found via   the Lab Guide.     TSH with free T4 reflex and/or T3 as indicated    Collection Time: 11/13/20 10:38 PM   Result Value Ref Range    TSH 2.05 0.40 - 4.00 mU/L   CBC with platelets differential    Collection Time: 11/13/20 10:38 PM   Result Value Ref Range    WBC 10.4 4.0 - 11.0 10e9/L    RBC Count 4.77 3.8 - 5.2 10e12/L    Hemoglobin 14.4 11.7 - 15.7 g/dL    Hematocrit 42.3 35.0 - 47.0 %    MCV 89 78 - 100 fl    MCH 30.2 26.5 - 33.0 pg    MCHC 34.0 31.5 - 36.5 g/dL    RDW 11.9 10.0 - 15.0 %    Platelet Count 358 150 - 450 10e9/L    Diff Method Automated Method     % Neutrophils 52.8 %    % Lymphocytes 37.9 %    % Monocytes 6.6 %    % Eosinophils 1.8 %    % Basophils 0.6 %     % Immature Granulocytes 0.3 %    Nucleated RBCs 0 0 /100    Absolute Neutrophil 5.5 1.6 - 8.3 10e9/L    Absolute Lymphocytes 3.9 0.8 - 5.3 10e9/L    Absolute Monocytes 0.7 0.0 - 1.3 10e9/L    Absolute Eosinophils 0.2 0.0 - 0.7 10e9/L    Absolute Basophils 0.1 0.0 - 0.2 10e9/L    Abs Immature Granulocytes 0.0 0 - 0.4 10e9/L    Absolute Nucleated RBC 0.0    HCG qualitative urine    Collection Time: 11/15/20  8:30 PM   Result Value Ref Range    HCG Qual Urine Negative NEG^Negative   Drug abuse screen 77 urine (FL, RH, SH)    Collection Time: 11/15/20  8:30 PM   Result Value Ref Range    Amphetamine Qual Urine Negative NEG^Negative    Barbiturates Qual Urine Negative NEG^Negative    Benzodiazepine Qual Urine Negative NEG^Negative    Cannabinoids Qual Urine Negative NEG^Negative    Cocaine Qual Urine Negative NEG^Negative    Opiates Qualitative Urine Negative NEG^Negative    PCP Qual Urine Negative NEG^Negative   UA reflex to Microscopic    Collection Time: 11/15/20  8:30 PM   Result Value Ref Range    Color Urine Yellow     Appearance Urine Clear     Glucose Urine Negative NEG^Negative mg/dL    Bilirubin Urine Negative NEG^Negative    Ketones Urine 10 (A) NEG^Negative mg/dL    Specific Gravity Urine 1.018 1.003 - 1.035    Blood Urine Negative NEG^Negative    pH Urine 6.5 5.0 - 7.0 pH    Protein Albumin Urine Negative NEG^Negative mg/dL    Urobilinogen mg/dL 2.0 0.0 - 2.0 mg/dL    Nitrite Urine Negative NEG^Negative    Leukocyte Esterase Urine Negative NEG^Negative    Source Midstream Urine          Impression:   This is a 25 year old female with depression and anxiety continues PHP.  Mood is labile.  She begins Day Tx tomorrow.           DIagnoses:     1.  Major depressive disorder, recurrent, severe without psychosis  2.  Generalized anxiety disorder.   3.  Anorexia nervosa.   4.  Borderline personality disorder.            Plan:     Completes PHP today  Begin Day Tx tomorrow. She'll see her regular therapist at  [facility] twice per week and DBT therapist once per week at [facility].    Increased Cymbalta to 90 mg/d  Restarted Lamictal.  Titrated to 50mg/d.   Continue all other medications as reviewed per electronic medical record today.   Safety plan reviewed. To the Emergency Department as needed or call after hours crisis line at [phone] or [phone]. [place] Crisis Text Line: Text [place] to 897733  or  Suicide LifeLine Chat: suicidepreventionlifeline.org/chat  Call [facility] at [phone] to schedule.  Follow up with outpatient provider as planned or sooner if needed for acute medical concerns.  Call the psychiatric nurse line with medication questions or concerns at [phone].      Dann Tompkins MD

## 2020-12-17 ENCOUNTER — HOSPITAL ENCOUNTER (OUTPATIENT)
Dept: BEHAVIORAL HEALTH | Facility: CLINIC | Age: 25
End: 2020-12-17
Attending: PSYCHIATRY & NEUROLOGY
Payer: COMMERCIAL

## 2020-12-17 PROCEDURE — 90853 GROUP PSYCHOTHERAPY: CPT | Mod: 95

## 2020-12-17 PROCEDURE — 90853 GROUP PSYCHOTHERAPY: CPT | Mod: GT

## 2020-12-17 PROCEDURE — G0177 OPPS/PHP; TRAIN & EDUC SERV: HCPCS | Mod: GT

## 2020-12-17 NOTE — PROGRESS NOTES
Adult Day Treatment Program:  Individualized Treatment Plan     Date of Plan: 20    Name: Corinne N Palm MRN: [id number]    : 1995    Programs:  Adult Day Treatment (ADT)     Clinical Track (if applicable):  2B    DSM5 Diagnosis  296.33 (F33.2) Major Depressive Disorder, Recurrent Episode, Severe _ and With anxious distress  300.02 (F41.1) Generalized Anxiety Disorder.  307.1 Anorexia Nervosa  (F50.01) Restricting type      Adult Day Treatment Program Multidisciplinary Team Members: Inocencio Hutchison MD and/or Dr. Beth Lopez, and/or Dr. Anthony Wing MD;     Corinne N Palm will participate in the Adult Day Treatment Program  3 days per week, 3 hours per day. Anticipated duration/discharge: 12 weeks    Due to COVID-19, services will be delivered via telemedicine until further notice.     Program Start Date: 20  Anticipated Discharge Date: 3/11/21 (pending authorization/clinical changes)    NOTE: Complete CGI     Review Date: Does Corinne N Palm continue to meet criteria to participate in the Adult Day Treatment Program, 3 days per week; 3 hours per day?   2021  Discharged to attend a higher level of care                       Client Strengths:  goal-focused, has a previous history of therapy, intelligent, open to learning, open to suggestions / feedback, support of family, friends and providers, wants to learn, willing to ask questions, willing to relate to others and work history     Client Participation in Plan:  Contributed to goals and plan     Areas of Vulnerability:  Suicidal Ideation   Anxiety  Depressive symptoms   Eating disorder  Experienced traumatic event multiple surgeries, Hypervigilance, Increased arousal, Impaired functioning and Dissociation     Long-Term Goals:  Knowledge about illness and management of symptoms   Maintenance of personal safety     Abuse Prevention Plan:  Safe, therapeutic environment   Safety coping plan as needed   Education regarding illness and  skill development   Coordination with care providers     Discharge Criteria:  Satisfactory progress toward treatment goals   Improvement re: identified problems and symptoms   Ability to continue recovery at next level of service   Has a discharge plan in place   Has safety/coping plan in place      Areas of Treatment Focus        Area of Treatment Focus:   Personal Safety  Start Date:    12/28/20    Goal:  Target Date: 2/22/21 Status: Completed  Cori will notify staff when needing assistance to develop or implement a coping plan to manage suicidal or self injurious urges.Use coping plan for safety, as needed.      Progress:           Treatment Strategies:   Assess / reassess level of potential for harm to self or others  Engage in safety planning when indicated  Facilitate increased self awareness          Area of Treatment Focus:   Symptom Stabilization and Management  Start Date:    12/28/20    Goal:  Target Date: 2/22/21 Status: Completed  When in life skills Cori  will provide an update related to perceived progress with mental health recovery weekly.      Progress:           Treatment Strategies:   Facilitate increased self awareness  Provide education regarding strategies and coping skills to assist with mental health recovery          Area of Treatment Focus:   Wellness and Mental Health  Start Date:    12/28/20    Goal:  Target Date: 2/22/21 Status: Completed  Cori will improve wellness related behaviors by getting enough sleep,exercise, balanced nutrition and take medications (if prescribed) to maintain good mental health.        Progress:           Treatment Strategies:   Facilitate increased self awareness  Provide education regarding wellness habits and routines which can assist with mental health recovery          Area of Treatment Focus:   Community Resources / Support and Discharge Planning  Start Date:    12/28/20    Goal:  Target Date: 2/22/21 Status: Completed  Cori will establish an aftercare  "plan to include medical providers and social supports by discharge.      Progress:           Treatment Strategies:   Facilitate increased self awareness  Provide education regarding relapse prevention,discharge planning and mental health support          Area of Treatment Focus:   Symptom Stabilization and Management  Start Date:    12/28/20    Goal:  Target Date: 2/22/21 Status: Completed  Learn and apply skills to understand and process emotions safely through use of emotion regulation skills    Progress:           Treatment Strategies:   Assist to identify treatment goals  Facilitate increased self awareness  Teach adaptive coping skills and communication skills  Use reality based supportive approach       George Pink, OTR/L      NOTE: Required signatures are completed manually and scanned into the electronic medical record. See \"Media\" tab in epic.    The Individualized Treatment Plan Signature Page has been routed to the provider for co-sign.      "

## 2020-12-17 NOTE — GROUP NOTE
Psychoeducation Group Note    PATIENT'S NAME: Corinne N Palm  MRN:   [id number]  :   1995  ACCT. NUMBER: [id number]  DATE OF SERVICE: 20  START TIME:  3:00 PM  END TIME:  3:50 PM; left at 330pm for appt  FACILITATOR: Leida Rowan RN  TOPIC:  RN Group: Mental Health Maintenance  Telemedicine Visit: The patient's condition can be safely assessed and treated via synchronous audio and visual telemedicine encounter.      Reason for Telemedicine Visit:  covid19    Originating Site (Patient Location): Patient's home    Distant Site (Provider Location): Provider Remote Setting    Consent:  The patient/guardian has verbally consented to: the potential risks and benefits of telemedicine (video visit) versus in person care; bill my insurance or make self-payment for services provided; and responsibility for payment of non-covered services.     Mode of Communication:  Video Conference via [employer]    As the provider I attest to compliance with applicable laws and regulations related to telemedicine.      [facility] Adult Mental Health Day Treatment  TRACK: 2B    NUMBER OF PARTICIPANTS: 6    Summary of Group / Topics Discussed:  Mental Health Maintenance:  Vulnerability: In this group, the concept of vulnerability was explored through the viewing, discussion, and self-reflection of the Kenia Sandoval Talk Titled,  The Power of Vulnerability.      Patient Session Goals / Objectives:  ? Defined and described definition of vulnerability   ? Identified 2 or more ways of practicing authenticity         Patient Participation / Response:  Fully participated with the group by sharing personal reflections / insights and openly received / provided feedback with other participants.    Verbalized understanding of mental health maintenance topic    Treatment Plan:  Patient has an initial individualized treatment plan that was created as part of their diagnostic assessment / admission process.  A master  individualized treatment plan is in the process of being developed with the patient and multi-disciplinary care team.    Leida Rowan RN

## 2020-12-17 NOTE — GROUP NOTE
Process Group Note    PATIENT'S NAME: Corinne N Palm  MRN:   [id number]  :   1995  ACCT. NUMBER: [id number]  DATE OF SERVICE: 20  START TIME:  1:00 PM  END TIME:  1:50 PM  FACILITATOR: Chelsea Hutchison  TOPIC:  Process Group    Diagnoses:  1.  Major depressive disorder, recurrent, severe without psychosis  2.  Generalized anxiety disorder.   3.  Anorexia nervosa.   4.  Borderline personality disorder.    [facility] Day Treatment  TRACK: 2B    Telemedicine Visit: The patient's condition can be safely assessed and treated via synchronous audio and visual telemedicine encounter.      Reason for Telemedicine Visit:  covid 19    Originating Site (Patient Location): Patient's home    Distant Site (Provider Location): Provider Remote Setting    Consent:  The patient/guardian has verbally consented to: the potential risks and benefits of telemedicine (video visit) versus in person care; bill my insurance or make self-payment for services provided; and responsibility for payment of non-covered services.     Mode of Communication:  Video Conference via [employer]    As the provider I attest to compliance with applicable laws and regulations related to telemedicine.      NUMBER OF PARTICIPANTS: 5          Data:    Session content: At the start of this group, patients were invited to check in by identifying themselves, describing their current emotional status, and identifying issues to address in this group.   Area(s) of treatment focus addressed in this session included Symptom Management, Personal Safety, Develop / Improve Independent Living Skills and Develop Socialization / Interpersonal Relationship Skills.  Lida introduced self with she/her pronouns.  She reported going to inpatient then [facility].  She reported suicidal ideation with no plan or intent to act and ongoing self injury urges.  She reports she has not acted on self harm recently and feels proud of this.  She is able to  commit to safety.  She reported struggling with anxiety, depression, and eating disorder symptoms.  She reported increase in symptoms after conflict with her mom yesterday.  She reported she is not working right now and unsure of when she should return.  She reported need to leave group early to attend another appointment.      Therapeutic Interventions/Treatment Strategies:  Psychotherapist offered support, feedback and validation and reinforced use of skills. Treatment modalities used include Motivational Interviewing, Cognitive Behavioral Therapy and Dialectical Behavioral Therapy. Validated and normalized.  Highlighted and reinforced skills used; connected to positive outcomes.  Provided additional skill suggestions.  Aided in creating a plan to help work towards goals.        Assessment:    Patient response:   Patient responded to session by accepting feedback, listening, focusing on goals, being attentive and accepting support    Possible barriers to participation / learning include: and no barriers identified    Health Issues:   None reported       Substance Use Review:   Substance Use: No active concerns identified.    Mental Status/Behavioral Observations  Appearance:   Appropriate   Eye Contact:   Good   Psychomotor Behavior: Normal   Attitude:   Cooperative   Orientation:   All  Speech   Rate / Production: Normal    Volume:  Normal   Mood:    Anxious  Depressed   Affect:    Appropriate   Thought Content:   Rumination and Safety reports  presence of suicidal ideation passive suicidal ideation  and thoughts of self-harm  Thought Form:  Coherent  Logical     Insight:    Good     Plan:     Safety Plan: Committed to safety and agreed to follow previously developed safety coping plan.      Barriers to treatment: None identified    Patient Contracts (see media tab):  None    Substance Use: Not addressed in session     Continue or Discharge: Patient will continue in Adult Day Treatment (ADT)  as planned. Patient  is likely to benefit from learning and using skills as they work toward the goals identified in their treatment plan.      Chelsea Hutchison  December 17, 2020

## 2020-12-17 NOTE — GROUP NOTE
Psychotherapy Group Note    PATIENT'S NAME: Corinne N Palm  MRN:   [id number]  :   1995  ACCT. NUMBER: [id number]  DATE OF SERVICE: 20  START TIME:  2:00 PM  END TIME:  2:50 PM  FACILITATOR: Chelsea Hutchison  TOPIC: MH EBP Group: Relationship Skills  [facility] Adult Mental Health Day Treatment  TRACK: 2B    Telemedicine Visit: The patient's condition can be safely assessed and treated via synchronous audio and visual telemedicine encounter.      Reason for Telemedicine Visit:  covid 19    Originating Site (Patient Location): Patient's home    Distant Site (Provider Location): Provider Remote Setting    Consent:  The patient/guardian has verbally consented to: the potential risks and benefits of telemedicine (video visit) versus in person care; bill my insurance or make self-payment for services provided; and responsibility for payment of non-covered services.     Mode of Communication:  Video Conference via [employer]    As the provider I attest to compliance with applicable laws and regulations related to telemedicine.      NUMBER OF PARTICIPANTS: 7    Summary of Group / Topics Discussed:  Relationship Skills: Assertive Communication: Patients were provided with a general overview of assertive communication skills and how practicing assertive communication skills will assist patients in developing healthier and more effective relationships. Patients reviewed their current awareness on ability to practice assertive communication, ways to increase assertive communication, and identified/problem solved barriers to assertive communication.     Patient Session Goals / Objectives:    Identified and discussed patient individual challenges with communication    Presented and practiced effective communication skills in session    Assisted patients in implementing more effective communication skills in their relationships      Patient Participation / Response:  Minimally participated, only when  prompted / asked.    Demonstrated understanding of topics discussed through group discussion and participation    Treatment Plan:  Patient has a current master individualized treatment plan.  See Epic treatment plan for more information.    Chelsea Hutchison

## 2020-12-17 NOTE — DISCHARGE SUMMARY
Adult Mental Health Intensive Outpatient Discharge Summary/Instructions      Patient: Corinne N Palm MRN: [id number]   : 1995 Age: 25 year old Sex: female     Admission Date: 20  Discharge Date: 2021   Diagnosis: 296.33 (F33.2) Major Depressive Disorder, Recurrent Episode, Severe _ and With anxious distress  300.02 (F41.1) Generalized Anxiety Disorder.  307.1 Anorexia Nervosa  (F50.01) Restricting type         Focus of Treatment / Progress    Personal Safety: Reports suicidal ideation and self injury urges present throughout treatment.  Has been able to commit to safety and willing to follow through with safety plan.  Reports symptoms of eating disorder block or distract fo safety concerns.  Client is attending higher level of care to address eating disorder concerns.     * Follow your safety plan     * Call crisis lines as needed:    [facility] [phone] [facility] [phone]  [facility] [phone] Crisis Connection [phone]  [facility] [phone] [facility] [phone]  [facility] [phone] National Suicide Prevention [phone]  [facility] [phone] Suicide Prevention [phone]  [facility] [phone]    Managing symptoms of: depression, anxiety, anorexia    Community support/health:  [place] [address] ([phone]) [email or web address], [place] Crisis text line( Text [place] to 939380),  [facility]  [place], [place] ([phone])  [facility] [place], [place] ( [phone])  [facility] [address] [place] [address], [phone]      Managing Symptoms and Preventing Relapse    * Go to all of your appointments    * Take all medications as directed.      * Carry a current list if medication with you    * Do not use illicit (street) drugs.  Avoid alcohol    * Report these symptoms to your care team. These are early signs of relapse:   Thoughts of  "suicide   Losing more sleep   Increased confusion   Mood getting worse   Feeling more aggressive   Other:  Isolation and rumination    *Use these skills daily:  Talk to someone you trust at least one time weekly, set boundaries and say \"no\", be assertive, act opposite of negative feelings, accept challenges with a positive attitude, exercise at least three times per week for 30 minutes,  get enough sleep, eat healthy foods, get into a good routine    Copy of summary sent to: In Epic    Follow up with psychiatrist / main caregiver: Coni Vigil  Next visit: As needed    Follow up with your therapist: Coni Rocha and Shelton Jordan S Next visit: Today    Go to group therapy and / or support groups at: [facility] eating [facility] [place] [facility] and [facility] [place]([facility]) support groups    See your medical doctor about:  For an annual physical exam or any general wellness or illness as needed.    Other:  Your treatment team appreciates having the opportunity to work with you and wishes you the best.    Client Signature:___unable to sign due to covid 19______  Staff Signature:___Shelton Hutchison MA [facility] 2/4/2021 4:00 pm      "

## 2020-12-18 NOTE — PROGRESS NOTES
Patient Active Problem List   Diagnosis     Suicidal ideation     MDD (major depressive disorder), recurrent severe, without psychosis (H)       Current Outpatient Medications:      DULoxetine (CYMBALTA) 60 MG capsule, Take 1 capsule (60 mg) by mouth At Bedtime, Disp: 30 capsule, Rfl: 0     gabapentin (NEURONTIN) 300 MG capsule, Take 300-600 mg by mouth nightly as needed (anxiety), Disp: , Rfl:      lamoTRIgine (LAMICTAL) 25 MG tablet, Take 2 tablets (50 mg) by mouth daily, Disp: 60 tablet, Rfl: 0     lamoTRIgine (LAMICTAL) 25 MG tablet, Take 1 tablet (25 mg) by mouth daily for 9 days, then increase to 2 tablets (50 mg) by mouth daily., Disp: 51 tablet, Rfl: 0     lurasidone (LATUDA) 80 MG TABS tablet, Take 80 mg by mouth At Bedtime, Disp: , Rfl:      polyethylene glycol (MIRALAX) 17 GM/Dose powder, Take 17 g by mouth daily, Disp: , Rfl:      Vitamin D, Cholecalciferol, 25 MCG (1000 UT) TABS, Take 1,000 Units by mouth daily , Disp: , Rfl:   Psychiatry staffing: case discussed  Diagnosis:    As above plus PRECIOUS and eating disorder.  Here from Partial

## 2020-12-20 ENCOUNTER — HEALTH MAINTENANCE LETTER (OUTPATIENT)
Age: 25
End: 2020-12-20

## 2020-12-21 ENCOUNTER — HOSPITAL ENCOUNTER (OUTPATIENT)
Dept: BEHAVIORAL HEALTH | Facility: CLINIC | Age: 25
End: 2020-12-21
Attending: PSYCHIATRY & NEUROLOGY
Payer: COMMERCIAL

## 2020-12-21 PROCEDURE — G0177 OPPS/PHP; TRAIN & EDUC SERV: HCPCS | Mod: 95

## 2020-12-21 PROCEDURE — 90853 GROUP PSYCHOTHERAPY: CPT | Mod: 95

## 2020-12-21 NOTE — GROUP NOTE
Psychoeducation Group Note    PATIENT'S NAME: Corinne N Palm  MRN:   [id number]  :   1995  ACCT. NUMBER: [id number]  DATE OF SERVICE: 20  START TIME:  2:00 PM  END TIME:  2:50 PM  FACILITATOR: George Pink OTR/L  TOPIC: MH Life Skills Group: Communication and Social Skills Development  Telemedicine Visit: The patient's condition can be safely assessed and treated via synchronous audio and visual telemedicine encounter.      Reason for Telemedicine Visit: COVID-19    Originating Site (Patient Location): Patient's home    Distant Site (Provider Location): [facility]: [place] [address]    Consent:  The patient/guardian has verbally consented to: the potential risks and benefits of telemedicine (video visit) versus in person care; bill my insurance or make self-payment for services provided; and responsibility for payment of non-covered services.     Mode of Communication:  Video Conference via [employer]    As the provider I attest to compliance with applicable laws and regulations related to telemedicine.   [facility] [place] [facility] Adult Mental Health Day Treatment  TRACK: 2B    NUMBER OF PARTICIPANTS: 4    Summary of Group / Topics Discussed:  Communication and Social Skills Development: Social Supports: Conflict Management Styles Scale: Patients completed a self assessment of their conflict management skills to identify both strengths and opportunities for growth.  Patients gained awareness of effective conflict management skills so they can connect with others more successfully.       Patient Session Goals / Objectives:    Identified strengths and opportunities for growth in conflict management skills and how these  impact their ability to communicate clearly with other people       Improved awareness of important aspects of conflict management skills and how this relates to mental health recovery        Established a plan for practice of these skills in their own environments    Practiced and  reflected on how to generalize taught skills to their everyday life        Patient Participation / Response:  Fully participated with the group by sharing personal reflections / insights and openly received / provided feedback with other participants.    Demonstrated understanding of content through handout/group discussion , Verbalized understanding of content and Patient would benefit from additional opportunities to practice the content to be able to generalize it to their everyday life with increased intentionality, consistency, and efficacy in support of their psychiatric recovery    Treatment Plan:  Patient has a current master individualized treatment plan.  See Epic treatment plan for more information.    George Pink, OTR/L

## 2020-12-21 NOTE — GROUP NOTE
Psychoeducation Group Note    PATIENT'S NAME: Corinne N Palm  MRN:   [id number]  :   1995  ACCT. NUMBER: [id number]  DATE OF SERVICE: 20  START TIME:  3:00 PM  END TIME:  3:50 PM  FACILITATOR: Jeny Gomez RN  TOPIC: NANDINI RN Group: Mental Health Maintenance  [facility] Adult Mental Health Day Treatment  TRACK: 2B    NUMBER OF PARTICIPANTS: 5    Telemedicine Visit: The patient's condition can be safely assessed and treated via synchronous audio and visual telemedicine encounter.      Reason for Telemedicine Visit: Services only offered telehealth    Originating Site (Patient Location): Patient's home    Distant Site (Provider Location): Provider Remote Setting    Consent:  The patient/guardian has verbally consented to: the potential risks and benefits of telemedicine (video visit) versus in person care; bill my insurance or make self-payment for services provided; and responsibility for payment of non-covered services.     Mode of Communication:  Video Conference via [employer]    As the provider I attest to compliance with applicable laws and regulations related to telemedicine.    Summary of Group / Topics Discussed:  Mental Health Maintenance:  The Anatomy of Trust: In this group, the concept of trust was explored and deconstructed through the viewing, discussion, and self-reflection of the Kenia Arteaga Talk Titled,  The Anatomy of Trust.      Patient Session Goals / Objectives:  ? Defined and described definition of trust  ? List & describe BRAVING acronym   ? Reflect and share 1-2 ways of how BRAVING can be applied into daily life and management of mental health symptoms      Patient Participation / Response:  Fully participated with the group by sharing personal reflections / insights and openly received / provided feedback with other participants.    Demonstrated understanding of topics discussed through group discussion and participation, Identified / Expressed personal readiness to  practice skills and Verbalized understanding of mental health maintenance topic    Treatment Plan:  Patient has a current master individualized treatment plan.  See Epic treatment plan for more information.    Jeny Gomez RN

## 2020-12-22 ENCOUNTER — HOSPITAL ENCOUNTER (OUTPATIENT)
Dept: BEHAVIORAL HEALTH | Facility: CLINIC | Age: 25
End: 2020-12-22
Attending: PSYCHIATRY & NEUROLOGY
Payer: COMMERCIAL

## 2020-12-22 PROCEDURE — G0177 OPPS/PHP; TRAIN & EDUC SERV: HCPCS | Mod: 95

## 2020-12-22 PROCEDURE — 90853 GROUP PSYCHOTHERAPY: CPT | Mod: GT

## 2020-12-22 NOTE — GROUP NOTE
Psychoeducation Group Note    PATIENT'S NAME: Corinne N Palm  MRN:   [id number]  :   1995  ACCT. NUMBER: [id number]  DATE OF SERVICE: 20  START TIME:  3:00 PM  END TIME:  3:50 PM  FACILITATOR: Jeny Gomez RN  TOPIC: NANDINI RN Group: Mental Health Maintenance  [facility] Adult Mental Health Day Treatment  TRACK: 2B    NUMBER OF PARTICIPANTS: 5    Telemedicine Visit: The patient's condition can be safely assessed and treated via synchronous audio and visual telemedicine encounter.      Reason for Telemedicine Visit: Services only offered telehealth    Originating Site (Patient Location): Patient's home    Distant Site (Provider Location): Provider Remote Setting    Consent:  The patient/guardian has verbally consented to: the potential risks and benefits of telemedicine (video visit) versus in person care; bill my insurance or make self-payment for services provided; and responsibility for payment of non-covered services.     Mode of Communication:  Video Conference via [employer]    As the provider I attest to compliance with applicable laws and regulations related to telemedicine.     Summary of Group / Topics Discussed:  Mental Health Maintenance:  Holiday Preparedness: Patients explored perceptions and emotions surrounding the upcoming winter holiday. Various topics of self-care and symptom management were discussed within the group. Specific focus was paid to identifying social support network to utilize when needed and the impact of the COVID-19 pandemic on holidays and families was discussed.    Patient Session Goals / Objectives:  ? Patients examined own feelings about upcoming holidays to identify personal needs & plan for having a healthy holiday weekend  ? Patients participated in group discussion   ? Patients explored the concept of gratitude  ? Patients identified one technique they will use to cope with symptoms      Patient Participation / Response:  Fully participated with the  group by sharing personal reflections / insights and openly received / provided feedback with other participants.    Demonstrated understanding of topics discussed through group discussion and participation, Identified / Expressed personal readiness to practice skills and Verbalized understanding of mental health maintenance topic    Treatment Plan:  Patient has a current master individualized treatment plan.  See Epic treatment plan for more information.    Jeny Gomez RN

## 2020-12-22 NOTE — GROUP NOTE
Psychoeducation Group Note    PATIENT'S NAME: Corinne N Palm  MRN:   [id number]  :   1995  ACCT. NUMBER: [id number]  DATE OF SERVICE: 20  START TIME:  2:00 PM  END TIME:  2:50 PM  FACILITATOR: Leida Rowan RN  TOPIC: MH RN Group: Health Maintenance  Telemedicine Visit: The patient's condition can be safely assessed and treated via synchronous audio and visual telemedicine encounter.      Reason for Telemedicine Visit:  covid19    Originating Site (Patient Location): Patient's home    Distant Site (Provider Location): Provider Remote Setting    Consent:  The patient/guardian has verbally consented to: the potential risks and benefits of telemedicine (video visit) versus in person care; bill my insurance or make self-payment for services provided; and responsibility for payment of non-covered services.     Mode of Communication:  Video Conference via [employer]    As the provider I attest to compliance with applicable laws and regulations related to telemedicine.      [facility] Adult Mental Health Day Treatment  TRACK: 2B    NUMBER OF PARTICIPANTS: 5, then 6    Summary of Group / Topics Discussed:  Health Maintenance: Wellness Check-in: Patients met with group facilitator to individually review a holistic wellness check-in to assess patient medication adherence/concerns, appointments, physical and mental health, exercise, nutrition, sleep, socialization, substance use, and need for service/resource referrals.       Patient Session Goals / Objectives:    Discussed various aspects of health management and self-care related to physical and mental health    Demonstrated increased self-awareness of current wellness needs    Developed health literacy skills in navigating the healthcare system and self-advocacy/communicating needs with health care team          Patient Participation / Response:  Fully participated with the group by sharing personal reflections / insights and openly received /  provided feedback with other participants.    Demonstrated understanding of topics discussed through group discussion and participation and Identified / Expressed personal readiness to practice skills    Treatment Plan:  Patient has a current master individualized treatment plan.  See Epic treatment plan for more information.    Leida Rowan RN

## 2020-12-22 NOTE — GROUP NOTE
Process Group Note    PATIENT'S NAME: Corinne N Palm  MRN:   [id number]  :   1995  ACCT. NUMBER: [id number]  DATE OF SERVICE: 20  START TIME:  1:00 PM  END TIME:  1:50 PM  FACILITATOR: Chelsea Hutchison  TOPIC:  Process Group    Diagnoses:  1.  Major depressive disorder, recurrent, severe without psychosis  2.  Generalized anxiety disorder.   3.  Anorexia nervosa.   4.  Borderline personality disorder.      [facility] Day Treatment  TRACK: 2B    Telemedicine Visit: The patient's condition can be safely assessed and treated via synchronous audio and visual telemedicine encounter.      Reason for Telemedicine Visit:  covid 19    Originating Site (Patient Location): Patient's home    Distant Site (Provider Location): Provider Remote Setting    Consent:  The patient/guardian has verbally consented to: the potential risks and benefits of telemedicine (video visit) versus in person care; bill my insurance or make self-payment for services provided; and responsibility for payment of non-covered services.     Mode of Communication:  Video Conference via [employer]    As the provider I attest to compliance with applicable laws and regulations related to telemedicine.      NUMBER OF PARTICIPANTS: 4          Data:    Session content: At the start of this group, patients were invited to check in by identifying themselves, describing their current emotional status, and identifying issues to address in this group.   Area(s) of treatment focus addressed in this session included Symptom Management, Personal Safety, Develop / Improve Independent Living Skills and Develop Socialization / Interpersonal Relationship Skills.  Lida reported having an okay night with her  and roommate watching Tv and noticing that she felt distracted.  Otherwise she continues to struggle with apathy and  Eating disorder symptoms.  SHe reported feeling physically bad and thought it might relate to low blood  sugar.  She used [employer] diabetes  to check her blood sugar and did find it was low.  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  She agreed it was important to take breaks from talking about her mental health personally and professional.  She is trying to focus on the holidays and positive experiences she might have during it.     Therapeutic Interventions/Treatment Strategies:  Psychotherapist offered support, feedback and validation and reinforced use of skills. Treatment modalities used include Motivational Interviewing, Cognitive Behavioral Therapy and Dialectical Behavioral Therapy. Validated and normalized.  Highlighted and reinforced skills used; connected to positive outcomes.  Provided additional skill suggestions.  Aided in creating a plan to help work towards goals.        Assessment:    Patient response:   Patient responded to session by accepting feedback, giving feedback, listening, focusing on goals, being attentive and accepting support    Possible barriers to participation / learning include: and no barriers identified    Health Issues:   Yes: Weight / dietary issues, Associated Psychological Distress       Substance Use Review:   Substance Use: No active concerns identified.    Mental Status/Behavioral Observations  Appearance:   Appropriate   Eye Contact:   Good   Psychomotor Behavior: Normal   Attitude:   Cooperative   Orientation:   All  Speech   Rate / Production: Normal    Volume:  Normal   Mood:    Anxious  Depressed  Apathetic  Affect:    Constricted   Thought Content:   Rumination and Safety reports  presence of suicidal ideation passive suicidal ideation   Thought Form:  Coherent  Logical     Insight:    Good     Plan:     Safety Plan: No current safety concerns identified.  Recommended that patient call 911 or go to the local ED should there be a change in any of these risk factors.     Barriers to treatment: None  identified    Patient Contracts (see media tab):  None    Substance Use: Not addressed in session     Continue or Discharge: Patient will continue in Adult Day Treatment (ADT)  as planned. Patient is likely to benefit from learning and using skills as they work toward the goals identified in their treatment plan.      Chelsea Hutchison  December 22, 2020

## 2020-12-28 ENCOUNTER — HOSPITAL ENCOUNTER (OUTPATIENT)
Dept: BEHAVIORAL HEALTH | Facility: CLINIC | Age: 25
End: 2020-12-28
Attending: PSYCHIATRY & NEUROLOGY
Payer: COMMERCIAL

## 2020-12-28 PROCEDURE — 90853 GROUP PSYCHOTHERAPY: CPT | Mod: GT

## 2020-12-28 PROCEDURE — G0177 OPPS/PHP; TRAIN & EDUC SERV: HCPCS | Mod: 95

## 2020-12-28 NOTE — GROUP NOTE
Psychoeducation Group Note    PATIENT'S NAME: Corinne N Palm  MRN:   [id number]  :   1995  ACCT. NUMBER: [id number]  DATE OF SERVICE: 20  START TIME:  2:00 PM  END TIME:  2:50 PM  FACILITATOR: George Pink OTR/L  TOPIC: MH Life Skills Group: Resiliency Development  Telemedicine Visit: The patient's condition can be safely assessed and treated via synchronous audio and visual telemedicine encounter.      Reason for Telemedicine Visit:  COVID-19    Originating Site (Patient Location): Patient's home    Distant Site (Provider Location): [facility]: [facility]    Consent:  The patient/guardian has verbally consented to: the potential risks and benefits of telemedicine (video visit) versus in person care; bill my insurance or make self-payment for services provided; and responsibility for payment of non-covered services.     Mode of Communication:  Video Conference via [employer]    As the provider I attest to compliance with applicable laws and regulations related to telemedicine.   [facility] Adult Mental Health Day Treatment  TRACK: 2B    NUMBER OF PARTICIPANTS: 3    Summary of Group / Topics Discussed:  Resiliency Development:  Coping with Change: Patients were taught how to identify stressors, signs of stress, coping skills, and prevention strategies for overall stress management.  Patients were given the opportunity to identify both ongoing and acute mental health symptoms and how to effectively manage these symptoms by developing an effective aftercare plan.  Patients increased awareness of community based resources.    Patient Session Goals / Objectives:    Identified how using coping skills can be used for symptom and stress management       Improved awareness of individualed symptoms and stressors and how to effectively cope     Established a relapse prevention plan to practice these skills in their own environments    Practiced and reflected on how to generalize  taught skills to their everyday life          Patient Participation / Response:  Fully participated with the group by sharing personal reflections / insights and openly received / provided feedback with other participants.    Demonstrated understanding of content through handout/group discussion , Verbalized understanding of content and Patient would benefit from additional opportunities to practice the content to be able to generalize it to their everyday life with increased intentionality, consistency, and efficacy in support of their psychiatric recovery    Treatment Plan:  Patient has a current master individualized treatment plan.  See Epic treatment plan for more information.    George Pink, OTR/L

## 2020-12-28 NOTE — GROUP NOTE
"Process Group Note    PATIENT'S NAME: Corinne N Palm  MRN:   [id number]  :   1995  ACCT. NUMBER: [id number]  DATE OF SERVICE: 20  START TIME:  1:00 PM  END TIME:  1:50 PM  FACILITATOR: Chelsea Hutchison  TOPIC:  Process Group    Diagnoses:  1.  Major depressive disorder, recurrent, severe without psychosis  2.  Generalized anxiety disorder.   3.  Anorexia nervosa.   4.  Borderline personality disorder.      [facility] Day Treatment  TRACK: 2B    Telemedicine Visit: The patient's condition can be safely assessed and treated via synchronous audio and visual telemedicine encounter.      Reason for Telemedicine Visit:  covid 19    Originating Site (Patient Location): Patient's home    Distant Site (Provider Location): Provider Remote Setting    Consent:  The patient/guardian has verbally consented to: the potential risks and benefits of telemedicine (video visit) versus in person care; bill my insurance or make self-payment for services provided; and responsibility for payment of non-covered services.     Mode of Communication:  Video Conference via [employer]    As the provider I attest to compliance with applicable laws and regulations related to telemedicine.      NUMBER OF PARTICIPANTS: 4          Data:    Session content: At the start of this group, patients were invited to check in by identifying themselves, describing their current emotional status, and identifying issues to address in this group.   Area(s) of treatment focus addressed in this session included Symptom Management, Personal Safety, Develop / Improve Independent Living Skills and Develop Socialization / Interpersonal Relationship Skills.  Lida reported returning to work  and Monday.  She reported having a \"freak out\" at work but instead of self harming (which is what she would have done in the past) she took her medications and texted her .  She denied acting on self harm.  She reported feeling " stuck in eating disorder symptoms which are leading to numbness.  She agrees these symptoms may be helping her to avoid acting on SIB. She reported suicidal ideation still present but lower than usual.  She is able to maintain safety.  She reported conflict with her mom who she does not usually have conflict with.      Therapeutic Interventions/Treatment Strategies:  Psychotherapist offered support, feedback and validation and reinforced use of skills. Treatment modalities used include Motivational Interviewing, Cognitive Behavioral Therapy and Dialectical Behavioral Therapy. Validated and normalized.  Highlighted and reinforced skills used; connected to positive outcomes.  Provided additional skill suggestions.  Aided in creating a plan to help work towards goals.        Assessment:    Patient response:   Patient responded to session by accepting feedback, listening, focusing on goals, being attentive and accepting support    Possible barriers to participation / learning include: and no barriers identified    Health Issues:   None reported       Substance Use Review:   Substance Use: No active concerns identified.    Mental Status/Behavioral Observations  Appearance:   Appropriate   Eye Contact:   Good   Psychomotor Behavior: Normal   Attitude:   Cooperative   Orientation:   All  Speech   Rate / Production: Normal    Volume:  Normal   Mood:    Anxious  Depressed   Affect:    Appropriate   Thought Content:   Rumination and Safety reports  presence of suicidal ideation passive suicidal ideation  and thoughts of self-harm  Thought Form:  Coherent  Logical     Insight:    Good     Plan:     Safety Plan: Committed to safety and agreed to follow previously developed safety coping plan.      Barriers to treatment: None identified    Patient Contracts (see media tab):  None    Substance Use: Not addressed in session     Continue or Discharge: Patient will continue in Adult Day Treatment (ADT)  as planned. Patient is likely  to benefit from learning and using skills as they work toward the goals identified in their treatment plan.      Chelsea Hutchison  December 28, 2020

## 2020-12-29 ENCOUNTER — HOSPITAL ENCOUNTER (OUTPATIENT)
Dept: BEHAVIORAL HEALTH | Facility: CLINIC | Age: 25
End: 2020-12-29
Attending: PSYCHIATRY & NEUROLOGY
Payer: COMMERCIAL

## 2020-12-29 PROCEDURE — G0177 OPPS/PHP; TRAIN & EDUC SERV: HCPCS | Mod: 95

## 2020-12-29 PROCEDURE — 90853 GROUP PSYCHOTHERAPY: CPT | Mod: GT

## 2020-12-29 NOTE — GROUP NOTE
Process Group Note    PATIENT'S NAME: Corinne N Palm  MRN:   [id number]  :   1995  ACCT. NUMBER: [id number]  DATE OF SERVICE: 20  START TIME:  1:00 PM  END TIME:  1:50 PM  FACILITATOR: Chelsea Hutchison  TOPIC:  Process Group    Diagnoses:  1.  Major depressive disorder, recurrent, severe without psychosis  2.  Generalized anxiety disorder.   3.  Anorexia nervosa.   4.  Borderline personality disorder.      [facility] Day Treatment  TRACK: 2B    Telemedicine Visit: The patient's condition can be safely assessed and treated via synchronous audio and visual telemedicine encounter.      Reason for Telemedicine Visit:  covid 19    Originating Site (Patient Location): Patient's home    Distant Site (Provider Location): Provider Remote Setting    Consent:  The patient/guardian has verbally consented to: the potential risks and benefits of telemedicine (video visit) versus in person care; bill my insurance or make self-payment for services provided; and responsibility for payment of non-covered services.     Mode of Communication:  Video Conference via [employer]    As the provider I attest to compliance with applicable laws and regulations related to telemedicine.      NUMBER OF PARTICIPANTS: 5          Data:    Session content: At the start of this group, patients were invited to check in by identifying themselves, describing their current emotional status, and identifying issues to address in this group.   Area(s) of treatment focus addressed in this session included Symptom Management, Personal Safety, Develop / Improve Independent Living Skills and Develop Socialization / Interpersonal Relationship Skills.  Lida experienced some technical difficulties.  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  She reported plans to work tomorrow but has also been asked about working Friday.  She is hesitant to assert needs that this may be too much  too fast as she worries about getting fired.  She agreed she might be able to find a third option between not working and working a full shift Friday and felt it possible to assert for a shorter shift.  She reported ruminations and catastrophizing thoughts about boss firing her because she ended a text with a period rather than an explanation jessica.  She reported knowing logically this was no the case and engaged in effective self talk to try to challenge.      Therapeutic Interventions/Treatment Strategies:  Psychotherapist offered support, feedback and validation and reinforced use of skills. Treatment modalities used include Motivational Interviewing, Cognitive Behavioral Therapy and Dialectical Behavioral Therapy. Validated and normalized.  Highlighted and reinforced skills used; connected to positive outcomes.  Provided additional skill suggestions.  Aided in creating a plan to help work towards goals.        Assessment:    Patient response:   Patient responded to session by accepting feedback, giving feedback, listening, focusing on goals, being attentive and accepting support    Possible barriers to participation / learning include: and no barriers identified    Health Issues:   Yes: Weight / dietary issues, Associated Psychological Distress       Substance Use Review:   Substance Use: No active concerns identified.    Mental Status/Behavioral Observations  Appearance:   Appropriate   Eye Contact:   Good   Psychomotor Behavior: Normal   Attitude:   Cooperative   Orientation:   All  Speech   Rate / Production: Normal    Volume:  Normal   Mood:    Anxious   Affect:    Appropriate   Thought Content:   Rumination  Thought Form:  Coherent  Logical     Insight:    Good     Plan:     Safety Plan: No current safety concerns identified.  Recommended that patient call 911 or go to the local ED should there be a change in any of these risk factors.     Barriers to treatment: None identified    Patient Contracts (see  media tab):  None    Substance Use: Not addressed in session     Continue or Discharge: Patient will continue in Adult Day Treatment (ADT)  as planned. Patient is likely to benefit from learning and using skills as they work toward the goals identified in their treatment plan.      Chelsea Hutchison  December 29, 2020

## 2020-12-29 NOTE — PROGRESS NOTES
Acknowledgement of Current Treatment Plan         I have reviewed my treatment plan with my therapist / counselor on 12/29/2020     I agree with the plan as it is written in the electronic health record. (2B)     Name:                                    Signature:  Corinne Palm  Unable to Sign due to Covid 19      Dr Inocencio Hutchison MD  Psychiatrist     Dr. Beth Lopez, [facility],      Chelsea Hutchison, [facility]  Psychotherapist  Electronically signed by Shelton Hutchison

## 2020-12-29 NOTE — GROUP NOTE
Psychotherapy Group Note    PATIENT'S NAME: Corinne N Palm  MRN:   [id number]  :   1995  ACCT. NUMBER: [id number]  DATE OF SERVICE: 20  START TIME:  3:00 PM  END TIME:  3:50 PM  FACILITATOR: Chelsea Hutchison  TOPIC: MH EBP Group: Specialty Awareness  [facility] Adult Mental Health Day Treatment  TRACK: 2B    Telemedicine Visit: The patient's condition can be safely assessed and treated via synchronous audio and visual telemedicine encounter.      Reason for Telemedicine Visit: Services only offered telehealth covid 19    Originating Site (Patient Location): Patient's home    Distant Site (Provider Location): Provider Remote Setting    Consent:  The patient/guardian has verbally consented to: the potential risks and benefits of telemedicine (video visit) versus in person care; bill my insurance or make self-payment for services provided; and responsibility for payment of non-covered services.     Mode of Communication:  Video Conference via [employer]    As the provider I attest to compliance with applicable laws and regulations related to telemedicine.      NUMBER OF PARTICIPANTS: 5    Summary of Group / Topics Discussed:  Specialty Topics: Life Transitions: The topic of life transitions was presented in order to help patients to better understand the challenges presented by life transitions, and how to best navigate them. Exploring the phases of transition and how one works through them was discussed. Patients were provided with information regarding community resources.     Patient Session Goals / Objectives:    Discussed the timing and nature of major life transitions    Explored how life transitions may impact mental health and functioning    Discussed coping strategies to manage symptoms and help with transitioning    Discussed and planned a successful transition        Patient Participation / Response:  Fully participated with the group by sharing personal reflections / insights and  openly received / provided feedback with other participants.    Demonstrated understanding of topics discussed through group discussion and participation, Identified / Expressed readiness to act on skill suggestions discussed in topic and Verbalized understanding of ways to proactively manage illness    Treatment Plan:  Patient has a current master individualized treatment plan.  See Epic treatment plan for more information.    Chelsea Hutchison

## 2020-12-29 NOTE — GROUP NOTE
Psychoeducation Group Note    PATIENT'S NAME: Corinne N Palm  MRN:   [id number]  :   1995  ACCT. NUMBER: [id number]  DATE OF SERVICE: 20  START TIME:  2:00 PM  END TIME:  2:50 PM  FACILITATOR: Leida Rowan RN  TOPIC: MH RN Group: Health Maintenance  Telemedicine Visit: The patient's condition can be safely assessed and treated via synchronous audio and visual telemedicine encounter.      Reason for Telemedicine Visit:  covid19    Originating Site (Patient Location): Patient's home    Distant Site (Provider Location): Provider Remote Setting    Consent:  The patient/guardian has verbally consented to: the potential risks and benefits of telemedicine (video visit) versus in person care; bill my insurance or make self-payment for services provided; and responsibility for payment of non-covered services.     Mode of Communication:  Video Conference via [employer]    As the provider I attest to compliance with applicable laws and regulations related to telemedicine.      [facility] Adult Mental Health Day Treatment  TRACK: 2B    NUMBER OF PARTICIPANTS: 5    Summary of Group / Topics Discussed:  Health Maintenance: Wellness Check-in: Patients met with group facilitator to individually review a holistic wellness check-in to assess patient medication adherence/concerns, appointments, physical and mental health, exercise, nutrition, sleep, socialization, substance use, and need for service/resource referrals.       Patient Session Goals / Objectives:    Discussed various aspects of health management and self-care related to physical and mental health    Demonstrated increased self-awareness of current wellness needs    Developed health literacy skills in navigating the healthcare system and self-advocacy/communicating needs with health care team          Patient Participation / Response:  Fully participated with the group by sharing personal reflections / insights and openly received / provided  feedback with other participants.    Demonstrated understanding of topics discussed through group discussion and participation and Identified / Expressed personal readiness to practice skills    Treatment Plan:  Patient has a current master individualized treatment plan.  See Epic treatment plan for more information.    Leida Rowan RN

## 2020-12-31 ENCOUNTER — HOSPITAL ENCOUNTER (OUTPATIENT)
Dept: BEHAVIORAL HEALTH | Facility: CLINIC | Age: 25
End: 2020-12-31
Attending: PSYCHIATRY & NEUROLOGY
Payer: COMMERCIAL

## 2020-12-31 PROCEDURE — G0177 OPPS/PHP; TRAIN & EDUC SERV: HCPCS | Mod: 95 | Performed by: OCCUPATIONAL THERAPIST

## 2020-12-31 PROCEDURE — G0177 OPPS/PHP; TRAIN & EDUC SERV: HCPCS | Mod: GT

## 2020-12-31 NOTE — GROUP NOTE
Psychoeducation Group Note    PATIENT'S NAME: Corinne N Palm  MRN:   [id number]  :   1995  ACCT. NUMBER: [id number]  DATE OF SERVICE: 20  START TIME:  3:00 PM  END TIME:  3:50 PM  FACILITATOR: Leida Rowan RN  TOPIC:  RN Group: Specialty Health  Telemedicine Visit: The patient's condition can be safely assessed and treated via synchronous audio and visual telemedicine encounter.      Reason for Telemedicine Visit:  covid19    Originating Site (Patient Location): Patient's home    Distant Site (Provider Location): Provider Remote Setting    Consent:  The patient/guardian has verbally consented to: the potential risks and benefits of telemedicine (video visit) versus in person care; bill my insurance or make self-payment for services provided; and responsibility for payment of non-covered services.     Mode of Communication:  Video Conference via [employer]    As the provider I attest to compliance with applicable laws and regulations related to telemedicine.      [facility] Mental Health Day Treatment  TRACK: 2B    NUMBER OF PARTICIPANTS: 4    Summary of Group / Topics Discussed:  Specialty Health:  Specialty Health: Vagus Nerve and Breath Work: Patients discussed the impact of Vagus nerve stimulation on the nervous system and how to support the vagus nerve using  breath work. Pts also practiced some breath work.     Patient Session Goals / Objectives:    Patients will understand the role of the vagus nerve in the nervous system    Patients will practice various breathwork techniques to promote sense of calm    Patients will identify type of breathwork they find effective      Patient Participation / Response:  Fully participated with the group by sharing personal reflections / insights and openly received / provided feedback with other participants.    Demonstrated understanding of topics discussed through group discussion and participation and Identified / Expressed personal  readiness to practice skills    Treatment Plan:  Patient has a current master individualized treatment plan.  See Epic treatment plan for more information.    Leida Rowan RN

## 2020-12-31 NOTE — GROUP NOTE
Psychoeducation Group Note    PATIENT'S NAME: Corinne N Palm  MRN:   [id number]  :   1995  ACCT. NUMBER: [id number]  DATE OF SERVICE: 20  START TIME:  2:00 PM  END TIME:  2:50 PM  FACILITATOR: William Velez OTR/L  TOPIC: MH Life Skills Group: Sensory Approaches in Mental Health  [facility] Adult Mental Health Day Treatment  TRACK: 2B    NUMBER OF PARTICIPANTS: 5  Telemedicine Visit: The patient's condition can be safely assessed and treated via synchronous audio and visual telemedicine encounter.      Reason for Telemedicine Visit: Services only offered telehealth    Originating Site (Patient Location): Patient's home    Distant Site (Provider Location): [facility]: [facility]    Consent:  The patient/guardian has verbally consented to: the potential risks and benefits of telemedicine (video visit) versus in person care; bill my insurance or make self-payment for services provided; and responsibility for payment of non-covered services.     Mode of Communication:  Video Conference via [employer]    As the provider I attest to compliance with applicable laws and regulations related to telemedicine.      Summary of Group / Topics Discussed:  Sensory Approaches in Mental Health:  Sensory Enhanced Mindfulness: Patients were taught and provided with an opportunity to explore and practice how using sensory enhanced mindfulness practices can help them stay grounded in the present moment as a way to manage mental health symptoms and stressors.         Patient Session Goals / Objectives:    Identified how using sensory enhanced mindfulness practices can be used for grounding, stress management, and self regulation      Improved awareness of different types of sensory enhanced mindfulness activities that assist with healthy coping of stress and symptoms      Established a plan for practice of these skills in their own environments    Practiced and reflected on how to generalize  taught skills to their everyday life        Patient Participation / Response:  Fully participated with the group by sharing personal reflections / insights and openly received / provided feedback with other participants.    Patient presentation: noted personal preferences and changes she has had to make, open to learn and practice techniques offered, Verbalized understanding of content and Patient would benefit from additional opportunities to practice the content to be able to generalize it to their everyday life with increased intentionality, consistency, and efficacy in support of their psychiatric recovery    Treatment Plan:  Patient has a current master individualized treatment plan.  See Epic treatment plan for more information.    William Velez, OTR/L

## 2021-01-04 ENCOUNTER — HOSPITAL ENCOUNTER (OUTPATIENT)
Dept: BEHAVIORAL HEALTH | Facility: CLINIC | Age: 26
End: 2021-01-04
Attending: PSYCHIATRY & NEUROLOGY
Payer: COMMERCIAL

## 2021-01-04 PROCEDURE — G0177 OPPS/PHP; TRAIN & EDUC SERV: HCPCS | Mod: 95

## 2021-01-04 PROCEDURE — 90853 GROUP PSYCHOTHERAPY: CPT | Mod: 95

## 2021-01-04 PROCEDURE — G0177 OPPS/PHP; TRAIN & EDUC SERV: HCPCS | Mod: GT

## 2021-01-04 NOTE — GROUP NOTE
Process Group Note    PATIENT'S NAME: Corinne N Palm  MRN:   [id number]  :   1995  ACCT. NUMBER: [id number]  DATE OF SERVICE: 21  START TIME:  1:00 PM  END TIME:  1:50 PM  FACILITATOR: Chelsea Hutchison  TOPIC:  Process Group    Diagnoses:  1.  Major depressive disorder, recurrent, severe without psychosis  2.  Generalized anxiety disorder.   3.  Anorexia nervosa.   4.  Borderline personality disorder.      [facility] Day Treatment  TRACK: 2B    Telemedicine Visit: The patient's condition can be safely assessed and treated via synchronous audio and visual telemedicine encounter.      Reason for Telemedicine Visit:  covid 19    Originating Site (Patient Location): Patient's home    Distant Site (Provider Location): Provider Remote Setting    Consent:  The patient/guardian has verbally consented to: the potential risks and benefits of telemedicine (video visit) versus in person care; bill my insurance or make self-payment for services provided; and responsibility for payment of non-covered services.     Mode of Communication:  Video Conference via [employer]    As the provider I attest to compliance with applicable laws and regulations related to telemedicine.      NUMBER OF PARTICIPANTS: 5          Data:    Session content: At the start of this group, patients were invited to check in by identifying themselves, describing their current emotional status, and identifying issues to address in this group.   Area(s) of treatment focus addressed in this session included Symptom Management, Personal Safety, Develop / Improve Independent Living Skills and Develop Socialization / Interpersonal Relationship Skills.  Lida reported playing games online with her  and their friends which was a positive.  She also reported returning to work.  She enjoys her work and feels it is meaningful so this was a positive but it came with some stressors.  She has historically struggled with SI  thoughts while at work and they have returned.  SHe took a PRN to help her cope and noted that the thoughts/urges were less than the had been in the past while at work.  She reported feeling caught off guard by their return so fast.  She feels numb and not here as a coping mechanism related to eating disorder.  She reported reading helps but has been reading a book related to mental health.  She stated plans to get a book not related to mental health.  She committed to safety with SI thoughts.      Therapeutic Interventions/Treatment Strategies:  Psychotherapist offered support, feedback and validation and reinforced use of skills. Treatment modalities used include Motivational Interviewing, Cognitive Behavioral Therapy and Dialectical Behavioral Therapy. Validated and normalized.  Highlighted and reinforced skills used; connected to positive outcomes.  Provided additional skill suggestions.  Aided in creating a plan to help work towards goals.        Assessment:    Patient response:   Patient responded to session by accepting feedback, giving feedback, listening, focusing on goals, being attentive and accepting support    Possible barriers to participation / learning include: and no barriers identified    Health Issues:   Yes: Weight / dietary issues, Associated Psychological Distress       Substance Use Review:   Substance Use: No active concerns identified.    Mental Status/Behavioral Observations  Appearance:   Appropriate   Eye Contact:   Fair   Psychomotor Behavior: Normal   Attitude:   Cooperative   Orientation:   All  Speech   Rate / Production: Normal    Volume:  Normal   Mood:    Anxious  Depressed   Affect:    Appropriate   Thought Content:   Rumination and Safety reports  presence of suicidal ideation passive suicidal ideation  and thoughts of self-harm  Thought Form:  Coherent  Logical     Insight:    Good     Plan:     Safety Plan: Committed to safety and agreed to follow previously developed safety  coping plan.      Barriers to treatment: None identified    Patient Contracts (see media tab):  None    Substance Use: Not addressed in session     Continue or Discharge: Patient will continue in Adult Day Treatment (ADT)  as planned. Patient is likely to benefit from learning and using skills as they work toward the goals identified in their treatment plan.      Chelsea Hutchison  January 4, 2021

## 2021-01-04 NOTE — GROUP NOTE
Psychoeducation Group Note    PATIENT'S NAME: Corinne N Palm  MRN:   [id number]  :   1995  ACCT. NUMBER: [id number]  DATE OF SERVICE:   START TIME:  2:00 PM2  END TIME:  2:50 PM  FACILITATOR: George Pink OTR/L  TOPIC: MH Life Skills Group: Resiliency Development  Telemedicine Visit: The patient's condition can be safely assessed and treated via synchronous audio and visual telemedicine encounter.      Reason for Telemedicine Visit: COVID-19    Originating Site (Patient Location): Patient's home    Distant Site (Provider Location): [facility]: [facility]    Consent:  The patient/guardian has verbally consented to: the potential risks and benefits of telemedicine (video visit) versus in person care; bill my insurance or make self-payment for services provided; and responsibility for payment of non-covered services.     Mode of Communication:  Video Conference via [employer]    As the provider I attest to compliance with applicable laws and regulations related to telemedicine.   [facility] Adult Mental Health Day Treatment  TRACK: 2B    NUMBER OF PARTICIPANTS: 4    Summary of Group / Topics Discussed:  Resiliency Development:  -Influence and Stress: Patients were taught how to identify stressors, signs of stress, coping skills, and prevention strategies for overall stress management.  Patients were given the opportunity to identify both ongoing and acute mental health symptoms and how to effectively manage these symptoms by developing an effective aftercare plan.  Patients increased awareness of community based resources.    Patient Session Goals / Objectives:    Identified how using coping skills can be used for symptom and stress management       Improved awareness of individualed symptoms and stressors and how to effectively cope     Established a relapse prevention plan to practice these skills in their own environments    Practiced and reflected  on how to generalize taught skills to their everyday life          Patient Participation / Response:  Fully participated with the group by sharing personal reflections / insights and openly received / provided feedback with other participants.    Demonstrated understanding of content through handout/group discussion , Verbalized understanding of content and Patient would benefit from additional opportunities to practice the content to be able to generalize it to their everyday life with increased intentionality, consistency, and efficacy in support of their psychiatric recovery    Treatment Plan:  Patient has a current master individualized treatment plan.  See Epic treatment plan for more information.    George Pink, OTR/L

## 2021-01-05 ENCOUNTER — HOSPITAL ENCOUNTER (OUTPATIENT)
Dept: BEHAVIORAL HEALTH | Facility: CLINIC | Age: 26
End: 2021-01-05
Attending: PSYCHIATRY & NEUROLOGY
Payer: COMMERCIAL

## 2021-01-05 PROCEDURE — G0177 OPPS/PHP; TRAIN & EDUC SERV: HCPCS | Mod: GT

## 2021-01-05 PROCEDURE — 90853 GROUP PSYCHOTHERAPY: CPT | Mod: GT

## 2021-01-05 NOTE — GROUP NOTE
Process Group Note    PATIENT'S NAME: Corinne N Palm  MRN:   [id number]  :   1995  ACCT. NUMBER: [id number]  DATE OF SERVICE: 21  START TIME:  3:00 PM  END TIME:  3:50 PM  FACILITATOR: Chelsea Hutchison  TOPIC:  Process Group    Diagnoses:  1.  Major depressive disorder, recurrent, severe without psychosis  2.  Generalized anxiety disorder.   3.  Anorexia nervosa.   4.  Borderline personality disorder.      [facility] Day Treatment  TRACK: 2B    Telemedicine Visit: The patient's condition can be safely assessed and treated via synchronous audio and visual telemedicine encounter.      Reason for Telemedicine Visit:  covid 19    Originating Site (Patient Location): Patient's home    Distant Site (Provider Location): Provider Remote Setting    Consent:  The patient/guardian has verbally consented to: the potential risks and benefits of telemedicine (video visit) versus in person care; bill my insurance or make self-payment for services provided; and responsibility for payment of non-covered services.     Mode of Communication:  Video Conference via [employer]    As the provider I attest to compliance with applicable laws and regulations related to telemedicine.     NUMBER OF PARTICIPANTS: 6          Data:    Session content: At the start of this group, patients were invited to check in by identifying themselves, describing their current emotional status, and identifying issues to address in this group.   Area(s) of treatment focus addressed in this session included Symptom Management, Personal Safety, Develop / Improve Independent Living Skills and Develop Socialization / Interpersonal Relationship Skills.  Lida reported working out obsessive working out against doctor s recommendations.  She is working on cutting this down and agreed to switch gears to yoga rather than stop completely.  She reported intrusive suicidal thoughts that she worries are her own thoughts.  She  reported asking psych to have more than a weeks worth of meds at a time and has gotten amonths worth.   She did not tell her  yet and this is part of her worries with her thoughts.  She reports being at a 6/10 in intensity and worries it is dangerous when it is an 8 or 9/10.  She committed to safety and will talk to her  about it.      Therapeutic Interventions/Treatment Strategies:  Psychotherapist offered support, feedback and validation and reinforced use of skills. Treatment modalities used include Motivational Interviewing, Cognitive Behavioral Therapy and Dialectical Behavioral Therapy.  Validated and normalized.  Highlighted and reinforced skills used; connected to positive outcomes.  Provided additional skill suggestions.  Aided in creating a plan to help work towards goals.      Assessment:    Patient response:   Patient responded to session by accepting feedback, giving feedback, listening, focusing on goals, being attentive and accepting support    Possible barriers to participation / learning include: and no barriers identified    Health Issues:   None reported       Substance Use Review:   Substance Use: No active concerns identified.    Mental Status/Behavioral Observations  Appearance:   Appropriate   Eye Contact:   Good   Psychomotor Behavior: Normal   Attitude:   Cooperative   Orientation:   All  Speech   Rate / Production: Normal    Volume:  Normal   Mood:    Anxious  Depressed   Affect:    Appropriate   Thought Content:   Rumination and Safety reports  presence of suicidal ideation passive suicidal ideation   Thought Form:  Coherent  Logical     Insight:    Good     Plan:     Safety Plan: Committed to safety and agreed to follow previously developed safety coping plan.      Barriers to treatment: None identified    Patient Contracts (see media tab):  None    Substance Use: Not addressed in session     Continue or Discharge: Patient will continue in Adult Day Treatment (ADT)  as  planned. Patient is likely to benefit from learning and using skills as they work toward the goals identified in their treatment plan.      Chelsea Hutchison  January 5, 2021

## 2021-01-05 NOTE — GROUP NOTE
Psychoeducation Group Note    PATIENT'S NAME: Corinne N Palm  MRN:   [id number]  :   1995  ACCT. NUMBER: [id number]  DATE OF SERVICE: 21  START TIME:  1:00 PM  END TIME:  1:50 PM  FACILITATOR: Mor Soni OT  TOPIC: MH Life Skills Group: Sensory Approaches in Mental Health  [facility] Adult Mental Health Day Treatment  TRACK: 2B    Telemedicine Visit: The patient's condition can be safely assessed and treated via synchronous audio and visual telemedicine encounter.      Reason for Telemedicine Visit: Services only offered telehealth and Covid 19    Originating Site (Patient Location): Patient's home    Distant Site (Provider Location): Provider Remote Setting    Consent:  The patient/guardian has verbally consented to: the potential risks and benefits of telemedicine (video visit) versus in person care; bill my insurance or make self-payment for services provided; and responsibility for payment of non-covered services.     Mode of Communication:  Video Conference via [employer] [facility]    As the provider I attest to compliance with applicable laws and regulations related to telemedicine.      NUMBER OF PARTICIPANTS: 5    Summary of Group / Topics Discussed:  Sensory Approaches in Mental Health:  Self Regulation Through Auditory awareness and preferences:  Patients were provided with education on Autonomic Nervous System activation and taught neurosensory based skills that can be used immediately to help them calm down and regain self control.  Patients were taught how to recognize specific signs and symptoms of their individualized state of arousal and how to make changes when needed.  Patients explored auditorybased skills (bottom up processing skills) and techniques to help manage symptoms and change level of arousal when needed to be in control and comfortable so they are able to function in different environments.       Patient Session Goals / Objectives:    Identified specific and  individualized neurosensory skills to help when distressed and for crisis management    Identified signs and symptoms of current level of arousal and ways to make changes in level of arousal when needed    Established a plan for practice of these skills in their own environments        Patient Participation / Response:  Fully participated with the group by sharing personal reflections / insights and openly received / provided feedback with other participants.    Verbalized understanding of content and Patient would benefit from additional opportunities to practice the content to be able to generalize it to their everyday life with increased intentionality, consistency, and efficacy in support of their psychiatric recovery    Treatment Plan:  Patient has a current master individualized treatment plan.  See Epic treatment plan for more information.    Mor Soni, OT

## 2021-01-05 NOTE — GROUP NOTE
Psychoeducation Group Note    PATIENT'S NAME: Corinne N Palm  MRN:   [id number]  :   1995  ACCT. NUMBER: [id number]  DATE OF SERVICE: 21  START TIME:  2:00 PM  END TIME:  2:50 PM  FACILITATOR: Leida Rowan RN  TOPIC:  RN Group: Health Maintenance  Telemedicine Visit: The patient's condition can be safely assessed and treated via synchronous audio and visual telemedicine encounter.      Reason for Telemedicine Visit:  covid19    Originating Site (Patient Location): Patient's home    Distant Site (Provider Location): Provider Remote Setting    Consent:  The patient/guardian has verbally consented to: the potential risks and benefits of telemedicine (video visit) versus in person care; bill my insurance or make self-payment for services provided; and responsibility for payment of non-covered services.     Mode of Communication:  Video Conference via zoom    As the provider I attest to compliance with applicable laws and regulations related to telemedicine.      [facility] Adult Mental Health Day Treatment  TRACK: 2B    NUMBER OF PARTICIPANTS: 6    Summary of Group / Topics Discussed:  Health Maintenance: Wellness Check-in: Patients met with group facilitator to individually review a holistic wellness check-in to assess patient medication adherence/concerns, appointments, physical and mental health, exercise, nutrition, sleep, socialization, substance use, and need for service/resource referrals.       Patient Session Goals / Objectives:    Discussed various aspects of health management and self-care related to physical and mental health    Demonstrated increased self-awareness of current wellness needs    Developed health literacy skills in navigating the healthcare system and self-advocacy/communicating needs with health care team          Patient Participation / Response:  Fully participated with the group by sharing personal reflections / insights and openly received / provided feedback with  other participants.    Demonstrated understanding of topics discussed through group discussion and participation and Identified / Expressed personal readiness to practice skills    Treatment Plan:  Patient has a current master individualized treatment plan.  See Epic treatment plan for more information.    Leida Rowan RN

## 2021-01-07 ENCOUNTER — HOSPITAL ENCOUNTER (OUTPATIENT)
Dept: BEHAVIORAL HEALTH | Facility: CLINIC | Age: 26
End: 2021-01-07
Attending: PSYCHIATRY & NEUROLOGY
Payer: COMMERCIAL

## 2021-01-07 PROCEDURE — 90853 GROUP PSYCHOTHERAPY: CPT | Mod: 95

## 2021-01-07 PROCEDURE — G0177 OPPS/PHP; TRAIN & EDUC SERV: HCPCS | Mod: GT

## 2021-01-07 PROCEDURE — 90853 GROUP PSYCHOTHERAPY: CPT | Mod: GT

## 2021-01-07 NOTE — GROUP NOTE
Psychotherapy Group Note    PATIENT'S NAME: Corinne N Palm  MRN:   [id number]  :   1995  ACCT. NUMBER: [id number]  DATE OF SERVICE: 21  START TIME:  2:00 PM  END TIME:  2:50 PM  FACILITATOR: Chelsea Hutchison  TOPIC: MH EBP Group: Relationship Skills  [facility] Adult Mental Health Day Treatment  TRACK: 2B    Telemedicine Visit: The patient's condition can be safely assessed and treated via synchronous audio and visual telemedicine encounter.      Reason for Telemedicine Visit:  covid 19    Originating Site (Patient Location): Patient's home    Distant Site (Provider Location): Provider Remote Setting    Consent:  The patient/guardian has verbally consented to: the potential risks and benefits of telemedicine (video visit) versus in person care; bill my insurance or make self-payment for services provided; and responsibility for payment of non-covered services.     Mode of Communication:  Video Conference via [employer]    As the provider I attest to compliance with applicable laws and regulations related to telemedicine.      NUMBER OF PARTICIPANTS: 6    Summary of Group / Topics Discussed:  Relationship Skills: Boundaries: Patients were provided with a general overview of interpersonal boundaries and how lack of boundaries relates to symptoms and functioning. The purpose is to help patients identify boundary issues and gain awareness and skills to work towards healthier interpersonal boundaries. Current awareness of healthy boundary characteristics and barriers to establishing healthy boundaries were discussed.    Patient Session Goals / Objectives:    Familiarized patients with the concept of interpersonal boundaries and their characteristics    Discussed and practiced strategies to promote healthier interpersonal boundaries    Identified boundary issues and identified plan to improve boundaries      Patient Participation / Response:  Moderately participated, sharing some personal reflections  / insights and adequately adequately received / provided feedback with other participants.    Demonstrated understanding of topics discussed through group discussion and participation and Demonstrated understanding of relationship skills and communication skills    Treatment Plan:  Patient has a current master individualized treatment plan.  See Epic treatment plan for more information.    Chelsea Hutchison

## 2021-01-07 NOTE — GROUP NOTE
Psychoeducation Group Note    PATIENT'S NAME: Corinne N Palm  MRN:   [id number]  :   1995  ACCT. NUMBER: [id number]  DATE OF SERVICE: 21  START TIME:  3:00 PM  END TIME:  3:50 PM  FACILITATOR: Leida Rowan RN  TOPIC: MH RN Group: Mental Health Maintenance  Telemedicine Visit: The patient's condition can be safely assessed and treated via synchronous audio and visual telemedicine encounter.      Reason for Telemedicine Visit:  covid19    Originating Site (Patient Location): Patient's home    Distant Site (Provider Location): Provider Remote Setting    Consent:  The patient/guardian has verbally consented to: the potential risks and benefits of telemedicine (video visit) versus in person care; bill my insurance or make self-payment for services provided; and responsibility for payment of non-covered services.     Mode of Communication:  Video Conference via [employer]    As the provider I attest to compliance with applicable laws and regulations related to telemedicine.      [facility] Adult Mental Health Day Treatment  TRACK: 2B    NUMBER OF PARTICIPANTS: 6    Summary of Group / Topics Discussed:  Mental Health Maintenance:  Stigma: In this group patients explored stigma surrounding a mental health diagnosis.  The group discussed the way stigma impacts their own life, and discussed strategies to reduce. The relationship between physical and mental health were also explored in the context of healthcare access, treatment, and support.    Patient Session Goals / Objectives:  ? Patients identified the importance of practicing emotional hygiene  ? Patients identified ways to decrease the  impact of stigma in their own life          Patient Participation / Response:  Fully participated with the group by sharing personal reflections / insights and openly received / provided feedback with other participants.    Demonstrated understanding of topics discussed through group discussion and participation  and Identified / Expressed personal readiness to practice skills    Treatment Plan:  Patient has a current master individualized treatment plan.  See Epic treatment plan for more information.    Leida Rowan RN

## 2021-01-07 NOTE — GROUP NOTE
Process Group Note    PATIENT'S NAME: Corinne N Palm  MRN:   [id number]  :   1995  ACCT. NUMBER: [id number]  DATE OF SERVICE: 21  START TIME:  1:00 PM  END TIME:  1:50 PM  FACILITATOR: Chelsea Hutchison  TOPIC:  Process Group    Diagnoses:  1.  Major depressive disorder, recurrent, severe without psychosis  2.  Generalized anxiety disorder.   3.  Anorexia nervosa.   4.  Borderline personality disorder.      [facility] Day [facility]  TRACK: 2B    Telemedicine Visit: The patient's condition can be safely assessed and treated via synchronous audio and visual telemedicine encounter.      Reason for Telemedicine Visit:  covid 19    Originating Site (Patient Location): Patient's home    Distant Site (Provider Location): Provider Remote Setting    Consent:  The patient/guardian has verbally consented to: the potential risks and benefits of telemedicine (video visit) versus in person care; bill my insurance or make self-payment for services provided; and responsibility for payment of non-covered services.     Mode of Communication:  Video Conference via [employer]    As the provider I attest to compliance with applicable laws and regulations related to telemedicine.      NUMBER OF PARTICIPANTS: 6          Data:    Session content: At the start of this group, patients were invited to check in by identifying themselves, describing their current emotional status, and identifying issues to address in this group.   Area(s) of treatment focus addressed in this session included Symptom Management, Personal Safety, Develop / Improve Independent Living Skills and Develop Socialization / Interpersonal Relationship Skills.  Lida reported she is not doing it well.  She reported feeling tired and down.  She reported feeling unsure of the catalyst.  She reported that at work yesterday she had SIB and SI urges with no action taken.  She is working on staying effectively distracted.  She feels able  to maintain safety when these thoughts are present.  She reported plans to talk with psychiatrist later today and already spoke with IT.  She reported struggling with negative self judgments about low mood, high symptoms, and struggling to do anything.  She was open to encouragement to be gentle and give self a break to have symptoms before returning to skill use in the future.      Therapeutic Interventions/Treatment Strategies:  Psychotherapist offered support, feedback and validation and reinforced use of skills. Treatment modalities used include Motivational Interviewing, Cognitive Behavioral Therapy and Dialectical Behavioral Therapy. Validated and normalized.  Highlighted and reinforced skills used; connected to positive outcomes.  Provided additional skill suggestions.  Aided in creating a plan to help work towards goals.        Assessment:    Patient response:   Patient responded to session by accepting feedback, giving feedback, listening, focusing on goals, being attentive and accepting support    Possible barriers to participation / learning include: and no barriers identified    Health Issues:   None reported       Substance Use Review:   Substance Use: No active concerns identified.    Mental Status/Behavioral Observations  Appearance:   Appropriate   Eye Contact:   Good   Psychomotor Behavior: Normal   Attitude:   Cooperative   Orientation:   All  Speech   Rate / Production: Normal    Volume:  Normal   Mood:    Anxious  Depressed   Affect:    Appropriate   Thought Content:   Rumination and Safety reports  presence of suicidal ideation passive suicidal ideation  and thoughts of self-harm  Thought Form:  Coherent  Logical     Insight:    Good     Plan:     Safety Plan: Committed to safety and agreed to follow previously developed safety coping plan.      Barriers to treatment: None identified    Patient Contracts (see media tab):  None    Substance Use: Not addressed in session     Continue or Discharge:  Patient will continue in Adult Day Treatment (ADT)  as planned. Patient is likely to benefit from learning and using skills as they work toward the goals identified in their treatment plan.      Chelsea Hutchison  January 7, 2021

## 2021-01-11 ENCOUNTER — HOSPITAL ENCOUNTER (OUTPATIENT)
Dept: BEHAVIORAL HEALTH | Facility: CLINIC | Age: 26
End: 2021-01-11
Attending: PSYCHIATRY & NEUROLOGY
Payer: COMMERCIAL

## 2021-01-11 PROCEDURE — G0177 OPPS/PHP; TRAIN & EDUC SERV: HCPCS | Mod: 95

## 2021-01-11 PROCEDURE — 90853 GROUP PSYCHOTHERAPY: CPT | Mod: 95

## 2021-01-11 NOTE — GROUP NOTE
Psychoeducation Group Note    PATIENT'S NAME: Corinne N Palm  MRN:   [id number]  :   1995  ACCT. NUMBER: [id number]  DATE OF SERVICE: 21  START TIME:  2:00 PM  END TIME:  2:50 PM  FACILITATOR: George Pink OTR/L  TOPIC: MH Life Skills Group: Resiliency Development  Telemedicine Visit: The patient's condition can be safely assessed and treated via synchronous audio and visual telemedicine encounter.      Reason for Telemedicine Visit:  COVID-19    Originating Site (Patient Location): Patient's home    Distant Site (Provider Location): [facility]: [facility]    Consent:  The patient/guardian has verbally consented to: the potential risks and benefits of telemedicine (video visit) versus in person care; bill my insurance or make self-payment for services provided; and responsibility for payment of non-covered services.     Mode of Communication:  Video Conference via [employer]    As the provider I attest to compliance with applicable laws and regulations related to telemedicine.   [facility] Adult Mental Health Day Treatment  TRACK: 2B    NUMBER OF PARTICIPANTS: 4    Summary of Group / Topics Discussed:  Resiliency Development:  Heathy Body/Healthy Mind: Patients were taught how to identify stressors, signs of stress, coping skills, and prevention strategies for overall stress management.  Patients were given the opportunity to identify both ongoing and acute mental health symptoms and how to effectively manage these symptoms by developing an effective aftercare plan.  Patients increased awareness of community based resources.    Patient Session Goals / Objectives:    Identified how using coping skills can be used for symptom and stress management       Improved awareness of individualed symptoms and stressors and how to effectively cope     Established a relapse prevention plan to practice these skills in their own environments    Practiced and reflected on how to  generalize taught skills to their everyday life          Patient Participation / Response:  Fully participated with the group by sharing personal reflections / insights and openly received / provided feedback with other participants.    Demonstrated understanding of content through handout/group discussion , Verbalized understanding of content and Patient would benefit from additional opportunities to practice the content to be able to generalize it to their everyday life with increased intentionality, consistency, and efficacy in support of their psychiatric recovery    Treatment Plan:  Patient has a current master individualized treatment plan.  See Epic treatment plan for more information.    George Pink, OTR/L

## 2021-01-11 NOTE — GROUP NOTE
Psychoeducation Group Note    PATIENT'S NAME: Corinne N Palm  MRN:   [id number]  :   1995  ACCT. NUMBER: [id number]  DATE OF SERVICE: 21  START TIME:  3:00 PM  END TIME:  3:50 PM  FACILITATOR: Mor Soni OT  TOPIC: MH Life Skills Group: Sensory Approaches in Mental Health  [facility] Adult Mental Health Day Treatment  TRACK: 2B    Telemedicine Visit: The patient's condition can be safely assessed and treated via synchronous audio and visual telemedicine encounter.      Reason for Telemedicine Visit: Services only offered telehealth and Covid 19    Originating Site (Patient Location): Patient's home    Distant Site (Provider Location): Provider Remote Setting    Consent:  The patient/guardian has verbally consented to: the potential risks and benefits of telemedicine (video visit) versus in person care; bill my insurance or make self-payment for services provided; and responsibility for payment of non-covered services.     Mode of Communication:  Video Conference via [employer]    As the provider I attest to compliance with applicable laws and regulations related to telemedicine.      NUMBER OF PARTICIPANTS: 5    Summary of Group / Topics Discussed:  Sensory Approaches in Mental Health:  Sensory Enhanced Mindfulness: Patients were taught and provided with an opportunity to explore and practice how using sensory enhanced mindfulness practices can help them stay grounded in the present moment as a way to manage mental health symptoms and stressors. Focus today is on self soothing through self touch and proprioceptive input. Facilitation of guided practice. Safe practice provided.     Patient Session Goals / Objectives:    Identified how using sensory enhanced mindfulness practices can be used for grounding, stress management, and self regulation      Improved awareness of different types of sensory enhanced mindfulness activities that assist with healthy coping of stress and symptoms       Established a plan for practice of these skills in their own environments    Practiced and reflected on how to generalize taught skills to their everyday life        Patient Participation / Response:  Fully participated with the group by sharing personal reflections / insights and openly received / provided feedback with other participants.    Verbalized understanding of content and Patient would benefit from additional opportunities to practice the content to be able to generalize it to their everyday life with increased intentionality, consistency, and efficacy in support of their psychiatric recovery    Treatment Plan:  Patient has a current master individualized treatment plan.  See Epic treatment plan for more information.    Mor Soni, OT

## 2021-01-11 NOTE — GROUP NOTE
Process Group Note    PATIENT'S NAME: Corinne N Palm  MRN:   [id number]  :   1995  ACCT. NUMBER: [id number]  DATE OF SERVICE: 21  START TIME:  1:00 PM  END TIME:  1:50 PM  FACILITATOR: Chelsea Hutchison  TOPIC:  Process Group    Diagnoses:  1.  Major depressive disorder, recurrent, severe without psychosis  2.  Generalized anxiety disorder.   3.  Anorexia nervosa.   4.  Borderline personality disorder.      [facility] Day Treatment  TRACK: 2B    Telemedicine Visit: The patient's condition can be safely assessed and treated via synchronous audio and visual telemedicine encounter.      Reason for Telemedicine Visit:  covid 19    Originating Site (Patient Location): Patient's home    Distant Site (Provider Location): Provider Remote Setting    Consent:  The patient/guardian has verbally consented to: the potential risks and benefits of telemedicine (video visit) versus in person care; bill my insurance or make self-payment for services provided; and responsibility for payment of non-covered services.     Mode of Communication:  Video Conference via [employer]    As the provider I attest to compliance with applicable laws and regulations related to telemedicine.      NUMBER OF PARTICIPANTS: 4          Data:    Session content: At the start of this group, patients were invited to check in by identifying themselves, describing their current emotional status, and identifying issues to address in this group.   Area(s) of treatment focus addressed in this session included Symptom Management, Personal Safety, Develop / Improve Independent Living Skills and Develop Socialization / Interpersonal Relationship Skills.  Lida reported mood has been up and down.  She reported finally giving her pills to her  which she feels frustrated with but acknowledges it has decreased obsessions with pills.  She did this after she admitted to psychiatrist she had not told  she had  medications and psychiatrist called .   However, he did not take medications until Lida and individual therapist called together to discuss him holding them.  She reported having a meaningful talk with her mom but felt taken aback after mom encouraged her to talk with her more like this.  Lida reports feeling she should not depend on mom due to codependency.  She reported coping with stress at work through mindfulness.  She reported her eating disorder is causing dissociation.  She reported goal to make a meal tonight even if she cannot eat it.      Therapeutic Interventions/Treatment Strategies:  Psychotherapist offered support, feedback and validation and reinforced use of skills. Treatment modalities used include Motivational Interviewing, Cognitive Behavioral Therapy and Dialectical Behavioral Therapy. Validated and normalized.  Highlighted and reinforced skills used; connected to positive outcomes.  Provided additional skill suggestions.  Aided in creating a plan to help work towards goals.        Assessment:    Patient response:   Patient responded to session by accepting feedback, giving feedback, listening, focusing on goals, being attentive and accepting support    Possible barriers to participation / learning include: and no barriers identified    Health Issues:   Yes: Weight / dietary issues, Associated Psychological Distress       Substance Use Review:   Substance Use: No active concerns identified.    Mental Status/Behavioral Observations  Appearance:   Appropriate   Eye Contact:   Good   Psychomotor Behavior: Normal   Attitude:   Cooperative   Orientation:   All  Speech   Rate / Production: Normal    Volume:  Normal   Mood:    Anxious  Depressed   Affect:    Appropriate   Thought Content:   Rumination and Safety reports  presence of suicidal ideation passive suicidal ideation   Thought Form:  Coherent  Logical     Insight:    Good     Plan:     Safety Plan: Committed to safety and agreed to  follow previously developed safety coping plan.      Barriers to treatment: None identified    Patient Contracts (see media tab):  None    Substance Use: Not addressed in session     Continue or Discharge: Patient will continue in Adult Day Treatment (ADT)  as planned. Patient is likely to benefit from learning and using skills as they work toward the goals identified in their treatment plan.      Chelsea Hutchison  January 11, 2021

## 2021-01-12 ENCOUNTER — HOSPITAL ENCOUNTER (OUTPATIENT)
Dept: BEHAVIORAL HEALTH | Facility: CLINIC | Age: 26
End: 2021-01-12
Attending: PSYCHIATRY & NEUROLOGY
Payer: COMMERCIAL

## 2021-01-12 PROCEDURE — G0177 OPPS/PHP; TRAIN & EDUC SERV: HCPCS | Mod: GT

## 2021-01-12 PROCEDURE — 90853 GROUP PSYCHOTHERAPY: CPT | Mod: GT

## 2021-01-12 NOTE — GROUP NOTE
Psychoeducation Group Note    PATIENT'S NAME: Corinne N Palm  MRN:   [id number]  :   1995  ACCT. NUMBER: [id number]  DATE OF SERVICE: 21  START TIME:  3:00 PM  END TIME:  3:50 PM  FACILITATOR: Mor Soni OT  TOPIC: MH Life Skills Group: Sensory Approaches in Mental Health  [facility] Adult Mental Health Day Treatment  TRACK: 2B    NUMBER OF PARTICIPANTS: 5    Summary of Group / Topics Discussed:  Sensory Approaches in Mental Health: Self-Regulation Skills: The vagus nerve and autonomic regulation:   Patient's explored the neurosensory connections between the body and the mind including skills and techniques to help develop one's capacity to be self aware and self regulate. Patients were provided with psychoeducation on the Autonomic Nervous System and explored the ways to tap into the calming properties of the Vagus nerve (parasympathetic NS) to help with distress tolerance and manage the impact of stressors on the body. Concepts presented and discussed included: interoception, polyvagal theory (neuroception, ventral and dorsal vagal activity, sympathetic, vagal brake). Explored ways to develop vagal tone and heart rate variability as strategy to support effective stress responses in specific contexts for improved functioning. Facilitated discussion around specific ways to they are doing this already and some possibilities for moving forward including breath work, cold input, meditation, physical activity, socializing & laughing, talking/singing/humming/gargling as based on current neurosensory science.    Patient Session Goals / Objectives:    [place] how the ANS works with an emphasis on the vagus nerve and importance of its' impact on functioning and mental wellbeing.    [place] about a framework to begin to understand stress responses and autonomic regulation. ([place])    [place] how developing interoceptive awareness can help one self-regulate sooner rather than later.    Identified  specific and individualized neurosensory skills to help when distressed and for crisis management via the vagus nerve, specifically ways to develop/build vagal tone to support mental health management.     Established a plan for practice of these skills in their own environments.           Patient Participation / Response:  Fully participated with the group by sharing personal reflections / insights and openly received / provided feedback with other participants.    Verbalized understanding of content and Patient would benefit from additional opportunities to practice the content to be able to generalize it to their everyday life with increased intentionality, consistency, and efficacy in support of their psychiatric recovery    Treatment Plan:  Patient has a current master individualized treatment plan.  See Epic treatment plan for more information.    Mor Soni, OT

## 2021-01-12 NOTE — GROUP NOTE
Process Group Note    PATIENT'S NAME: Corinne N Palm  MRN:   [id number]  :   1995  ACCT. NUMBER: [id number]  DATE OF SERVICE: 21  START TIME:  1:00 PM  END TIME:  1:50 PM  FACILITATOR: Chelsea Hutchison  TOPIC:  Process Group    Diagnoses:  1.  Major depressive disorder, recurrent, severe without psychosis  2.  Generalized anxiety disorder.   3.  Anorexia nervosa.   4.  Borderline personality disorder.      [facility] Day Treatment  TRACK: 2B    Telemedicine Visit: The patient's condition can be safely assessed and treated via synchronous audio and visual telemedicine encounter.      Reason for Telemedicine Visit:  covid 19    Originating Site (Patient Location): Patient's home    Distant Site (Provider Location): Provider Remote Setting    Consent:  The patient/guardian has verbally consented to: the potential risks and benefits of telemedicine (video visit) versus in person care; bill my insurance or make self-payment for services provided; and responsibility for payment of non-covered services.     Mode of Communication:  Video Conference via Aligo    As the provider I attest to compliance with applicable laws and regulations related to telemedicine.      NUMBER OF PARTICIPANTS: 4          Data:    Session content: At the start of this group, patients were invited to check in by identifying themselves, describing their current emotional status, and identifying issues to address in this group.   Area(s) of treatment focus addressed in this session included Symptom Management, Personal Safety, Develop / Improve Independent Living Skills and Develop Socialization / Interpersonal Relationship Skills.  Lida reported feeling okay but struggling a bit.  She reported she was not able to prepare a meal last night but did go grocery shopping.  She continues to struggle with ED symptoms.  She is worried about appointment with Coni later this week.  She reported struggling  with SIB urges but is not acting.  She reported suicidal ideation with no intent to act and is able to commit to safety.      Therapeutic Interventions/Treatment Strategies:  Psychotherapist offered support, feedback and validation and reinforced use of skills. Treatment modalities used include Motivational Interviewing, Cognitive Behavioral Therapy and Dialectical Behavioral Therapy. Validated and normalized.  Highlighted and reinforced skills used; connected to positive outcomes.  Provided additional skill suggestions.  Aided in creating a plan to help work towards goals.        Assessment:    Patient response:   Patient responded to session by accepting feedback, giving feedback, listening, focusing on goals, being attentive and accepting support    Possible barriers to participation / learning include: and no barriers identified    Health Issues:   Yes: Weight / dietary issues, Associated Psychological Distress       Substance Use Review:   Substance Use: No active concerns identified.    Mental Status/Behavioral Observations  Appearance:   Appropriate   Eye Contact:   Good   Psychomotor Behavior: Normal   Attitude:   Cooperative   Orientation:   All  Speech   Rate / Production: Normal    Volume:  Normal   Mood:    Anxious  Depressed   Affect:    Appropriate   Thought Content:   Rumination and Safety reports  presence of suicidal ideation passive suicidal ideation  and thoughts of self-harm  Thought Form:  Coherent  Logical     Insight:    Good     Plan:     Safety Plan: Committed to safety and agreed to follow previously developed safety coping plan.      Barriers to treatment: None identified    Patient Contracts (see media tab):  None    Substance Use: Not addressed in session     Continue or Discharge: Patient will continue in Adult Day Treatment (ADT)  as planned. Patient is likely to benefit from learning and using skills as they work toward the goals identified in their treatment plan.      Chelsea PRESLEY  [place]  January 12, 2021

## 2021-01-12 NOTE — GROUP NOTE
Psychoeducation Group Note    PATIENT'S NAME: Corinne N Palm  MRN:   [id number]  :   1995  ACCT. NUMBER: [id number]  DATE OF SERVICE: 21  START TIME:  2:00 PM  END TIME:  2:50 PM  FACILITATOR: Leida Rowan RN  TOPIC: MH RN Group: Health Maintenance  Telemedicine Visit: The patient's condition can be safely assessed and treated via synchronous audio and visual telemedicine encounter.      Reason for Telemedicine Visit:  covid19    Originating Site (Patient Location): Patient's home    Distant Site (Provider Location): Provider Remote Setting    Consent:  The patient/guardian has verbally consented to: the potential risks and benefits of telemedicine (video visit) versus in person care; bill my insurance or make self-payment for services provided; and responsibility for payment of non-covered services.     Mode of Communication:  Video Conference via Giggem    As the provider I attest to compliance with applicable laws and regulations related to telemedicine.      [facility] Adult Mental Health Day Treatment  TRACK: 2B    NUMBER OF PARTICIPANTS: 5    Summary of Group / Topics Discussed:  Health Maintenance: Wellness Check-in: Patients met with group facilitator to individually review a holistic wellness check-in to assess patient medication adherence/concerns, appointments, physical and mental health, exercise, nutrition, sleep, socialization, substance use, and need for service/resource referrals.       Patient Session Goals / Objectives:    Discussed various aspects of health management and self-care related to physical and mental health    Demonstrated increased self-awareness of current wellness needs    Developed health literacy skills in navigating the healthcare system and self-advocacy/communicating needs with health care team          Patient Participation / Response:  Fully participated with the group by sharing personal reflections / insights and openly received / provided  feedback with other participants.    Demonstrated understanding of topics discussed through group discussion and participation and Identified / Expressed personal readiness to practice skills    Treatment Plan:  Patient has a current master individualized treatment plan.  See Epic treatment plan for more information.    Leida Rowan RN

## 2021-01-14 ENCOUNTER — HOSPITAL ENCOUNTER (OUTPATIENT)
Dept: BEHAVIORAL HEALTH | Facility: CLINIC | Age: 26
End: 2021-01-14
Attending: PSYCHIATRY & NEUROLOGY
Payer: COMMERCIAL

## 2021-01-14 PROCEDURE — 90853 GROUP PSYCHOTHERAPY: CPT | Mod: GT

## 2021-01-14 PROCEDURE — G0177 OPPS/PHP; TRAIN & EDUC SERV: HCPCS | Mod: GT

## 2021-01-14 PROCEDURE — 90853 GROUP PSYCHOTHERAPY: CPT | Mod: 95

## 2021-01-14 NOTE — GROUP NOTE
Psychoeducation Group Note    PATIENT'S NAME: Corinne N Palm  MRN:   [id number]  :   1995  ACCT. NUMBER: [id number]  DATE OF SERVICE: 21  START TIME:  3:00 PM  END TIME:  3:50 PM  FACILITATOR: Leida Rowan RN  TOPIC: NANDINI RN Group: Brain Health  Telemedicine Visit: The patient's condition can be safely assessed and treated via synchronous audio and visual telemedicine encounter.      Reason for Telemedicine Visit:  covid19    Originating Site (Patient Location): Patient's home    Distant Site (Provider Location): Provider Remote Setting    Consent:  The patient/guardian has verbally consented to: the potential risks and benefits of telemedicine (video visit) versus in person care; bill my insurance or make self-payment for services provided; and responsibility for payment of non-covered services.     Mode of Communication:  Video Conference via Zoom    As the provider I attest to compliance with applicable laws and regulations related to telemedicine.      [facility] Mental Health Day Treatment  TRACK: 2B    NUMBER OF PARTICIPANTS: 4    Summary of Group / Topics Discussed:  Brain Health:  Pathophysiology of Mood Disorders: Patients were educated on mood disorder etiology and neuroscience, risk factors, symptoms, and pharmacologic, psychotherapeutic, and complementary treatment options. Patients were guided on a discussion of mental, behavioral, and physical symptoms and shared their symptoms with the group.     Patient Session Goals / Objectives:  ? Described what mood disorders are and identified risk factors   ? Explained how chemical imbalances in the brain can cause symptoms and how medications work to reverse this imbalance   ? Identified and described pharmacologic, psychotherapeutic, and complementary treatment options        Patient Participation / Response:  Fully participated with the group by sharing personal reflections / insights and openly received / provided feedback with  other participants.    Demonstrated understanding of topics discussed through group discussion and participation and Identified / Expressed personal readiness to practice skills    Treatment Plan:  Patient has a current master individualized treatment plan.  See Epic treatment plan for more information.    Leida Rowan RN

## 2021-01-14 NOTE — GROUP NOTE
Psychotherapy Group Note    PATIENT'S NAME: Corinne N Palm  MRN:   [id number]  :   1995  ACCT. NUMBER: [id number]  DATE OF SERVICE: 21  START TIME:  2:00 PM  END TIME:  2:50 PM  FACILITATOR: Chelsea Hutchison  TOPIC: MH EBP Group: Relationship Skills  [facility] Adult Mental Health Day Treatment  TRACK: 2B    Telemedicine Visit: The patient's condition can be safely assessed and treated via synchronous audio and visual telemedicine encounter.      Reason for Telemedicine Visit:  covid 19    Originating Site (Patient Location): Patient's home    Distant Site (Provider Location): Provider Remote Setting    Consent:  The patient/guardian has verbally consented to: the potential risks and benefits of telemedicine (video visit) versus in person care; bill my insurance or make self-payment for services provided; and responsibility for payment of non-covered services.     Mode of Communication:  Video Conference via Kobo    As the provider I attest to compliance with applicable laws and regulations related to telemedicine.      NUMBER OF PARTICIPANTS: 5    Summary of Group / Topics Discussed:  Relationship Skills: Boundaries: Patients were provided with a general overview of interpersonal boundaries and how lack of boundaries relates to symptoms and functioning. The purpose is to help patients identify boundary issues and gain awareness and skills to work towards healthier interpersonal boundaries. Current awareness of healthy boundary characteristics and barriers to establishing healthy boundaries were discussed.    Patient Session Goals / Objectives:    Familiarized patients with the concept of interpersonal boundaries and their characteristics    Discussed and practiced strategies to promote healthier interpersonal boundaries    Identified boundary issues and identified plan to improve boundaries      Patient Participation / Response:  Fully participated with the group by sharing personal  reflections / insights and openly received / provided feedback with other participants.    Demonstrated understanding of topics discussed through group discussion and participation, Demonstrated understanding of relationship skills and communication skills and Identified / Expressed personal readiness to incorporate effective communication skills    Treatment Plan:  Patient has a current master individualized treatment plan.  See Epic treatment plan for more information.    Chelsea Hutchison

## 2021-01-14 NOTE — GROUP NOTE
Process Group Note    PATIENT'S NAME: Corinne N Palm  MRN:   [id number]  :   1995  ACCT. NUMBER: [id number]  DATE OF SERVICE: 21  START TIME:  1:00 PM  END TIME:  1:50 PM  FACILITATOR: Chelsea Hutchison  TOPIC:  Process Group    Diagnoses:  1.  Major depressive disorder, recurrent, severe without psychosis  2.  Generalized anxiety disorder.   3.  Anorexia nervosa.   4.  Borderline personality disorder.      [facility] Day Treatment  TRACK: 2B    Telemedicine Visit: The patient's condition can be safely assessed and treated via synchronous audio and visual telemedicine encounter.      Reason for Telemedicine Visit:  covid 19    Originating Site (Patient Location): Patient's home    Distant Site (Provider Location): Provider Remote Setting    Consent:  The patient/guardian has verbally consented to: the potential risks and benefits of telemedicine (video visit) versus in person care; bill my insurance or make self-payment for services provided; and responsibility for payment of non-covered services.     Mode of Communication:  Video Conference via [employer]    As the provider I attest to compliance with applicable laws and regulations related to telemedicine.      NUMBER OF PARTICIPANTS: 5          Data:    Session content: At the start of this group, patients were invited to check in by identifying themselves, describing their current emotional status, and identifying issues to address in this group.   Area(s) of treatment focus addressed in this session included Symptom Management, Personal Safety, Develop / Improve Independent Living Skills and Develop Socialization / Interpersonal Relationship Skills.  Lida reported work was difficult with a hard patient situation.  She was not sure if she handled it well and punished self by acting on SIB while at work.  She was able to give self credit for delaying action and stopping it at once.  She wants to work on having a hard  boundary with herself with SIB action at work.  She feels frustrated with herself and wants to be done working on skills. However, she is also able to recognize progress and does feel good about harm reduction techniques.     Therapeutic Interventions/Treatment Strategies:  Psychotherapist offered support, feedback and validation and reinforced use of skills. Treatment modalities used include Motivational Interviewing, Cognitive Behavioral Therapy and Dialectical Behavioral Therapy.  Validated and normalized.  Highlighted and reinforced skills used; connected to positive outcomes.  Provided additional skill suggestions.  Aided in creating a plan to help work towards goals.      Assessment:    Patient response:   Patient responded to session by accepting feedback, giving feedback, listening, focusing on goals, being attentive and accepting support    Possible barriers to participation / learning include: and no barriers identified    Health Issues:   None reported       Substance Use Review:   Substance Use: No active concerns identified.    Mental Status/Behavioral Observations  Appearance:   Appropriate   Eye Contact:   Good   Psychomotor Behavior: Normal   Attitude:   Cooperative   Orientation:   All  Speech   Rate / Production: Normal    Volume:  Normal   Mood:    Anxious  Depressed   Affect:    Appropriate   Thought Content:   Rumination and Safety reports  presence of suicidal ideation passive suicidal ideation  and thoughts of self-harm  Thought Form:  Coherent  Logical     Insight:    Good     Plan:     Safety Plan: Committed to safety and agreed to follow previously developed safety coping plan.      Barriers to treatment: None identified    Patient Contracts (see media tab):  None    Substance Use: Not addressed in session     Continue or Discharge: Patient will continue in Adult Day Treatment (ADT)  as planned. Patient is likely to benefit from learning and using skills as they work toward the goals  identified in their treatment plan.      Chelsea Hutchison  January 14, 2021

## 2021-01-18 ENCOUNTER — HOSPITAL ENCOUNTER (OUTPATIENT)
Dept: BEHAVIORAL HEALTH | Facility: CLINIC | Age: 26
End: 2021-01-18
Attending: PSYCHIATRY & NEUROLOGY
Payer: COMMERCIAL

## 2021-01-18 PROCEDURE — 90853 GROUP PSYCHOTHERAPY: CPT | Mod: 95

## 2021-01-18 PROCEDURE — G0177 OPPS/PHP; TRAIN & EDUC SERV: HCPCS | Mod: 95

## 2021-01-18 PROCEDURE — G0177 OPPS/PHP; TRAIN & EDUC SERV: HCPCS | Mod: GT

## 2021-01-18 NOTE — GROUP NOTE
Psychoeducation Group Note    PATIENT'S NAME: Corinne N Palm  MRN:   [id number]  :   1995  ACCT. NUMBER: [id number]  DATE OF SERVICE: 21  START TIME:  3:00 PM  END TIME:  3:50 PM  FACILITATOR: Mor Soni OT  TOPIC: MH Life Skills Group: Sensory Approaches in Mental Health  [facility] Adult Mental Health Day Treatment  TRACK: 2A    Telemedicine Visit: The patient's condition can be safely assessed and treated via synchronous audio and visual telemedicine encounter.      Reason for Telemedicine Visit: Services only offered telehealth and Covid 19    Originating Site (Patient Location): Patient's home    Distant Site (Provider Location): Provider Remote Setting    Consent:  The patient/guardian has verbally consented to: the potential risks and benefits of telemedicine (video visit) versus in person care; bill my insurance or make self-payment for services provided; and responsibility for payment of non-covered services.     Mode of Communication:  Video Conference via Zoom via [employer]    As the provider I attest to compliance with applicable laws and regulations related to telemedicine.      NUMBER OF PARTICIPANTS: 5    Summary of Group / Topics Discussed:  Sensory Approaches in Mental Health:  Benefits of curated focused intervention activity: Patients were provided with verbal and experiential education to identify physical and sensorimotor based activities that can be utilized for stress management, self care, health maintenance, and self regulation.  Patients increased knowledge and awareness of activities that support good self care, build resiliency, and prevent relapse through healthy engagement in mindful focused activities for effective coping with illness and reduction of maladaptive coping skills. Using a group social/cognitive experiential process, patients worked together to increase comfort with sharing and open to learning about each other to increase and strengthen a sense of  safety in the group to support their therapeutic goals..     Patient Session Goals / Objectives:    Identified specific physical and sensorimotor based activities for stress management, self care, health maintenance, and self regulation      Improved awareness of activities that are meaningful focused activities that support good self care, build resiliency, and prevent relapse and how this applies to current daily life    Established a plan for practice of these skills in their own environments    Practiced and reflected on how to generalize taught skills to their everyday life      Patient Participation / Response:  Fully participated with the group by sharing personal reflections / insights and openly received / provided feedback with other participants.    Verbalized understanding of content and Patient would benefit from additional opportunities to practice the content to be able to generalize it to their everyday life with increased intentionality, consistency, and efficacy in support of their psychiatric recovery    Treatment Plan:  Patient has a current master individualized treatment plan.  See Epic treatment plan for more information.    Mor Soni, OT

## 2021-01-18 NOTE — GROUP NOTE
Process Group Note    PATIENT'S NAME: Corinne N Palm  MRN:   [id number]  :   1995  ACCT. NUMBER: [id number]  DATE OF SERVICE: 21  START TIME:  1:00 PM  END TIME:  1:50 PM  FACILITATOR: Chelsea Hutchison  TOPIC:  Process Group    Diagnoses:  1.  Major depressive disorder, recurrent, severe without psychosis  2.  Generalized anxiety disorder.   3.  Anorexia nervosa.   4.  Borderline personality disorder.      [facility] Day Treatment  TRACK: 2B    Telemedicine Visit: The patient's condition can be safely assessed and treated via synchronous audio and visual telemedicine encounter.      Reason for Telemedicine Visit:  covid 19    Originating Site (Patient Location): Patient's home    Distant Site (Provider Location): Provider Remote Setting    Consent:  The patient/guardian has verbally consented to: the potential risks and benefits of telemedicine (video visit) versus in person care; bill my insurance or make self-payment for services provided; and responsibility for payment of non-covered services.     Mode of Communication:  Video Conference via Zelnas    As the provider I attest to compliance with applicable laws and regulations related to telemedicine.      NUMBER OF PARTICIPANTS: 4          Data:    Session content: At the start of this group, patients were invited to check in by identifying themselves, describing their current emotional status, and identifying issues to address in this group.   Area(s) of treatment focus addressed in this session included Symptom Management, Personal Safety, Develop / Improve Independent Living Skills and Develop Socialization / Interpersonal Relationship Skills.  Lida reported having a difficult weekend.  She reported high suicidal ideation over the weekend and it is lower now.  She reported she did not act by spending time with her .  Since SI has been lower she has found the key to her medications that are locked up and she  has not yet told her  she has the key.  She feels like she is being deceitful which is not per her values.  She also worries about if she still has access to this key if symptoms increase. She reported feeling triggered by seeing her roommate from residential and struggling with ineffective comparisons.  She has not acted on SIB at work.  She is working on getting back into [facility].  She is able to commit to safety.      Therapeutic Interventions/Treatment Strategies:  Psychotherapist offered support, feedback and validation and reinforced use of skills. Treatment modalities used include Motivational Interviewing, Cognitive Behavioral Therapy and Dialectical Behavioral Therapy. Validated and normalized.  Highlighted and reinforced skills used; connected to positive outcomes.  Provided additional skill suggestions.  Aided in creating a plan to help work towards goals.        Assessment:    Patient response:   Patient responded to session by accepting feedback, giving feedback, listening, focusing on goals, being attentive and accepting support    Possible barriers to participation / learning include: and no barriers identified    Health Issues:   None reported       Substance Use Review:   Substance Use: No active concerns identified.    Mental Status/Behavioral Observations  Appearance:   Appropriate   Eye Contact:   Good   Psychomotor Behavior: Normal   Attitude:   Cooperative   Orientation:   All  Speech   Rate / Production: Normal    Volume:  Normal   Mood:    Anxious  Depressed   Affect:    Appropriate   Thought Content:   Rumination and Safety reports  presence of suicidal ideation passive suicidal ideation  and thoughts of self-harm  Thought Form:  Coherent  Logical     Insight:    Good     Plan:     Safety Plan: Committed to safety and agreed to follow previously developed safety coping plan.      Barriers to treatment: None identified    Patient Contracts (see media tab):  None    Substance Use: Not  addressed in session     Continue or Discharge: Patient will continue in Adult Day Treatment (ADT)  as planned. Patient is likely to benefit from learning and using skills as they work toward the goals identified in their treatment plan.      Chelsea Hutchison  January 18, 2021

## 2021-01-18 NOTE — GROUP NOTE
Psychoeducation Group Note    PATIENT'S NAME: Corinne N Palm  MRN:   [id number]  :   1995  ACCT. NUMBER: [id number]  DATE OF SERVICE: 21  START TIME:  2:00 PM  END TIME:  2:50 PM  FACILITATOR: George Pink OTR/L  TOPIC: MH Life Skills Group: Resiliency Development  Telemedicine Visit: The patient's condition can be safely assessed and treated via synchronous audio and visual telemedicine encounter.      Reason for Telemedicine Visit: COVID-19    Originating Site (Patient Location): Patient's home    Distant Site (Provider Location): [facility]    Consent:  The patient/guardian has verbally consented to: the potential risks and benefits of telemedicine (video visit) versus in person care; bill my insurance or make self-payment for services provided; and responsibility for payment of non-covered services.     Mode of Communication:  Video Conference via [employer]    As the provider I attest to compliance with applicable laws and regulations related to telemedicine.   [facility] Adult Mental Health Day Treatment  TRACK: 2B    NUMBER OF PARTICIPANTS: 5    Summary of Group / Topics Discussed:  Resiliency Development:  Exercises for Stress Reduction: Patients were taught how to identify stressors, signs of stress, coping skills, and prevention strategies for overall stress management.  Patients were given the opportunity to identify both ongoing and acute mental health symptoms and how to effectively manage these symptoms by developing an effective aftercare plan.  Patients increased awareness of community based resources.    Patient Session Goals / Objectives:    Identified how using coping skills can be used for symptom and stress management       Improved awareness of individualed symptoms and stressors and how to effectively cope     Established a relapse prevention plan to practice these skills in their own environments    Practiced and reflected on how to  generalize taught skills to their everyday life          Patient Participation / Response:  Fully participated with the group by sharing personal reflections / insights and openly received / provided feedback with other participants.    Demonstrated understanding of content through handout/group discussion , Verbalized understanding of content and Patient would benefit from additional opportunities to practice the content to be able to generalize it to their everyday life with increased intentionality, consistency, and efficacy in support of their psychiatric recovery    Treatment Plan:  Patient has a current master individualized treatment plan.  See Epic treatment plan for more information.    George Pink, OTR/L

## 2021-01-19 ENCOUNTER — HOSPITAL ENCOUNTER (OUTPATIENT)
Dept: BEHAVIORAL HEALTH | Facility: CLINIC | Age: 26
End: 2021-01-19
Attending: PSYCHIATRY & NEUROLOGY
Payer: COMMERCIAL

## 2021-01-19 PROCEDURE — G0177 OPPS/PHP; TRAIN & EDUC SERV: HCPCS | Mod: 95

## 2021-01-19 PROCEDURE — 90853 GROUP PSYCHOTHERAPY: CPT | Mod: GT

## 2021-01-19 PROCEDURE — G0177 OPPS/PHP; TRAIN & EDUC SERV: HCPCS | Mod: GT

## 2021-01-19 NOTE — GROUP NOTE
Psychoeducation Group Note    PATIENT'S NAME: Corinne N Palm  MRN:   [id number]  :   1995  ACCT. NUMBER: [id number]  DATE OF SERVICE: 21  START TIME:  2:00 PM  END TIME:  2:50 PM  FACILITATOR: Leida Rowan RN  TOPIC: MH RN Group: Health Maintenance  Telemedicine Visit: The patient's condition can be safely assessed and treated via synchronous audio and visual telemedicine encounter.      Reason for Telemedicine Visit:  covid19    Originating Site (Patient Location): Patient's home    Distant Site (Provider Location): Provider Remote Setting    Consent:  The patient/guardian has verbally consented to: the potential risks and benefits of telemedicine (video visit) versus in person care; bill my insurance or make self-payment for services provided; and responsibility for payment of non-covered services.     Mode of Communication:  Video Conference via [employer]    As the provider I attest to compliance with applicable laws and regulations related to telemedicine.[facility] Mental Health Day Treatment  TRACK: 2B    NUMBER OF PARTICIPANTS: 5    Summary of Group / Topics Discussed:  Health Maintenance: Wellness Check-in: Patients met with group facilitator to individually review a holistic wellness check-in to assess patient medication adherence/concerns, appointments, physical and mental health, exercise, nutrition, sleep, socialization, substance use, and need for service/resource referrals.       Patient Session Goals / Objectives:    Discussed various aspects of health management and self-care related to physical and mental health    Demonstrated increased self-awareness of current wellness needs    Developed health literacy skills in navigating the healthcare system and self-advocacy/communicating needs with health care team          Patient Participation / Response:  Fully participated with the group by sharing personal reflections / insights and openly received / provided  feedback with other participants.    Demonstrated understanding of topics discussed through group discussion and participation and Identified / Expressed personal readiness to practice skills    Treatment Plan:  Patient has a current master individualized treatment plan.  See Epic treatment plan for more information.    Leida Rowan RN

## 2021-01-19 NOTE — PROGRESS NOTES
Patient Active Problem List   Diagnosis     Suicidal ideation     MDD (major depressive disorder), recurrent severe, without psychosis (H)       Current Outpatient Medications:      DULoxetine (CYMBALTA) 60 MG capsule, Take 1 capsule (60 mg) by mouth At Bedtime, Disp: 30 capsule, Rfl: 0     gabapentin (NEURONTIN) 300 MG capsule, Take 300-600 mg by mouth nightly as needed (anxiety), Disp: , Rfl:      lamoTRIgine (LAMICTAL) 25 MG tablet, Take 2 tablets (50 mg) by mouth daily, Disp: 60 tablet, Rfl: 0     lamoTRIgine (LAMICTAL) 25 MG tablet, Take 1 tablet (25 mg) by mouth daily for 9 days, then increase to 2 tablets (50 mg) by mouth daily., Disp: 51 tablet, Rfl: 0     lurasidone (LATUDA) 80 MG TABS tablet, Take 80 mg by mouth At Bedtime, Disp: , Rfl:      polyethylene glycol (MIRALAX) 17 GM/Dose powder, Take 17 g by mouth daily, Disp: , Rfl:      Vitamin D, Cholecalciferol, 25 MCG (1000 UT) TABS, Take 1,000 Units by mouth daily , Disp: , Rfl:   Psychiatry staffing: case discussed  Diagnosis: as above plus PRECIOUS and eating disorder.  Suicidal thoughts occur, no current intent.

## 2021-01-19 NOTE — GROUP NOTE
Psychoeducation Group Note    PATIENT'S NAME: Corinne N Palm  MRN:   [id number]  :   1995  ACCT. NUMBER: [id number]  DATE OF SERVICE: 21  START TIME:  1:00 PM  END TIME:  1:50 PM  FACILITATOR: Mor Soni OT  TOPIC: MH Life Skills Group: Sensory Approaches in Mental Health  [facility] Adult Mental Health Day Treatment  TRACK: 2B    Telemedicine Visit: The patient's condition can be safely assessed and treated via synchronous audio and visual telemedicine encounter.      Reason for Telemedicine Visit: Services only offered telehealth and covid 19    Originating Site (Patient Location): Patient's home    Distant Site (Provider Location): Provider Remote Setting    Consent:  The patient/guardian has verbally consented to: the potential risks and benefits of telemedicine (video visit) versus in person care; bill my insurance or make self-payment for services provided; and responsibility for payment of non-covered services.     Mode of Communication:  Video Conference via [employer] via [employer]    As the provider I attest to compliance with applicable laws and regulations related to telemedicine.      NUMBER OF PARTICIPANTS:4    Summary of Group / Topics Discussed:  Sensory Approaches in Mental Health:  Sensory Enhanced Mindfulness: Patients were taught and provided with an opportunity to explore and practice how using sensory enhanced mindfulness practices can help them stay grounded in the present moment as a way to manage mental health symptoms and stressors. Psychoeducation on therapy resources for specific body based approaches including neurofeedback, biofeedback, Neurosensory Occupational Therapy as potential modalities that help a person to gain awareness and skills in self regulation to improve participation in meaningful roles, routines, relationships, and responsibilities.    Patient Session Goals / Objectives:    Identified how using sensory enhanced mindfulness practices can be used  for grounding, stress management, and self regulation      Improved awareness of different types of sensory enhanced mindfulness activities that assist with healthy coping of stress and symptoms      Established a plan for practice of these skills in their own environments    Practiced and reflected on how to generalize taught skills to their everyday life        Patient Participation / Response:  Fully participated with the group by sharing personal reflections / insights and openly received / provided feedback with other participants.    Verbalized understanding of content and Patient would benefit from additional opportunities to practice the content to be able to generalize it to their everyday life with increased intentionality, consistency, and efficacy in support of their psychiatric recovery    Treatment Plan:  Patient has a current master individualized treatment plan.  See Epic treatment plan for more information.    Mor Soni, OT

## 2021-01-19 NOTE — GROUP NOTE
Process Group Note    PATIENT'S NAME: Corinne N Palm  MRN:   [id number]  :   1995  ACCT. NUMBER: [id number]  DATE OF SERVICE: 21  START TIME:  3:00 PM  END TIME:  3:50 PM  FACILITATOR: Chelsea Hutchison  TOPIC:  Process Group    Diagnoses:  1.  Major depressive disorder, recurrent, severe without psychosis  2.  Generalized anxiety disorder.   3.  Anorexia nervosa.   4.  Borderline personality disorder.      [facility] Day Treatment  TRACK: 2B    Telemedicine Visit: The patient's condition can be safely assessed and treated via synchronous audio and visual telemedicine encounter.      Reason for Telemedicine Visit:  covid 19    Originating Site (Patient Location): Patient's home    Distant Site (Provider Location): Provider Remote Setting    Consent:  The patient/guardian has verbally consented to: the potential risks and benefits of telemedicine (video visit) versus in person care; bill my insurance or make self-payment for services provided; and responsibility for payment of non-covered services.     Mode of Communication:  Video Conference via [employer]    As the provider I attest to compliance with applicable laws and regulations related to telemedicine.      NUMBER OF PARTICIPANTS: 5          Data:    Session content: At the start of this group, patients were invited to check in by identifying themselves, describing their current emotional status, and identifying issues to address in this group.   Area(s) of treatment focus addressed in this session included Symptom Management, Personal Safety, Develop / Improve Independent Living Skills and Develop Socialization / Interpersonal Relationship Skills.  Lida reported feeling tired and irritable.  She reported feeling upset she is irritable as it is not magalie her.  She reported struggling with ED symptoms and feel angry with dietician who suggested a program that has not been helpful to Lida in the past.  She reported talking  with IT and trying to distract.  She reported work being busy today helped and has work tomorrow.  SHe reported suicidal ideation is strong but she is able to commit to safety at this time.  She has not yet given keys back to  to hold onto meds.  She feels trapped in her home and cannot focus on art to cope.      Therapeutic Interventions/Treatment Strategies:  Psychotherapist offered support, feedback and validation and reinforced use of skills. Treatment modalities used include Motivational Interviewing, Cognitive Behavioral Therapy and Dialectical Behavioral Therapy. Validated and normalized.  Highlighted and reinforced skills used; connected to positive outcomes.  Provided additional skill suggestions.  Aided in creating a plan to help work towards goals.        Assessment:    Patient response:   Patient responded to session by accepting feedback, giving feedback, listening, focusing on goals, being attentive and accepting support    Possible barriers to participation / learning include: and no barriers identified    Health Issues:   Yes: Sleep disturbance, Associated Psychological Distress  Weight / dietary issues, Associated Psychological Distress       Substance Use Review:   Substance Use: No active concerns identified.    Mental Status/Behavioral Observations  Appearance:   Appropriate   Eye Contact:   Good   Psychomotor Behavior: Normal   Attitude:   Cooperative   Orientation:   All  Speech   Rate / Production: Normal    Volume:  Normal   Mood:    Anxious  Depressed  Irritable   Affect:    Appropriate   Thought Content:   Rumination and Safety reports  presence of suicidal ideation passive suicidal ideation  and thoughts of self-harm  Thought Form:  Coherent  Logical     Insight:    Good     Plan:     Safety Plan: Committed to safety and agreed to follow previously developed safety coping plan.      Barriers to treatment: None identified    Patient Contracts (see media tab):  None    Substance Use:  Not addressed in session     Continue or Discharge: Patient will continue in Adult Day Treatment (ADT)  as planned. Patient is likely to benefit from learning and using skills as they work toward the goals identified in their treatment plan.      Chelsea Hutchison  January 19, 2021

## 2021-01-19 NOTE — ADDENDUM NOTE
Encounter addended by: Inocencio Hutchison MD on: 1/19/2021 8:34 AM   Actions taken: Clinical Note Signed

## 2021-01-21 ENCOUNTER — HOSPITAL ENCOUNTER (OUTPATIENT)
Dept: BEHAVIORAL HEALTH | Facility: CLINIC | Age: 26
End: 2021-01-21
Attending: PSYCHIATRY & NEUROLOGY
Payer: COMMERCIAL

## 2021-01-21 PROCEDURE — 90853 GROUP PSYCHOTHERAPY: CPT | Mod: 95

## 2021-01-21 PROCEDURE — G0177 OPPS/PHP; TRAIN & EDUC SERV: HCPCS | Mod: GT

## 2021-01-21 NOTE — GROUP NOTE
Psychoeducation Group Note    PATIENT'S NAME: Corinne N Palm  MRN:   [id number]  :   1995  ACCT. NUMBER: [id number]  DATE OF SERVICE: 21  START TIME:  2:00 PM  END TIME:  2:50 PM  FACILITATOR: Leida Rowan RN  TOPIC: MH RN Group: Medication Education and Management  Telemedicine Visit: The patient's condition can be safely assessed and treated via synchronous audio and visual telemedicine encounter.      Reason for Telemedicine Visit:  covid19    Originating Site (Patient Location): Patient's home    Distant Site (Provider Location): Provider Remote Setting    Consent:  The patient/guardian has verbally consented to: the potential risks and benefits of telemedicine (video visit) versus in person care; bill my insurance or make self-payment for services provided; and responsibility for payment of non-covered services.     Mode of Communication:  Video Conference via zoom    As the provider I attest to compliance with applicable laws and regulations related to telemedicine.      [facility] Mental Health Day Treatment  TRACK: 2B    NUMBER OF PARTICIPANTS: 3    Summary of Group / Topics Discussed:  Medication Educations and Management:  Medication Jeopardy: Patients provided education regarding medication safety, antidepressants, side effects, neuroleptics, expected medication outcomes, knowledge of diagnosis, symptoms, and symptom management through an engaging jeopardy-style format.     Patient Session Goals / Objectives:    ? Participated in team-based Jeopardy game  ? Identified strategies for safe use, handling, and disposal of medications  ? Discussed basic aspects of medication safety, side effects, adverse outcomes and contraindications        Patient Participation / Response:  Fully participated with the group by sharing personal reflections / insights and openly received / provided feedback with other participants.     Demonstrated understanding of topics discussed through group  discussion and participation and Identified / Expressed personal readiness to practice skills    Treatment Plan:  Patient has a current master individualized treatment plan.  See Epic treatment plan for more information.    Leida Rowan RN

## 2021-01-21 NOTE — GROUP NOTE
Process Group Note    PATIENT'S NAME: Corinne N Palm  MRN:   [id number]  :   1995  ACCT. NUMBER: [id number]  DATE OF SERVICE: 21  START TIME:  1:00 PM  END TIME:  1:50 PM  FACILITATOR: Chelsea Hutchison  TOPIC:  Process Group    Diagnoses:  1.  Major depressive disorder, recurrent, severe without psychosis  2.  Generalized anxiety disorder.   3.  Anorexia nervosa.   4.  Borderline personality disorder.      [facility] Day Treatment  TRACK: 2B    Telemedicine Visit: The patient's condition can be safely assessed and treated via synchronous audio and visual telemedicine encounter.      Reason for Telemedicine Visit:  covid 19    Originating Site (Patient Location): Patient's home    Distant Site (Provider Location): Provider Remote Setting    Consent:  The patient/guardian has verbally consented to: the potential risks and benefits of telemedicine (video visit) versus in person care; bill my insurance or make self-payment for services provided; and responsibility for payment of non-covered services.     Mode of Communication:  Video Conference via [employer]    As the provider I attest to compliance with applicable laws and regulations related to telemedicine.      NUMBER OF PARTICIPANTS: 3          Data:    Session content: At the start of this group, patients were invited to check in by identifying themselves, describing their current emotional status, and identifying issues to address in this group.   Area(s) of treatment focus addressed in this session included Symptom Management, Personal Safety, Develop / Improve Independent Living Skills and Develop Socialization / Interpersonal Relationship Skills.  Lida reported working and staying busy the last few days which has helped distract from suicidal thoughts.  She has not yet told  she has access to medications and did not update psychiatrist on this as last time psychiatrist did not react in a way that helped Lida  to feel empowered.  She has plans to talk with therapist about this who was able to help Lida talk with her  rather than talk for Lida in a similar situation in the past.  She reported talking with psychiatrist about how she feels she is not functioning well due to ED symptoms and Depression.  She reported plans to talk more with therapist today to explore ED programs.  She also wants to see what is covered by insurance and talk with boss about what might be available to her for leave.  She continues to remind self that waves of strong SI are temporary and this has helped her maintain safety.  She denies current intent and is able to commit to safety.      Therapeutic Interventions/Treatment Strategies:  Psychotherapist offered support, feedback and validation and reinforced use of skills. Treatment modalities used include Motivational Interviewing, Cognitive Behavioral Therapy and Dialectical Behavioral Therapy.  Validated and normalized.  Highlighted and reinforced skills used; connected to positive outcomes.  Provided additional skill suggestions.  Aided in creating a plan to help work towards goals.        Assessment:    Patient response:   Patient responded to session by accepting feedback, giving feedback, listening, focusing on goals, being attentive and accepting support    Possible barriers to participation / learning include: and no barriers identified    Health Issues:   Yes: Weight / dietary issues, Associated Psychological Distress       Substance Use Review:   Substance Use: No active concerns identified.    Mental Status/Behavioral Observations  Appearance:   Appropriate   Eye Contact:   Good   Psychomotor Behavior: Normal   Attitude:   Cooperative   Orientation:   All  Speech   Rate / Production: Normal    Volume:  Normal   Mood:    Anxious  Depressed   Affect:    Appropriate   Thought Content:   Rumination and Safety reports  presence of suicidal ideation passive suicidal ideation  and  thoughts of self-harm  Thought Form:  Coherent  Logical     Insight:    Good     Plan:     Safety Plan: Committed to safety and agreed to follow previously developed safety coping plan.      Barriers to treatment: None identified    Patient Contracts (see media tab):  None    Substance Use: Not addressed in session     Continue or Discharge: Patient will continue in Adult Day Treatment (ADT)  as planned. Patient is likely to benefit from learning and using skills as they work toward the goals identified in their treatment plan.      Chelsea Hutchison  January 21, 2021

## 2021-01-21 NOTE — GROUP NOTE
Psychotherapy Group Note    PATIENT'S NAME: Corinne N Palm  MRN:   [id number]  :   1995  ACCT. NUMBER: [id number]  DATE OF SERVICE: 21  START TIME:  3:00 PM  END TIME:  3:50 PM  FACILITATOR: Chelsea Hutchison  TOPIC: MH EBP Group: Self-Awareness  [facility] Adult Mental Health Day Treatment  TRACK: 2B    Telemedicine Visit: The patient's condition can be safely assessed and treated via synchronous audio and visual telemedicine encounter.      Reason for Telemedicine Visit:  covid 19    Originating Site (Patient Location): Patient's home    Distant Site (Provider Location): Provider Remote Setting    Consent:  The patient/guardian has verbally consented to: the potential risks and benefits of telemedicine (video visit) versus in person care; bill my insurance or make self-payment for services provided; and responsibility for payment of non-covered services.     Mode of Communication:  Video Conference via [employer]    As the provider I attest to compliance with applicable laws and regulations related to telemedicine.      NUMBER OF PARTICIPANTS: 4    Summary of Group / Topics Discussed:  Self-Awareness: Values: Patients identified personal values by examining development of their current values and how their values influence their daily functioning and life choices. Patients explored the impact of their values on their thoughts, feelings, and actions. Patients discussed definition of personal values and how they develop and change over time. The goal is to help patients reconcile value conflicts and achieve balance and flexibility to improve mood and daily functioning.     Patient Session Goals / Objectives:    Examined development of values and impact of values on functioning    Identified and prioritized important values related to current well-being     Identified strategies to change or enhance values to positively impact symptoms    Assisted patients to find ways to adapt functioning to  better fit their values        Patient Participation / Response:  Fully participated with the group by sharing personal reflections / insights and openly received / provided feedback with other participants.    Demonstrated understanding of topics discussed through group discussion and participation, Demonstrated understanding of values, strengths, and challenges to learn about themselves and Identified / Expressed readiness to act intentionally, increase self-compassion, promote personal growth    Treatment Plan:  Patient has a current master individualized treatment plan.  See Epic treatment plan for more information.    Chelsea Hutchison

## 2021-01-25 ENCOUNTER — HOSPITAL ENCOUNTER (OUTPATIENT)
Dept: BEHAVIORAL HEALTH | Facility: CLINIC | Age: 26
End: 2021-01-25
Attending: PSYCHIATRY & NEUROLOGY
Payer: COMMERCIAL

## 2021-01-25 PROCEDURE — 90853 GROUP PSYCHOTHERAPY: CPT | Mod: 95

## 2021-01-25 PROCEDURE — G0177 OPPS/PHP; TRAIN & EDUC SERV: HCPCS | Mod: 95

## 2021-01-25 NOTE — GROUP NOTE
Psychoeducation Group Note    PATIENT'S NAME: Corinne N Palm  MRN:   [id number]  :   1995  ACCT. NUMBER: [id number]  DATE OF SERVICE: 21  START TIME:  2:00 PM  END TIME:  2:50 PM  FACILITATOR: George Pink OTR/L  TOPIC: MH Life Skills Group: Resiliency Development  Telemedicine Visit: The patient's condition can be safely assessed and treated via synchronous audio and visual telemedicine encounter.      Reason for Telemedicine Visit: COVID-19    Originating Site (Patient Location): Patient's home    Distant Site (Provider Location[facility]    Consent:  The patient/guardian has verbally consented to: the potential risks and benefits of telemedicine (video visit) versus in person care; bill my insurance or make self-payment for services provided; and responsibility for payment of non-covered services.     Mode of Communication:  Video Conference via [employer]    As the provider I attest to compliance with applicable laws and regulations related to telemedicine.   [facility] Adult Mental Health Day Treatment  TRACK: 2B    NUMBER OF PARTICIPANTS: 4    Summary of Group / Topics Discussed:  Resiliency Development:  Coping Skills( Strategies to Improve Motivation): Patients were taught how to identify stressors, signs of stress, coping skills, and prevention strategies for overall stress management.  Patients were given the opportunity to identify both ongoing and acute mental health symptoms and how to effectively manage these symptoms by developing an effective aftercare plan.  Patients increased awareness of community based resources.    Patient Session Goals / Objectives:    Identified how using coping skills can be used for symptom and stress management       Improved awareness of individualed symptoms and stressors and how to effectively cope     Established a relapse prevention plan to practice these skills in their own environments    Practiced and  reflected on how to generalize taught skills to their everyday life          Patient Participation / Response:  Fully participated with the group by sharing personal reflections / insights and openly received / provided feedback with other participants.    Demonstrated understanding of content through handout /group discussion , Verbalized understanding of content and Patient would benefit from additional opportunities to practice the content to be able to generalize it to their everyday life with increased intentionality, consistency, and efficacy in support of their psychiatric recovery    Treatment Plan:  Patient has a current master individualized treatment plan.  See Epic treatment plan for more information.    George Pink, OTR/L

## 2021-01-25 NOTE — GROUP NOTE
Process Group Note    PATIENT'S NAME: Corinne N Palm  MRN:   [id number]  :   1995  ACCT. NUMBER: [id number]  DATE OF SERVICE: 21  START TIME:  1:00 PM  END TIME:  1:50 PM  FACILITATOR: Chelsea Hutchison  TOPIC:  Process Group    Diagnoses:  1.  Major depressive disorder, recurrent, severe without psychosis  2.  Generalized anxiety disorder.   3.  Anorexia nervosa.   4.  Borderline personality disorder.      [facility] Day Treatment  TRACK: 2B    Telemedicine Visit: The patient's condition can be safely assessed and treated via synchronous audio and visual telemedicine encounter.      Reason for Telemedicine Visit:  covid 19    Originating Site (Patient Location): Patient's home    Distant Site (Provider Location): Provider Remote Setting    Consent:  The patient/guardian has verbally consented to: the potential risks and benefits of telemedicine (video visit) versus in person care; bill my insurance or make self-payment for services provided; and responsibility for payment of non-covered services.     Mode of Communication:  Video Conference via [employer]    As the provider I attest to compliance with applicable laws and regulations related to telemedicine.      NUMBER OF PARTICIPANTS: 3          Data:    Session content: At the start of this group, patients were invited to check in by identifying themselves, describing their current emotional status, and identifying issues to address in this group.   Area(s) of treatment focus addressed in this session included Symptom Management, Personal Safety, Develop / Improve Independent Living Skills and Develop Socialization / Interpersonal Relationship Skills.  Lida reported getting done with work.  She reported her weekend was rough with elevated eating disorder symptoms.  She reported these symptoms blocked safety concerns.  She reporetd finally giving her key to medicine safe back to her  but lied to him about how long  she had had it.  She reported calling [facility] for ED treatment and did an intake.  She is waiting to hear back today.   She reported feeling unsure if she wants this or not but feels she does not have to decide now.  She reported being ill over the weekend with her covid vaccine.  She reported working on taking things one thing at a time.     Therapeutic Interventions/Treatment Strategies:  Psychotherapist offered support, feedback and validation and reinforced use of skills. Treatment modalities used include Motivational Interviewing, Cognitive Behavioral Therapy and Dialectical Behavioral Therapy. Validated and normalized.  Highlighted and reinforced skills used; connected to positive outcomes.  Provided additional skill suggestions.  Aided in creating a plan to help work towards goals.        Assessment:    Patient response:   Patient responded to session by accepting feedback, giving feedback, listening, focusing on goals, being attentive and accepting support    Possible barriers to participation / learning include: and no barriers identified    Health Issues:   Yes: Weight / dietary issues, Associated Psychological Distress       Substance Use Review:   Substance Use: No active concerns identified.    Mental Status/Behavioral Observations  Appearance:   Appropriate   Eye Contact:   Good   Psychomotor Behavior: Normal   Attitude:   Cooperative   Orientation:   All  Speech   Rate / Production: Normal    Volume:  Normal   Mood:    Anxious  Depressed   Affect:    Appropriate   Thought Content:   Rumination and Safety reports  presence of suicidal ideation passive suicidal ideation  and thoughts of self-harm  Thought Form:  Coherent  Logical     Insight:    Good     Plan:     Safety Plan: Committed to safety and agreed to follow previously developed safety coping plan.      Barriers to treatment: None identified    Patient Contracts (see media tab):  None    Substance Use: Not addressed in session     Continue or  Discharge: Patient will continue in Adult Day Treatment (ADT)  as planned. Patient is likely to benefit from learning and using skills as they work toward the goals identified in their treatment plan.      Chelsea Hutchison  January 25, 2021

## 2021-01-25 NOTE — GROUP NOTE
Psychoeducation Group Note    PATIENT'S NAME: Corinne N Palm  MRN:   [id number]  :   1995  ACCT. NUMBER: [id number]  DATE OF SERVICE: 21  START TIME:  3:00 PM  END TIME:  3:50 PM  FACILITATOR: Mor Soni OT  TOPIC: MH Life Skills Group: Sensory Approaches in Mental Health  [facility] Adult Mental Health Day Treatment  TRACK:2B    Telemedicine Visit: The patient's condition can be safely assessed and treated via synchronous audio and visual telemedicine encounter.      Reason for Telemedicine Visit: Services only offered telehealth and Covid 19    Originating Site (Patient Location): Patient's home    Distant Site (Provider Location): Provider Remote Setting    Consent:  The patient/guardian has verbally consented to: the potential risks and benefits of telemedicine (video visit) versus in person care; bill my insurance or make self-payment for services provided; and responsibility for payment of non-covered services.     Mode of Communication:  Video Conference via [employer]     As the provider I attest to compliance with applicable laws and regulations related to telemedicine.      NUMBER OF PARTICIPANTS: 4    Summary of Group / Topics Discussed:  Sensory Approaches in Mental Health:  Self Regulation: Interoceptive awareness.   Patients were provided with education on Developing self awareness through interoceptive attunement: tuning into the body to help the mind. Patients were taught how to recognize specific signs and symptoms of their individualized state of arousal and work to read them accurately and effectively to support self regulation and participation in valued roles, routines, and responsibilities.  Patients explored body based skills (bottom up processing skills) and techniques to help manage symptoms and change level of arousal when needed to be in control and comfortable so they are able to function in different environments.       Patient Session Goals /  Objectives:    [place] about Interoception and ways to read their own individualized signs of distress and wellbeing.     Identified specific and individualized neurosensory skills to help when distressed and for crisis management    Identified signs and symptoms of current level of arousal and ways to make changes in level of arousal when needed    Established a plan for practice of these skills in their own environments        Patient Participation / Response:  Fully participated with the group by sharing personal reflections / insights and openly received / provided feedback with other participants.    Verbalized understanding of content and Patient would benefit from additional opportunities to practice the content to be able to generalize it to their everyday life with increased intentionality, consistency, and efficacy in support of their psychiatric recovery    Treatment Plan:  Patient has a current master individualized treatment plan.  See Epic treatment plan for more information.    Mor Soni, OT

## 2021-01-26 ENCOUNTER — HOSPITAL ENCOUNTER (OUTPATIENT)
Dept: BEHAVIORAL HEALTH | Facility: CLINIC | Age: 26
End: 2021-01-26
Attending: PSYCHIATRY & NEUROLOGY
Payer: COMMERCIAL

## 2021-01-26 PROCEDURE — G0177 OPPS/PHP; TRAIN & EDUC SERV: HCPCS | Mod: GT

## 2021-01-26 PROCEDURE — 90853 GROUP PSYCHOTHERAPY: CPT | Mod: 95

## 2021-01-26 NOTE — GROUP NOTE
Psychoeducation Group Note    PATIENT'S NAME: Corinne N Palm  MRN:   [id number]  :   1995  ACCT. NUMBER: [id number]  DATE OF SERVICE: 21  START TIME:  3:00 PM  END TIME:  3:50 PM  FACILITATOR: Mor Soni OT  TOPIC: MH Life Skills Group: Sensory Approaches in Mental Health  [facility] Adult Mental Health Day Treatment  TRACK: 2B    Telemedicine Visit: The patient's condition can be safely assessed and treated via synchronous audio and visual telemedicine encounter.      Reason for Telemedicine Visit: Services only offered telehealth and Covid 19    Originating Site (Patient Location): Patient's home    Distant Site (Provider Location): Provider Remote Setting    Consent:  The patient/guardian has verbally consented to: the potential risks and benefits of telemedicine (video visit) versus in person care; bill my insurance or make self-payment for services provided; and responsibility for payment of non-covered services.     Mode of Communication:  Video Conference via [employer] via [employer]    As the provider I attest to compliance with applicable laws and regulations related to telemedicine.      NUMBER OF PARTICIPANTS: 5    Summary of Group / Topics Discussed:  Sensory Approaches in Mental Health:  Sensory Enhanced Mindfulness: Patients were taught and provided with an opportunity to explore and practice how using sensory enhanced mindfulness practices can help them stay grounded in the present moment as a way to manage mental health symptoms and stressors. Focus today was on sensory enhanced seated yoga (Certified Instructor) with emphasis on comfortable ways to experience breathing.    Patient Session Goals / Objectives:    Identified how using sensory enhanced mindfulness practices can be used for grounding, stress management, and self regulation      Improved awareness of different types of sensory enhanced mindfulness activities that assist with healthy coping of stress and symptoms       Established a plan for practice of these skills in their own environments    Practiced and reflected on how to generalize taught skills to their everyday life        Patient Participation / Response:  Fully participated with the group by sharing personal reflections / insights and openly received / provided feedback with other participants.    Verbalized understanding of content and Patient would benefit from additional opportunities to practice the content to be able to generalize it to their everyday life with increased intentionality, consistency, and efficacy in support of their psychiatric recovery    Treatment Plan:  Patient has a current master individualized treatment plan.  See Epic treatment plan for more information.    Mor Soni, OT

## 2021-01-26 NOTE — GROUP NOTE
Process Group Note    PATIENT'S NAME: Corinne N Palm  MRN:   [id number]  :   1995  ACCT. NUMBER: [id number]  DATE OF SERVICE: 21  START TIME:  1:00 PM  END TIME:  1:50 PM  FACILITATOR: Chelsea Hutchison  TOPIC:  Process Group    Diagnoses:  1.  Major depressive disorder, recurrent, severe without psychosis  2.  Generalized anxiety disorder.   3.  Anorexia nervosa.   4.  Borderline personality disorder.      [facility] Day Treatment  TRACK: 2B    Telemedicine Visit: The patient's condition can be safely assessed and treated via synchronous audio and visual telemedicine encounter.      Reason for Telemedicine Visit:  covid 19    Originating Site (Patient Location): Patient's home    Distant Site (Provider Location): Provider Remote Setting    Consent:  The patient/guardian has verbally consented to: the potential risks and benefits of telemedicine (video visit) versus in person care; bill my insurance or make self-payment for services provided; and responsibility for payment of non-covered services.     Mode of Communication:  Video Conference via [employer]    As the provider I attest to compliance with applicable laws and regulations related to telemedicine.      NUMBER OF PARTICIPANTS: 5; a 6th group member arrived at 1:40          Data:    Session content: At the start of this group, patients were invited to check in by identifying themselves, describing their current emotional status, and identifying issues to address in this group.   Area(s) of treatment focus addressed in this session included Symptom Management, Personal Safety, Develop / Improve Independent Living Skills and Develop Socialization / Interpersonal Relationship Skills.  Lida reported getting a call from [employer] that she got into the residential program but it is a 4-6 week wait.  She reported wanting to talk to IT about if this would be a good fit.  She reported eating disorder symptoms are high and she  is feeling irritable.  This is frustrating to her as she does not identify with being an irritable person.  She also worries she is not living up to her values (ie holding onto med keys instead of giving them to her ).  She agreed it might be helpful to attend to a different value like creativity at some point in the next few days.     Therapeutic Interventions/Treatment Strategies:  Psychotherapist offered support, feedback and validation and reinforced use of skills. Treatment modalities used include Motivational Interviewing, Cognitive Behavioral Therapy and Dialectical Behavioral Therapy. Validated and normalized.  Highlighted and reinforced skills used; connected to positive outcomes.  Provided additional skill suggestions.  Aided in creating a plan to help work towards goals.        Assessment:    Patient response:   Patient responded to session by accepting feedback, giving feedback, listening, focusing on goals and being attentive    Possible barriers to participation / learning include: and no barriers identified    Health Issues:   Yes: Weight / dietary issues, Associated Psychological Distress       Substance Use Review:   Substance Use: No active concerns identified.    Mental Status/Behavioral Observations  Appearance:   Appropriate   Eye Contact:   Good   Psychomotor Behavior: Normal   Attitude:   Cooperative   Orientation:   All  Speech   Rate / Production: Normal    Volume:  Normal   Mood:    Anxious  Depressed  Irritable   Affect:    Appropriate   Thought Content:   Rumination  Thought Form:  Coherent  Logical     Insight:    Good     Plan:     Safety Plan: Committed to safety and agreed to follow previously developed safety coping plan.      Barriers to treatment: None identified    Patient Contracts (see media tab):  None    Substance Use: Not addressed in session     Continue or Discharge: Patient will continue in Adult Day Treatment (ADT)  as planned. Patient is likely to benefit from  learning and using skills as they work toward the goals identified in their treatment plan.      Chelsea Hutchison  January 26, 2021

## 2021-01-26 NOTE — GROUP NOTE
Psychoeducation Group Note    PATIENT'S NAME: Corinne N Palm  MRN:   [id number]  :   1995  ACCT. NUMBER: [id number]  DATE OF SERVICE: 21  START TIME:  2:00 PM  END TIME:  2:50 PM  FACILITATOR: Leida Rowan RN  TOPIC:  RN Group: Health Maintenance  Telemedicine Visit: The patient's condition can be safely assessed and treated via synchronous audio and visual telemedicine encounter.      Reason for Telemedicine Visit:  covid19    Originating Site (Patient Location): Patient's home    Distant Site (Provider Location): Provider Remote Setting    Consent:  The patient/guardian has verbally consented to: the potential risks and benefits of telemedicine (video visit) versus in person care; bill my insurance or make self-payment for services provided; and responsibility for payment of non-covered services.     Mode of Communication:  Video Conference via Zoom    As the provider I attest to compliance with applicable laws and regulations related to telemedicine.      [facility] Adult Mental Health Day Treatment  TRACK: 2B    NUMBER OF PARTICIPANTS: 5 (7B pt merged w/ group)    Summary of Group / Topics Discussed:  Health Maintenance: Wellness Check-in: Patients met with group facilitator to individually review a holistic wellness check-in to assess patient medication adherence/concerns, appointments, physical and mental health, exercise, nutrition, sleep, socialization, substance use, and need for service/resource referrals.       Patient Session Goals / Objectives:    Discussed various aspects of health management and self-care related to physical and mental health    Demonstrated increased self-awareness of current wellness needs    Developed health literacy skills in navigating the healthcare system and self-advocacy/communicating needs with health care team          Patient Participation / Response:  Fully participated with the group by sharing personal reflections / insights and openly received  / provided feedback with other participants.    Demonstrated understanding of topics discussed through group discussion and participation and Identified / Expressed personal readiness to practice skills    Treatment Plan:  Patient has a current master individualized treatment plan.  See Epic treatment plan for more information.    Leida Rowan RN

## 2021-01-28 ENCOUNTER — HOSPITAL ENCOUNTER (OUTPATIENT)
Dept: BEHAVIORAL HEALTH | Facility: CLINIC | Age: 26
End: 2021-01-28
Attending: PSYCHIATRY & NEUROLOGY
Payer: COMMERCIAL

## 2021-01-28 PROCEDURE — G0177 OPPS/PHP; TRAIN & EDUC SERV: HCPCS | Mod: GT

## 2021-01-28 PROCEDURE — 90853 GROUP PSYCHOTHERAPY: CPT | Mod: GT

## 2021-01-28 NOTE — GROUP NOTE
Psychoeducation Group Note    PATIENT'S NAME: Corinne N Palm  MRN:   [id number]  :   1995  ACCT. NUMBER: [id number]  DATE OF SERVICE: 21  START TIME:  3:00 PM  END TIME:  3:50 PM  FACILITATOR: Leida Rowan RN  TOPIC:  RN Group: Medication Education and Management  Telemedicine Visit: The patient's condition can be safely assessed and treated via synchronous audio and visual telemedicine encounter.      Reason for Telemedicine Visit:  covid19    Originating Site (Patient Location): Patient's home    Distant Site (Provider Location): Provider Remote Setting    Consent:  The patient/guardian has verbally consented to: the potential risks and benefits of telemedicine (video visit) versus in person care; bill my insurance or make self-payment for services provided; and responsibility for payment of non-covered services.     Mode of Communication:  Video Conference via zoom    As the provider I attest to compliance with applicable laws and regulations related to telemedicine.      [facility] Mental Health Day Treatment  TRACK: 2B    NUMBER OF PARTICIPANTS: 7    Summary of Group / Topics Discussed:  Medication Educations and Management:  Medication Categories: Patient were provided with a brief overview of four major psychotropic medication categories. Expected effects, potential side effects, adverse reactions, and contraindications were discussed. Patients learned about how their medications work to treat their illness and reviewed safe medication management, handling, and disposal of medications.     Patient Session Goals / Objectives:  ? Listed the four major categories of psychotropic medications (antidepressants, antipsychotics, mood stabilizers, anti-anxiety)  ? Identified medications in each category and important adverse reactions/contraindications for use  ? Explained how the medications they take work to treat their illness       Patient Participation / Response:  Fully  participated with the group by sharing personal reflections / insights and openly received / provided feedback with other participants.     Demonstrated understanding of topics discussed through group discussion and participation and Identified / Expressed personal readiness to practice skills    Treatment Plan:  Patient has a current master individualized treatment plan.  See Epic treatment plan for more information.     Patient also requested support in interpreting GeneSight test results r/t MTHFR. Pt results seem to indicate further assessment would be helpful, so writer directed her to s/w her provider.    Leida Rowan RN

## 2021-01-28 NOTE — GROUP NOTE
Process Group Note    PATIENT'S NAME: Corinne N Palm  MRN:   [id number]  :   1995  ACCT. NUMBER: [id number]  DATE OF SERVICE: 21  START TIME:  1:00 PM  END TIME:  1:50 PM  FACILITATOR: Chelsea Hutchison  TOPIC:  Process Group    Diagnoses:  1.  Major depressive disorder, recurrent, severe without psychosis  2.  Generalized anxiety disorder.   3.  Anorexia nervosa.   4.  Borderline personality disorder.      [facility] Day Treatment  TRACK: 2B    Telemedicine Visit: The patient's condition can be safely assessed and treated via synchronous audio and visual telemedicine encounter.      Reason for Telemedicine Visit:  covid 19    Originating Site (Patient Location): Patient's home    Distant Site (Provider Location): Provider Remote Setting    Consent:  The patient/guardian has verbally consented to: the potential risks and benefits of telemedicine (video visit) versus in person care; bill my insurance or make self-payment for services provided; and responsibility for payment of non-covered services.     Mode of Communication:  Video Conference via [employer]    As the provider I attest to compliance with applicable laws and regulations related to telemedicine.      NUMBER OF PARTICIPANTS: 7          Data:    Session content: At the start of this group, patients were invited to check in by identifying themselves, describing their current emotional status, and identifying issues to address in this group.   Area(s) of treatment focus addressed in this session included Symptom Management, Personal Safety, Develop / Improve Independent Living Skills and Develop Socialization / Interpersonal Relationship Skills.  Lida reported starting to look forward to work which has gotten easier.  She reported seeing her eating disorder therapist again finally and discussed willingness to look into ED treatment but advocated for [facility] over [facility] as she already has staff there.  She reported  decreased anxiety and depression.  SI/SIB urges are present btu decreasing, she can commit to safety.  ED symptoms are high but gives self credit that this is the first time she is advocating for herself and willing to explore higher levels of care on her own before it being mandatory.  She reported plans to cope with downtime ove rthe weekend and reached out to friends to play D&D this weekend.     Therapeutic Interventions/Treatment Strategies:  Psychotherapist offered support, feedback and validation and reinforced use of skills. Treatment modalities used include Motivational Interviewing, Cognitive Behavioral Therapy and Dialectical Behavioral Therapy. Validated and normalized.  Highlighted and reinforced skills used; connected to positive outcomes.  Provided additional skill suggestions.  Aided in creating a plan to help work towards goals.        Assessment:    Patient response:   Patient responded to session by accepting feedback, giving feedback, listening, focusing on goals, being attentive and accepting support    Possible barriers to participation / learning include: and no barriers identified    Health Issues:   Yes: Weight / dietary issues, Associated Psychological Distress       Substance Use Review:   Substance Use: No active concerns identified.    Mental Status/Behavioral Observations  Appearance:   Appropriate   Eye Contact:   Good   Psychomotor Behavior: Normal   Attitude:   Cooperative   Orientation:   All  Speech   Rate / Production: Normal    Volume:  Normal   Mood:    Anxious  Depressed   Affect:    Appropriate   Thought Content:   Rumination and Safety reports  presence of suicidal ideation passive suicidal ideation  and thoughts of self-harm  Thought Form:  Coherent  Logical     Insight:    Good     Plan:     Safety Plan: Committed to safety and agreed to follow previously developed safety coping plan.      Barriers to treatment: None identified    Patient Contracts (see media tab):   None    Substance Use: Not addressed in session     Continue or Discharge: Patient will continue in Adult Day Treatment (ADT)  as planned. Patient is likely to benefit from learning and using skills as they work toward the goals identified in their treatment plan.      Chelsea Hutchison  January 28, 2021

## 2021-01-28 NOTE — GROUP NOTE
Psychotherapy Group Note    PATIENT'S NAME: Corinne N Palm  MRN:   [id number]  :   1995  ACCT. NUMBER: [id number]  DATE OF SERVICE: 21  START TIME:  2:00 PM  END TIME:  2:50 PM  FACILITATOR: Chelsea Hutchison  TOPIC: MH EBP Group: Behavioral Activation  [facility] Day Treatment  TRACK: 2B    Telemedicine Visit: The patient's condition can be safely assessed and treated via synchronous audio and visual telemedicine encounter.      Reason for Telemedicine Visit:  covid 19    Originating Site (Patient Location): Patient's home    Distant Site (Provider Location): Provider Remote Setting    Consent:  The patient/guardian has verbally consented to: the potential risks and benefits of telemedicine (video visit) versus in person care; bill my insurance or make self-payment for services provided; and responsibility for payment of non-covered services.     Mode of Communication:  Video Conference via [employer]ender    As the provider I attest to compliance with applicable laws and regulations related to telemedicine.     NUMBER OF PARTICIPANTS: 7    Summary of Group / Topics Discussed:  Behavioral Activation: The Change Process - Behavior Change: Patients explored the process and types of change, including but not limited to, theories of change, steps to making change, methods of changing behavior, and potential barriers.  Patients worked to identify what changes may benefit their daily lives, and work towards a plan to implement change.      Patient Session Goals / Objectives:    Demonstrate understanding of the change process.      Identify positive and negative behavioral patterns.    Make plans to track and implement changes and share experiences in group.    Identify personal barriers to change      Patient Participation / Response:  Fully participated with the group by sharing personal reflections / insights and openly received / provided feedback with other  participants.    Demonstrated understanding of topics discussed through group discussion and participation, Expressed understanding of the relationship between behaviors, thoughts, and feelings and Shared experiences and challenges with making behavioral changes    Treatment Plan:  Patient has a current master individualized treatment plan.  See Epic treatment plan for more information.    Chelsea Hutchison

## 2021-02-01 ENCOUNTER — HOSPITAL ENCOUNTER (OUTPATIENT)
Dept: BEHAVIORAL HEALTH | Facility: CLINIC | Age: 26
End: 2021-02-01
Attending: PSYCHIATRY & NEUROLOGY
Payer: COMMERCIAL

## 2021-02-01 PROCEDURE — G0177 OPPS/PHP; TRAIN & EDUC SERV: HCPCS | Mod: 95

## 2021-02-01 PROCEDURE — 90853 GROUP PSYCHOTHERAPY: CPT | Mod: GT

## 2021-02-01 NOTE — GROUP NOTE
Psychoeducation Group Note    PATIENT'S NAME: Corinne N Palm  MRN:   [id number]  :   1995  ACCT. NUMBER: [id number]  DATE OF SERVICE: 21  START TIME:  2:00 PM  END TIME:  2:50 PM  FACILITATOR: George Pink OTR/L  TOPIC: MH Life Skills Group: Resiliency Development  Telemedicine Visit: The patient's condition can be safely assessed and treated via synchronous audio and visual telemedicine encounter.      Reason for Telemedicine Visit: COVID-19    Originating Site (Patient Location): Patient's home    Distant Site (Provider Location): [facility]: [facility]    Consent:  The patient/guardian has verbally consented to: the potential risks and benefits of telemedicine (video visit) versus in person care; bill my insurance or make self-payment for services provided; and responsibility for payment of non-covered services.     Mode of Communication:  Video Conference via Dr. TATTOFF    As the provider I attest to compliance with applicable laws and regulations related to telemedicine.   [facility] Adult Mental Health Day Treatment  TRACK: 2B    NUMBER OF PARTICIPANTS: 7    Summary of Group / Topics Discussed:  Resiliency Development:  Coping Skills(Personal Recovery Outcome Measure): Patients were taught how to identify stressors, signs of stress, coping skills, and prevention strategies for overall stress management.  Patients were given the opportunity to identify both ongoing and acute mental health symptoms and how to effectively manage these symptoms by developing an effective aftercare plan.  Patients increased awareness of community based resources.    Patient Session Goals / Objectives:    Identified how using coping skills can be used for symptom and stress management       Improved awareness of individualed symptoms and stressors and how to effectively cope     Established a relapse prevention plan to practice these skills in their own environments    Practiced and  reflected on how to generalize taught skills to their everyday life          Patient Participation / Response:  Moderately participated, sharing some personal reflections / insights and adequately adequately received / provided feedback with other participants.    Demonstrated understanding of content through handout/group discussion , Verbalized understanding of content and Patient would benefit from additional opportunities to practice the content to be able to generalize it to their everyday life with increased intentionality, consistency, and efficacy in support of their psychiatric recovery    Treatment Plan:  Patient has a current master individualized treatment plan.  See Epic treatment plan for more information.    George Pink, OTR/L

## 2021-02-01 NOTE — GROUP NOTE
Psychoeducation Group Note    PATIENT'S NAME: Corinne N Palm  MRN:   [id number]  :   1995  ACCT. NUMBER: [id number]  DATE OF SERVICE: 21  START TIME:  1:00 PM  END TIME:  1:50 PM  FACILITATOR: Mor Soni OT  TOPIC: MH Life Skills Group: Sensory Approaches in Mental Health  [facility] Adult Mental Health Day Treatment  TRACK: 2B    NUMBER OF PARTICIPANTS: 6    Summary of Group / Topics Discussed:  Sensory Approaches in Mental Health:  Coping through the Senses: Environmental considerations: Patients explored environmental impact on nervous system functioning and used it as a basis for self regulation across settings/contexts. They worked to identify sensory preferences in their home environment and ways to tap into them to self regulate. They also discussed environments that are unhelpful and disregulating and ways to proactively adapt to that environment to allow for improved regulation and participation.  Patients were provided with an experiential opportunity to increase self-awareness of helpful sensory input and self care activities. Patients were introduced on how to create supportive environments that encourage use of these skills.         Patient Session Goals / Objectives:    Identified specific and individualized neurosensory skills to help when distressed      Identified skills learned and how this applies to current daily life    Established a plan for practice of these skills in their own environments        Patient Participation / Response:  Fully participated with the group by sharing personal reflections / insights and openly received / provided feedback with other participants.    Verbalized understanding of content and Patient would benefit from additional opportunities to practice the content to be able to generalize it to their everyday life with increased intentionality, consistency, and efficacy in support of their psychiatric recovery    Treatment Plan:  Patient has a  current master individualized treatment plan.  See Epic treatment plan for more information.    Mor Soni, OT

## 2021-02-01 NOTE — GROUP NOTE
Process Group Note    PATIENT'S NAME: Corinne N Palm  MRN:   [id number]  :   1995  ACCT. NUMBER: [id number]  DATE OF SERVICE: 21  START TIME:  3:00 PM  END TIME:  3:50 PM  FACILITATOR: Chelsea Hutchison  TOPIC:  Process Group    Diagnoses:  1.  Major depressive disorder, recurrent, severe without psychosis  2.  Generalized anxiety disorder.   3.  Anorexia nervosa.   4.  Borderline personality disorder.      [facility] Day Treatment  TRACK: 2B    Telemedicine Visit: The patient's condition can be safely assessed and treated via synchronous audio and visual telemedicine encounter.      Reason for Telemedicine Visit:  covid 19    Originating Site (Patient Location): Patient's home    Distant Site (Provider Location): Provider Remote Setting    Consent:  The patient/guardian has verbally consented to: the potential risks and benefits of telemedicine (video visit) versus in person care; bill my insurance or make self-payment for services provided; and responsibility for payment of non-covered services.     Mode of Communication:  Video Conference via [employer]    As the provider I attest to compliance with applicable laws and regulations related to telemedicine.      NUMBER OF PARTICIPANTS: 7          Data:    Session content: At the start of this group, patients were invited to check in by identifying themselves, describing their current emotional status, and identifying issues to address in this group.   Area(s) of treatment focus addressed in this session included Symptom Management, Personal Safety, Develop / Improve Independent Living Skills and Develop Socialization / Interpersonal Relationship Skills.  Lida reported feeling irritable.  She reported seeing her IT Thursday and found out she is on a waitlist for [facility].  This might be a 1-2 week wait list.  She is unsure if she wants to go or not.  Later, she agrees she will go but does not like that she needs  to go.  She reported frustration with psychiatrist assuming she will fail [place] and stating she has done so many residential programs already when she states she has not.  She reported talking to boss about leave who was supportive but Lida reports worry thoughts about being fired.  She recognizes she is not functioning well.  She reported knowing her eating disorder wants her to stay out of residential as otherwise she has to address it.  She is focusing on self care.      Therapeutic Interventions/Treatment Strategies:  Psychotherapist offered support, feedback and validation and reinforced use of skills. Treatment modalities used include Motivational Interviewing, Cognitive Behavioral Therapy and Dialectical Behavioral Therapy. Validated and normalized.  Highlighted and reinforced skills used; connected to positive outcomes.  Provided additional skill suggestions.  Aided in creating a plan to help work towards goals.        Assessment:    Patient response:   Patient responded to session by accepting feedback, giving feedback, listening, focusing on goals, being attentive and accepting support    Possible barriers to participation / learning include: and no barriers identified    Health Issues:   Yes: Weight / dietary issues, Associated Psychological Distress       Substance Use Review:   Substance Use: No active concerns identified.    Mental Status/Behavioral Observations  Appearance:   Appropriate   Eye Contact:   Good   Psychomotor Behavior: Normal   Attitude:   Cooperative   Orientation:   All  Speech   Rate / Production: Normal    Volume:  Normal   Mood:    Anxious  Depressed  Irritable   Affect:    Appropriate   Thought Content:   Rumination and Safety reports  presence of suicidal ideation passive suicidal ideation  and thoughts of self-harm  Thought Form:  Coherent  Logical     Insight:    Good     Plan:     Safety Plan: Committed to safety and agreed to follow previously developed safety coping plan.       Barriers to treatment: None identified    Patient Contracts (see media tab):  None    Substance Use: Not addressed in session     Continue or Discharge: Patient will continue in Adult Day Treatment (ADT)  as planned. Patient is likely to benefit from learning and using skills as they work toward the goals identified in their treatment plan.      Chelsea Hutchison  February 1, 2021

## 2021-02-02 ENCOUNTER — HOSPITAL ENCOUNTER (OUTPATIENT)
Dept: BEHAVIORAL HEALTH | Facility: CLINIC | Age: 26
End: 2021-02-02
Attending: PSYCHIATRY & NEUROLOGY
Payer: COMMERCIAL

## 2021-02-02 PROCEDURE — 90853 GROUP PSYCHOTHERAPY: CPT | Mod: GT

## 2021-02-02 PROCEDURE — G0177 OPPS/PHP; TRAIN & EDUC SERV: HCPCS | Mod: 95

## 2021-02-02 PROCEDURE — G0177 OPPS/PHP; TRAIN & EDUC SERV: HCPCS | Mod: GT

## 2021-02-02 NOTE — GROUP NOTE
Process Group Note    PATIENT'S NAME: Corinne N Palm  MRN:   [id number]  :   1995  ACCT. NUMBER: [id number]  DATE OF SERVICE: 21  START TIME:  3:00 PM  END TIME:  3:50 PM  FACILITATOR: Chelsea Hutchison  TOPIC:  Process Group    Diagnoses:  1.  Major depressive disorder, recurrent, severe without psychosis  2.  Generalized anxiety disorder.   3.  Anorexia nervosa.   4.  Borderline personality disorder.      [facility] Day Treatment  TRACK: 2B    Telemedicine Visit: The patient's condition can be safely assessed and treated via synchronous audio and visual telemedicine encounter.      Reason for Telemedicine Visit:  covid 19    Originating Site (Patient Location): Patient's home    Distant Site (Provider Location): Provider Remote Setting    Consent:  The patient/guardian has verbally consented to: the potential risks and benefits of telemedicine (video visit) versus in person care; bill my insurance or make self-payment for services provided; and responsibility for payment of non-covered services.     Mode of Communication:  Video Conference via [employer]    As the provider I attest to compliance with applicable laws and regulations related to telemedicine.      NUMBER OF PARTICIPANTS: 7          Data:    Session content: At the start of this group, patients were invited to check in by identifying themselves, describing their current emotional status, and identifying issues to address in this group.   Area(s) of treatment focus addressed in this session included Symptom Management, Personal Safety, Develop / Improve Independent Living Skills and Develop Socialization / Interpersonal Relationship Skills.  Lida reported plans to start [facility] programming for ED symptoms on Monday.  She reported she does not like that she has to do this but knows this is where she is at.  She gives herself credit for catching it sooner than in the past.  She reported feeling  frustrated and willing.  She did not report a change in safety issues.      Therapeutic Interventions/Treatment Strategies:  Psychotherapist offered support, feedback and validation and reinforced use of skills. Treatment modalities used include Motivational Interviewing, Cognitive Behavioral Therapy and Dialectical Behavioral Therapy. Validated and normalized.  Highlighted and reinforced skills used; connected to positive outcomes.  Provided additional skill suggestions.  Aided in creating a plan to help work towards goals.        Assessment:    Patient response:   Patient responded to session by accepting feedback, giving feedback, listening, focusing on goals, being attentive and accepting support    Possible barriers to participation / learning include: and no barriers identified    Health Issues:   Yes: Weight / dietary issues, Associated Psychological Distress       Substance Use Review:   Substance Use: No active concerns identified.    Mental Status/Behavioral Observations  Appearance:   Appropriate   Eye Contact:   Good   Psychomotor Behavior: Normal   Attitude:   Cooperative   Orientation:   All  Speech   Rate / Production: Normal    Volume:  Normal   Mood:    Anxious  Depressed   Affect:    Appropriate   Thought Content:   Rumination and Safety reports  presence of suicidal ideation passive suicidal ideation  and thoughts of self-harm  Thought Form:  Coherent  Logical     Insight:    Good     Plan:     Safety Plan: Committed to safety and agreed to follow previously developed safety coping plan.      Barriers to treatment: None identified    Patient Contracts (see media tab):  None    Substance Use: Not addressed in session     Continue or Discharge: Patient will continue in Adult Day Treatment (ADT)  as planned. Patient is likely to benefit from learning and using skills as they work toward the goals identified in their treatment plan.      Chelsea Hutchison  February 2, 2021

## 2021-02-02 NOTE — GROUP NOTE
Psychoeducation Group Note    PATIENT'S NAME: Corinne N Palm  MRN:   [id number]  :   1995  ACCT. NUMBER: [id number]  DATE OF SERVICE: 21  START TIME:  1:00 PM  END TIME:  1:50 PM  FACILITATOR: Mor Soni OT  TOPIC: MH Life Skills Group: Sensory Approaches in Mental Health  [facility] Adult Mental Health Day Treatment  TRACK: 2B    Telemedicine Visit: The patient's condition can be safely assessed and treated via synchronous audio and visual telemedicine encounter.      Reason for Telemedicine Visit: Services only offered telehealth and Covid 19    Originating Site (Patient Location): Patient's home    Distant Site (Provider Location): Provider Remote Setting    Consent:  The patient/guardian has verbally consented to: the potential risks and benefits of telemedicine (video visit) versus in person care; bill my insurance or make self-payment for services provided; and responsibility for payment of non-covered services.     Mode of Communication:  Video Conference via [employer]    As the provider I attest to compliance with applicable laws and regulations related to telemedicine.        NUMBER OF PARTICIPANTS: 7    Summary of Group / Topics Discussed:  Sensory Approaches in Mental Health:Resiliency Development: Honoring Energy. Group members were provided with group intervention process focused on exploring strategies and individualized adaptations for managing their energy to support improved mental wellbeing. They were provided with psychoeducation on and explored the concept of energy management through the lens of self-compassion by embracing the complementary energies of Yin & Yang (leaning into/yielding to, doing/being, sympathetic/parasympathetic, etc) to embrace a rhythm of recovery (Beatriz 2019). They worked to improve self-awareness and develop a sense of agency through identification of specific meaningful and purposeful activities based on their own contextual energy level.  Facilitation of reflection, validation, and support provided.          Patient Session Goals / Objectives:     Learn and develop understanding of ways to manage energy that embraces self-compassion.     Develop an understanding and practice of energy management through the rhythm of complementary energies to develop resiliency.     Deepen self-awareness about their own daily energy rhythms and apply taught concepts to their own plan for energy management strategies as they begin to re-engage in meaningful purposeful activities.     Identify one way to integrate content into their daily life to support recovery and resiliency.        Patient Participation / Response:  Fully participated with the group by sharing personal reflections / insights and openly received / provided feedback with other participants.    Verbalized understanding of content and Patient would benefit from additional opportunities to practice the content to be able to generalize it to their everyday life with increased intentionality, consistency, and efficacy in support of their psychiatric recovery    Treatment Plan:  Patient has a current master individualized treatment plan.  See Epic treatment plan for more information.    Mor Soni, OT

## 2021-02-02 NOTE — GROUP NOTE
Psychoeducation Group Note    PATIENT'S NAME: Corinne N Palm  MRN:   [id number]  :   1995  ACCT. NUMBER: [id number]  DATE OF SERVICE: 21  START TIME:  2:00 PM  END TIME:  2:50 PM  FACILITATOR: Leida Rowan RN  TOPIC:  RN Group: Health Maintenance  Telemedicine Visit: The patient's condition can be safely assessed and treated via synchronous audio and visual telemedicine encounter.      Reason for Telemedicine Visit:  covid19    Originating Site (Patient Location): Patient's home    Distant Site (Provider Location): Provider Remote Setting    Consent:  The patient/guardian has verbally consented to: the potential risks and benefits of telemedicine (video visit) versus in person care; bill my insurance or make self-payment for services provided; and responsibility for payment of non-covered services.     Mode of Communication:  Video Conference via Zoom    As the provider I attest to compliance with applicable laws and regulations related to telemedicine.      [facility] Day Treatment  TRACK: 2B    NUMBER OF PARTICIPANTS: 7    Summary of Group / Topics Discussed:  Health Maintenance: Wellness Check-in: Patients met with group facilitator to individually review a holistic wellness check-in to assess patient medication adherence/concerns, appointments, physical and mental health, exercise, nutrition, sleep, socialization, substance use, and need for service/resource referrals.       Patient Session Goals / Objectives:    Discussed various aspects of health management and self-care related to physical and mental health    Demonstrated increased self-awareness of current wellness needs    Developed health literacy skills in navigating the healthcare system and self-advocacy/communicating needs with health care team          Patient Participation / Response:  Fully participated with the group by sharing personal reflections / insights and openly received / provided feedback with  other participants.    Demonstrated understanding of topics discussed through group discussion and participation and Identified / Expressed personal readiness to practice skills    Treatment Plan:  Patient has a current master individualized treatment plan.  See Epic treatment plan for more information.    Leida Rowan RN

## 2021-02-03 NOTE — ADDENDUM NOTE
Encounter addended by: Chelsea Hutchison on: 2/3/2021 7:59 AM   Actions taken: Charge Capture section accepted

## 2021-02-04 ENCOUNTER — HOSPITAL ENCOUNTER (OUTPATIENT)
Dept: BEHAVIORAL HEALTH | Facility: CLINIC | Age: 26
End: 2021-02-04
Attending: PSYCHIATRY & NEUROLOGY
Payer: COMMERCIAL

## 2021-02-04 PROCEDURE — 90853 GROUP PSYCHOTHERAPY: CPT | Mod: GT

## 2021-02-04 PROCEDURE — 90853 GROUP PSYCHOTHERAPY: CPT | Mod: 95

## 2021-02-04 PROCEDURE — G0177 OPPS/PHP; TRAIN & EDUC SERV: HCPCS | Mod: 95

## 2021-02-04 NOTE — GROUP NOTE
Psychoeducation Group Note    PATIENT'S NAME: Corinne N Palm  MRN:   [id number]  :   1995  ACCT. NUMBER: [id number]  DATE OF SERVICE: 21  START TIME:  2:00 PM  END TIME:  2:50 PM  FACILITATOR: Leida Rowan RN  TOPIC:  RN Group: Medication Education and Management  Telemedicine Visit: The patient's condition can be safely assessed and treated via synchronous audio and visual telemedicine encounter.      Reason for Telemedicine Visit:  covid19    Originating Site (Patient Location): Patient's home    Distant Site (Provider Location): Provider Remote Setting    Consent:  The patient/guardian has verbally consented to: the potential risks and benefits of telemedicine (video visit) versus in person care; bill my insurance or make self-payment for services provided; and responsibility for payment of non-covered services.     Mode of Communication:  Video Conference via zoom    As the provider I attest to compliance with applicable laws and regulations related to telemedicine.      [facility] Mental Health Day Treatment  TRACK: 2B    NUMBER OF PARTICIPANTS: 7    Summary of Group / Topics Discussed:  Medication Educations and Management:part 2 -   Medication Categories: Patient were provided with a brief overview of four major psychotropic medication categories. Expected effects, potential side effects, adverse reactions, and contraindications were discussed. Patients learned about how their medications work to treat their illness and reviewed safe medication management, handling, and disposal of medications.     Patient Session Goals / Objectives:  ? Listed the four major categories of psychotropic medications (antidepressants, antipsychotics, mood stabilizers, anti-anxiety)  ? Identified medications in each category and important adverse reactions/contraindications for use  ? Explained how the medications they take work to treat their illness       Patient Participation / Response:  Fully  participated with the group by sharing personal reflections / insights and openly received / provided feedback with other participants.     Demonstrated understanding of topics discussed through group discussion and participation and Identified / Expressed personal readiness to practice skills    Treatment Plan:  Patient has See Epic Treatment Plan - Patient is discharging.    Leida Rowan RN

## 2021-02-04 NOTE — GROUP NOTE
Process Group Note    PATIENT'S NAME: Corinne N Palm  MRN:   [id number]  :   1995  ACCT. NUMBER: [id number]  DATE OF SERVICE: 21  START TIME:  1:00 PM  END TIME:  1:50 PM  FACILITATOR: Chelsea Hutchison  TOPIC:  Process Group    Diagnoses:  1.  Major depressive disorder, recurrent, severe without psychosis  2.  Generalized anxiety disorder.   3.  Anorexia nervosa.   4.  Borderline personality disorder.      [facility] Day Treatment  TRACK: 2B    Telemedicine Visit: The patient's condition can be safely assessed and treated via synchronous audio and visual telemedicine encounter.      Reason for Telemedicine Visit:  covid 19    Originating Site (Patient Location): Patient's home    Distant Site (Provider Location): Provider Remote Setting    Consent:  The patient/guardian has verbally consented to: the potential risks and benefits of telemedicine (video visit) versus in person care; bill my insurance or make self-payment for services provided; and responsibility for payment of non-covered services.     Mode of Communication:  Video Conference via Rigel    As the provider I attest to compliance with applicable laws and regulations related to telemedicine.      NUMBER OF PARTICIPANTS: 7          Data:    Session content: At the start of this group, patients were invited to check in by identifying themselves, describing their current emotional status, and identifying issues to address in this group.   Area(s) of treatment focus addressed in this session included Symptom Management, Personal Safety, Develop / Improve Independent Living Skills and Develop Socialization / Interpersonal Relationship Skills.  Lida reported talking with her boss which was difficult to due feelings of guilt related to taking time off.  She reported feeling somewhat better about this leave than past leaves as she was better able to plan and make coverage plans with coworker. She reported anxiety  about going to [place] and anger that she has to do an intensive residential prior to residential which is a change of plans. However, she is using radical acceptance and willingness to accept these issues.  She reported goal to make weekend plans to help decrease ruminations and attend to self care.     Therapeutic Interventions/Treatment Strategies:  Psychotherapist offered support, feedback and validation and reinforced use of skills. Treatment modalities used include Motivational Interviewing, Cognitive Behavioral Therapy and Dialectical Behavioral Therapy. Validated and normalized.  Highlighted and reinforced skills used; connected to positive outcomes.  Provided additional skill suggestions.  Aided in creating a plan to help work towards goals.        Assessment:    Patient response:   Patient responded to session by accepting feedback, giving feedback, listening, focusing on goals, being attentive and accepting support    Possible barriers to participation / learning include: and no barriers identified    Health Issues:   Yes: Weight / dietary issues, Associated Psychological Distress       Substance Use Review:   Substance Use: No active concerns identified.    Mental Status/Behavioral Observations  Appearance:   Appropriate   Eye Contact:   Good   Psychomotor Behavior: Normal   Attitude:   Cooperative   Orientation:   All  Speech   Rate / Production: Normal    Volume:  Normal   Mood:    Anxious  Depressed   Affect:    Appropriate   Thought Content:   Rumination and Safety reports  presence of suicidal ideation passive suicidal ideation  and thoughts of self-harm  Thought Form:  Coherent  Logical     Insight:    Good     Plan:     Safety Plan: Committed to safety and agreed to follow previously developed safety coping plan.      Barriers to treatment: None identified    Patient Contracts (see media tab):  None    Substance Use: Not addressed in session     Continue or Discharge: Patient is being discharged  today. See Treatment Plan and Discharge Summary.       Chelsea Hutchison  February 4, 2021

## 2021-02-04 NOTE — GROUP NOTE
Psychotherapy Group Note    PATIENT'S NAME: Corinne N Palm  MRN:   [id number]  :   1995  ACCT. NUMBER: [id number]  DATE OF SERVICE: 21  START TIME:  2:00 PM  END TIME:  2:50 PM  FACILITATOR: Chelsea Hutchison  TOPIC: MH EBP Group: Behavioral Activation  [facility] Mental Health Day Treatment  TRACK: 2B    Telemedicine Visit: The patient's condition can be safely assessed and treated via synchronous audio and visual telemedicine encounter.      Reason for Telemedicine Visit:  covid 19    Originating Site (Patient Location): Patient's home    Distant Site (Provider Location): Provider Remote Setting    Consent:  The patient/guardian has verbally consented to: the potential risks and benefits of telemedicine (video visit) versus in person care; bill my insurance or make self-payment for services provided; and responsibility for payment of non-covered services.     Mode of Communication:  Video Conference via Fullscreen    As the provider I attest to compliance with applicable laws and regulations related to telemedicine.      NUMBER OF PARTICIPANTS: 8    Summary of Group / Topics Discussed:  Behavioral Activation: Behavior Chain Analysis: Patients explored the steps leading up to identified unwanted behaviors, and ways to replace them with more effective behaviors. Patients examined factors contributing to unwanted behaviors, with a goal of determining ways to interrupt the unhealthy patterns.    Patient Session Goals / Objectives:    Identify thoughts, feelings, and actions that contribute to unwanted behaviors    Identify thoughts, feelings, and actions that contribute to more effective/healthy and desired behaviors    Make plans to track and implement changes and share experiences in group    Identify personal barriers to change      Patient Participation / Response:  Fully participated with the group by sharing personal reflections / insights and openly received / provided feedback with  other participants.    Demonstrated understanding of topics discussed through group discussion and participation, Expressed understanding of the relationship between behaviors, thoughts, and feelings and Shared experiences and challenges with making behavioral changes    Treatment Plan:  Patient has See Epic Treatment Plan - Patient is discharging.    Chelsea Hutchison

## 2021-04-24 ENCOUNTER — HEALTH MAINTENANCE LETTER (OUTPATIENT)
Age: 26
End: 2021-04-24

## 2021-06-08 ENCOUNTER — HOSPITAL ENCOUNTER (INPATIENT)
Facility: CLINIC | Age: 26
LOS: 20 days | Discharge: LEFT BEFORE DISCHARGE | DRG: 885 | End: 2021-06-28
Attending: EMERGENCY MEDICINE | Admitting: PSYCHIATRY & NEUROLOGY
Payer: COMMERCIAL

## 2021-06-08 DIAGNOSIS — F50.9 EATING DISORDER, UNSPECIFIED TYPE: ICD-10-CM

## 2021-06-08 DIAGNOSIS — Z20.822 COVID-19 RULED OUT BY LABORATORY TESTING: ICD-10-CM

## 2021-06-08 DIAGNOSIS — Z86.59 HISTORY OF OCD (OBSESSIVE COMPULSIVE DISORDER): ICD-10-CM

## 2021-06-08 DIAGNOSIS — F33.1 MAJOR DEPRESSIVE DISORDER, RECURRENT EPISODE, MODERATE (H): ICD-10-CM

## 2021-06-08 DIAGNOSIS — T50.902A SUICIDE ATTEMPT BY DRUG OVERDOSE (H): ICD-10-CM

## 2021-06-08 DIAGNOSIS — T42.6X2A POISONING BY OTHER ANTIEPILEPTIC AND SEDATIVE-HYPNOTIC DRUGS, INTENTIONAL SELF-HARM, INITIAL ENCOUNTER (H): ICD-10-CM

## 2021-06-08 LAB
ALBUMIN SERPL-MCNC: 4.3 G/DL (ref 3.4–5)
ALP SERPL-CCNC: 63 U/L (ref 40–150)
ALT SERPL W P-5'-P-CCNC: 19 U/L (ref 0–50)
AMPHETAMINES UR QL SCN: NEGATIVE
ANION GAP SERPL CALCULATED.3IONS-SCNC: 7 MMOL/L (ref 3–14)
APAP SERPL-MCNC: <2 MG/L (ref 10–20)
AST SERPL W P-5'-P-CCNC: 18 U/L (ref 0–45)
BARBITURATES UR QL: NEGATIVE
BASOPHILS # BLD AUTO: 0.1 10E9/L (ref 0–0.2)
BASOPHILS NFR BLD AUTO: 1.2 %
BENZODIAZ UR QL: NEGATIVE
BILIRUB SERPL-MCNC: 0.4 MG/DL (ref 0.2–1.3)
BUN SERPL-MCNC: 11 MG/DL (ref 7–30)
CALCIUM SERPL-MCNC: 9.4 MG/DL (ref 8.5–10.1)
CANNABINOIDS UR QL SCN: NEGATIVE
CHLORIDE SERPL-SCNC: 108 MMOL/L (ref 94–109)
CO2 SERPL-SCNC: 24 MMOL/L (ref 20–32)
COCAINE UR QL: NEGATIVE
CREAT SERPL-MCNC: 0.73 MG/DL (ref 0.52–1.04)
DIFFERENTIAL METHOD BLD: NORMAL
EOSINOPHIL # BLD AUTO: 0.1 10E9/L (ref 0–0.7)
EOSINOPHIL NFR BLD AUTO: 1.6 %
ERYTHROCYTE [DISTWIDTH] IN BLOOD BY AUTOMATED COUNT: 11.7 % (ref 10–15)
ETHANOL UR QL SCN: NEGATIVE
GFR SERPL CREATININE-BSD FRML MDRD: >90 ML/MIN/{1.73_M2}
GLUCOSE SERPL-MCNC: 76 MG/DL (ref 70–99)
HCG UR QL: NEGATIVE
HCT VFR BLD AUTO: 41.5 % (ref 35–47)
HGB BLD-MCNC: 14 G/DL (ref 11.7–15.7)
IMM GRANULOCYTES # BLD: 0 10E9/L (ref 0–0.4)
IMM GRANULOCYTES NFR BLD: 0.1 %
LABORATORY COMMENT REPORT: NORMAL
LYMPHOCYTES # BLD AUTO: 2.8 10E9/L (ref 0.8–5.3)
LYMPHOCYTES NFR BLD AUTO: 34.7 %
MCH RBC QN AUTO: 28.9 PG (ref 26.5–33)
MCHC RBC AUTO-ENTMCNC: 33.7 G/DL (ref 31.5–36.5)
MCV RBC AUTO: 86 FL (ref 78–100)
MONOCYTES # BLD AUTO: 0.4 10E9/L (ref 0–1.3)
MONOCYTES NFR BLD AUTO: 5.5 %
NEUTROPHILS # BLD AUTO: 4.6 10E9/L (ref 1.6–8.3)
NEUTROPHILS NFR BLD AUTO: 56.9 %
NRBC # BLD AUTO: 0 10*3/UL
NRBC BLD AUTO-RTO: 0 /100
OPIATES UR QL SCN: NEGATIVE
PLATELET # BLD AUTO: 337 10E9/L (ref 150–450)
POTASSIUM SERPL-SCNC: 3.9 MMOL/L (ref 3.4–5.3)
PROT SERPL-MCNC: 8.2 G/DL (ref 6.8–8.8)
RBC # BLD AUTO: 4.85 10E12/L (ref 3.8–5.2)
SALICYLATES SERPL-MCNC: <2 MG/DL
SARS-COV-2 RNA RESP QL NAA+PROBE: NEGATIVE
SODIUM SERPL-SCNC: 139 MMOL/L (ref 133–144)
SPECIMEN SOURCE: NORMAL
WBC # BLD AUTO: 8.1 10E9/L (ref 4–11)

## 2021-06-08 PROCEDURE — 81025 URINE PREGNANCY TEST: CPT | Performed by: PSYCHIATRY & NEUROLOGY

## 2021-06-08 PROCEDURE — 90791 PSYCH DIAGNOSTIC EVALUATION: CPT

## 2021-06-08 PROCEDURE — 80179 DRUG ASSAY SALICYLATE: CPT | Performed by: EMERGENCY MEDICINE

## 2021-06-08 PROCEDURE — 80143 DRUG ASSAY ACETAMINOPHEN: CPT | Performed by: EMERGENCY MEDICINE

## 2021-06-08 PROCEDURE — 80307 DRUG TEST PRSMV CHEM ANLYZR: CPT | Performed by: PSYCHIATRY & NEUROLOGY

## 2021-06-08 PROCEDURE — 124N000002 HC R&B MH UMMC

## 2021-06-08 PROCEDURE — 99285 EMERGENCY DEPT VISIT HI MDM: CPT | Performed by: PSYCHIATRY & NEUROLOGY

## 2021-06-08 PROCEDURE — 80320 DRUG SCREEN QUANTALCOHOLS: CPT | Performed by: PSYCHIATRY & NEUROLOGY

## 2021-06-08 PROCEDURE — 80053 COMPREHEN METABOLIC PANEL: CPT | Performed by: EMERGENCY MEDICINE

## 2021-06-08 PROCEDURE — 99285 EMERGENCY DEPT VISIT HI MDM: CPT | Mod: 25 | Performed by: PSYCHIATRY & NEUROLOGY

## 2021-06-08 PROCEDURE — C9803 HOPD COVID-19 SPEC COLLECT: HCPCS | Performed by: PSYCHIATRY & NEUROLOGY

## 2021-06-08 PROCEDURE — 85025 COMPLETE CBC W/AUTO DIFF WBC: CPT | Performed by: EMERGENCY MEDICINE

## 2021-06-08 PROCEDURE — 87635 SARS-COV-2 COVID-19 AMP PRB: CPT | Performed by: PSYCHIATRY & NEUROLOGY

## 2021-06-08 RX ORDER — DULOXETIN HYDROCHLORIDE 60 MG/1
60 CAPSULE, DELAYED RELEASE ORAL EVERY MORNING
Status: DISCONTINUED | OUTPATIENT
Start: 2021-06-09 | End: 2021-06-11

## 2021-06-08 RX ORDER — POLYETHYLENE GLYCOL 3350 17 G/17G
17 POWDER, FOR SOLUTION ORAL DAILY
COMMUNITY
Start: 2021-03-08 | End: 2024-01-12

## 2021-06-08 RX ORDER — MAGNESIUM HYDROXIDE/ALUMINUM HYDROXICE/SIMETHICONE 120; 1200; 1200 MG/30ML; MG/30ML; MG/30ML
30 SUSPENSION ORAL EVERY 4 HOURS PRN
Status: DISCONTINUED | OUTPATIENT
Start: 2021-06-08 | End: 2021-06-28 | Stop reason: HOSPADM

## 2021-06-08 RX ORDER — POLYETHYLENE GLYCOL 3350 17 G/17G
17 POWDER, FOR SOLUTION ORAL AT BEDTIME
Status: DISCONTINUED | OUTPATIENT
Start: 2021-06-09 | End: 2021-06-09

## 2021-06-08 RX ORDER — OLANZAPINE 10 MG/2ML
10 INJECTION, POWDER, FOR SOLUTION INTRAMUSCULAR 3 TIMES DAILY PRN
Status: DISCONTINUED | OUTPATIENT
Start: 2021-06-08 | End: 2021-06-14

## 2021-06-08 RX ORDER — ACETAMINOPHEN 325 MG/1
650 TABLET ORAL EVERY 4 HOURS PRN
Status: DISCONTINUED | OUTPATIENT
Start: 2021-06-08 | End: 2021-06-28 | Stop reason: HOSPADM

## 2021-06-08 RX ORDER — DULOXETIN HYDROCHLORIDE 60 MG/1
60 CAPSULE, DELAYED RELEASE ORAL AT BEDTIME
Status: DISCONTINUED | OUTPATIENT
Start: 2021-06-09 | End: 2021-06-08

## 2021-06-08 RX ORDER — LAMOTRIGINE 200 MG/1
200 TABLET ORAL AT BEDTIME
Status: DISCONTINUED | OUTPATIENT
Start: 2021-06-09 | End: 2021-06-09

## 2021-06-08 RX ORDER — LAMOTRIGINE 200 MG/1
200 TABLET ORAL AT BEDTIME
Status: ON HOLD | COMMUNITY
Start: 2021-05-21 | End: 2021-06-28

## 2021-06-08 RX ORDER — DULOXETIN HYDROCHLORIDE 30 MG/1
30 CAPSULE, DELAYED RELEASE ORAL EVERY MORNING
Status: DISCONTINUED | OUTPATIENT
Start: 2021-06-09 | End: 2021-06-09

## 2021-06-08 RX ORDER — VITAMIN B COMPLEX
1000 TABLET ORAL DAILY
Status: DISCONTINUED | OUTPATIENT
Start: 2021-06-09 | End: 2021-06-28 | Stop reason: HOSPADM

## 2021-06-08 RX ORDER — DULOXETIN HYDROCHLORIDE 30 MG/1
30 CAPSULE, DELAYED RELEASE ORAL AT BEDTIME
Status: ON HOLD | COMMUNITY
Start: 2021-05-21 | End: 2021-06-28

## 2021-06-08 RX ORDER — LANOLIN ALCOHOL/MO/W.PET/CERES
3 CREAM (GRAM) TOPICAL
Status: DISCONTINUED | OUTPATIENT
Start: 2021-06-08 | End: 2021-06-28 | Stop reason: HOSPADM

## 2021-06-08 RX ORDER — DULOXETIN HYDROCHLORIDE 30 MG/1
30 CAPSULE, DELAYED RELEASE ORAL AT BEDTIME
Status: DISCONTINUED | OUTPATIENT
Start: 2021-06-09 | End: 2021-06-08

## 2021-06-08 RX ORDER — LURASIDONE HYDROCHLORIDE 80 MG/1
80 TABLET, FILM COATED ORAL
COMMUNITY
Start: 2021-03-18 | End: 2021-06-08

## 2021-06-08 RX ORDER — OLANZAPINE 10 MG/1
10 TABLET ORAL 3 TIMES DAILY PRN
Status: DISCONTINUED | OUTPATIENT
Start: 2021-06-08 | End: 2021-06-14

## 2021-06-08 RX ORDER — HYDROXYZINE HYDROCHLORIDE 25 MG/1
25 TABLET, FILM COATED ORAL EVERY 4 HOURS PRN
Status: DISCONTINUED | OUTPATIENT
Start: 2021-06-08 | End: 2021-06-28 | Stop reason: HOSPADM

## 2021-06-08 RX ORDER — LURASIDONE HYDROCHLORIDE 20 MG/1
80 TABLET, FILM COATED ORAL AT BEDTIME
Status: DISCONTINUED | OUTPATIENT
Start: 2021-06-09 | End: 2021-06-28 | Stop reason: HOSPADM

## 2021-06-08 RX ORDER — DULOXETIN HYDROCHLORIDE 60 MG/1
60 CAPSULE, DELAYED RELEASE ORAL
Status: ON HOLD | COMMUNITY
Start: 2021-04-20 | End: 2021-06-08

## 2021-06-08 ASSESSMENT — ENCOUNTER SYMPTOMS
GASTROINTESTINAL NEGATIVE: 1
HEADACHES: 0
FEVER: 0
NECK STIFFNESS: 0
EYES NEGATIVE: 1
COLOR CHANGE: 0
EYE REDNESS: 0
ABDOMINAL PAIN: 0
CONFUSION: 0
HYPERACTIVE: 0
CARDIOVASCULAR NEGATIVE: 1
HALLUCINATIONS: 0
DIFFICULTY URINATING: 0
DECREASED CONCENTRATION: 1
ARTHRALGIAS: 0
RESPIRATORY NEGATIVE: 1
NEUROLOGICAL NEGATIVE: 1
CONSTITUTIONAL NEGATIVE: 1
SHORTNESS OF BREATH: 0
MUSCULOSKELETAL NEGATIVE: 1
SLEEP DISTURBANCE: 1

## 2021-06-08 ASSESSMENT — ACTIVITIES OF DAILY LIVING (ADL)
DIFFICULTY_EATING/SWALLOWING: NO
FALL_HISTORY_WITHIN_LAST_SIX_MONTHS: NO
DOING_ERRANDS_INDEPENDENTLY_DIFFICULTY: NO
ADL_ASSESSMENT: WDL
WEAR_GLASSES_OR_BLIND: NO
WALKING_OR_CLIMBING_STAIRS_DIFFICULTY: NO
CONCENTRATING,_REMEMBERING_OR_MAKING_DECISIONS_DIFFICULTY: NO
PATIENT_/_FAMILY_COMMUNICATION_STYLE: SPOKEN LANGUAGE (ENGLISH OR BILINGUAL)
LAUNDRY: UNABLE TO COMPLETE
ORAL_HYGIENE: INDEPENDENT
HEARING_DIFFICULTY_OR_DEAF: NO
DIFFICULTY_COMMUNICATING: NO
HYGIENE/GROOMING: INDEPENDENT
DRESS: INDEPENDENT
TOILETING_ISSUES: NO
DRESSING/BATHING_DIFFICULTY: NO

## 2021-06-08 ASSESSMENT — MIFFLIN-ST. JEOR: SCORE: 1245.72

## 2021-06-08 NOTE — ED NOTES
Bed: HW02  Expected date: 6/8/21  Expected time: 2:50 PM  Means of arrival: Ambulance  Comments:  [facility], 24yo female, suicidal, on a hold from her clinic

## 2021-06-08 NOTE — ED NOTES
Patient took and od yesterday and after the od, got into her car in hopes of having an accident with her car. States she was impulsive yesterday. States she went to her dbt group and told  the  she felt self, even though she didn't feel safe. Today she feels suicidal but feels she can keep herself safe

## 2021-06-08 NOTE — ED PROVIDER NOTES
"    [facility] EMERGENCY DEPARTMENT ([facility])       6/08/21  History     Chief Complaint   Patient presents with     Suicide Attempt     yesterday between 3pm to 4 pm patient took a total of 3000mg of gabapentin in a suicide attempt.      The history is provided by the patient and medical records.     Corinne N Palm is a 25 year old female with a past medical history significant for OCD, MDD, and anxiety who presents to the Emergency Department for evaluation of after a suicide attempt yesterday with gabapentin. The patient reports that she was at work yesterday, and took 3000 mg of gabapentin yesterday at approximately 3-4 PM as a suicide attempt. Patient reports that she \"felt all over the place, and that nothing was real\" after taking these medications. Patient reports that she was then wanting to go home and grab more pills, but states that [place] the mall at [place] DBT appointment. She states that she had interpreted the DBT appointment as \"she did not think she was going to get better, so she might as well kill herself\". She then went home and took an unspecified amount of additional gabapentin. She reports that her symptoms of \"feeling all over the place\" lasted until she went to bed at approximately 9-10 PM. Patient states that she had  and attended a psychiatry appointment this morning, and states that her psychiatrist subsequently referred her here to the [facility] ED for evaluation. Here in the ED, patient states that she still does feel suicidal but could keep herself safe.    I have reviewed the Medications, Allergies, Past Medical and Surgical History, and Social History in the [facility] system.  PAST MEDICAL HISTORY:   Past Medical History:   Diagnosis Date     OCD (obsessive compulsive disorder)        PAST SURGICAL HISTORY:   Past Surgical History:   Procedure Laterality Date     ENT SURGERY         Past medical history, past surgical history, medications, and allergies were reviewed " with the patient. Additional pertinent items: None    FAMILY HISTORY: No family history on file.    SOCIAL HISTORY:   Social History     Tobacco Use     Smoking status: Never Smoker   Substance Use Topics     Alcohol use: No     Social history was reviewed with the patient. Additional pertinent items: None      Patient's Medications   New Prescriptions    No medications on file   Previous Medications    DULOXETINE (CYMBALTA) 30 MG CAPSULE    Take 30 mg by mouth daily    DULOXETINE (CYMBALTA) 60 MG CAPSULE    Take 1 capsule (60 mg) by mouth At Bedtime    DULOXETINE (CYMBALTA) 60 MG CAPSULE    Take 60 mg by mouth    GABAPENTIN (NEURONTIN) 300 MG CAPSULE    Take 300-600 mg by mouth nightly as needed (anxiety)    LAMOTRIGINE (LAMICTAL) 200 MG TABLET    Take 200 mg by mouth daily    LEVONORGESTREL (KYLEENA) 19.5 MG IUD    1 each by Intrauterine route    LURASIDONE (LATUDA) 80 MG TABS TABLET    Take 80 mg by mouth At Bedtime    POLYETHYLENE GLYCOL (MIRALAX) 17 GM/DOSE POWDER    Take 17 g by mouth    VITAMIN D, CHOLECALCIFEROL, 25 MCG (1000 UT) TABS    Take 1,000 Units by mouth daily    Modified Medications    No medications on file   Discontinued Medications    LAMOTRIGINE (LAMICTAL) 25 MG TABLET    Take 1 tablet (25 mg) by mouth daily for 9 days, then increase to 2 tablets (50 mg) by mouth daily.    LAMOTRIGINE (LAMICTAL) 25 MG TABLET    Take 2 tablets (50 mg) by mouth daily    LURASIDONE (LATUDA) 80 MG TABS TABLET    Take 80 mg by mouth    POLYETHYLENE GLYCOL (MIRALAX) 17 GM/DOSE POWDER    Take 17 g by mouth daily          Allergies   Allergen Reactions     Morphine Other (See Comments)     Twitching     Amoxicillin Rash     Sulfa Drugs Rash        Review of Systems   Constitutional: Negative for fever.   HENT: Negative for congestion.    Eyes: Negative for redness.   Respiratory: Negative for shortness of breath.    Cardiovascular: Negative for chest pain.   Gastrointestinal: Negative for abdominal pain.    Genitourinary: Negative for difficulty urinating.   Musculoskeletal: Negative for arthralgias and neck stiffness.   Skin: Negative for color change.   Neurological: Negative for headaches.   Psychiatric/Behavioral: Positive for decreased concentration, self-injury and suicidal ideas. Negative for confusion.   All other systems reviewed and are negative.      Physical Exam   BP: 123/84  Pulse: 90  Temp: 98.9  F (37.2  C)  Resp: 16  SpO2: 97 %      Physical Exam  Vitals signs and nursing note reviewed.   Constitutional:       General: She is not in acute distress.     Appearance: She is not diaphoretic.   HENT:      Head: Atraumatic.      Mouth/Throat:      Pharynx: No oropharyngeal exudate.   Eyes:      General: No scleral icterus.     Pupils: Pupils are equal, round, and reactive to light.   Cardiovascular:      Heart sounds: Normal heart sounds.   Pulmonary:      Effort: No respiratory distress.      Breath sounds: Normal breath sounds.   Abdominal:      General: Bowel sounds are normal.      Palpations: Abdomen is soft.      Tenderness: There is no abdominal tenderness.   Musculoskeletal:         General: No tenderness.   Skin:     General: Skin is warm.      Findings: No rash.   Psychiatric:         Mood and Affect: Mood is depressed.         Speech: Speech normal.         Thought Content: Thought content normal.         ED Course   3:40 PM  The patient was seen and examined by Wilfredo Calloway MD in Room EDHW02.        Procedures                           Results for orders placed or performed during the hospital encounter of 06/08/21 (from the past 24 hour(s))   CBC with platelets differential   Result Value Ref Range    WBC 8.1 4.0 - 11.0 10e9/L    RBC Count 4.85 3.8 - 5.2 10e12/L    Hemoglobin 14.0 11.7 - 15.7 g/dL    Hematocrit 41.5 35.0 - 47.0 %    MCV 86 78 - 100 fl    MCH 28.9 26.5 - 33.0 pg    MCHC 33.7 31.5 - 36.5 g/dL    RDW 11.7 10.0 - 15.0 %    Platelet Count 337 150 - 450 10e9/L    Diff Method  Automated Method     % Neutrophils 56.9 %    % Lymphocytes 34.7 %    % Monocytes 5.5 %    % Eosinophils 1.6 %    % Basophils 1.2 %    % Immature Granulocytes 0.1 %    Nucleated RBCs 0 0 /100    Absolute Neutrophil 4.6 1.6 - 8.3 10e9/L    Absolute Lymphocytes 2.8 0.8 - 5.3 10e9/L    Absolute Monocytes 0.4 0.0 - 1.3 10e9/L    Absolute Eosinophils 0.1 0.0 - 0.7 10e9/L    Absolute Basophils 0.1 0.0 - 0.2 10e9/L    Abs Immature Granulocytes 0.0 0 - 0.4 10e9/L    Absolute Nucleated RBC 0.0    Comprehensive metabolic panel   Result Value Ref Range    Sodium 139 133 - 144 mmol/L    Potassium 3.9 3.4 - 5.3 mmol/L    Chloride 108 94 - 109 mmol/L    Carbon Dioxide 24 20 - 32 mmol/L    Anion Gap 7 3 - 14 mmol/L    Glucose 76 70 - 99 mg/dL    Urea Nitrogen 11 7 - 30 mg/dL    Creatinine 0.73 0.52 - 1.04 mg/dL    GFR Estimate >90 >60 mL/min/[1.73_m2]    GFR Estimate If Black >90 >60 mL/min/[1.73_m2]    Calcium 9.4 8.5 - 10.1 mg/dL    Bilirubin Total 0.4 0.2 - 1.3 mg/dL    Albumin 4.3 3.4 - 5.0 g/dL    Protein Total 8.2 6.8 - 8.8 g/dL    Alkaline Phosphatase 63 40 - 150 U/L    ALT 19 0 - 50 U/L    AST 18 0 - 45 U/L   Salicylate level   Result Value Ref Range    Salicylate Level <2 mg/dL     Medications - No data to display          Assessments & Plan (with Medical Decision Making)   25-year-old female who presents at the request of psychiatrist for alleged suicide attempt yesterday evening.  Differential included toxidrome, metabolic disturbance, substance ingestion, depression, personality disorder.  Exam revealed normal vital signs.  Work-up included CBC and comprehensive metabolic panel that were grossly unremarkable.  Salicylates were less than 2.  Patient will be evaluated by behavioral  with final disposition.    I have reviewed the nursing notes.    I have reviewed the findings, diagnosis, plan and need for follow up with the patient.    New Prescriptions    No medications on file       Final diagnoses:   Suicide  attempt by drug overdose (H)   I, Duncan Dotson, am serving as a trained medical scribe to document services personally performed by Wilfredo Calloway MD, based on the provider's statements to me.      I, Wilfredo Calloway MD, was physically present and have reviewed and verified the accuracy of this note documented by Duncan Dotsno.     6/8/2021   [facility] EMERGENCY DEPARTMENT     Calderon Calloway MD  06/08/21 1734

## 2021-06-08 NOTE — ED PROVIDER NOTES
Did not see or evaluate patient, accidentally signed up for patient. Patient seen by Dr. Calloway, please see his note for patient encounter details.      Farrah Cabral MD  06/09/21 9482

## 2021-06-09 LAB
CHOLEST SERPL-MCNC: 165 MG/DL
HDLC SERPL-MCNC: 74 MG/DL
LDLC SERPL CALC-MCNC: 80 MG/DL
NONHDLC SERPL-MCNC: 91 MG/DL
TRIGL SERPL-MCNC: 57 MG/DL
TSH SERPL DL<=0.005 MIU/L-ACNC: 1.77 MU/L (ref 0.4–4)

## 2021-06-09 PROCEDURE — 250N000013 HC RX MED GY IP 250 OP 250 PS 637: Performed by: PSYCHIATRY & NEUROLOGY

## 2021-06-09 PROCEDURE — 124N000002 HC R&B MH UMMC

## 2021-06-09 PROCEDURE — 80061 LIPID PANEL: CPT | Performed by: PSYCHIATRY & NEUROLOGY

## 2021-06-09 PROCEDURE — 36415 COLL VENOUS BLD VENIPUNCTURE: CPT | Performed by: PSYCHIATRY & NEUROLOGY

## 2021-06-09 PROCEDURE — G0177 OPPS/PHP; TRAIN & EDUC SERV: HCPCS

## 2021-06-09 PROCEDURE — 99221 1ST HOSP IP/OBS SF/LOW 40: CPT | Mod: AI | Performed by: PSYCHIATRY & NEUROLOGY

## 2021-06-09 PROCEDURE — 84443 ASSAY THYROID STIM HORMONE: CPT | Performed by: PSYCHIATRY & NEUROLOGY

## 2021-06-09 RX ORDER — POLYETHYLENE GLYCOL 3350 17 G/17G
17 POWDER, FOR SOLUTION ORAL DAILY
Status: DISCONTINUED | OUTPATIENT
Start: 2021-06-09 | End: 2021-06-15

## 2021-06-09 RX ORDER — CLONAZEPAM 0.5 MG/1
0.5 TABLET ORAL 2 TIMES DAILY
Status: DISCONTINUED | OUTPATIENT
Start: 2021-06-09 | End: 2021-06-16

## 2021-06-09 RX ORDER — LAMOTRIGINE 25 MG/1
100 TABLET ORAL 2 TIMES DAILY
Status: DISCONTINUED | OUTPATIENT
Start: 2021-06-09 | End: 2021-06-16

## 2021-06-09 RX ORDER — POLYETHYLENE GLYCOL 3350 17 G/17G
17 POWDER, FOR SOLUTION ORAL DAILY
Status: DISCONTINUED | OUTPATIENT
Start: 2021-06-10 | End: 2021-06-09

## 2021-06-09 RX ADMIN — LURASIDONE HYDROCHLORIDE 80 MG: 20 TABLET, FILM COATED ORAL at 00:01

## 2021-06-09 RX ADMIN — LAMOTRIGINE 100 MG: 25 TABLET ORAL at 22:10

## 2021-06-09 RX ADMIN — DULOXETINE HYDROCHLORIDE 60 MG: 60 CAPSULE, DELAYED RELEASE ORAL at 09:17

## 2021-06-09 RX ADMIN — ACETAMINOPHEN 650 MG: 325 TABLET, FILM COATED ORAL at 11:19

## 2021-06-09 RX ADMIN — LAMOTRIGINE 200 MG: 200 TABLET ORAL at 00:01

## 2021-06-09 RX ADMIN — HYDROXYZINE HYDROCHLORIDE 25 MG: 25 TABLET, FILM COATED ORAL at 12:50

## 2021-06-09 RX ADMIN — CLONAZEPAM 0.5 MG: 0.5 TABLET ORAL at 22:10

## 2021-06-09 RX ADMIN — Medication 1000 UNITS: at 09:17

## 2021-06-09 RX ADMIN — LURASIDONE HYDROCHLORIDE 80 MG: 20 TABLET, FILM COATED ORAL at 22:11

## 2021-06-09 RX ADMIN — FLUOXETINE 20 MG: 20 CAPSULE ORAL at 22:11

## 2021-06-09 RX ADMIN — DULOXETINE HYDROCHLORIDE 30 MG: 30 CAPSULE, DELAYED RELEASE ORAL at 09:16

## 2021-06-09 RX ADMIN — POLYETHYLENE GLYCOL 3350 17 G: 17 POWDER, FOR SOLUTION ORAL at 09:25

## 2021-06-09 ASSESSMENT — ACTIVITIES OF DAILY LIVING (ADL)
LAUNDRY: WITH SUPERVISION
HYGIENE/GROOMING: INDEPENDENT
DRESS: INDEPENDENT
ORAL_HYGIENE: INDEPENDENT

## 2021-06-09 NOTE — PROGRESS NOTES
06/08/21 4150   Patient Belongings   Did you bring any home meds/supplements to the hospital?  No   Patient Belongings remains with patient;locker;sent to security per site process   Patient Belongings Remaining with Patient body jewelry;bracelet;cell phone/electronics;clothing;jewelry;keys;laptop computer;shoes;ring;purse/wallet;wallet;watch;other (see comments)   Patient Belongings Put in Hospital Secure Location (Security or Locker, etc.) cash/credit card;other (see comments);money (see comment)   Belongings Search Yes   Clothing Search Yes   Second Staff Kiley   With Patient:  -2 rings  -bra    Locker:  -Dress  -shoes  -leggings  -cardigan  -underwear  -2 josh pins  -2 hair ties  -Iphone  -hospital paperwork  -Purse   -Apple watch  -gum  -5 bracelets  -wallet lanyard  -10 gift cards  -2 health insurance cards  -[place] state drivers license  -[employer] ID  -3 lotions  -Notepad clipboard  -tweezers  -Ipad  -Macbook  -5 pens  -fidget  -hand   -Disposable mask    Security:  -pepper spray  -1 $20 bill  -Bid [employer] visa *9043  -[place] [employer] visa *[id number]  -[place] [employer] visa *4240  -[employer] debit *3131  -[facility] Visa *[id number]    [id number]  Robin was given rings    A               Admission:  I am responsible for any personal items that are not sent to the safe or pharmacy.  [facility] is not responsible for loss, theft or damage of any property in my possession.    Signature:  _________________________________ Date: _______  Time: _____                                              Staff Signature:  ____________________________ Date: ________  Time: _____      2nd Staff person, if patient is unable/unwilling to sign:    Signature: ________________________________ Date: ________  Time: _____     Discharge:  [facility] has returned all of my personal belongings:    Signature: _________________________________ Date: ________  Time: _____                                          Staff Signature:   ____________________________ Date: ________  Time: _____

## 2021-06-09 NOTE — PLAN OF CARE
"Patient admitted to [facility] on a 72-hour-hold which started on 2021 at 19:36. Patient asked If she would be able to leave after the hold is . Writer provided education on process and informed patient they will be meeting with the provider tomorrow. Also informed patient of their right to ask to be voluntary. Patient confirmed she had received the Patient Rights for Emergency Hold paperwork in ED and writer encouraged patient to review.     Patient was pleasant and cooperative during admission. On admission denies SI, SIB, HI and psychotic symptoms. Affect in blunted. Mood is depressed and patient endorsed feelings of hopelessness r/t e her eating disorder, answered the question what would make things better with \"if my eating disorder went away.\" States she had to \"put on weight at [place], but it is steadily creeping down again.\" States her main support is her . Identifies \"crocheting and my job\" as pleasures in life.     Oriented to unit.     UDS negative. Denies substance use. States she drinks 1-2 glasses of wine less than monthly.    Covid-19 pending on admission. Encouraged patient to wear mask when out of room.       "

## 2021-06-09 NOTE — PLAN OF CARE
Patient slept approximately 6 hours during the overnight shift; appeared comfortable with even and unlabored breathing while asleep. Scheduled HS medications administered at the beginning of the shift without incident. No issues or concerns reported or observed. Nursing will continue to monitor.

## 2021-06-09 NOTE — PLAN OF CARE
"Patient requested/provided PRN atarax for anxiety (tearful) and reported \"it helps.\" Patient agrees that meeting with psychiatry \"may be beneficial\" as \"urges of self-harm have kept happening.\" [facility] and [facility].    Patient clearly communicates needs. Patient has refused both breakfast and lunch this shift adding \"I am in the [facility] and discharged from  2 mos. Ago).    Patient contracts for safety \"now.\"    /80   Pulse 84   Temp 98.3  F (36.8  C) (Temporal)   Resp 16   Ht 1.6 m (5' 3\")   Wt 54.2 kg (119 lb 6.4 oz)   LMP 06/08/2021   SpO2 95%   BMI 21.15 kg/m  . No c/o pain.      Nursing will continue to monitor.  "

## 2021-06-09 NOTE — ED PROVIDER NOTES
ED Provider Note  [facility]      History     Chief Complaint   Patient presents with     Suicide Attempt     yesterday between 3pm to 4 pm patient took a total of 3000mg of gabapentin in a suicide attempt.      HPI Corinne N Palm is a 25 year old female who is here via EMS from home after she talked to her psychiatric provider (Dr juni Vigil) through [facility]. Patient has history of eating disorder and depression and anxiety. 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 Patient told her spouse hat she had ingested 3000 mg of gabapentin over course of an hour yesterday afternoon as she was feeling acutely suicidal. She is prescribed 900-1600 mg daily. Spouse called poison control which recommended patient be seen in an ED. Patient refused to go and opted to talk to her psychiatrist today. Spouse did not feel he can force her to come to the ED but was able to secure the rest of her meds and monitored her closely overnight. Patient had not attempted suicide before and her psychiatrist was concerned with her attempt yesterday and had her brought in.     Patient was seen and medically cleared through the main ED. She appears impaired here in [facility], unable to make definitive decisions and safety planning. Patient did not want to be admitted.    Please see DEC Crisis Assessment on 6/8/21 in Epic for further details.    PERSONAL MEDICAL HISTORY  Past Medical History:   Diagnosis Date     OCD (obsessive compulsive disorder)      PAST SURGICAL HISTORY  Past Surgical History:   Procedure Laterality Date     ENT SURGERY       FAMILY HISTORY  No family history on file.  SOCIAL HISTORY  Social History     Tobacco Use     Smoking status: Never Smoker   Substance Use Topics     Alcohol use: No     MEDICATIONS  No current facility-administered medications for this encounter.      Current Outpatient Medications   Medication     DULoxetine (CYMBALTA) 30 MG capsule     DULoxetine  (CYMBALTA) 60 MG capsule     DULoxetine (CYMBALTA) 60 MG capsule     gabapentin (NEURONTIN) 300 MG capsule     lamoTRIgine (LAMICTAL) 200 MG tablet     levonorgestrel (KYLEENA) 19.5 MG IUD     lurasidone (LATUDA) 80 MG TABS tablet     polyethylene glycol (MIRALAX) 17 GM/Dose powder     Vitamin D, Cholecalciferol, 25 MCG (1000 UT) TABS     ALLERGIES  Allergies   Allergen Reactions     Morphine Other (See Comments)     Twitching     Amoxicillin Rash     Sulfa Drugs Rash          Review of Systems   Constitutional: Negative.    HENT: Negative.    Eyes: Negative.    Respiratory: Negative.    Cardiovascular: Negative.    Gastrointestinal: Negative.    Genitourinary: Negative.    Musculoskeletal: Negative.    Skin: Negative.    Neurological: Negative.    Psychiatric/Behavioral: Positive for decreased concentration, sleep disturbance and suicidal ideas. Negative for hallucinations. The patient is not hyperactive.    All other systems reviewed and are negative.        Physical Exam   BP: 123/84  Pulse: 90  Temp: 98.9  F (37.2  C)  Resp: 16  SpO2: 97 %  Physical Exam  Vitals signs and nursing note reviewed.   HENT:      Head: Normocephalic.   Eyes:      Pupils: Pupils are equal, round, and reactive to light.   Neck:      Musculoskeletal: Normal range of motion.   Pulmonary:      Effort: Pulmonary effort is normal.   Musculoskeletal: Normal range of motion.   Neurological:      General: No focal deficit present.      Mental Status: She is alert.   Psychiatric:         Attention and Perception: She is inattentive. She does not perceive auditory or visual hallucinations.         Mood and Affect: Mood is anxious and depressed. Affect is inappropriate.         Speech: Speech is tangential.         Behavior: Behavior is withdrawn. Behavior is not agitated, aggressive or hyperactive. Behavior is cooperative.         Thought Content: Thought content is not paranoid or delusional. Thought content includes suicidal ideation. Thought  content does not include homicidal ideation.         Cognition and Memory: Cognition and memory normal.         Judgment: Judgment is impulsive and inappropriate.         ED Course      Procedures             Results for orders placed or performed during the hospital encounter of 06/08/21   CBC with platelets differential     Status: None   Result Value Ref Range    WBC 8.1 4.0 - 11.0 10e9/L    RBC Count 4.85 3.8 - 5.2 10e12/L    Hemoglobin 14.0 11.7 - 15.7 g/dL    Hematocrit 41.5 35.0 - 47.0 %    MCV 86 78 - 100 fl    MCH 28.9 26.5 - 33.0 pg    MCHC 33.7 31.5 - 36.5 g/dL    RDW 11.7 10.0 - 15.0 %    Platelet Count 337 150 - 450 10e9/L    Diff Method Automated Method     % Neutrophils 56.9 %    % Lymphocytes 34.7 %    % Monocytes 5.5 %    % Eosinophils 1.6 %    % Basophils 1.2 %    % Immature Granulocytes 0.1 %    Nucleated RBCs 0 0 /100    Absolute Neutrophil 4.6 1.6 - 8.3 10e9/L    Absolute Lymphocytes 2.8 0.8 - 5.3 10e9/L    Absolute Monocytes 0.4 0.0 - 1.3 10e9/L    Absolute Eosinophils 0.1 0.0 - 0.7 10e9/L    Absolute Basophils 0.1 0.0 - 0.2 10e9/L    Abs Immature Granulocytes 0.0 0 - 0.4 10e9/L    Absolute Nucleated RBC 0.0    Comprehensive metabolic panel     Status: None   Result Value Ref Range    Sodium 139 133 - 144 mmol/L    Potassium 3.9 3.4 - 5.3 mmol/L    Chloride 108 94 - 109 mmol/L    Carbon Dioxide 24 20 - 32 mmol/L    Anion Gap 7 3 - 14 mmol/L    Glucose 76 70 - 99 mg/dL    Urea Nitrogen 11 7 - 30 mg/dL    Creatinine 0.73 0.52 - 1.04 mg/dL    GFR Estimate >90 >60 mL/min/[1.73_m2]    GFR Estimate If Black >90 >60 mL/min/[1.73_m2]    Calcium 9.4 8.5 - 10.1 mg/dL    Bilirubin Total 0.4 0.2 - 1.3 mg/dL    Albumin 4.3 3.4 - 5.0 g/dL    Protein Total 8.2 6.8 - 8.8 g/dL    Alkaline Phosphatase 63 40 - 150 U/L    ALT 19 0 - 50 U/L    AST 18 0 - 45 U/L   Salicylate level     Status: None   Result Value Ref Range    Salicylate Level <2 mg/dL   Acetaminophen level     Status: None   Result Value Ref Range     Acetaminophen Level <2 mg/L     Medications - No data to display     Assessments & Plan (with Medical Decision Making)   Patient with MDD and eating disorder who got overwhelmed and suicidal yesterday and followed through with an ingestion. She remains at elevated risk and appears impaired in her decision making. She was unable to safety plan. I felt she needed admission for further observation and monitoring. She refused and was placed on a 72 hour hold.    I have reviewed the nursing notes. I have reviewed the findings, diagnosis, plan and need for follow up with the patient.    New Prescriptions    No medications on file       Final diagnoses:   Suicide attempt by drug overdose (H)   Major depressive disorder, recurrent episode, moderate (H)   Eating disorder, unspecified type   History of OCD (obsessive compulsive disorder)       --  Mir Baez MD  [facility] EMERGENCY DEPARTMENT  6/8/2021     Mir Baez MD  06/08/21 [id number]

## 2021-06-09 NOTE — PLAN OF CARE
BEHAVIORAL TEAM DISCUSSION    Participants: Dr. Dowling, Tyra Saravia and Nidia Michael, LIZ's; Jacqui Boswell RN  Progress: Pt is a new admission, 25 year old female admitted to [place] [address] on a 72HH (6/8/21) via the ED due to Si and overdose.  Anticipated length of stay: TBD  Continued Stay Criteria/Rationale: pt needs further evaluation  Medical/Physical: uneventful  Precautions:   Behavioral Orders   Procedures     Code 1 - Restrict to Unit     Discontinue 1:1 attendant for suicide risk     Order Specific Question:   I have performed an in person assessment of the patient     Answer:   Based on this assessment the patient no longer requires a one on one attendant at this point in time.     Order Specific Question:   Rationale     Answer:   Medical Record Reviewed     Order Specific Question:   Rationale     Answer:   Patient States able to remain safe in hospital     Order Specific Question:   Rationale     Answer:   Modifications to care environment made to mitigate safety risk     Order Specific Question:   Rationale     Answer:   Routine observations are sufficient to monitor safety.     Routine Programming     As clinically indicated     Status 15     Every 15 minutes.     Suicide precautions     Patients on Suicide Precautions should have a Combination Diet ordered that includes a Diet selection(s) AND a Behavioral Tray selection for Safe Tray - with utensils, or Safe Tray - NO utensils       Plan: pt will meet with psychiatry and CTC will assist with discharge planning and psychosocial assessment  Rationale for change in precautions or plan: new admission

## 2021-06-09 NOTE — PLAN OF CARE
"Attended 1 OT Creative expression Group x 45 minutes with 4 peers. Pt was given and completed a written self assessment. Regarding why admitted noted, \"I don't know...one thing lead to another and I ended up trying to kill myself'.   Further noted experiencing: sadness, anxiety, depression, negative racing thoughts, indecisiveness, blanking out, self harm thoughts and actions, obsessive thoughts, and anorexia.  Identified DBT and crocheting as her coping skills.  Goals sh'e like to address include: make a safety plan, look at self-destructive behavior and effects. Supports listed as: , therapist, friends and DBT. OT purpose was explained with a value of having involvement in tx plan, and provided options to meet self identified goals. Will assess further in the areas of organization, problem solving, and concentration. Was pleasant on approach and was assertive in making needs known (in group setting). Attentive to detailed familiar creative task x 40 minutes. Will continue to encourage and support consistent group attendance and participation to work on developing a safety plan and increase self awareness for improved self management.    "

## 2021-06-09 NOTE — H&P
H&P, preliminary    The patient was seen, her chart reviewed, her case discussed with staff, report to follow.    DX: Axis I         Major Depression, recurrent         Anorexia Nervosa          OCD         Panic Disorder         PRECIOUS         Axis II         Borderline Personality Disorder    Plan: Close observation. Continue 72 hour hold. Suicide precautions. Dietary consult. No caffeine. I will taper and discontinue Cymbalta as ineffective. Start Prozac with gradual dose increase to 80 mg QAM. Start Klonopin 0.5 mg BID. Change Lamictal to 100 mg BID. Continue Latuda as is           Humberto Dowling MD

## 2021-06-09 NOTE — PROGRESS NOTES
Corinne N Palm  June 8, 2021    The patient was sent to the ER by her psychiatrist Giulia Vigil at [facility]. 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 This clinician talked with her in a way that she found quite triggering and the patient felt an overwhelming sense that she should kill herself. She then ingested an overdose of Gabapentin. She can not appropriately contract for safety but declines a voluntary admission to the hospital. Patient is placed on a 72 hour hold and recommended for admission.     Current Suicidal Ideation/Self-Injurious Concerns/Methods: patient ingested an overdose of Gabapentin    Inappropriate Sexual Behavior: No    Aggression/Homicidal Ideation: None - N/A    For additional details see full DEC assessment.     Laura Amador, LICSW

## 2021-06-09 NOTE — PHARMACY-ADMISSION MEDICATION HISTORY
Admission Medication History Completed by Pharmacy    See [facility] Admission Navigator for allergy information, preferred outpatient pharmacy, prior to admission medications and immunization status.     Medication History Sources:     Patient and dispense report.     Changes made to PTA medication list (reason):    Added: None    Deleted: None    Changed: Added frequency for Miralax (per patient report).    Additional Information:   Patient is a good historian. She knows her medications name and strength.     Duloxetine- Patient is taking both 30 mg and 60 mg doses to equal 90 mg daily dose     Gabapentin- Patient often uses gabapentin -600 mg once a day      Prior to Admission medications    Medication Sig Last Dose Taking? Auth Provider   DULoxetine (CYMBALTA) 30 MG capsule Take 30 mg by mouth At Bedtime (Take with 60 mg to total 90 mg). 6/9/2021 at Unknown time Yes Reported, Patient   DULoxetine (CYMBALTA) 60 MG capsule Take 1 capsule (60 mg) by mouth At Bedtime (Take with 30 mg to total 90 mg) 6/9/2021 at Unknown time Yes Marcellus Méndez CNP   gabapentin (NEURONTIN) 300 MG capsule Take 300-600 mg by mouth 3 times daily as needed (anxiety). 6/7/2021 at PRN Yes Unknown, Entered By History   lamoTRIgine (LAMICTAL) 200 MG tablet Take 200 mg by mouth At Bedtime  6/8/2021 at Unknown time Yes Reported, Patient   levonorgestrel (KYLEENA) 19.5 MG IUD 1 each by Intrauterine route Unknown time Yes Reported, Patient   lurasidone (LATUDA) 80 MG TABS tablet Take 80 mg by mouth At Bedtime 6/8/2021 at Unknown time Yes Unknown, Entered By History   polyethylene glycol (MIRALAX) 17 GM/Dose powder Take 17 g by mouth daily  6/9/2021 at Unknown time Yes Reported, Patient   Vitamin D, Cholecalciferol, 25 MCG (1000 UT) TABS Take 1,000 Units by mouth daily  6/9/2021 at Unknown time Yes Unknown, Entered By History       Date completed: 06/09/21    Medication history completed by: Suellen Vanegas, Student Pharmacist

## 2021-06-09 NOTE — PLAN OF CARE
"  INITIAL PSYCHOSOCIAL ASSESSMENT AND NOTE  I have reviewed the chart and interviewed the patient, and developed Care Plan.  Information for assessment was obtained from: [facility] and patient.  PRESENTING PROBLEM:  Pt is a 25 year old female admitted to [facility] on a 72HH (6/8/21) via the ED after she revealed to her  that she intentionally overdosed on her gabapentin the previous day.   apparently called pt's OP psychiatrist at [facility] to inform.  Pt tells this CTC that she has been in the [facility] program where she sees a dietician, a psychiatrist, a DBT therapist, and a therapist who specializes in trauma.  Pt has a hx of numerous surgeries over the years to correct a congenital cleft palate.  The surgeries / ongoing medical interventions were quite traumatic for her.    Pt reports that the therapist who specializes in trauma took an unexpected ADITI and will not be available until 6/22.  Pt reports that she saw a 'higher level\" therapist in the program who 'made me feel like why are you doing this program if it doesn't help'.  It appears that pt left feeling hopeless and anxious and 'that nothing mattered and nothing was working\".  Pt reports she has been taking small overdoses of her gabapentin recently and that after her session, she 'kept taking more and more' so she 'could lower my inhibition and maybe drive and hit something'.  She eventually attended her DBT session and told the leader who asked her if she would be able to maintain safety.  Pt appears to have told her  who told her psychiatrist the next day.  Pt reports she felt 'blamed' for things not working and it was suggested she had no motivation to recover or at least it felt that way to her she added.  She does feel as if part of herself wants to recover.  Pt is clear that she took more of the medication than was prescribed 'to test things'.  And she reports \"in my state of mind, it doesn't seem like that " "big of a deal\".  She denies any prior hx of overdosing.    She is worried about missing work as she has had many leaves of absence this year.  She reports her marriage is supportive but she would like to have children and her  says no 'unless I can take care of my body'.  [place] and [place] be [place] but can be 'overbearing'.    Pt began the [facility] program around 2018.  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  She has had anorexia since her teen years.  She recalls that her cleft palate surgeries could be put off if her weight was too low.  Surgeries were from birth to age 16.  Pt left home to attend the [employer].    The following areas have been assessed:  History of Mental Health and Chemical Dependency: hx of multiple inpatient admissions and \"too many partial hospitalizations to count'.  No hx of substance abuse and limited alcohol use.  Living Situation: lives with  and one roommate who pays rent.  Significant Life Events (Illness, Abuse, Trauma, Death): she identified congenital cleft palate and required surgeries as traumatic.  Family Description (Constellation, Family Psychiatric History): states father with anxiety.    Financial Status: stable   Occupational History: currently a music therapist.  Educational Background: college graduate     Service History: none  Legal Issues: none  Ethnic/Cultural Issues: none  Spiritual Orientation: raised [employer] but looking for another organization.  Social Functioning (organizations, interests): croqueting and pt's job. Hanging out with friends.    Current Treatment providers:   [facility] program  Social Service Assessment/Plan:   Patient will have psychiatric assessment and medication management by psychiatry. . The treatment team will continue to assess and stabilize the patient's mental health symptoms with the use of medications and therapeutic " programming. Hospital staff will provide a safe environment and a therapeutic milieu. Staff will continue to assess patient as needed. Patient will be encouraged to participate in unit groups and activities. CTC will assist with after care planning. CTC will discuss options for increasing community supports with the patient. CTC will coordinate with outpatient providers and will place referrals to ensure appropriate follow up care is in place.

## 2021-06-09 NOTE — PLAN OF CARE
Assessment/Intervention/Current Symptoms and Care Coordination:   Reviewed chart and attended team discussion.  Interviewed pt and completed psychosocial.  Entered team note.      Discharge Plan or Goal: stabilize and discharge to OP LOC, most likely back to intensive [place] programming.    Barriers to Discharge: acuity of mental health sxs.      Referral Status: pt has established providers.    Legal Status: on a 72HH

## 2021-06-09 NOTE — PROGRESS NOTES
Clinical Nutrition Services Brief Note - (+) admission nutrition risk screen unsure of possible weight loss.     Corinne N Palm is a 25 year old female admitted d/t suicide attempt. MST screen score of 2 for uncertainty regarding possible weight loss.        Per documented weight history, patient's weight up over past 7 months.  Wt Readings from Last 10 Encounters:  06/08/21 : 54.2 kg (119 lb 6.4 oz)  11/17/20 : 48.6 kg (107 lb 1.6 oz)  02/07/16 : 58.6 kg (129 lb 1.6 oz)  03/12/15 : 54.4 kg (120 lb)       Due to no weight loss prior to admission, RD to sign off at this time. RD available by consult if further nutrition intervention warranted prior to discharge.    Thania Martinez RD, LD  ICU/5A/OB/Mental Health Pager (M-F): [phone]  On Call Pager (weekends only): [phone]

## 2021-06-09 NOTE — PROGRESS NOTES
Patient disclosed to CTC that she scratched herself. Visualized scratched areas R arm and R ankle and observed to be superficial slightly reddened and raised. MD notified.    Patient denies pain and contracts for safety and agrees to address urges with staff.    Patient ate 0% breakfast. Lunch is en route.    Nursing will continue to monitor.

## 2021-06-10 PROCEDURE — G0177 OPPS/PHP; TRAIN & EDUC SERV: HCPCS

## 2021-06-10 PROCEDURE — 250N000013 HC RX MED GY IP 250 OP 250 PS 637: Performed by: PSYCHIATRY & NEUROLOGY

## 2021-06-10 PROCEDURE — 124N000002 HC R&B MH UMMC

## 2021-06-10 RX ORDER — MULTIPLE VITAMINS W/ MINERALS TAB 9MG-400MCG
1 TAB ORAL DAILY
Status: DISCONTINUED | OUTPATIENT
Start: 2021-06-10 | End: 2021-06-28 | Stop reason: HOSPADM

## 2021-06-10 RX ADMIN — MELATONIN TAB 3 MG 3 MG: 3 TAB at 20:40

## 2021-06-10 RX ADMIN — LAMOTRIGINE 100 MG: 25 TABLET ORAL at 09:31

## 2021-06-10 RX ADMIN — OLANZAPINE 10 MG: 10 TABLET, FILM COATED ORAL at 20:40

## 2021-06-10 RX ADMIN — CLONAZEPAM 0.5 MG: 0.5 TABLET ORAL at 20:36

## 2021-06-10 RX ADMIN — HYDROXYZINE HYDROCHLORIDE 25 MG: 25 TABLET, FILM COATED ORAL at 20:36

## 2021-06-10 RX ADMIN — CLONAZEPAM 0.5 MG: 0.5 TABLET ORAL at 09:29

## 2021-06-10 RX ADMIN — FLUOXETINE 20 MG: 20 CAPSULE ORAL at 09:30

## 2021-06-10 RX ADMIN — Medication 1 TABLET: at 20:34

## 2021-06-10 RX ADMIN — LURASIDONE HYDROCHLORIDE 80 MG: 20 TABLET, FILM COATED ORAL at 20:36

## 2021-06-10 RX ADMIN — DULOXETINE HYDROCHLORIDE 60 MG: 60 CAPSULE, DELAYED RELEASE ORAL at 09:30

## 2021-06-10 RX ADMIN — LAMOTRIGINE 100 MG: 25 TABLET ORAL at 20:34

## 2021-06-10 RX ADMIN — Medication 1000 UNITS: at 09:31

## 2021-06-10 ASSESSMENT — ACTIVITIES OF DAILY LIVING (ADL)
HYGIENE/GROOMING: INDEPENDENT
DRESS: INDEPENDENT
HYGIENE/GROOMING: INDEPENDENT
ORAL_HYGIENE: INDEPENDENT
DRESS: STREET CLOTHES;INDEPENDENT
ORAL_HYGIENE: INDEPENDENT

## 2021-06-10 ASSESSMENT — MIFFLIN-ST. JEOR: SCORE: 1246.18

## 2021-06-10 NOTE — H&P
Admitted: 06/08/2021    HISTORY OF PRESENT ILLNESS:  This is one of the several [facility] psychiatric admissions for this 25-year-old  white female with a long history of anorexia, depression, generalized anxiety, as well as panic disorder, and a history of suicidality, as well as history of obsessive compulsive disorder, who presented to our emergency department after a suicide attempt, whereby she took about ten 300 mg gabapentin tablets with the intent to kill herself.  She did it at work, and then upon return home, took some more pills (she is not sure what kind and how much).  She felt somewhat unreal and disorganized after the overdose.  She has been feeling down, felt that her life has no purpose, but went to see her psychiatrist, Dr. Giulia Vigil, who referred her to our emergency department for admission.  The patient was evaluated in our emergency department, where she continued to feel suicidal and had a plan to get out, take some more pills, and crash her car.  She was transferred to the station 32, with suicide precautions.    MEDICATIONS:  She has been taking the following medications prior to admission:  Cymbalta 90 mg at bedtime, gabapentin 300-600 mg as needed for anxiety, Lamictal 200 mg at bedtime, Latuda 80 mg at bedtime, and vitamin D. She has been compliant with her medications.    PAST PSYCHIATRIC HISTORY:  The patient started to exhibit signs of obsessive compulsive disorder at the age of 10 and was diagnosed as suffering from depression since the age of 14.  She also has been unsatisfied with the way she looked and has been restricting for her food intake since the age of 13.  Over the years, she has been tried on numerous different medications, including Risperdal, Effexor, Zoloft, Prozac, Seroquel, and Luvox, but currently does not remember the length of exposure or the doses.    The patient has never experienced any manic episodes, and her depression has been unremitted for many  years.  She describes occasional short episodes of feeling normally happy, but this states lasted only for several days at a time, after which her depression would recur.  She has been experiencing obsessive thoughts about the various matters over and over again, has been picking things in certain ways, and avoided certain numbers.  She has never had a fear of getting infected and had no history of excessive handwashing.  She was hospitalized at [facility] in 2016 and stayed in the hospital for about 3 weeks.  Prior to that, she was admitted to our hospital station 10 under Dr. Fung from 02/01/2016 to 02/11/2016, was admitted again here in 05/2017, and her last admission here took place in 11/2020 under the care of Dr. Pike.  All her admissions were due to suicidal thoughts.  She was last admitted at [facility] inpatient program, from where she was discharged 2 months ago.  She is now attending outpatient program through [facility].    FAMILY HISTORY:  Remarkable for schizophrenia in her maternal uncle, anxiety in her paternal grandmother, and some paranoia and possible schizophrenia in her maternal cousin.    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  Outside of that, she has been healthy physically according to her.    ALLERGIES:  THE PATIENT IS ALLERGIC TO MORPHINE, WHICH CAUSED UNDULY TWITCHING, AMOXICILLIN AND SULFA DRUGS BOTH CAUSING SKIN RASH.    CHEMICAL USE HISTORY:  The patient adamantly denied any history of alcohol abuse or illicit drug abuse.  The patient had never smoked tobacco.  She consumes about 1-2 cups of coffee a day plus several diet Cokes.    BRIEF SOCIAL HISTORY:  The patient was born in [place], the only child to her parents, both of whom were present while she was growing up.  She denies any history of abuse.  She had graduated from high school and from the [employer], majoring in music therapy.  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  She has been working part time in partial hospital program.  The patient has gotten  in 2018 to a man she dated for 13 years.  They have no children. Reportedly her  wanted her mental health to be stabilized before childbearing is considered.     MENTAL STATUS EXAMINATION:  Revealed a normally built and normally developed, somewhat undernourished, generally pleasant, friendly, and cooperative with the interview, 25-year-old, white female, appearing about her stated age.  She presents with some facial disfiguration as a consequence of previous surgeries.  She is alert and oriented x3.  Her speech is coherent, logical, and goal directed.  She appears to be somewhat depressed and readily admits to suicidal thoughts with a plan to crash her car.  She does present with multiple obsessive thoughts and compulsions.  She denies hallucinations, and no prominent delusions could be noted or elicited currently or in the past.  The patient is functioning at the estimated average level of intelligence.  She has marginal insight into her problems, and her judgment is impaired.    DIAGNOSTIC IMPRESSION:  AXIS I:  1.  Major depression, recurrent.  2.  Anorexia nervosa.  3.  Obsessive compulsive disorder.  4.  Panic disorder.  5.  Generalized anxiety disorder.    AXIS II:  Borderline personality disorder.    PLAN:  Will observe the patient closely, continue 72-hour hold.  Order suicide precaution and dietary consult.  No caffeine in her diet.  I will taper and discontinue Cymbalta as ineffective and start Prozac with gradual dose increase to 80 mg q.a.m.  I will start Klonopin 0.5 mg b.i.d. and change her Lamictal to 100 mg b.i.d.  I will continue Latuda in the same dose.    Humberto Dowling MD        D: 06/09/2021   T: 06/10/2021   MT: [email or web address]    Name:     PALM, CORINNE N.  MRN:      [id number]         Account:     [id number]   :      1995           Admitted:    2021       Document: [id number]

## 2021-06-10 NOTE — PLAN OF CARE
Pt attended mental health education group. Discussed coping strategies; group identified specific strategies each of them used. Pt created a pocket  booklet  with their favorite or most useful strategies.

## 2021-06-10 NOTE — PLAN OF CARE
Problem: Suicidal Behavior  Goal: Suicidal Behavior is Absent or Managed  Outcome: Improving   Patient has been isolative but cooperative.  She admits to suicidal thoughts via overdose but denies plan and/or urge and contracts for safety in the hospital.   Patient stated her day had ups and downs but overall she feels better than when admitted.  She denied medical problems, medication side effects, hallucinations, SIB urges, and thought problems.

## 2021-06-10 NOTE — PLAN OF CARE
"  Problem: Mood Impairment (Depressive Signs/Symptoms)  Goal: Improved Mood Symptoms (Depressive Signs/Symptoms)  Outcome: No Change   See nutrition note. \"I'm the same.\" has \"a little\" racing thoughts. Depression is present. Anxiety \"is worse.\" Attends groups. Patient was on bike after lunch. Patient said had an orange for breakfast and coffee for lunch. \"If I was at [facility] they would tell me to stop using the bike.\" Writer had patient walk back to room. Patient said had cut self yesterday on right wrist and and this morning on right antecubital. Right wrist was minimal but right antecubital was more extensive. Writer had patient wash right wrist and right antecubital with soap and water. Patient states will cut again. Patient used cap from lotion bottle which was in patient's pocket. Patient gave writer cap. Patient also said if was not in hospital would take an overdose or run car into something. Patient said there is not a way to commit suicide in the hospital. Patient was not able to contract for safety for SIB. Patient then went to group. Staff took everything out of patients room that she could harm herself with. Writer spoke with Matilde Carpenter nurse manager about situation who said to go more conservative with watching patient more closely, checking room twice a shift. Staff brought patient weighted blanket which (patient uses one at home) and weighted shoulder wrap. Staff gave patient coloring pictures and had patient color with 5 markers in dining room. Staff gave patient essential oil. Writer updated Dr. Wing about situation and what nurse manager and Tj Van said to do. Dr. Larson said if patient needs an SIO later put her on it.  "

## 2021-06-10 NOTE — PLAN OF CARE
Voice mail received from Bekah, care coordinator at [facility] calling from [phone] offering services if needed.    Call returned to  of pt, Robin [phone].  NED on file.  Robin reported that recent overdose for pt seemed different that her suicidal thoughts in the past.  This incident seemed more impulsive.  In the past, if she was planning or had intent, she would inform her . This time, she told him that she didn't really know why it happened.  She took the overdose of her medication then told him she had done this.       has known pt for many years and has been a great source of support for pt, typically following the directions of the treatment team.  He would like to know the plan when it is time for discharge.

## 2021-06-10 NOTE — PLAN OF CARE
Problem: Mood Impairment (Depressive Signs/Symptoms)  Goal: Improved Mood Symptoms (Depressive Signs/Symptoms)  Outcome: No Change   Patient on 72HH. On suicide precaution. Appeared asleep. Slept a total of 7.5 hours.

## 2021-06-10 NOTE — PLAN OF CARE
Attended 2 OT Creative Expression Groups x 45 minutes each with 4 peers and 1 OT Life Skills Group  focused on letting go...x 45 minutes with 2 peers. Sulmeredith mood and affect, however, when engaged in crocheting task is focused, organized and feeling accomplished with task progress. Noted therapeutic benefit of her participation in creative tasks. Avoided speaking of RFA or need for personal change. Discussed healthy communication with honesty. Admitted to jumping to conclusions rather than clarifying what person said.   Was asked to meet with pt regarding self harm actions. Offered essential oil use, weighted blankets, activities that distract from urges. Will check in 6/11 to see if resources were beneficial or if others need to be explored.

## 2021-06-10 NOTE — PROGRESS NOTES
"CLINICAL NUTRITION SERVICES - ASSESSMENT NOTE     Nutrition Prescription    RECOMMENDATIONS FOR MDs/PROVIDERS TO ORDER:  If patient unable to meet needs within the next 3-5 days recommend consideration of nutrition support    Malnutrition Status:    Severe malnutrition in the context of acute on chronic illness as evidenced by severe restriction of intake and signs of fat and muscle loss    Recommendations already ordered by Registered Dietitian (RD):  Nutrition Education: Discussed nutrition history and prior to and since admission.  Discussed oral nutrition supplements. Encouraged adequate PO of food and fluids and discussed the impact of ongoing poor PO on both mental and physical health.      Multivitamin/mineral supplement therapy- Davon SIMS     Pt declines supplements. She reports she is feeling very willful and is determined to restrict her intake.    Future/Additional Recommendations:  Monitor intake, need/willingness for supplementation     REASON FOR ASSESSMENT  Corinne N Palm is a/an 25 year old female assessed by the dietitian for 2 Provider Orders - \"OP with (M)) [facility] DX: Anorexia (IP dc from [facility] 2 mos.)  (6/9 declined brekfast, declined lunch)\" and \"Restricting food intake.\" MST screen score of 2 due to \"unsure\" of weight loss. Screened yesterday and no weight loss has occurred.    Pt admitted d/t SA. [facility] significant for eating disorder and depression and anxiety    NUTRITION HISTORY  - Information obtained from patient  - Diet history- pt reports heavy restriction for several weeks. She is thin by appearance and is dressed warmly and has a warming wrap around her neck.  - pt has been at [facility] in the past and undergoes outpatient therapy there. She reports residential treatment there a few months ago which led to weight gain, which she finds distressing and she would be happier at a lower body weight  - She reports that using her eating d/o symptoms actually improves how she feels " temporarily as it gives her a sense of control. She muses that maybe if she starves herself for long enough that may kill her.   - Supplements- Ensure and blended supplement drinks while at [facility]  - NKFA  - pt asked how long she would need to go without eating before being discharged as she is aware that this is not a specialized eating disorder unit. Unsure and expressed this to patient--did note that transfer to a medical unit is a possibility and she reports she does not want that.    CURRENT NUTRITION ORDERS  Diet: Regular  Intake/Tolerance: poor- pt had fruit and coffee so far today    LABS  Labs reviewed    Electrolytes  Sodium (mmol/L)   Date Value   06/08/2021 139   11/13/2020 138   02/03/2016 139     Potassium (mmol/L)   Date Value   06/08/2021 3.9   11/13/2020 3.8   02/03/2016 4.0     No results found for: MAG, PHOS Renal  Urea Nitrogen (mg/dL)   Date Value   06/08/2021 11   11/13/2020 6 (L)   02/03/2016 12     Creatinine (mg/dL)   Date Value   06/08/2021 0.73   11/13/2020 0.67   02/03/2016 0.79     Inflammatory Markers  WBC (10e9/L)   Date Value   06/08/2021 8.1   11/13/2020 10.4   02/03/2016 9.5     Albumin (g/dL)   Date Value   06/08/2021 4.3   11/13/2020 4.1   02/03/2016 4.4      Blood Glucose  Glucose (mg/dL)   Date Value   06/08/2021 76   11/13/2020 75   02/03/2016 92    Hepatic  ALT (U/L)   Date Value   06/08/2021 19     AST (U/L)   Date Value   06/08/2021 18     Alkaline Phosphatase (U/L)   Date Value   06/08/2021 63     Bilirubin Total (mg/dL)   Date Value   06/08/2021 0.4    Additional  Triglycerides (mg/dL)   Date Value   06/09/2021 57     Ketones Urine (mg/dL)   Date Value   11/15/2020 10 (A)          MEDICATIONS    clonazePAM  0.5 mg Oral BID     DULoxetine  60 mg Oral QAM     FLUoxetine  20 mg Oral Daily     lamoTRIgine  100 mg Oral BID     lurasidone  80 mg Oral At Bedtime     polyethylene glycol  17 g Oral Daily     Vitamin D3  1,000 Units Oral Daily     "    ANTHROPOMETRICS  Height: 160 cm (5' 3\")  Most Recent Weight: 54.2 kg (119 lb 8 oz)    IBW: 52.2 kg  Body mass index is 21.17 kg/m .  BMI: Normal BMI  Weight History:  06/08/21 : 54.2 kg (119 lb 6.4 oz)  11/17/20 : 48.6 kg (107 lb 1.6 oz)  02/07/16 : 58.6 kg (129 lb 1.6 oz)  03/12/15 : 54.4 kg (120 lb)     Dosing Weight: 54.2 kg    ASSESSED NUTRITION NEEDS  Estimated Energy Needs: 4099-6756 kcals/day (25 - 35 kcals/kg)  Justification: Maintenance vs repletion d/t restriction  Estimated Protein Needs: 65-81 grams protein/day (1.2 - 1.5 grams of pro/kg)  Justification: Repletion  Estimated Fluid Needs: 9461-2068 mL/day (1 mL/kcal)   Justification: Maintenance    MALNUTRITION  % Intake: </= 50% for >/= 5 days (severe)  % Weight Loss: Unable to assess- pt does not provide recent weight history, gain noted presumably while in residential treatment, but unsure what weight was at time of discharge before she began restricting again.   Subcutaneous Fat Loss: Facial region:  Orbital- moderate  Muscle Loss: Temporal:  Mild to moderate  Fluid Accumulation/Edema: None noted  Malnutrition Diagnosis: Severe malnutrition in the context of acute on chronic illness as evidenced by severe restriction of intake and signs of fat and muscle loss    NUTRITION DIAGNOSIS  Severe malnutrition in the context of acute on chronic illness as evidenced by severe restriction of intake and signs of fat and muscle loss    INTERVENTIONS  Implementation  Nutrition Education: Discussed nutrition history and prior to and since admission.  Discussed oral nutrition supplements. Encouraged adequate PO of food and fluids and discussed the impact of ongoing poor PO on both mental and physical health.      Multivitamin/mineral supplement therapy- Davon SIMS     Pt declines supplements. She reports she is feeling very willful and is determined to restrict her intake.    Goals  Pt will meet at least 50% of needs through PO intake.   "   Monitoring/Evaluation  Progress toward goals will be monitored and evaluated per protocol.  Thania Martinez RD, LD  ICU/5A/OB/Mental Health Pager (M-F): [phone]  On Call Pager (weekends only): [phone]

## 2021-06-11 PROCEDURE — 124N000002 HC R&B MH UMMC

## 2021-06-11 PROCEDURE — 250N000013 HC RX MED GY IP 250 OP 250 PS 637: Performed by: PSYCHIATRY & NEUROLOGY

## 2021-06-11 PROCEDURE — 99232 SBSQ HOSP IP/OBS MODERATE 35: CPT | Performed by: PSYCHIATRY & NEUROLOGY

## 2021-06-11 PROCEDURE — G0177 OPPS/PHP; TRAIN & EDUC SERV: HCPCS

## 2021-06-11 RX ORDER — NALTREXONE HYDROCHLORIDE 50 MG/1
50 TABLET, FILM COATED ORAL AT BEDTIME
Status: DISCONTINUED | OUTPATIENT
Start: 2021-06-11 | End: 2021-06-28 | Stop reason: HOSPADM

## 2021-06-11 RX ORDER — DULOXETIN HYDROCHLORIDE 20 MG/1
40 CAPSULE, DELAYED RELEASE ORAL EVERY MORNING
Status: DISCONTINUED | OUTPATIENT
Start: 2021-06-12 | End: 2021-06-14

## 2021-06-11 RX ADMIN — POLYETHYLENE GLYCOL 3350 17 G: 17 POWDER, FOR SOLUTION ORAL at 08:30

## 2021-06-11 RX ADMIN — CLONAZEPAM 0.5 MG: 0.5 TABLET ORAL at 20:32

## 2021-06-11 RX ADMIN — CLONAZEPAM 0.5 MG: 0.5 TABLET ORAL at 08:30

## 2021-06-11 RX ADMIN — Medication 1000 UNITS: at 08:30

## 2021-06-11 RX ADMIN — Medication 1 TABLET: at 08:30

## 2021-06-11 RX ADMIN — LURASIDONE HYDROCHLORIDE 80 MG: 20 TABLET, FILM COATED ORAL at 20:32

## 2021-06-11 RX ADMIN — FLUOXETINE 20 MG: 20 CAPSULE ORAL at 08:30

## 2021-06-11 RX ADMIN — LAMOTRIGINE 100 MG: 25 TABLET ORAL at 20:32

## 2021-06-11 RX ADMIN — NALTREXONE HYDROCHLORIDE 50 MG: 50 TABLET, FILM COATED ORAL at 20:32

## 2021-06-11 RX ADMIN — LAMOTRIGINE 100 MG: 25 TABLET ORAL at 08:30

## 2021-06-11 RX ADMIN — DULOXETINE HYDROCHLORIDE 60 MG: 60 CAPSULE, DELAYED RELEASE ORAL at 08:30

## 2021-06-11 ASSESSMENT — ACTIVITIES OF DAILY LIVING (ADL): HYGIENE/GROOMING: INDEPENDENT

## 2021-06-11 NOTE — PLAN OF CARE
"Patient had an orange, with juice, for breakfast and refused lunch.Patient has been isolative to room -does attend groups (\"enjoys\"). Patient has a flat affect and communicates clearly. Patient has contracted for safety while endorsing chronic SIB urges. Patient states \"I have my DBT skills\" appearing to find it adequate to end the conversation.    Patient denies SI.    /87   Pulse 99   Temp 98.6  F (37  C) (Temporal)   Resp 18   Ht 1.6 m (5' 3\")   Wt 54.2 kg (119 lb 8 oz)   LMP 06/08/2021   SpO2 97%   BMI 21.17 kg/m  . No c/o pain.     -NO bike @ NO intake  -Intake monitored    Nursing will continue to monitor.    "

## 2021-06-11 NOTE — PLAN OF CARE
"Patient was isolative to her room.  Affect preoccupied.  She denied new self-injurious behavior since day shift.  Patient endorsed passive SI; denied hallucinations, physical problems, and medication side effects.  She was not observed eating this shift.  Patient was informed that per provider order, she was not allowed to use unit exercise bike due to lack of oral intake.  She was also informed that she would be put on finger food diet and her rings would be sent home or to security should there be additional SIB.  She was offered the opportunity to send rings home with  but declined.  \"My SIB is always worse when I'm in the hospital.\"   visited and patient said it went well.  After he left patient reported racing thoughts urging her to stick something in an outlet.  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    Patient was offered additional coloring pictures but stated she had done so much coloring today she was bored.  She continued to use the weighted blanket and shoulder wrap.  She was encouraged to stay in lounge but spent time before she fell asleep reading books brought in by .  Patient received PRN melatonin, vistaril,  and zyprexa for racing thoughts with HS meds.  "

## 2021-06-11 NOTE — PLAN OF CARE
Called  of pt, Robin [phone], to inform of prepetition commitment screening.  Answered all questions and recommended RICO.

## 2021-06-11 NOTE — PROGRESS NOTES
Reviewed care plan with patient. Patient contracts for safety at this time and verbalizes understanding that using DBT skills at urges, and alerting staff of urges, will be helpful tools for remaining safe.     Nursing will continue to monitor.

## 2021-06-11 NOTE — PLAN OF CARE
Attended 1 OT Creative Expression Group, x 45 minutes with 4 peers, after verbally bryanna for safety with FELICIANOR/L. Sullen affect and mood. Focused and organized on detailed familiar task entire group. Remained at assigned table entire group only getting up to put her things away at groups end. Minimal to no social interaction with peers and/or writer.

## 2021-06-11 NOTE — PROGRESS NOTES
"  PSYCHIATRIC PROGRESS NOTE    Admission Date: 06/08/2021    The patient was seen, her chart reviewed, her case discussed with staff.  She continued to engage in self-injurious behavior (has been restricting her food intake and has been scratching herself). She continued to be depressed with intrusive suicidal thoughts, urges to hurt herself and previously stated suicidal plan. She was quoted by staff saying that she doesn't plan to stop scratching herself as it helps her anxiety.  She tolerated added Klonopin without any problems with some tiredness after the first dose. She had no problems transitioning from Cymbalta to Prozac. She has been medication compliant. She wants to go home after her 72-hour Hold expires.  She was seen by the Dietary yesterday and was advised to take Ensure which she refused to take. She \"didn't remember\" what she ate for supper and had only one coffee for breakfast.    This is a 25-year old  white female with a long history of OCD, anorexia, anxiety and depression with multiple psychiatric hospitalizations who was referred for admission by her outpatient psychiatrist.  She was seen by the undersigned last Wednesday. I added Klonopin to her regimen and started transitioning her from Cymbalta (as it was ineffective) to Prozac.    MEDICATIONS  Cymbalta 60 mg QAM  Prozac 20 mg QAM  Lamictal 100 mg BID  Latuda 80 mg at bedtime  Klonopin 0.5 mg BID    LABS  No new results    VS: T - 98.6, Pulse - 99, Resp - 18, SpO2 - 97%, BP - 117/87    MENTAL STATUS EXAMINATION  Reveals a normally built and normally developed 25-year old white female. She ia alert and oriented X 3. Her speech is coherent and goal directed but of low tempo and tone. She appears to be depressed and hopeless. She continues to have intrusive suicidal thoughts. She admitted that crashing her car is on the back of her mind. She admits to urges to hurt herself although report being less anxious. No overt psychotic features " are noted or elicited. She has only marginal insight into her problems and her judgement is impaired.    DIAGNOSTIC IMPRESSION    Major Depression, recurrent  Anorexia Nervosa  PRECIOUS  OCD  Panic Disorder  BPD    Plan: I will put the patient on another 72-hour hold and initiate petition for commitment.  I will add Naltrexone to her regimen to help with impulses to hurt herself. I will further increase Prozac to 40 mg QAM and decrease Cymbalta to 40 mg QAM.    Ginette Solano CNP will resume the care on Monday    Humberto Dowling MD

## 2021-06-11 NOTE — PLAN OF CARE
Assessment/Intervention/Current Symptoms and Care Coordination:   [facility] called [facility] pre-petition, [phone]. Left message for Carolyn Andra regarding petition. Faxed petition and notes to [phone].    [facility] later spoke with pt who stated she was unaware that she was being put on a second 72 hour hold or that commitment was being pursued. Stated that the psychiatrist simply told her that she needed to stay longer. [facility] explained the process, gave pt the commitment handout.     [facility] called [facility] pre-petition again. Spoke with Lo who will do the screening. Gave verbal report.    Discharge Plan or Goal:  Pt to eventually discharge to home with return to treatment services    Barriers to Discharge:  Pt reports active SIB and suicidal ideation    Referral Status:   A petition for commitment has been made    Legal Status:   Petition for commitment has been made to [facility]

## 2021-06-12 PROCEDURE — 124N000002 HC R&B MH UMMC

## 2021-06-12 PROCEDURE — 250N000013 HC RX MED GY IP 250 OP 250 PS 637: Performed by: PSYCHIATRY & NEUROLOGY

## 2021-06-12 RX ADMIN — POLYETHYLENE GLYCOL 3350 17 G: 17 POWDER, FOR SOLUTION ORAL at 07:56

## 2021-06-12 RX ADMIN — FLUOXETINE HYDROCHLORIDE 40 MG: 20 CAPSULE ORAL at 07:56

## 2021-06-12 RX ADMIN — LURASIDONE HYDROCHLORIDE 80 MG: 20 TABLET, FILM COATED ORAL at 20:41

## 2021-06-12 RX ADMIN — Medication 1 TABLET: at 07:56

## 2021-06-12 RX ADMIN — LAMOTRIGINE 100 MG: 25 TABLET ORAL at 20:41

## 2021-06-12 RX ADMIN — DULOXETINE HYDROCHLORIDE 40 MG: 20 CAPSULE, DELAYED RELEASE ORAL at 07:56

## 2021-06-12 RX ADMIN — CLONAZEPAM 0.5 MG: 0.5 TABLET ORAL at 07:57

## 2021-06-12 RX ADMIN — NALTREXONE HYDROCHLORIDE 50 MG: 50 TABLET, FILM COATED ORAL at 20:41

## 2021-06-12 RX ADMIN — Medication 1000 UNITS: at 07:56

## 2021-06-12 RX ADMIN — LAMOTRIGINE 100 MG: 25 TABLET ORAL at 07:56

## 2021-06-12 RX ADMIN — CLONAZEPAM 0.5 MG: 0.5 TABLET ORAL at 20:41

## 2021-06-12 ASSESSMENT — ACTIVITIES OF DAILY LIVING (ADL)
LAUNDRY: WITH SUPERVISION
DRESS: INDEPENDENT
ORAL_HYGIENE: INDEPENDENT
HYGIENE/GROOMING: INDEPENDENT

## 2021-06-12 NOTE — PLAN OF CARE
"Patient came to team station and requested bandages.  She then showed writer open areas oozing blood on the medial aspects of both ankles.  Upon questioning patient admitted \"opening up old scars today.\"  Per MD order, patient's rings were sent to security and her diet order was changed to finger food only.  Patient was not pleased with this, and stated her behavior did not constitute self-injury.  She responded \"I don't know\" when asked about SI but endorsed significant depression and anxiety.  Patient denied hallucinations, racing thoughts, and medication side effects.    "

## 2021-06-12 NOTE — PLAN OF CARE
Patient slept approximately 7.5 hours during the overnight shift; appeared comfortable with even and unlabored breathing while asleep. No issues or concerns reported or observed. Nursing will continue to monitor.

## 2021-06-12 NOTE — PROGRESS NOTES
Total Time: 45 minutes  Number of patients in group: 3     Scope of service: Psychoeducation Group  Symptoms reported: Reported high anxiety. Affect was flat.      Patient progress: Pt sat and participated throughout the session.  Pt made significant contributions and stayed active throughout the group. Pt. received and provided feedback to group participants.      Patient response/reaction to treatment intervention(s): This patient participated in this group session with her peers. Pt identified her goal as wanting to  decrease her anxiety.   Pt also participated in learning ways to relax different body muscles so as to improve sleep. Pt was quiet throughout the group but listened and nodded her head in response to questions asked.

## 2021-06-12 NOTE — PLAN OF CARE
"Pt states she slept well.  Pt contracts for safety regarding SIB.  Pt ate 2 egg whites, 1/2 apple juice and fruit for breakfast. Pt states she doesn't eat much while in the hospital. Pt states if she doesn't eat two good things can happen, \"I will either die or get skinny and either way is ok.\" Pt states she wants to leave and worried about her job.  Pt's spouse visited and is returning her rings to home.  Pt did not eat anything for lunch stating, \"I didn't feel like it.\"  Pt states visit with spouse went well.  Pt rates depression 5/10 and anxiety 8/10.  Pt states she thinks about putting something in a outlet but states, \"I am not going to do that because I agreed to be safe.\"  Pt is med compliant and cooperative.    "

## 2021-06-13 PROCEDURE — 250N000013 HC RX MED GY IP 250 OP 250 PS 637: Performed by: PSYCHIATRY & NEUROLOGY

## 2021-06-13 PROCEDURE — 124N000002 HC R&B MH UMMC

## 2021-06-13 RX ADMIN — FLUOXETINE HYDROCHLORIDE 40 MG: 20 CAPSULE ORAL at 08:38

## 2021-06-13 RX ADMIN — Medication 1000 UNITS: at 08:37

## 2021-06-13 RX ADMIN — Medication 1 TABLET: at 08:38

## 2021-06-13 RX ADMIN — OLANZAPINE 10 MG: 10 TABLET, FILM COATED ORAL at 21:08

## 2021-06-13 RX ADMIN — POLYETHYLENE GLYCOL 3350 17 G: 17 POWDER, FOR SOLUTION ORAL at 08:37

## 2021-06-13 RX ADMIN — CLONAZEPAM 0.5 MG: 0.5 TABLET ORAL at 21:06

## 2021-06-13 RX ADMIN — LURASIDONE HYDROCHLORIDE 80 MG: 20 TABLET, FILM COATED ORAL at 21:06

## 2021-06-13 RX ADMIN — DULOXETINE HYDROCHLORIDE 40 MG: 20 CAPSULE, DELAYED RELEASE ORAL at 08:37

## 2021-06-13 RX ADMIN — NALTREXONE HYDROCHLORIDE 50 MG: 50 TABLET, FILM COATED ORAL at 21:07

## 2021-06-13 RX ADMIN — LAMOTRIGINE 100 MG: 25 TABLET ORAL at 21:08

## 2021-06-13 RX ADMIN — CLONAZEPAM 0.5 MG: 0.5 TABLET ORAL at 08:38

## 2021-06-13 RX ADMIN — LAMOTRIGINE 100 MG: 25 TABLET ORAL at 08:38

## 2021-06-13 RX ADMIN — OLANZAPINE 10 MG: 10 TABLET, FILM COATED ORAL at 10:39

## 2021-06-13 ASSESSMENT — MIFFLIN-ST. JEOR: SCORE: 1235.75

## 2021-06-13 ASSESSMENT — ACTIVITIES OF DAILY LIVING (ADL)
HYGIENE/GROOMING: INDEPENDENT
DRESS: INDEPENDENT
LAUNDRY: WITH SUPERVISION
ORAL_HYGIENE: INDEPENDENT

## 2021-06-13 NOTE — PROGRESS NOTES
Patient refused breakfast and took morning meds. 8 oz water with miralax consumed.    Nursing will continue to monitor.

## 2021-06-13 NOTE — PROGRESS NOTES
Pt informed writer she is thinking about hurting herself and states she has been sticking patients soft pens and plastic forks into the outlets.  Pt's room was locked and pt agreed to sit in lounge where she could be seen.  Pt could not contract for safety but did agree to give writer anything she is tempted to stick into an outlet.  Writer paged on-all to see if an SIO should be ordered.      Writer gave pt zyprexa prn and pt agreed to take due to racing thoughts of wanting to hurt herself.  Per on-call provider would like SIO on patient ordered.

## 2021-06-13 NOTE — PROGRESS NOTES
"Patient requested to use cell phone to call boss. Patient asked \"if hospitalization is reason given for work absence why is calling from hospital phone a problem?\" Patient responded \"If I do not call from my cell phone my boss will know I am inpatient mental health.\"     Patient told accomodation could be reconsidered at adequate staff availability.    Patient locked out of room at self harm attempt pushing objects into outlet. MD notified. No injury.    Staffing updated.  SIO initiated.  ANS paged.  MGR paged.  "

## 2021-06-13 NOTE — PLAN OF CARE
"Patient does not contract for safety continuing to say \"I will hurt myself.\" Patient ate 0% breakfast and at lunch ate small amount of salad with small amount of green beans and a cookie. 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 Patient insight an judgement are poor as fails to contract for safety endorsing what sounds to be chronic SIB urges and SI thoughts. Patient napped 2 hours.      06/13/21 1300   Coping/Psychosocial   Verbalized Emotional State frustration   Trust Relationship/Rapport choices provided;care explained   Coping/Psychosocial Interventions   Supportive Measures active listening utilized   Cognitive/Neuro/Behavioral WDL   Cognitive/Neuro/Behavioral WDL X;mood/behavior(SIB )   Mood/Behavior flat affect;other (see comments)   Behavior WDL   Behavior WDL X   Interactions frequently seeks out staff;avoids social contact   Emotion Mood WDL   Emotion/Mood/Affect WDL X   Affect flat;blunted   Thought Process WDL   Thought Process WDL X;judgment and insight   Judgment and Insight denies responsibility;inappropriately focused on discharge;judgment not appropriate to situation;insight appropriate to situation   Skin   Skin WDL WDL   Eladio Risk Assessment   Sensory Perception 4-->no impairment   Moisture 4-->rarely moist   Activity 4-->walks frequently   Mobility 4-->no limitation   Nutrition 1-->very poor   Friction and Shear 3-->no apparent problem   Eladio Score 20   Musculoskeletal   Musculoskeletal WDL WDL   Nutrition   Intake (%) 25%(sm amt green beans/salad and cookie)   Fall Risk Interventions   Within arms reach in use No   Fall precaution band in place? Yes   C-SSRS (Frequent Screener)   Q2 Suicidal Thoughts (Since Last Contact) yes   Q3 Have you been thinking about how you might do this? yes   Q4 Suicidal Intent without Specific Plan yes   Q5 Suicide Intent with Specific Plan no   Q6 Suicide Behavior yes   Violence Risk   Feels Like " Hurting Others no   Previous Attempt to Harm Others no   Daily Care   Activity (Behavioral Health) up ad bob   Activities of Daily Living   Hygiene/Grooming independent   Oral Hygiene independent   Dress independent   Laundry with supervision   Room Organization independent   Activity   Activity Assistance Provided independent   Hygiene Care Assistance   Hygiene Assistance independent     Nursing will continue to monitor.

## 2021-06-13 NOTE — PLAN OF CARE
"Patient continues to be withdrawn and isolative to self and room.  Rates her depression a 5/10 and anxiety 6/10, however, does not give specifics.  Affect is flat/blunted, sad.    This writer printed out the Commitment Process brochure off of the [facility] website for patient per request of hers and her husbands.      Patient ate about 50% of a side salad for dinner with no dressing- very small amount and a couple bites of fruit, no snack this shift.  Total for the shift = 10%.    Does not care much if she were to die from her eating disorder.    Patient did ask writer \"If I don't eat will I get discharged?  That is how they do it at the [facility]?\"  "

## 2021-06-14 PROCEDURE — 250N000013 HC RX MED GY IP 250 OP 250 PS 637: Performed by: PSYCHIATRY & NEUROLOGY

## 2021-06-14 PROCEDURE — 124N000002 HC R&B MH UMMC

## 2021-06-14 PROCEDURE — H2032 ACTIVITY THERAPY, PER 15 MIN: HCPCS

## 2021-06-14 PROCEDURE — 99232 SBSQ HOSP IP/OBS MODERATE 35: CPT | Performed by: NURSE PRACTITIONER

## 2021-06-14 RX ORDER — DULOXETIN HYDROCHLORIDE 20 MG/1
20 CAPSULE, DELAYED RELEASE ORAL EVERY MORNING
Status: COMPLETED | OUTPATIENT
Start: 2021-06-15 | End: 2021-06-17

## 2021-06-14 RX ORDER — OLANZAPINE 5 MG/1
5-10 TABLET ORAL 3 TIMES DAILY PRN
Status: DISCONTINUED | OUTPATIENT
Start: 2021-06-14 | End: 2021-06-24

## 2021-06-14 RX ORDER — OLANZAPINE 10 MG/2ML
10 INJECTION, POWDER, FOR SOLUTION INTRAMUSCULAR 3 TIMES DAILY PRN
Status: DISCONTINUED | OUTPATIENT
Start: 2021-06-14 | End: 2021-06-24

## 2021-06-14 RX ADMIN — LAMOTRIGINE 100 MG: 25 TABLET ORAL at 08:00

## 2021-06-14 RX ADMIN — FLUOXETINE HYDROCHLORIDE 40 MG: 20 CAPSULE ORAL at 08:01

## 2021-06-14 RX ADMIN — CLONAZEPAM 0.5 MG: 0.5 TABLET ORAL at 22:47

## 2021-06-14 RX ADMIN — CLONAZEPAM 0.5 MG: 0.5 TABLET ORAL at 08:01

## 2021-06-14 RX ADMIN — Medication 1000 UNITS: at 08:01

## 2021-06-14 RX ADMIN — DULOXETINE HYDROCHLORIDE 40 MG: 20 CAPSULE, DELAYED RELEASE ORAL at 08:01

## 2021-06-14 RX ADMIN — LAMOTRIGINE 100 MG: 25 TABLET ORAL at 22:48

## 2021-06-14 RX ADMIN — Medication 1 TABLET: at 08:01

## 2021-06-14 RX ADMIN — LURASIDONE HYDROCHLORIDE 80 MG: 20 TABLET, FILM COATED ORAL at 22:48

## 2021-06-14 RX ADMIN — NALTREXONE HYDROCHLORIDE 50 MG: 50 TABLET, FILM COATED ORAL at 22:47

## 2021-06-14 ASSESSMENT — ACTIVITIES OF DAILY LIVING (ADL)
ORAL_HYGIENE: INDEPENDENT
LAUNDRY: WITH SUPERVISION
HYGIENE/GROOMING: INDEPENDENT
DRESS: INDEPENDENT

## 2021-06-14 NOTE — PROGRESS NOTES
"[facility],  Psychiatric Progress Note      Impression:     Corinne \"Cori\" Melinda is a 25-year-old female admitted to [facility] on 6/8/2021.  She was admitted on a 72-hour hold through the ER following an overdose on 3000 mg of Neurontin as a suicide attempt.  She had been restricting her food intake and scratching herself.  Since admission, she is being transitioned from Cymbalta to Prozac.  Lamictal was continued.  Latuda was continued.  Klonopin was continued.  A petition for commitment MI was initiated in [facility].  On 6/9 she superficially cut her right wrist and right antecubital area.  On 6/12 she opened old wounds on her ankles.  SIO was initiated on 6/13 after she informed staff that she tried to insert plasticware and pens into the electrical outlets in her room.  She continues to endorse urges to engage in self-injury.  She has been eating minimally.           Diagnoses:     Major depressive disorder, severe, recurrent, without psychosis  Anorexia nervosa  Generalized anxiety disorder  OCD  Panic disorder  Borderline personality disorder         Plan:      Medications:  Continue Klonopin 0.5 mg BID.  Reduce Cymbalta to 20 mg daily x 3 days, then discontinue.  Continue Prozac 40 mg daily.  Continue Lamictal 100 mg BID.  Continue Latuda 80 mg at HS.  Continue Naltrexone 50 mg at HS.  Continue PRNs of Melatonin, Hydroxyzine and Zyprexa.    Continue status individual observation.    Monitor and record PO intake.  CMP, magnesium and phosphorus labs tomorrow.    Petition for commitment MI in [facility] (no [place] requested).  She is enrolled in DBT.  She has psychiatry and therapy through [facility].        Attestation:  Patient has been seen and evaluated by me, Ginette Solano, APRN CNP  The patient was counseled on nature of illness and treatment plan/options  Care was coordinated with treatment team       Clinical Global " "Impressions  First:  Considering your total clinical experience with this particular patient population, how severe are the patient's symptoms at this time?: 7 (06/14/21 0804)  Compared to the patient's condition at the START of treatment, this patient's condition is: 4 (06/14/21 0804)  Most recent:  Considering your total clinical experience with this particular patient population, how severe are the patient's symptoms at this time?: 7 (06/14/21 0804)  Compared to the patient's condition at the START of treatment, this patient's condition is: 4 (06/14/21 0804)            Interim History:     The patient's care was discussed with the treatment team and chart notes were reviewed.  Pt was documented as sleeping well during the weekend overnight shifts.  She has occasionally been using PRN Zyprexa which is somewhat helpful but causes her to feel drowsy.  She says her mood has been \"up and down.\"  When she is \"feeling erratic and trying to harm myself, it goes in streaks, I'm not really sure why.\"  She said her urges to engage in self-injury were high yesterday but low thus far today since she just woke up.  She reports suicidal ideation with a plan to overdose.  States she does not have any means with which to kill herself in the hospital.  Mood is depressed overall and anxiety has been high.  She reports difficulty focusing and some memory impairment.  She states she has been restricting her food intake because she wants to lose weight and because she wants to die.  She reports that yesterday she ate soup, lettuce, cierra crackers and an orange.  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  Stated that she finished the partial program at [employer] in May and \"felt a lot better.\"  Her therapist is on a medical leave and the covering therapist suggested to her " "that she did not appear to desire recovery, which was likely a factor in her recent symptoms.           Medications:     Current Facility-Administered Medications   Medication     acetaminophen (TYLENOL) tablet 650 mg     alum & mag hydroxide-simethicone (MAALOX) suspension 30 mL     clonazePAM (klonoPIN) tablet 0.5 mg     DULoxetine (CYMBALTA) DR capsule 40 mg     FLUoxetine (PROzac) capsule 40 mg     hydrOXYzine (ATARAX) tablet 25 mg     lamoTRIgine (LaMICtal) tablet 100 mg     lurasidone (LATUDA) tablet 80 mg     melatonin tablet 3 mg     multivitamin w/minerals (THERA-VIT-M) tablet 1 tablet     naltrexone (DEPADE/REVIA) tablet 50 mg     OLANZapine (zyPREXA) tablet 5-10 mg    Or     OLANZapine (zyPREXA) injection 10 mg     polyethylene glycol (MIRALAX) Packet 17 g     Vitamin D3 (CHOLECALCIFEROL) tablet 1,000 Units             Allergies:     Allergies   Allergen Reactions     Morphine Other (See Comments)     Twitching     Amoxicillin Rash     Sulfa Drugs Rash            Psychiatric Examination:     /82 (BP Location: Left arm)   Pulse 94   Temp 98.1  F (36.7  C) (Oral)   Resp 16   Ht 1.6 m (5' 3\")   Wt 53.2 kg (117 lb 3.2 oz)   LMP 06/08/2021   SpO2 96%   BMI 20.76 kg/m      Appearance:  awake, alert, adequately groomed and dressed in hospital scrubs  Attitude:  cooperative  Eye Contact:  fair  Mood:  anxious and depressed  Affect:  mood congruent  Speech:  clear, coherent  Psychomotor Behavior:  no evidence of tardive dyskinesia, dystonia, or tics  Thought Process:  linear and goal oriented  Associations:  no loose associations  Thought Content:  no evidence of psychotic thought, reports suicidal ideation with a plan to overdose, reports urges to engage in self-injury  Insight:  partial  Judgment:  limited  Oriented to:  date, time, person, and place  Attention Span and Concentration:  reports mild impairment  Recent and Remote Memory:  reports mild short term impairment  Language:  intact, fluent " English  Fund of Knowledge:  appropriate  Muscle Strength and Tone : normal  Gait and Station:  normal         Labs:     No results found for this or any previous visit (from the past 24 hour(s)).

## 2021-06-14 NOTE — PLAN OF CARE
Pt spent time in and out of her room, coloring in the lounge or resting in bed. Affect was full range. Mood was anxious, somewhat depressed. She was compliant with medications except for the miralax this morning. Pt stated that she isn't currently having SIB thoughts but has within the last hour. Her food and fluid intake has been very low throughout the shift. Pt stated that she had a cup of coffee today but hasn't been drinking any of the juices or other beverages on her meal trays. This RN encouraged the pt to drink more water, she seemed somewhat unreceptive to the idea. Pt also asked to know what her legal status was, this writer explained that she is on a 72HH and that a petition for commitment was filed. She was accepting of this information, didn't become agitated or upset about it. Will continue to monitor.

## 2021-06-14 NOTE — PLAN OF CARE
Pt appeared to sleep 7 hours. One to One in place for safety. No safety concerns or issues reported.

## 2021-06-14 NOTE — PROGRESS NOTES
Pt ate 2 small carrot slices, 1 piece of lettuce and a tomato slice from the garnish for her hamburger. 0 mL fluid intake, 0% of main entree.

## 2021-06-14 NOTE — PLAN OF CARE
CTC gave pt a notice of pre-petition screening at the request of Laura Payne, screener for [facility].

## 2021-06-14 NOTE — PLAN OF CARE
Problem: Behavior Regulation Impairment (Nonsuicidal Self-Injury)  Goal: Improved Behavior Regulation and Impulse Control (Nonsuicidal Self-Injury)  Outcome: No Change   Patient stated she would overdose if she had access to pills.  She self-reported abrading a small area on her left wrist, and was given soap and bandaids for this.  No blood or signs of infection noted.  Patient endorses depression and anxiety at 5 out of 10.  Intake for evening shift was two packs of crackers,  carrots and an apple.  Patient reported sense of depersonalization and unreality.  She denied physical problems and medication side effects.  Patient was given prn zyprexa with hs meds.

## 2021-06-14 NOTE — PLAN OF CARE
Assessment/Intervention/Current Symptoms and Care Coordination:   Reviewed chart and attended team discussion.  pt appears to be restricting food intake. She has suggested that she would overdose if access to pills. Pt reports sxs of depersonalization, SIB, anxiety, depression.     Discharge Plan or Goal:  Petition for commitment in motion.  Discharge plan will depend on outcome of this process.     Barriers to Discharge: acuity of mental health sxs.       Referral Status: no referrals at this time.  pt has established providers.     Legal Status: prepetition initiated

## 2021-06-14 NOTE — PLAN OF CARE
"Music Therapy Group note    Total time in session: 90 minutes     Number of patients in group: 2    Scope of service: psychodynamic     Patient progress: initial encounter     Patient response/reaction to treatment intervention(s): Lida presented as open in Music Therapy group today.  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  She engaged very well in all interventions (gold analysis, written reflection, music for relaxation).  She identified her eating disorder as being similar to her inner critic, and wanting to \"break up\" with her eating disorder, like the markham of a song broke up with her relationship because she was \"in love with her future\".  The theme/topics of group today were self-acceptance and perseverance.  The word Lida chose for at the end of the two back-to-back sessions was \"calm\".       Belen Mchugh, [facility]  Board-Certified Music Therapist           "

## 2021-06-15 LAB
ALBUMIN SERPL-MCNC: 3.9 G/DL (ref 3.4–5)
ALP SERPL-CCNC: 52 U/L (ref 40–150)
ALT SERPL W P-5'-P-CCNC: 19 U/L (ref 0–50)
ANION GAP SERPL CALCULATED.3IONS-SCNC: 6 MMOL/L (ref 3–14)
AST SERPL W P-5'-P-CCNC: 21 U/L (ref 0–45)
BILIRUB SERPL-MCNC: 0.5 MG/DL (ref 0.2–1.3)
BUN SERPL-MCNC: 8 MG/DL (ref 7–30)
CALCIUM SERPL-MCNC: 9.1 MG/DL (ref 8.5–10.1)
CHLORIDE SERPL-SCNC: 107 MMOL/L (ref 94–109)
CO2 SERPL-SCNC: 28 MMOL/L (ref 20–32)
CREAT SERPL-MCNC: 1.07 MG/DL (ref 0.52–1.04)
GFR SERPL CREATININE-BSD FRML MDRD: 71 ML/MIN/{1.73_M2}
GLUCOSE SERPL-MCNC: 80 MG/DL (ref 70–99)
MAGNESIUM SERPL-MCNC: 2.1 MG/DL (ref 1.6–2.3)
PHOSPHATE SERPL-MCNC: 3.7 MG/DL (ref 2.5–4.5)
POTASSIUM SERPL-SCNC: 3.9 MMOL/L (ref 3.4–5.3)
PROT SERPL-MCNC: 7.3 G/DL (ref 6.8–8.8)
SODIUM SERPL-SCNC: 141 MMOL/L (ref 133–144)

## 2021-06-15 PROCEDURE — 83735 ASSAY OF MAGNESIUM: CPT | Performed by: NURSE PRACTITIONER

## 2021-06-15 PROCEDURE — 250N000013 HC RX MED GY IP 250 OP 250 PS 637: Performed by: PSYCHIATRY & NEUROLOGY

## 2021-06-15 PROCEDURE — 36415 COLL VENOUS BLD VENIPUNCTURE: CPT | Performed by: NURSE PRACTITIONER

## 2021-06-15 PROCEDURE — G0177 OPPS/PHP; TRAIN & EDUC SERV: HCPCS

## 2021-06-15 PROCEDURE — 80053 COMPREHEN METABOLIC PANEL: CPT | Performed by: NURSE PRACTITIONER

## 2021-06-15 PROCEDURE — 99232 SBSQ HOSP IP/OBS MODERATE 35: CPT | Performed by: NURSE PRACTITIONER

## 2021-06-15 PROCEDURE — 250N000013 HC RX MED GY IP 250 OP 250 PS 637: Performed by: NURSE PRACTITIONER

## 2021-06-15 PROCEDURE — 84100 ASSAY OF PHOSPHORUS: CPT | Performed by: NURSE PRACTITIONER

## 2021-06-15 PROCEDURE — 124N000002 HC R&B MH UMMC

## 2021-06-15 RX ORDER — POLYETHYLENE GLYCOL 3350 17 G/17G
17 POWDER, FOR SOLUTION ORAL DAILY PRN
Status: DISCONTINUED | OUTPATIENT
Start: 2021-06-15 | End: 2021-06-28 | Stop reason: HOSPADM

## 2021-06-15 RX ADMIN — HYDROXYZINE HYDROCHLORIDE 25 MG: 25 TABLET, FILM COATED ORAL at 12:55

## 2021-06-15 RX ADMIN — FLUOXETINE HYDROCHLORIDE 40 MG: 20 CAPSULE ORAL at 08:09

## 2021-06-15 RX ADMIN — NALTREXONE HYDROCHLORIDE 50 MG: 50 TABLET, FILM COATED ORAL at 19:03

## 2021-06-15 RX ADMIN — Medication 1 TABLET: at 08:09

## 2021-06-15 RX ADMIN — LAMOTRIGINE 100 MG: 25 TABLET ORAL at 19:03

## 2021-06-15 RX ADMIN — LAMOTRIGINE 100 MG: 25 TABLET ORAL at 08:09

## 2021-06-15 RX ADMIN — CLONAZEPAM 0.5 MG: 0.5 TABLET ORAL at 19:03

## 2021-06-15 RX ADMIN — Medication 1000 UNITS: at 08:09

## 2021-06-15 RX ADMIN — CLONAZEPAM 0.5 MG: 0.5 TABLET ORAL at 08:09

## 2021-06-15 RX ADMIN — DULOXETINE HYDROCHLORIDE 20 MG: 20 CAPSULE, DELAYED RELEASE ORAL at 08:09

## 2021-06-15 RX ADMIN — LURASIDONE HYDROCHLORIDE 80 MG: 20 TABLET, FILM COATED ORAL at 21:15

## 2021-06-15 ASSESSMENT — ACTIVITIES OF DAILY LIVING (ADL)
HYGIENE/GROOMING: INDEPENDENT
DRESS: INDEPENDENT
DRESS: INDEPENDENT
HYGIENE/GROOMING: INDEPENDENT
ORAL_HYGIENE: INDEPENDENT
ORAL_HYGIENE: INDEPENDENT
LAUNDRY: WITH SUPERVISION
LAUNDRY: WITH SUPERVISION

## 2021-06-15 ASSESSMENT — MIFFLIN-ST. JEOR: SCORE: 1230.3

## 2021-06-15 NOTE — PLAN OF CARE
Patient slept approximately 7 hours during the overnight shift; appeared comfortable with even and unlabored breathing while asleep. No issues or concerns reported or observed. SIO in place according to order. Nursing will continue to monitor.

## 2021-06-15 NOTE — PLAN OF CARE
Problem: Behavior Regulation Impairment (Nonsuicidal Self-Injury)  Goal: Improved Behavior Regulation and Impulse Control (Nonsuicidal Self-Injury)  Outcome: No Change   Patient reports she has been picking at healing scars this evening.  No threats to make suicide attempts in the hospital.  She stated if she were at home she would overdose, but notes  has pills there in a lock box.  Patient ate nothing this shift but agreed to increase fluid intake.  She reported being extremely fatigued.  Patient has many questions about commitment process and was directed to discuss these with CTC.    She denied racing thoughts, physical problems, and medication side effects.

## 2021-06-15 NOTE — PLAN OF CARE
Assessment/Intervention/Current Symptoms and Care Coordination: reviewed chart and remotely attended team discussion.  Entered team note.      Discharge Plan or Goal:  Address court process and follow outcome.  Stabilize and address discharge planning needs.     Barriers to Discharge:  Acuity of mental health    Referral Status: not applicable at this time    Legal Status:  prepetition screening in process

## 2021-06-15 NOTE — PLAN OF CARE
Attended 2 OT Creative Expression Groups x 45 minutes each with 4 peers and 1:1 staff  present and 1 Life Skills Group focused on affirmations x 45 minutes with 2 peers and 1:1 staff.  Initiated task and began planning for next creative expression task. Noted benefits of the task focus and/or involvement then asked if she could do it outside of group times. Accepted/undrestood the reasons crafts can not be done outside of group. Shared creative work she had done outside of group setting and noted how beneficial/therapeutic it was for her. Was able to identify affirmations she could accept and attempt to recite when in a negative space or when she needs motivation. Pleasant and social with staff. Spoke of missing her work but, also aware of the drain it can have on her mental health. Noted ways she has found to find balance.

## 2021-06-15 NOTE — PLAN OF CARE
Pt attended the structured Therapeutic Recreation group, participating in a group activity. Pt participated in group discussion, leisure participation, and social engagement to gain self-esteem, manage behaviors, improve social skills, decrease isolation, and reduce anxiety/depression.   Pt remained focused and engaged throughout group activity.  Pt was sociable and was appropriate with interactions with the others in group. Pt was an active participant, contributing to the clues and descriptions throughout the activity.

## 2021-06-15 NOTE — PLAN OF CARE
BEHAVIORAL TEAM DISCUSSION    Participants: Sariah Solano CNP; Tyra Saravia and Nidia Michael, CTC's; Denae Gee, RN  Progress: Pt has been restricting food intake, states she would overdose if at home.  Pt is cooperative with prepetition commitment process.  She has been attending groups.   Anticipated length of stay: dependent upon the duration / outcome of the commitment process  Continued Stay Criteria/Rationale: poor judgment, failure at OP LOC, thoughts to harm self.   Medical/Physical: uneventful  Precautions:   Behavioral Orders   Procedures     Code 1 - Restrict to Unit     Discontinue 1:1 attendant for suicide risk     Order Specific Question:   I have performed an in person assessment of the patient     Answer:   Based on this assessment the patient no longer requires a one on one attendant at this point in time.     Order Specific Question:   Rationale     Answer:   Medical Record Reviewed     Order Specific Question:   Rationale     Answer:   Patient States able to remain safe in hospital     Order Specific Question:   Rationale     Answer:   Modifications to care environment made to mitigate safety risk     Order Specific Question:   Rationale     Answer:   Routine observations are sufficient to monitor safety.     Petition for commitment     [facility]     Routine Programming     As clinically indicated     Self Injury Precaution     Status 15     Every 15 minutes.     Status Individual Observation     Patient SIO status reviewed with team/RN.  Please also refer to RN/team documentation for add'l detail.    -SIO staff to monitor following which have contributed to patient being on SIO:  She has utilized objects in her room to scratch herself, picked at old wounds and tried to insert a plastic fork and pen into an electrical outlet   -Possible interventions SIO staff could use to support patient's treatment progress:  Active listening. Teach coping mechanisms.   -When following  observed, team will review discontinuation of SIO:  Ability to maintain safety on the unit.     Order Specific Question:   CONTINUOUS 24 hours / day     Answer:   5 feet     Order Specific Question:   Indications for SIO     Answer:   Self-injury risk     Suicide precautions     Patients on Suicide Precautions should have a Combination Diet ordered that includes a Diet selection(s) AND a Behavioral Tray selection for Safe Tray - with utensils, or Safe Tray - NO utensils       [facility], pt will continue to meet with psychiatry and pt will be encouraged to attend unit programming  Rationale for change in precautions or plan: unable to be safely discharged.

## 2021-06-15 NOTE — PLAN OF CARE
"Throughout the morning pt was visible in milieu, attended groups, social with select peers. Pt had her pre-petition screening interview around 1200. Pt stated that she thinks she \"said all the wrong things\" and that she doesn't want to be committed or be in the hospital anymore. Pt endorsed SI/SIB thoughts and said \"all I can think about is sticking my wet hand into that socket on the wall. I don't want to be here anymore.\" She was tearful, anxious, hugging her knees with her hair over her face. Pt agreed to take prn hydroxyzine, minimally receptive to trying any other coping skills. Pt refused her lunch tray. Will continue to monitor.  "

## 2021-06-15 NOTE — PROGRESS NOTES
"[facility],  Psychiatric Progress Note      Impression:     Corinne \"Cori\" Melinda is a 25-year-old female admitted to [facility] on 6/8/2021.  She was admitted on a 72-hour hold through the ER following an overdose on 3000 mg of Neurontin as a suicide attempt.  She had been restricting her food intake and scratching herself.  Since admission, she is being transitioned from Cymbalta to Prozac.  Lamictal was continued.  Latuda was continued.  Klonopin was continued.  A petition for commitment MI was initiated in [facility].  On 6/9 she superficially cut her right wrist and right antecubital area.  On 6/12 she opened old wounds on her ankles.  SIO was initiated on 6/13 after she informed staff that she tried to insert plasticware and pens into the electrical outlets in her room.  She continues to endorse suicidal ideation and urges to engage in self-injury.  She has been eating minimally.           Diagnoses:     Major depressive disorder, severe, recurrent, without psychosis  Anorexia nervosa  Generalized anxiety disorder  OCD  Panic disorder  Borderline personality disorder         Plan:      Medications:  Continue Klonopin 0.5 mg BID.  Continue Cymbalta to 20 mg daily x 3 days, then discontinue.  Continue Prozac 40 mg daily.  Continue Lamictal 100 mg BID.  Continue Latuda 80 mg at HS; provided education it does not work appropriately without consuming at least 350 calories with it.  Continue Naltrexone 50 mg at HS.  Continue PRNs of Melatonin, Hydroxyzine and Zyprexa.  Change Miralax to PRN.    Continue status individual observation.    Monitor and record PO intake.      Petition for commitment MI in [facility] (no [place] requested).  She is enrolled in DBT.  She has psychiatry and therapy through [facility].        Attestation:   Patient has been seen and evaluated by me, Ginette Solano, APRN CNP  The patient was counseled on nature of illness " "and treatment plan/options  Care was coordinated with treatment team       Clinical Global Impressions  First:  Considering your total clinical experience with this particular patient population, how severe are the patient's symptoms at this time?: 7 (06/14/21 0804)  Compared to the patient's condition at the START of treatment, this patient's condition is: 4 (06/14/21 0804)  Most recent:  Considering your total clinical experience with this particular patient population, how severe are the patient's symptoms at this time?: 7 (06/14/21 0804)  Compared to the patient's condition at the START of treatment, this patient's condition is: 4 (06/14/21 0804)            Interim History:     The patient's care was discussed with the treatment team and chart notes were reviewed.  Pt was documented as sleeping 7 hours during the overnight shift.  She remains on SIO.  She has been attending groups.  Labs were normal with the exception of mildly elevated creatinine.  She has been eating minimally and consuming inadequate amounts of fluids.  Yesterday she ate 2 small carrots, lettuce, 1 tomato slice and an orange.  Weight on admission was 119# and is currently 116#.  She said her mood has been \"up and down.\"  When asked about suicidal ideation, she responded, \"If I say what I'm feeling I'm going to be stuck in here longer so I don't want to say anything.\"  She eventually reported that she has ongoing urges to insert objects in electrical sockets as well as urges to engage in self-injury.  She says she has been \"trying to find things\" with which to harm herself \"because I feel like I failed.\"  She reports some irritability during conversations with her mother and .  She said, \"I want to go home and go back to work\" but that \"I'm really in the refusal camp\" with regard to treatment.  She says that during her stays at [facility] and at [facility] she \"was discharged because I was doing worse.\"  Discussed with her that this is not " "the case presently, particularly now that a petition for commitment has been filed.  Discussed with her that if she does not take Latuda with at least 350 calories, it will not work.           Medications:     Current Facility-Administered Medications   Medication     acetaminophen (TYLENOL) tablet 650 mg     alum & mag hydroxide-simethicone (MAALOX) suspension 30 mL     clonazePAM (klonoPIN) tablet 0.5 mg     DULoxetine (CYMBALTA) DR capsule 20 mg     FLUoxetine (PROzac) capsule 40 mg     hydrOXYzine (ATARAX) tablet 25 mg     lamoTRIgine (LaMICtal) tablet 100 mg     lurasidone (LATUDA) tablet 80 mg     melatonin tablet 3 mg     multivitamin w/minerals (THERA-VIT-M) tablet 1 tablet     naltrexone (DEPADE/REVIA) tablet 50 mg     OLANZapine (zyPREXA) tablet 5-10 mg    Or     OLANZapine (zyPREXA) injection 10 mg     polyethylene glycol (MIRALAX) Packet 17 g     Vitamin D3 (CHOLECALCIFEROL) tablet 1,000 Units             Allergies:     Allergies   Allergen Reactions     Morphine Other (See Comments)     Twitching     Amoxicillin Rash     Sulfa Drugs Rash            Psychiatric Examination:     /75 (BP Location: Left arm)   Pulse 86   Temp 97.7  F (36.5  C) (Oral)   Resp 15   Ht 1.6 m (5' 3\")   Wt 52.6 kg (116 lb)   LMP 06/08/2021   SpO2 97%   BMI 20.55 kg/m      Appearance:  awake, alert, adequately groomed and dressed in hospital scrubs  Attitude:  cooperative  Eye Contact:  fair  Mood:  anxious   Affect:  mood congruent  Speech:  clear, coherent  Psychomotor Behavior:  no evidence of tardive dyskinesia, dystonia, or tics  Thought Process:  linear and goal oriented  Associations:  no loose associations  Thought Content:  no evidence of psychotic thought, reports suicidal ideation with a plan to overdose or insert an object into an electrical socket, reports urges to engage in self-injury  Insight:  partial  Judgment:  limited  Oriented to:  date, time, person, and place  Attention Span and Concentration:  " reports mild impairment  Recent and Remote Memory:  reports mild short term impairment  Language:  intact, fluent English  Fund of Knowledge:  appropriate  Muscle Strength and Tone : normal  Gait and Station:  normal         Labs:     Recent Results (from the past 24 hour(s))   Magnesium    Collection Time: 06/15/21  7:33 AM   Result Value Ref Range    Magnesium 2.1 1.6 - 2.3 mg/dL   Phosphorus    Collection Time: 06/15/21  7:33 AM   Result Value Ref Range    Phosphorus 3.7 2.5 - 4.5 mg/dL   Comprehensive metabolic panel    Collection Time: 06/15/21  7:33 AM   Result Value Ref Range    Sodium 141 133 - 144 mmol/L    Potassium 3.9 3.4 - 5.3 mmol/L    Chloride 107 94 - 109 mmol/L    Carbon Dioxide 28 20 - 32 mmol/L    Anion Gap 6 3 - 14 mmol/L    Glucose 80 70 - 99 mg/dL    Urea Nitrogen 8 7 - 30 mg/dL    Creatinine 1.07 (H) 0.52 - 1.04 mg/dL    GFR Estimate 71 >60 mL/min/[1.73_m2]    GFR Estimate If Black 82 >60 mL/min/[1.73_m2]    Calcium 9.1 8.5 - 10.1 mg/dL    Bilirubin Total 0.5 0.2 - 1.3 mg/dL    Albumin 3.9 3.4 - 5.0 g/dL    Protein Total 7.3 6.8 - 8.8 g/dL    Alkaline Phosphatase 52 40 - 150 U/L    ALT 19 0 - 50 U/L    AST 21 0 - 45 U/L

## 2021-06-16 LAB
LABORATORY COMMENT REPORT: NORMAL
SARS-COV-2 RNA RESP QL NAA+PROBE: NEGATIVE
SPECIMEN SOURCE: NORMAL

## 2021-06-16 PROCEDURE — 250N000013 HC RX MED GY IP 250 OP 250 PS 637: Performed by: NURSE PRACTITIONER

## 2021-06-16 PROCEDURE — 90853 GROUP PSYCHOTHERAPY: CPT

## 2021-06-16 PROCEDURE — 250N000013 HC RX MED GY IP 250 OP 250 PS 637: Performed by: PSYCHIATRY & NEUROLOGY

## 2021-06-16 PROCEDURE — 99232 SBSQ HOSP IP/OBS MODERATE 35: CPT | Performed by: NURSE PRACTITIONER

## 2021-06-16 PROCEDURE — 87635 SARS-COV-2 COVID-19 AMP PRB: CPT | Performed by: NURSE PRACTITIONER

## 2021-06-16 PROCEDURE — G0177 OPPS/PHP; TRAIN & EDUC SERV: HCPCS

## 2021-06-16 PROCEDURE — 124N000002 HC R&B MH UMMC

## 2021-06-16 RX ORDER — CLONAZEPAM 0.5 MG/1
0.25 TABLET ORAL 2 TIMES DAILY
Status: DISCONTINUED | OUTPATIENT
Start: 2021-06-16 | End: 2021-06-18

## 2021-06-16 RX ORDER — LAMOTRIGINE 150 MG/1
150 TABLET ORAL 2 TIMES DAILY
Status: DISCONTINUED | OUTPATIENT
Start: 2021-06-16 | End: 2021-06-28 | Stop reason: HOSPADM

## 2021-06-16 RX ADMIN — LAMOTRIGINE 100 MG: 25 TABLET ORAL at 08:34

## 2021-06-16 RX ADMIN — FLUOXETINE HYDROCHLORIDE 40 MG: 20 CAPSULE ORAL at 08:34

## 2021-06-16 RX ADMIN — LURASIDONE HYDROCHLORIDE 80 MG: 20 TABLET, FILM COATED ORAL at 21:34

## 2021-06-16 RX ADMIN — ACETAMINOPHEN 650 MG: 325 TABLET, FILM COATED ORAL at 14:58

## 2021-06-16 RX ADMIN — POLYETHYLENE GLYCOL 3350 17 G: 17 POWDER, FOR SOLUTION ORAL at 14:21

## 2021-06-16 RX ADMIN — CLONAZEPAM 0.5 MG: 0.5 TABLET ORAL at 08:33

## 2021-06-16 RX ADMIN — Medication 0.25 MG: at 21:33

## 2021-06-16 RX ADMIN — NALTREXONE HYDROCHLORIDE 50 MG: 50 TABLET, FILM COATED ORAL at 21:34

## 2021-06-16 RX ADMIN — Medication 1000 UNITS: at 08:34

## 2021-06-16 RX ADMIN — LAMOTRIGINE 150 MG: 150 TABLET ORAL at 21:33

## 2021-06-16 RX ADMIN — Medication 1 TABLET: at 08:34

## 2021-06-16 RX ADMIN — OLANZAPINE 5 MG: 5 TABLET, FILM COATED ORAL at 11:39

## 2021-06-16 RX ADMIN — OLANZAPINE 5 MG: 5 TABLET, FILM COATED ORAL at 11:04

## 2021-06-16 RX ADMIN — DULOXETINE HYDROCHLORIDE 20 MG: 20 CAPSULE, DELAYED RELEASE ORAL at 08:34

## 2021-06-16 ASSESSMENT — ACTIVITIES OF DAILY LIVING (ADL)
DRESS: INDEPENDENT
DRESS: INDEPENDENT
ORAL_HYGIENE: INDEPENDENT
HYGIENE/GROOMING: INDEPENDENT
HYGIENE/GROOMING: INDEPENDENT
ORAL_HYGIENE: INDEPENDENT

## 2021-06-16 NOTE — PLAN OF CARE
Assessment/Intervention/Current Symptoms and Care Coordination: reviewed chart and attended team discussion.   [place] that [employer] would not support petition for commitment and would communicate this to pt.  Entered note to reflect [employer] decision.    Pt has escalated and made threats to harm self after learning of this outcome.     Discharge Plan or Goal: stabilize and return to OP LOC    Barriers to Discharge: pt voices active SI / threat to harm self    Referral Status: na at this time    Legal Status: petition for commitment not supported

## 2021-06-16 NOTE — PLAN OF CARE
CTC met with pt. Pt had question about obtaining case management services, had read about this in the commitment paperwork. CTC advised do not need to be committed in order to get services; will give pt information on this.   Pt also asked about the 12 hour intent and then being placed on a 72 hour hold. [facility] clarified with pt the reason for a 12 hour intent. Clarified that a hold would be placed if pt were seen as being at imminent risk; encouraged pt to discuss thoroughly with the provider the difference between her chronic thoughts of self-harm versus actual intent.  Pt also discussed her resistance to the diagnosis of borderline personality disorder. States that she is aware of that there is great stigma within the mental health community regarding persons with this diagnosis. [facility] affirmed this with pt. Also advised pt that this is something that this unit works hard to avoid. Also discussed with her that knowing a diagnosis makes it easier to understand reactions and also engage in treatment.

## 2021-06-16 NOTE — PLAN OF CARE
Psycho Education Group  Pt participated in check-in. Pt discussed and reviewed safety plan in group. Pt was able to provide a few suggestions during discussion with encouragement/prompts from Writer. Pt reported one of her supports was her . Pt was cooperative and respectful during group.

## 2021-06-16 NOTE — PLAN OF CARE
"Letter received from [facility]. States that one reason for not supporting is, \"Corinne is connected and engaged with Psychiatry, therapy, Occupational therapy, and DBT providers in the community. Corinne is also involved in treatment for her Anorexia Nervosa at [facility]. Corinne is agreeable to getting connected with additional services as needed.\"    A copy of the letter was placed in pt chart.  "

## 2021-06-16 NOTE — PROGRESS NOTES
Behavioral Health  Note    Behavioral Health  Spirituality Group Note    UNIT [facility]    Name: Corinne N Palm YOB: 1995   MRN: [id number] Age: 25 year old      Patient attended -led group, which included discussion of spirituality, coping with illness and building resilience.    Patient attended group for 1 hrs.    The patient actively participated in group discussion and patient demonstrated an appreciation of topic's application for their personal circumstances.    Lida identified connection as a meaningful spirituality word. She shared about that desire to belong and feel accepted as her whole self.      Thania Squires  Chaplain Resident  Pager: [phone]

## 2021-06-16 NOTE — PLAN OF CARE
Intake of 118ml. Pt given orange juice that was poured into cup (no access to foil cup or plastic) and given to pt. Pt finished orange juice and cup returned to staff.

## 2021-06-16 NOTE — PROGRESS NOTES
"[facility],  Psychiatric Progress Note      Impression:     Corinne \"Cori\" Melinda is a 25-year-old female admitted to [facility] on 6/8/2021.  She was admitted on a 72-hour hold through the ER following an overdose on 3000 mg of Neurontin as a suicide attempt.  She had been restricting her food intake and scratching herself.  Since admission, she is being transitioned from Cymbalta to Prozac.  Lamictal was continued.  Latuda was continued.  Klonopin was continued.  A petition for commitment MI was initiated in [facility] but was not supported, and she subsequently signed in voluntarily.  On 6/9 she superficially cut her right wrist and right antecubital area.  On 6/12 she opened old wounds on her ankles.  SIO was initiated on 6/13 after she informed staff that she tried to insert plasticware and pens into the electrical outlets in her room.  She continues to endorse suicidal ideation and urges to engage in self-injury.  She has been eating minimally.           Diagnoses:     Major depressive disorder, severe, recurrent, without psychosis  Anorexia nervosa  Generalized anxiety disorder  OCD  Panic disorder  Borderline personality disorder         Plan:      Medications:  Begin taper off Klonopin.  Continue Cymbalta to 20 mg x 1 dose tomorrow, then discontinue.  Continue Prozac 40 mg daily.  Increase Lamictal to 150 mg BID.  Continue Latuda 80 mg at HS; provided education it does not work appropriately without consuming at least 350 calories with it.  Continue Naltrexone 50 mg at HS.  Continue PRNs of Melatonin, Hydroxyzine and Zyprexa.     Continue status individual observation.  No groups with potentially harmful objects such as scissors, pens, etc until provider reassesses safety.      Monitor and record PO intake.      Petition for commitment MI in [facility] was not supported.  She signed in voluntarily.  Discharge to home when stable.  She is " "enrolled in DBT.  She has psychiatry and therapy through [facility].        Attestation:   Patient has been seen and evaluated by me, KITTY Vicente CNP  The patient was counseled on nature of illness and treatment plan/options  Care was coordinated with treatment team       Clinical Global Impressions  First:  Considering your total clinical experience with this particular patient population, how severe are the patient's symptoms at this time?: 7 (06/14/21 0804)  Compared to the patient's condition at the START of treatment, this patient's condition is: 4 (06/14/21 0804)  Most recent:  Considering your total clinical experience with this particular patient population, how severe are the patient's symptoms at this time?: 7 (06/14/21 0804)  Compared to the patient's condition at the START of treatment, this patient's condition is: 4 (06/14/21 0804)            Interim History:     The patient's care was discussed with the treatment team and chart notes were reviewed.  Pt was documented as sleeping 7 hours during the overnight shift.  She remains on SIO.  She has been attending groups and spending time coloring.  Petition for commitment was not supported.  Discussed patient's options, including remaining hospitalized voluntarily or discharging.  She responded, \"I'm not sure.  I don't know if I feel safe,\" whereas yesterday she was very eager to discharge.  She then discussed how during OT group this AM she was having urges to take the scissors and stab and kill herself.  She has an ongoing desire to put water on her hand and try to insert a finger into an electrical socket.  She also said that now that she has overdosed for the first time \"the flood [place] are open\" and she will do it again.  She also acknowledges strong urges to engage in self-injury.  She remains unable to contract for safety on the unit, and certainly not if she were discharged home.  She said, \"I'm confused, I'm not sure what's best.\"  She " "eventually agreed to sign in voluntarily.  She said her mood is depressed.  Discussed with her that yesterday she denied feeling depressed, and that overall she has provided a great deal of conflicting information about her thoughts and her symptoms, rendering it difficult to make treatment recommendations.  She responded that provider was accurately assessing that she was providing conflicting information and said that this is because her mood and symptoms are \"all over the place.\"  Discussed how this and other symptoms fit criteria for borderline personality disorder.  She said that she is confident she does not have this diagnosis and would not trust the opinion of a provider who only recently met her.  Discussed that nearly all psychiatric records viewable in Epic do list this diagnosis and that acknowledgement of this is important to understand a framework for why she is experiencing her symptoms and promoting recovery.  She became tearful while discussing this.   She continues to eat minimally and states this is because she wants to lose weight and because she wants to die.  Yesterday she reported eating better at home than in the hospital, though today she denied that this was in fact the case.           Medications:     Current Facility-Administered Medications   Medication     acetaminophen (TYLENOL) tablet 650 mg     alum & mag hydroxide-simethicone (MAALOX) suspension 30 mL     clonazePAM (klonoPIN) half-tab 0.25 mg     DULoxetine (CYMBALTA) DR capsule 20 mg     FLUoxetine (PROzac) capsule 40 mg     hydrOXYzine (ATARAX) tablet 25 mg     lamoTRIgine (LaMICtal) tablet 150 mg     lurasidone (LATUDA) tablet 80 mg     melatonin tablet 3 mg     multivitamin w/minerals (THERA-VIT-M) tablet 1 tablet     naltrexone (DEPADE/REVIA) tablet 50 mg     OLANZapine (zyPREXA) tablet 5-10 mg    Or     OLANZapine (zyPREXA) injection 10 mg     polyethylene glycol (MIRALAX) Packet 17 g     Vitamin D3 (CHOLECALCIFEROL) tablet " "1,000 Units             Allergies:     Allergies   Allergen Reactions     Morphine Other (See Comments)     Twitching     Amoxicillin Rash     Sulfa Drugs Rash            Psychiatric Examination:     /82 (BP Location: Left arm)   Pulse 100   Temp 97.9  F (36.6  C) (Oral)   Resp 16   Ht 1.6 m (5' 3\")   Wt 52.6 kg (116 lb)   LMP 06/08/2021   SpO2 95%   BMI 20.55 kg/m      Appearance:  awake, alert, adequately groomed and dressed in hospital scrubs  Attitude:  cooperative  Eye Contact:  fair  Mood:  anxious, depressed, \"all over the place\"  Affect:  tearful at times  Speech:  clear, coherent  Psychomotor Behavior:  no evidence of tardive dyskinesia, dystonia, or tics  Thought Process:  linear and goal oriented  Associations:  no loose associations  Thought Content:  no evidence of psychotic thought, reports suicidal ideation with a plan to overdose, stab herself with scissors or insert an object into an electrical socket, reports urges to engage in self-injury  Insight:  partial  Judgment:  limited  Oriented to:  date, time, person, and place  Attention Span and Concentration:  reports mild impairment  Recent and Remote Memory:  reports mild short term impairment  Language:  intact, fluent English  Fund of Knowledge:  appropriate  Muscle Strength and Tone : normal  Gait and Station:  normal         Labs:     No results found for this or any previous visit (from the past 24 hour(s)).  "

## 2021-06-16 NOTE — PLAN OF CARE
"  Problem: Suicidal Behavior  Goal: Suicidal Behavior is Absent or Managed  Outcome: No Change   \"I'm not okay. I'm feeling very upset and mad. She said I have Borderline Personality, I don't have that,\"patient repeated twice. After meeting with Sariah Solano NP patient started head banging and covering her head with weighted blanket. \"I want to cut myself.\" Writer worked with patient on paced breathing and ice pack.  Afterwards patient started head banging again. Zyprexa 5mg administered at 1100 and another Zyprexa administered half an hour later because head banging and putting head under weighted blanket continued. After an hour patient started calming down and said she was feeling better. C/o feeling dizzy after Zyprexa. Fluids were encouraged.   \"The anxiety was high but it's at a one now.\" Depression is at a 7 with 10 high. \"I'm feeling obsessed about being voluntary, I don't know why.\" Continues to have suicidal thoughts and thoughts of self harm. One of the plans for suicide is not taking medications and stashing them. Was not able to keep self safe today. Ate egg whites for breakfast and drank 118ccs of apple juice. Ate all the carrots with dip for lunch. States has not had a BM for 1 and 1/2-2weeks. Is passing gas. Denies abdominal pain. Feels bloated. Miralax administered in 240ccs of water. Sariah Solano NP is aware of bowel status.   "

## 2021-06-16 NOTE — PLAN OF CARE
Attended 1 OT Creative Expression Group with 1:1 staff x 45 minutes with 4 peers. . Contracted for safety while in OT Group. Affect bright, social with staff, accepted positive feedback and shared process of task completion and plans to do more.  Focused on task completion.

## 2021-06-16 NOTE — PLAN OF CARE
VM from Laura with [facility] [phone] received at 8:13am noting that 'after reviewing the case, they did not find enough to support a commitment'. She is already well connected to the community and they do not know what a commitment would do at this point.  They will fax a non support letter to the unit and Laura stated she will call the pt on the unit and encourage her to sign in voluntarily.  Outcome Communicated to treatment team

## 2021-06-17 PROCEDURE — 99233 SBSQ HOSP IP/OBS HIGH 50: CPT | Performed by: NURSE PRACTITIONER

## 2021-06-17 PROCEDURE — H2032 ACTIVITY THERAPY, PER 15 MIN: HCPCS

## 2021-06-17 PROCEDURE — 250N000013 HC RX MED GY IP 250 OP 250 PS 637: Performed by: PSYCHIATRY & NEUROLOGY

## 2021-06-17 PROCEDURE — 250N000011 HC RX IP 250 OP 636: Performed by: NURSE PRACTITIONER

## 2021-06-17 PROCEDURE — 250N000013 HC RX MED GY IP 250 OP 250 PS 637: Performed by: NURSE PRACTITIONER

## 2021-06-17 PROCEDURE — 124N000002 HC R&B MH UMMC

## 2021-06-17 RX ORDER — LANOLIN ALCOHOL/MO/W.PET/CERES
100 CREAM (GRAM) TOPICAL DAILY
Status: DISCONTINUED | OUTPATIENT
Start: 2021-06-17 | End: 2021-06-28 | Stop reason: HOSPADM

## 2021-06-17 RX ADMIN — NALTREXONE HYDROCHLORIDE 50 MG: 50 TABLET, FILM COATED ORAL at 21:15

## 2021-06-17 RX ADMIN — Medication 0.25 MG: at 08:42

## 2021-06-17 RX ADMIN — FLUOXETINE HYDROCHLORIDE 40 MG: 20 CAPSULE ORAL at 08:42

## 2021-06-17 RX ADMIN — Medication 0.25 MG: at 21:15

## 2021-06-17 RX ADMIN — DULOXETINE HYDROCHLORIDE 20 MG: 20 CAPSULE, DELAYED RELEASE ORAL at 08:42

## 2021-06-17 RX ADMIN — Medication 1000 UNITS: at 08:42

## 2021-06-17 RX ADMIN — LURASIDONE HYDROCHLORIDE 80 MG: 20 TABLET, FILM COATED ORAL at 21:15

## 2021-06-17 RX ADMIN — THIAMINE HCL TAB 100 MG 100 MG: 100 TAB at 21:16

## 2021-06-17 RX ADMIN — LAMOTRIGINE 150 MG: 150 TABLET ORAL at 08:42

## 2021-06-17 RX ADMIN — OLANZAPINE 10 MG: 10 INJECTION, POWDER, LYOPHILIZED, FOR SOLUTION INTRAMUSCULAR at 16:50

## 2021-06-17 RX ADMIN — LAMOTRIGINE 150 MG: 150 TABLET ORAL at 21:15

## 2021-06-17 RX ADMIN — Medication 1 TABLET: at 08:42

## 2021-06-17 ASSESSMENT — ACTIVITIES OF DAILY LIVING (ADL)
DRESS: SCRUBS (BEHAVIORAL HEALTH)
ORAL_HYGIENE: INDEPENDENT
LAUNDRY: UNABLE TO COMPLETE
HYGIENE/GROOMING: HANDWASHING;PROMPTS

## 2021-06-17 ASSESSMENT — MIFFLIN-ST. JEOR: SCORE: 1238.92

## 2021-06-17 NOTE — PROGRESS NOTES
"[facility],  Psychiatric Progress Note      Impression:     Corinne \"Cori\" Melinda is a 25-year-old female admitted to [facility] on 6/8/2021.  She was admitted on a 72-hour hold through the ER following an overdose on 3000 mg of Neurontin as a suicide attempt.  She had been restricting her food intake and scratching herself.  Since admission, she tapered off Cymbalta.  Prozac was initiated.  Lamictal was continued.  Latuda was continued.  Klonopin was continued.  A petition for commitment MI was initiated in [facility] but was not supported, and she subsequently signed in voluntarily.  On 6/9 she superficially cut her right wrist and right antecubital area.  On 6/12 she opened old wounds on her ankles.  SIO was initiated on 6/13 after she informed staff that she tried to insert plasticware and pens into the electrical outlets in her room.  She superficially cut herself with a straw in her bathroom on 6/16.  She continues to endorse suicidal ideation and urges to engage in self-injury.  She has been eating minimally.           Diagnoses:     Major depressive disorder, severe, recurrent, without psychosis  Anorexia nervosa  Generalized anxiety disorder  OCD  Panic disorder  Borderline personality disorder         Plan:      Medications:  Continue taper off Klonopin.  Discontinue Cymbalta.  Continue Prozac 40 mg daily.  Continue Lamictal 150 mg BID.  Continue Latuda 80 mg at HS; provided education it does not work appropriately without consuming at least 350 calories with it.  Continue Naltrexone 50 mg at HS.  Continue PRNs of Melatonin, Hydroxyzine and Zyprexa.     Continue status individual observation.  No groups with potentially harmful objects such as scissors, pens, etc until provider reassesses safety.      Monitor and record PO intake.      Petition for commitment MI in [facility] was not supported.  She signed in voluntarily.  Discharge to " "home when stable.  Discussed goal for discharge by 6/21 since she has a therapy appointment 6/22.  She is enrolled in DBT.  She has psychiatry and therapy through [facility].        Attestation:   Patient has been seen and evaluated by me, KITTY Vicente CNP  The patient was counseled on nature of illness and treatment plan/options  Care was coordinated with treatment team       Clinical Global Impressions  First:  Considering your total clinical experience with this particular patient population, how severe are the patient's symptoms at this time?: 7 (06/14/21 0804)  Compared to the patient's condition at the START of treatment, this patient's condition is: 4 (06/14/21 0804)  Most recent:  Considering your total clinical experience with this particular patient population, how severe are the patient's symptoms at this time?: 7 (06/14/21 0804)  Compared to the patient's condition at the START of treatment, this patient's condition is: 4 (06/14/21 0804)            Interim History:     The patient's care was discussed with the treatment team and chart notes were reviewed.  Pt was documented as sleeping 7.25 hours during the overnight shift.  She remains on status individual observation.  Yesterday she engaged in head banging and also used a straw to cut herself superficially while she was in the bathroom.   She says that she \"felt stupid\" about head banging, cutting herself, and \"reacting like that\" after our conversation about borderline personality disorder.  She said, \"I really wanted to hurt and kill myself.\"  She received PRN Zyprexa 5 mg x 2 which was helpful but caused cognitive impairment.  Reviewed all criteria for borderline personality disorder together today and she meets 6/9 with 5 required for a diagnosis.  She continues to endorse suicidal ideation with a plan to overdose.  She reports \"looking for things to harm myself\" on the unit.  She continues to eat minimally, though some improvement " "compared to admission.  She does feel dizzy and hasn't had a bowel movement since admission.  She said her therapist will be returning from a leave of absence next week and she would like to be discharged in time for her 6/22 appointment.  She said that she and her therapist had been working on IFS and the patient referred to her eating disorder in this context as Jennifer, and she feels \"really stuck in that mode.\"  She said she feels like she was abandoned by her therapist when they were working through some trauma related to her cleft palate surgeries.  Pt reportedly referred to her SIO staff as her \"friend\" today and asked if she could have a gavino needles and yarn to work on outside of OT group as she is not allowed in OT group where there are scissors and other potentially harmful objects.  This request was denied.  Provider discussed with pt that SIO is for the purposes of safety only and they are not meant to play games with and/or socialize with her as this could reinforce, even subconsciously, maladaptive thoughts and behaviors.  She expressed understanding.  Staff have also been educated regarding this.           Medications:     Current Facility-Administered Medications   Medication     acetaminophen (TYLENOL) tablet 650 mg     alum & mag hydroxide-simethicone (MAALOX) suspension 30 mL     clonazePAM (klonoPIN) half-tab 0.25 mg     FLUoxetine (PROzac) capsule 40 mg     hydrOXYzine (ATARAX) tablet 25 mg     lamoTRIgine (LaMICtal) tablet 150 mg     lurasidone (LATUDA) tablet 80 mg     melatonin tablet 3 mg     multivitamin w/minerals (THERA-VIT-M) tablet 1 tablet     naltrexone (DEPADE/REVIA) tablet 50 mg     OLANZapine (zyPREXA) tablet 5-10 mg    Or     OLANZapine (zyPREXA) injection 10 mg     polyethylene glycol (MIRALAX) Packet 17 g     Vitamin D3 (CHOLECALCIFEROL) tablet 1,000 Units             Allergies:     Allergies   Allergen Reactions     Morphine Other (See Comments)     Twitching     Amoxicillin " "Rash     Sulfa Drugs Rash            Psychiatric Examination:     /82   Pulse 87   Temp 98.3  F (36.8  C) (Temporal)   Resp 16   Ht 1.6 m (5' 3\")   Wt 53.5 kg (117 lb 14.4 oz)   LMP 06/08/2021   SpO2 93%   BMI 20.89 kg/m      Appearance:  awake, alert, adequately groomed and dressed in hospital scrubs  Attitude:  cooperative  Eye Contact:  good  Mood:  reports emotional dysregulation, some improvement in depressive symptoms compared to yesterday  Affect:  mildly blunted  Speech:  clear, coherent  Psychomotor Behavior:  no evidence of tardive dyskinesia, dystonia, or tics  Thought Process:  linear and goal oriented  Associations:  no loose associations  Thought Content:  no evidence of psychotic thought, reports suicidal ideation with a plan to overdose, reports urges to engage in self-injury  Insight:  partial  Judgment:  limited  Oriented to:  date, time, person, and place  Attention Span and Concentration:  reports mild impairment  Recent and Remote Memory:  reports mild short term impairment  Language:  intact, fluent English  Fund of Knowledge:  appropriate  Muscle Strength and Tone : normal  Gait and Station:  normal         Labs:     No results found for this or any previous visit (from the past 24 hour(s)).  "

## 2021-06-17 NOTE — PLAN OF CARE
Assessment/Intervention/Current Symptoms and Care Coordination:   CTC met with pt at pt's request. Pt was sitting in her bed with the blanket over her head like a tent. Pt did remove that when CTC spoke to her. Pt then requested to sign a NED to S. Did not want to specify a provider; did specify [place] location.   [facility] advised pt that the NED would be filled out, at the  and ready for her signature when she is ready to sign.    Discharge Plan or Goal:  Pt to discharge to current services    Barriers to Discharge:  Pt reports does not feel safe    Referral Status:   Pt has services in place and will return to those    Legal Status:   Voluntary

## 2021-06-17 NOTE — PLAN OF CARE
Patient continue to be on SIO for SI/SIB.  Staff noticed a superficial cut on patient right arm.  Patient claimed she sneaked out a coffee straw and cut herself.  Straw and pencil was found in patient's pocket Which was removed.  Patient rated depression at 9/10, anxiety 8/10.  Currently drawing pictures with staff close supervision .  Took scheduled medications with no problem.  Good appetite, was able to talk to her  on the phone.  Still demanding to talk to her  if she call.  Patient denies pain this shift, refused prn medication offered for anxiety.  Presently awake in room.  Will continue to monitor closely.

## 2021-06-17 NOTE — PROGRESS NOTES
" 06/16/21 2300   Groups   Details    (Psychotherapy)   Number of patients attending the group:  3  Group Length:  1 Hours- approx half of group     Group Therapy Type: Psychotherapy     Summary of Group / Topics Discussed:        The  Psychotherapy group goal is to promote insight to positive choice and change. Group processing is within a supportive and safe environment. Patients will process emotions using verbal group and expressive psychotherapy interventions including visual art/writing interventions.     Group interventions support patients by: self compassion and creative exploration     Modalities to reach these goals include: process group/ art therapy     Group response-engaged both in discussion and creativity    Subjective -patient report of mood today- \"overwhlemed, freaked out today about a diagnosis\" that was suggested. Pt did say they had a good visit with their spouse.     Patient Response-  Writer was notified by pts nurse before group concern about safety , writer offered pt 5 markers and writer kept caps, pt was cooperative with the safety measures. She did a very thoughtful drawing of a self compassion tree. She represented people that care about her and the positive messages they have given her.   She indicated negative self talk , she spoke about not being happy about her diagnosis and also she did allude to some disordered eating thoughts in her drawing content. She then put rain clouds, watering the negative thoughts with several positive affirmations, it was quite detailed and thoughtful visual representation of her feelings.    Winston Turner, CLIFF, ATR-BC          "

## 2021-06-17 NOTE — PROGRESS NOTES
"Writer responded to code green called on pt as she was dysregulated and having a hard time gaining control of her behavior. Pt was sitting on her bed, hitting the back of her head on the wall. She had her sweatshirt up over her face with both hands holding it in place. Pt mentioned several times that she did not want to have to move her sweatshirt from her face, but did not volunteer reason. She said, \"I don't feel like i'm here.\" Writer suggested some grounding techniques, beginning with uncovering her eyes to look at her surroundings, offered ice packs and activities for distraction, but pt refused all. Pt said she was confused. Writer spent some time reorienting pt to reality and provided her with clear options, but pt continued to refuse and hit her head on the wall several more times. When asked, she reports she usually just sits and waits for feelings to pass. Staff continued to encourage pt to verbalize feelings and offered suggestions, but pt was unwilling. Pt agreed that a PRN medication was the best choice at this time after being given some time to try to deescalate on her own. Pt still refused to uncover her face, but was willing to take the medication intramuscularly. After taking the medication, the head-banging ceased.   "

## 2021-06-17 NOTE — PLAN OF CARE
Pt appeared to have slept for 7.25 hours this night. No SIB behaviors noted this shift. Pt did not need any prn medication this shift.

## 2021-06-17 NOTE — PLAN OF CARE
"Patient sitting on bathroom floor lightly banging head intermittently. Was responding to verbal redirection to stop hand banging for the moment but would start again a few minutes later then stopped again after verbal redirection. Offered oral zyprexa and patient had shirt over her face and said she does not want to move the shirt from her face and said she felt like she could not move. Encouraged to leave the bathroom and patient did listen to redirection and sat on her bed. Staff continued to have verbal de escalation and patient said she does not feel like herself and that she felt out of touch with reality. Had intermittent head banging while on bed. Patient was presented with oral zyprexa again but unwilling to comply with having her shirt further down so that staff can see her mouth. Agreeable to have IM zyprexa. After getting medication patient head bang stopped.  Denied pain, anxiety \"low\" and depression '9'/10. +SI with plan to overdose on her medication or electrocute herself. Unable to contract for safety. Said her SIB urges after medication were better. Room search completed. Changed into behavioral scrubs.  Ate 9 cierra crackers and part of her fruit cup. Drank one ginger ale and 1/3 of apple juice.  Attended evening group.   Continues on suicide, SIB and cheeking precautions.  SIO:1 for SIB/SI  "

## 2021-06-17 NOTE — PROGRESS NOTES
CLINICAL NUTRITION SERVICES - REASSESSMENT NOTE     Nutrition Prescription    RECOMMENDATIONS FOR MDs/PROVIDERS TO ORDER:  Recommend consideration of nutrition support if appropriate based on goals of care    Malnutrition Status:    Previously noted, severe    Recommendations already ordered by Registered Dietitian (RD):  Medical food supplement therapy- trial 1 apple Ensure Clear daily, poured out and into cup. Send with breakfast.  Order 100 mg thiamine daily.    Future/Additional Recommendations:  Acceptance of supplement, goals of care     EVALUATION OF THE PROGRESS TOWARD GOALS   Diet: Regular  Intake: Intake remains poor. Pt is taking some foods--egg whites and coffee at breakfast, carrots sticks with some dip, fruit cups, juices, cierra crackers. Overall not improving much. Diet order will be changed to finger foods due to SIB behavior again today.     NEW FINDINGS   Weights  Wt Readings from Last 10 Encounters:   06/17/21 53.5 kg (117 lb 14.4 oz)   06/15/21 52.6 kg (116 lb)   06/13/21 53.2 kg (117 lb 3.2 oz)   06/08/21 54.16 kg (119 lb 6.4 oz)   11/17/20 48.6 kg (107 lb 1.6 oz)   02/07/16 58.6 kg (129 lb 1.6 oz)   03/12/15 54.4 kg (120 lb)     No BM since admission    MALNUTRITION  % Intake: </= 50% for >/= 5 days (severe)  % Weight Loss: Unable to assess- pt does not provide recent weight history, gain noted presumably while in residential treatment, but unsure what weight was at time of discharge before she began restricting again.   Subcutaneous Fat Loss: Facial region:  Orbital- moderate  Muscle Loss: Temporal:  Mild to moderate  Fluid Accumulation/Edema: None noted  Malnutrition Diagnosis: Severe malnutrition in the context of acute on chronic illness as evidenced by severe restriction of intake and signs of fat and muscle loss    Previous Goals   Pt will meet at least 50% of needs through PO intake.  Evaluation: Not met    Previous Nutrition Diagnosis  Severe malnutrition in the context of acute on  chronic illness as evidenced by severe restriction of intake and signs of fat and muscle loss  Evaluation: No change    CURRENT NUTRITION DIAGNOSIS  Severe malnutrition in the context of acute on chronic illness as evidenced by severe restriction of intake and signs of fat and muscle loss    INTERVENTIONS  Implementation  Medical food supplement therapy- trial 1 apple Ensure Clear daily, poured out and into cup. Send with breakfast.  Order 100 mg thiamine daily.    Goals  Pt will meet at least 50% of needs through PO intake.    Monitoring/Evaluation  Progress toward goals will be monitored and evaluated per protocol.    Thania Martinez RD, LD  ICU/5A/OB/Mental Health Pager (M-F): [phone]  On Call Pager (weekends only): [phone]

## 2021-06-18 PROCEDURE — G0177 OPPS/PHP; TRAIN & EDUC SERV: HCPCS

## 2021-06-18 PROCEDURE — 99233 SBSQ HOSP IP/OBS HIGH 50: CPT | Performed by: NURSE PRACTITIONER

## 2021-06-18 PROCEDURE — 250N000013 HC RX MED GY IP 250 OP 250 PS 637: Performed by: PSYCHIATRY & NEUROLOGY

## 2021-06-18 PROCEDURE — 250N000011 HC RX IP 250 OP 636: Performed by: NURSE PRACTITIONER

## 2021-06-18 PROCEDURE — 250N000013 HC RX MED GY IP 250 OP 250 PS 637: Performed by: NURSE PRACTITIONER

## 2021-06-18 PROCEDURE — 124N000002 HC R&B MH UMMC

## 2021-06-18 RX ADMIN — THIAMINE HCL TAB 100 MG 100 MG: 100 TAB at 07:55

## 2021-06-18 RX ADMIN — OLANZAPINE 10 MG: 10 INJECTION, POWDER, LYOPHILIZED, FOR SOLUTION INTRAMUSCULAR at 19:29

## 2021-06-18 RX ADMIN — LURASIDONE HYDROCHLORIDE 80 MG: 20 TABLET, FILM COATED ORAL at 20:21

## 2021-06-18 RX ADMIN — NALTREXONE HYDROCHLORIDE 50 MG: 50 TABLET, FILM COATED ORAL at 20:21

## 2021-06-18 RX ADMIN — Medication 1 TABLET: at 07:54

## 2021-06-18 RX ADMIN — FLUOXETINE HYDROCHLORIDE 40 MG: 20 CAPSULE ORAL at 07:54

## 2021-06-18 RX ADMIN — Medication 1000 UNITS: at 07:55

## 2021-06-18 RX ADMIN — LAMOTRIGINE 150 MG: 150 TABLET ORAL at 20:21

## 2021-06-18 RX ADMIN — Medication 0.25 MG: at 07:55

## 2021-06-18 RX ADMIN — LAMOTRIGINE 150 MG: 150 TABLET ORAL at 07:54

## 2021-06-18 ASSESSMENT — ACTIVITIES OF DAILY LIVING (ADL)
LAUNDRY: WITH SUPERVISION
HYGIENE/GROOMING: INDEPENDENT
DRESS: INDEPENDENT
HYGIENE/GROOMING: INDEPENDENT
LAUNDRY: WITH SUPERVISION
DRESS: INDEPENDENT
ORAL_HYGIENE: INDEPENDENT
ORAL_HYGIENE: INDEPENDENT

## 2021-06-18 NOTE — PROGRESS NOTES
Pt ate 1 cup of fresh fruit from her lunch tray, 240 mL coffee with 1 creamer packet. She ate 0% of her entree.

## 2021-06-18 NOTE — PLAN OF CARE
Pt was restless throughout shift, in and out of room and milieu, coloring and working on some art projects in the dining area. Pt was social with SIO staff, they were reminded that they're not supposed to socialize or play games with the pt and that they are only supposed to monitor for SIB. Affect was blunted, flat but pleasant upon approach. Mood was depressed, anxious at times. Pt endorses active SI/SIB thoughts and continues to need SIO to maintain safety on the unit. Pt's intake is poor, she usually eats less then 25% of the food on her meal trays. Will continue to monitor.

## 2021-06-18 NOTE — PLAN OF CARE
"  Problem: Adult Inpatient Plan of Care  Goal: Plan of Care Review  Outcome: No Change  Flowsheets (Taken 6/18/2021 1700)  Plan of Care Reviewed With: patient     Patient has been restless and needed redirection as she wanted to pace hallways excessively. Patient accepted redirection with this behavior. Also upset that SIO would not leave when using bathroom. Given explanation that dignity is maintained although safety cannot be compromised. Patient tried to take a straw from a silk milk and SIO redirected patient. Patient said she had no intention of drinking the milk and just wanted the straw.  Denied pain, anxiety '6'/10, depression '7'/10. +SI with plan to overdose on medications or electrocute herself. Said her SIB urges are \"in the middle.\"   Later in evening started head banging with shirt over her head. Said, \"I am stupid I feel stupid.\" Offered oral medications and patient refused to remove her shirt from her mouth. Said she would take an injection and IM zyprexa given. Patient started again with the head banging and given options and took hs medications. Has stopped the behavior. Ate carrots with some dressing, fruit cup, apple juice, 1/3 of milk, water and 3 cierra crackers. At snack time had double portion of yogurt with granola and a bag of chips. Room check completed no contraband found.  Continues on SIO:1, SIB, SI and cheeking precautions.  "

## 2021-06-18 NOTE — PLAN OF CARE
"Music Therapy Group note    Total time in session: 45 minutes     Number of patients in group: 4    Scope of service: psychodynamic     Patient progress: declining     Patient response/reaction to treatment intervention(s): Of note: Lida approached MT after group to say she is a MT herself.  She did not recall meeting writer earlier this week and sharing this same information on Monday, during a 90-minute afternoon group she was a part of.      She entered session with bleary eyes and stated she was \"tired and had a tough day\".  She was looking for \"distraction\" out of group tonight.  She resonated with the same song she had introduced to group on Monday, \"Christiana\".  She was calm and cooperative, but had a flatter affect than on Monday and appeared sad and tired.  Despite this, she was attentive and appropriate during session.  MT was careful to not utilize supplies that could be dangerous, per nurse's request before group (ie. writing utensils)  MT modified group plan.  Lida thanked writer for group, and did brighten slightly during session.     Belen Mchugh, [facility]  Board-Certified Music Therapist           "

## 2021-06-18 NOTE — PLAN OF CARE
Attended 1 Leisure/Social Skills Group x 45 minutes with 3 peers. Appropriately requested group be adapted so she could attend. Group had already been planned for her safety restrictions. Bright, pleasant and social. Attentive/focused entire group. Decisive and able to read others personalities and interests when making choices. Noted she was thankful for the opportunity to attend group and socialize in structured group setting.

## 2021-06-18 NOTE — PROGRESS NOTES
"[facility],  Psychiatric Progress Note      Impression:     Corinne \"Cori\" Melinda is a 25-year-old female admitted to [facility] on 6/8/2021.  She was admitted on a 72-hour hold through the ER following an overdose on 3000 mg of Neurontin as a suicide attempt.  She had been restricting her food intake and scratching herself.  Since admission, she tapered off Cymbalta.  Prozac was initiated.  Lamictal was continued.  Latuda was continued.  A petition for commitment MI was initiated in [facility] but was not supported, and she subsequently signed in voluntarily.  On 6/9 she superficially cut her right wrist and right antecubital area.  On 6/12 she opened old wounds on her ankles.  SIO was initiated on 6/13 after she informed staff that she tried to insert plasticware and pens into the electrical outlets in her room.  She superficially cut herself with a straw in her bathroom on 6/16.  On 6/17 she engaged in light head banging and was not redirectable, so IM Zyprexa was administered.  She continues to endorse suicidal ideation and urges to engage in self-injury.  She has been eating minimally.           Diagnoses:     Major depressive disorder, severe, recurrent, without psychosis  Anorexia nervosa  Generalized anxiety disorder  OCD  Panic disorder  Borderline personality disorder         Plan:      Medications:  Discontinue Klonopin.  Continue Prozac 40 mg daily.  Continue Lamictal 150 mg BID.  Continue Latuda 80 mg at HS; provided education it does not work appropriately without consuming at least 350 calories with it.  Continue Naltrexone 50 mg at HS.  Continue PRNs of Melatonin, Hydroxyzine and Zyprexa.     Continue status individual observation.  No groups with potentially harmful objects such as scissors, pens, etc until provider reassesses safety.      Monitor and record PO intake.  CMP, magnesium and phosphorus labs on 6/21.    Petition for " commitment MI in [facility] was not supported.  She signed in voluntarily.  She has 3 therapists and reports having a therapy appointment on 6/29 with the therapist who was on a leave of absence.  She is enrolled in DBT.  She has psychiatry and therapy through [place].  She is considering treatment at [facility] in [place].  On 6/18 provider completed disability paperwork recommending reassessment of work status in 3 weeks.        Attestation:   Patient has been seen and evaluated by me, KITTY Vicente CNP  The patient was counseled on nature of illness and treatment plan/options  Care was coordinated with treatment team, patient's mother Kay ([phone]))      Clinical Global Impressions  First:  Considering your total clinical experience with this particular patient population, how severe are the patient's symptoms at this time?: 7 (06/14/21 0804)  Compared to the patient's condition at the START of treatment, this patient's condition is: 4 (06/14/21 0804)  Most recent:  Considering your total clinical experience with this particular patient population, how severe are the patient's symptoms at this time?: 7 (06/14/21 0804)  Compared to the patient's condition at the START of treatment, this patient's condition is: 4 (06/14/21 0804)            Interim History:     The patient's care was discussed with the treatment team and chart notes were reviewed.  Pt was documented as sleeping 6.5 hours during the overnight shift.  She refused to take her shirt off her face and engaged in some light head banging behavior yesterday and received IM Zyprexa which was beneficial.  States that she was having dissociative symptoms at the time.  She continues to report suicidal ideation with a plan to insert objects into an electrical outlet or overdose.  She had strong urges to engage in self-injury yesterday, but not today.  She says she has been eating less than 500 calories per day and has no intention of altering her  "behaviors.  Her anxiety is \"pretty high\" and mood is overall \"up and down.\"  She said she received news that her therapist's ADITI will be extended until 6/28, which was very disappointing to her.  She said her  was upset when she told him about head banging and told her that she could not live with him if she engages in this behavior.  Pt said that while hospitalized she does not have access to some of the DBT skills she would normally utilize, but also does not feel safe to return home.  Provider called pt's mother and provided her with an update regarding her progress and plan of care.  Provider also completed disability paperwork recommending reassessment of work status in 3 weeks.           Medications:     Current Facility-Administered Medications   Medication     acetaminophen (TYLENOL) tablet 650 mg     alum & mag hydroxide-simethicone (MAALOX) suspension 30 mL     FLUoxetine (PROzac) capsule 40 mg     hydrOXYzine (ATARAX) tablet 25 mg     lamoTRIgine (LaMICtal) tablet 150 mg     lurasidone (LATUDA) tablet 80 mg     melatonin tablet 3 mg     multivitamin w/minerals (THERA-VIT-M) tablet 1 tablet     naltrexone (DEPADE/REVIA) tablet 50 mg     OLANZapine (zyPREXA) tablet 5-10 mg    Or     OLANZapine (zyPREXA) injection 10 mg     polyethylene glycol (MIRALAX) Packet 17 g     thiamine (B-1) tablet 100 mg     Vitamin D3 (CHOLECALCIFEROL) tablet 1,000 Units             Allergies:     Allergies   Allergen Reactions     Morphine Other (See Comments)     Twitching     Amoxicillin Rash     Sulfa Drugs Rash            Psychiatric Examination:     /87 (BP Location: Left arm)   Pulse 114   Temp 98.3  F (36.8  C) (Oral)   Resp 16   Ht 1.6 m (5' 3\")   Wt 53.5 kg (117 lb 14.4 oz)   LMP 06/08/2021   SpO2 95%   BMI 20.89 kg/m      Appearance:  awake, alert, adequately groomed and dressed in hospital scrubs  Attitude:  cooperative  Eye Contact:  good  Mood:  \"up and down,\" anxious  Affect:  mildly " blunted  Speech:  clear, coherent  Psychomotor Behavior:  no evidence of tardive dyskinesia, dystonia, or tics  Thought Process:  linear and goal oriented  Associations:  no loose associations  Thought Content:  no evidence of psychotic thought, reports suicidal ideation with a plan to overdose or insert and object into an electrical socket, reports urges to engage in self-injury yesterday but not today  Insight:  partial  Judgment:  limited  Oriented to:  date, time, person, and place  Attention Span and Concentration:  reports mild impairment  Recent and Remote Memory:  reports mild short term impairment  Language:  intact, fluent English  Fund of Knowledge:  appropriate  Muscle Strength and Tone : normal  Gait and Station:  normal         Labs:     No results found for this or any previous visit (from the past 24 hour(s)).

## 2021-06-19 LAB — GLUCOSE BLDC GLUCOMTR-MCNC: 91 MG/DL (ref 70–99)

## 2021-06-19 PROCEDURE — 250N000013 HC RX MED GY IP 250 OP 250 PS 637: Performed by: PSYCHIATRY & NEUROLOGY

## 2021-06-19 PROCEDURE — 250N000013 HC RX MED GY IP 250 OP 250 PS 637: Performed by: NURSE PRACTITIONER

## 2021-06-19 PROCEDURE — 124N000002 HC R&B MH UMMC

## 2021-06-19 PROCEDURE — 999N001017 HC STATISTIC GLUCOSE BY METER IP

## 2021-06-19 RX ADMIN — OLANZAPINE 5 MG: 5 TABLET, FILM COATED ORAL at 14:50

## 2021-06-19 RX ADMIN — FLUOXETINE HYDROCHLORIDE 40 MG: 20 CAPSULE ORAL at 08:06

## 2021-06-19 RX ADMIN — HYDROXYZINE HYDROCHLORIDE 25 MG: 25 TABLET, FILM COATED ORAL at 12:44

## 2021-06-19 RX ADMIN — Medication 1 TABLET: at 08:06

## 2021-06-19 RX ADMIN — OLANZAPINE 10 MG: 5 TABLET, FILM COATED ORAL at 16:56

## 2021-06-19 RX ADMIN — LAMOTRIGINE 150 MG: 150 TABLET ORAL at 08:06

## 2021-06-19 RX ADMIN — NALTREXONE HYDROCHLORIDE 50 MG: 50 TABLET, FILM COATED ORAL at 20:51

## 2021-06-19 RX ADMIN — Medication 1000 UNITS: at 08:06

## 2021-06-19 RX ADMIN — LURASIDONE HYDROCHLORIDE 80 MG: 20 TABLET, FILM COATED ORAL at 20:51

## 2021-06-19 RX ADMIN — THIAMINE HCL TAB 100 MG 100 MG: 100 TAB at 08:07

## 2021-06-19 RX ADMIN — POLYETHYLENE GLYCOL 3350 17 G: 17 POWDER, FOR SOLUTION ORAL at 09:17

## 2021-06-19 RX ADMIN — LAMOTRIGINE 150 MG: 150 TABLET ORAL at 20:51

## 2021-06-19 ASSESSMENT — ACTIVITIES OF DAILY LIVING (ADL)
ORAL_HYGIENE: INDEPENDENT
HYGIENE/GROOMING: INDEPENDENT
LAUNDRY: WITH SUPERVISION
DRESS: INDEPENDENT

## 2021-06-19 NOTE — PLAN OF CARE
Pt was visible in milieu, minimally social with peers, spent time coloring and working on some art projects. Affect was blunted, flat. Mood was depressed, anxious. Pt's appetite was poor, she ate about 25% of her meal trays. Pt was compliant with medications, requested prn miralax this morning. Pt continues to endorse SI/SIB thoughts and urges, SIO renewed for pt safety. She requested prn hydroxyzine for anxiety at 1245. Will continue to monitor.

## 2021-06-19 NOTE — PLAN OF CARE
Patient in room and started to bang her head. Was redirected and given zyprexa 10 mg and patient stopped behavior. Asked for her own clothes back and declined request at this time due to risk of SIB.  Continues to have SI+ with plan to electrocute herself or overdose on medications. Unable to contract for safety. Rated depression '10'/10, anxiety '4'/10 and pain '3'/10 headache. Declined pain analgesic. Did not attend evening group.  Drank 3 juices, had 8 cierra crackers, water and a cheese sandwich. Took a shower.  Continues on SIO:1  Cheeking, SIB and suicide precaution

## 2021-06-19 NOTE — PROGRESS NOTES
Pt ate 2 hardboiled egg whites and 1 fresh fruit cup for breakfast. For lunch, pt ate 1 fresh fruit cup, the lettuce and tomato slice from the garnish for her hamburger and baby carrots with ranch dressing.

## 2021-06-20 PROCEDURE — 124N000002 HC R&B MH UMMC

## 2021-06-20 PROCEDURE — 250N000013 HC RX MED GY IP 250 OP 250 PS 637: Performed by: NURSE PRACTITIONER

## 2021-06-20 PROCEDURE — 250N000013 HC RX MED GY IP 250 OP 250 PS 637: Performed by: PSYCHIATRY & NEUROLOGY

## 2021-06-20 RX ADMIN — OLANZAPINE 10 MG: 5 TABLET, FILM COATED ORAL at 22:39

## 2021-06-20 RX ADMIN — FLUOXETINE HYDROCHLORIDE 40 MG: 20 CAPSULE ORAL at 08:27

## 2021-06-20 RX ADMIN — THIAMINE HCL TAB 100 MG 100 MG: 100 TAB at 08:27

## 2021-06-20 RX ADMIN — LAMOTRIGINE 150 MG: 150 TABLET ORAL at 08:27

## 2021-06-20 RX ADMIN — LURASIDONE HYDROCHLORIDE 80 MG: 20 TABLET, FILM COATED ORAL at 21:01

## 2021-06-20 RX ADMIN — OLANZAPINE 10 MG: 5 TABLET, FILM COATED ORAL at 16:59

## 2021-06-20 RX ADMIN — Medication 1000 UNITS: at 08:27

## 2021-06-20 RX ADMIN — LAMOTRIGINE 150 MG: 150 TABLET ORAL at 20:46

## 2021-06-20 RX ADMIN — Medication 1 TABLET: at 08:27

## 2021-06-20 ASSESSMENT — ACTIVITIES OF DAILY LIVING (ADL)
HYGIENE/GROOMING: INDEPENDENT
ORAL_HYGIENE: INDEPENDENT
LAUNDRY: WITH SUPERVISION
ORAL_HYGIENE: INDEPENDENT
HYGIENE/GROOMING: INDEPENDENT
DRESS: INDEPENDENT
DRESS: INDEPENDENT

## 2021-06-20 NOTE — PLAN OF CARE
Pt was visible in milieu, social with select peers, spent time playing scrabble with some other peers. Affect was blunted, flat but brightens upon approach. Mood was depressed, anxious. Pt continues to endorse SI/SIB, remains on SIO for safety but hasn't engaged in any behaviors at all this shift. Pt's appetite and intake are poor. Will continue to monitor.

## 2021-06-20 NOTE — PLAN OF CARE
"  Problem: Adult Inpatient Plan of Care  Goal: Optimal Comfort and Wellbeing  6/20/2021 1705 by Ruby Mccarthy, RN  Outcome: No Change  6/20/2021 1705 by Ruby Mccarthy, RN  Outcome: No Change      Denied pain. Continues to have SI with plan to electrocute herself or overdose on medications and does not contract for safety. Said her anxiety and depression are high. Started having urges to head bang and was pacing the hallway. Patient redirected from behavior and utilized music as coping skill instead. Given frozen juice and prn zyprexa 10 mg. Said she is upset that this feeling came back and that she is struggling with urges again.   C/o feeling dizzy and said this happens when she does not eat enough.  No significant orthostatic drop noted. Given instructions to get up slowly and ask for help if needed and offered juice.  Patient identified being distracted and keeping busy as helpful way for her to deal with stress.   Ate most all her chicken tenders, fruit cup, apple juice, and small amount of green beans at dinner.  Snack time had a bowl of radha chips, salsa and two juices. Refused hs naltrexone said, \"I am not taking it I have tried it and it does not work.\"  Later in evening started lightly head banging. Given music and redirected behavior. Then again started head banging and given prn zyprexa. Patient sleeping soon after.    Continues on SIO:1, suicide, SIB and cheeking precautions.  Started on fall precautions.  "

## 2021-06-21 LAB
ALBUMIN SERPL-MCNC: 4 G/DL (ref 3.4–5)
ALP SERPL-CCNC: 52 U/L (ref 40–150)
ALT SERPL W P-5'-P-CCNC: 19 U/L (ref 0–50)
ANION GAP SERPL CALCULATED.3IONS-SCNC: 7 MMOL/L (ref 3–14)
AST SERPL W P-5'-P-CCNC: 19 U/L (ref 0–45)
BILIRUB SERPL-MCNC: 0.3 MG/DL (ref 0.2–1.3)
BUN SERPL-MCNC: 7 MG/DL (ref 7–30)
CALCIUM SERPL-MCNC: 9 MG/DL (ref 8.5–10.1)
CHLORIDE SERPL-SCNC: 108 MMOL/L (ref 94–109)
CO2 SERPL-SCNC: 25 MMOL/L (ref 20–32)
CREAT SERPL-MCNC: 0.83 MG/DL (ref 0.52–1.04)
GFR SERPL CREATININE-BSD FRML MDRD: >90 ML/MIN/{1.73_M2}
GLUCOSE SERPL-MCNC: 108 MG/DL (ref 70–99)
MAGNESIUM SERPL-MCNC: 2.1 MG/DL (ref 1.6–2.3)
PHOSPHATE SERPL-MCNC: 2.9 MG/DL (ref 2.5–4.5)
POTASSIUM SERPL-SCNC: 4 MMOL/L (ref 3.4–5.3)
PROT SERPL-MCNC: 7.4 G/DL (ref 6.8–8.8)
SODIUM SERPL-SCNC: 140 MMOL/L (ref 133–144)

## 2021-06-21 PROCEDURE — 124N000002 HC R&B MH UMMC

## 2021-06-21 PROCEDURE — G0177 OPPS/PHP; TRAIN & EDUC SERV: HCPCS

## 2021-06-21 PROCEDURE — 84100 ASSAY OF PHOSPHORUS: CPT | Performed by: NURSE PRACTITIONER

## 2021-06-21 PROCEDURE — 250N000013 HC RX MED GY IP 250 OP 250 PS 637: Performed by: PSYCHIATRY & NEUROLOGY

## 2021-06-21 PROCEDURE — 36415 COLL VENOUS BLD VENIPUNCTURE: CPT | Performed by: NURSE PRACTITIONER

## 2021-06-21 PROCEDURE — 250N000013 HC RX MED GY IP 250 OP 250 PS 637: Performed by: NURSE PRACTITIONER

## 2021-06-21 PROCEDURE — 250N000011 HC RX IP 250 OP 636: Performed by: NURSE PRACTITIONER

## 2021-06-21 PROCEDURE — 99232 SBSQ HOSP IP/OBS MODERATE 35: CPT | Performed by: NURSE PRACTITIONER

## 2021-06-21 PROCEDURE — 83735 ASSAY OF MAGNESIUM: CPT | Performed by: NURSE PRACTITIONER

## 2021-06-21 PROCEDURE — 80053 COMPREHEN METABOLIC PANEL: CPT | Performed by: NURSE PRACTITIONER

## 2021-06-21 RX ADMIN — THIAMINE HCL TAB 100 MG 100 MG: 100 TAB at 08:04

## 2021-06-21 RX ADMIN — LAMOTRIGINE 150 MG: 150 TABLET ORAL at 08:05

## 2021-06-21 RX ADMIN — MELATONIN TAB 3 MG 3 MG: 3 TAB at 02:46

## 2021-06-21 RX ADMIN — OLANZAPINE 10 MG: 10 INJECTION, POWDER, LYOPHILIZED, FOR SOLUTION INTRAMUSCULAR at 13:57

## 2021-06-21 RX ADMIN — Medication 1 TABLET: at 08:03

## 2021-06-21 RX ADMIN — LURASIDONE HYDROCHLORIDE 80 MG: 20 TABLET, FILM COATED ORAL at 20:06

## 2021-06-21 RX ADMIN — Medication 1000 UNITS: at 08:04

## 2021-06-21 RX ADMIN — LAMOTRIGINE 150 MG: 150 TABLET ORAL at 20:07

## 2021-06-21 RX ADMIN — FLUOXETINE HYDROCHLORIDE 40 MG: 20 CAPSULE ORAL at 08:05

## 2021-06-21 ASSESSMENT — ACTIVITIES OF DAILY LIVING (ADL)
HYGIENE/GROOMING: INDEPENDENT
ORAL_HYGIENE: INDEPENDENT
DRESS: INDEPENDENT
LAUNDRY: WITH SUPERVISION

## 2021-06-21 NOTE — PROGRESS NOTES
Patient ate 2 boiled eggs with only eating the egg white part.and one juice for breakfast. Patient did not eat anything for lunch.

## 2021-06-21 NOTE — PROGRESS NOTES
"At approximately 1320 Pt was walking back to her room from the dining room. Upon passing the nursing station, Pt quickly leaned over the desk and swiped one of the pliable (bendy?) pens that was laying on the desk. This action was observed by SIO staff that was walking right behind patient. Patient ran into her room and sat down on her bed and proceeded to write in one of her books with said pen. SIO staff followed behind her and prompted her to hand over pen. Pt refused several times, stating that she feels her doctor is \"punishing\" her. Her assigned nurse entered the room and prompted her again to hand over the pen. Pt continued to refuse. Nurse and SIO staff were able to safely remove pen without going hands on per [facility] policy.   "

## 2021-06-21 NOTE — PROGRESS NOTES
Lida   attended 1 of 3 OT groups today. She participated in a Health and Wellbeing discussion group this a.m. on self-care practices to support whole wellbeing. Pt.participated actively in a group discussion. Agreeable and appropriate while in group.      06/21/21 1400   Occupational Therapy   Type of Intervention structured groups   Response Initiates, socially acceptable   Hours 1

## 2021-06-21 NOTE — PLAN OF CARE
"Patient denied having urges to hurt herself this morning. \"Not right now.\" Patient went to a morning group that was approved. Patient is to eat in the lounge and continues on a finger food only.   After speaking with her provider, patient had her hear down on the table in the dining area. She did not lift her head to speak with this writer. Patient said, \"I'm mad. I don't get to go home. I feel trapped.\" Patient said, \"At [facility] they discharge you when you get worse.\" Patient was encouraged to show that she can keep herself safe. Patient instead focused on not being able to have a pen and go to OT groups where there are sharp objects. This afternoon this writer went into patient's room and she had a patient pen and writer took it immediately away from her, per provider order she cannot have a pen. Patient was asked why she took one out of the basket by desk, \"Because I can have a fucking pen.\" Patient then was noted a few minutes later to start head banging. Writer tried to give Olanzapine PO but patient refused to take her head out of her sweatshirt. \"It's caused a trauma response.\" Patient then, with the help of multiple staff, was given IM Olanzapine 10 MG.  Patient has not head banged since. She is pacing in the hallway.  Patient remains on SIO. Will continue to monitor and offer support.    "

## 2021-06-21 NOTE — PROGRESS NOTES
"[facility],  Psychiatric Progress Note      Impression:     Corinne \"Cori\" Melinda is a 25-year-old female admitted to [facility] on 6/8/2021.  She was admitted on a 72-hour hold through the ER following an overdose on 3000 mg of Neurontin as a suicide attempt.  She had been restricting her food intake and scratching herself.  Since admission, she tapered off Cymbalta.  Prozac was initiated.  Lamictal was continued.  Latuda was continued.  A petition for commitment MI was initiated in [facility] but was not supported, and she subsequently signed in voluntarily.  On 6/9 she superficially cut her right wrist and right antecubital area.  On 6/12 she opened old wounds on her ankles.  SIO was initiated on 6/13 after she informed staff that she tried to insert plasticware and pens into the electrical outlets in her room.  She superficially cut herself with a straw in her bathroom on 6/16.  On 6/17 she engaged in light head banging and was not redirectable, so IM Zyprexa was administered.  She has been head banging daily since then and occasionally receiving PO or IM Zyprexa.  She continues to endorse suicidal ideation and urges to engage in self-injury.  She has been eating minimally.           Diagnoses:     Major depressive disorder, severe, recurrent, without psychosis  Anorexia nervosa  Generalized anxiety disorder  OCD  Panic disorder  Borderline personality disorder         Plan:      Medications:  Continue Prozac 40 mg daily.  Continue Lamictal 150 mg BID.  Continue Latuda 80 mg at HS; provided education it does not work appropriately without consuming at least 350 calories with it.  Continue Naltrexone 50 mg at HS.  Continue PRNs of Melatonin, Hydroxyzine and Zyprexa.     Continue status individual observation.  No groups with potentially harmful objects such as scissors, pens, etc until provider reassesses safety.      Monitor and record PO intake. " "    Petition for commitment MI in [facility] was not supported.  She signed in voluntarily.  She has 3 therapists and reports having a therapy appointment on 6/29 with the therapist who was on a leave of absence.  She is enrolled in DBT.  She has psychiatry and therapy through [place].  She is considering treatment at [facility] in [place].  On 6/18 provider completed disability paperwork recommending reassessment of work status in 3 weeks.        Attestation:   Patient has been seen and evaluated by me, KITTY Vicente CNP  The patient was counseled on nature of illness and treatment plan/options  Care was coordinated with treatment team, patient's mother Kay ([phone])      Clinical Global Impressions  First:  Considering your total clinical experience with this particular patient population, how severe are the patient's symptoms at this time?: 7 (06/14/21 0804)  Compared to the patient's condition at the START of treatment, this patient's condition is: 4 (06/14/21 0804)  Most recent:  Considering your total clinical experience with this particular patient population, how severe are the patient's symptoms at this time?: 7 (06/14/21 0804)  Compared to the patient's condition at the START of treatment, this patient's condition is: 4 (06/14/21 0804)            Interim History:     The patient's care was discussed with the treatment team and chart notes were reviewed.  Pt was documented as sleeping 5, 6.75 and 7 hours during the weekend overnight shifts.  She remains in status individual observation.  Staff report she has been pacing.  On Friday evening she engaged in head banging behavior and received IM Zyprexa.  On 6/19 and 6/20 evenings she engaged in head banging behavior and received PO Zyprexa.  She refused Naltrexone last night, stating it is ineffective.  Discussed that it is provider's recommendation that she take it, but ultimately her choice.  She said her mood is \"up and down,\" sometimes " "\"fine\" and sometimes \"awful and dissociated.\"  She said that \"little things happen\" that trigger emotional dysregulation.  She reports suicidal ideation with a plan to overdose.  She reports ongoing urges to engage in self-injury with objects she finds in the environment.  Yet, in spite of her inability to contract for safety, she continues to request discontinuation of SIO and discharge.  Discussed criteria for these.  States she has been experiencing dry mouth, otherwise tolerating meds well.  She is eating at least 25% of all meals, sometimes more, and labs are normal today.  Provider called her mother Kay to provide an update.           Medications:     Current Facility-Administered Medications   Medication     acetaminophen (TYLENOL) tablet 650 mg     alum & mag hydroxide-simethicone (MAALOX) suspension 30 mL     FLUoxetine (PROzac) capsule 40 mg     hydrOXYzine (ATARAX) tablet 25 mg     lamoTRIgine (LaMICtal) tablet 150 mg     lurasidone (LATUDA) tablet 80 mg     melatonin tablet 3 mg     multivitamin w/minerals (THERA-VIT-M) tablet 1 tablet     naltrexone (DEPADE/REVIA) tablet 50 mg     OLANZapine (zyPREXA) tablet 5-10 mg    Or     OLANZapine (zyPREXA) injection 10 mg     polyethylene glycol (MIRALAX) Packet 17 g     thiamine (B-1) tablet 100 mg     Vitamin D3 (CHOLECALCIFEROL) tablet 1,000 Units             Allergies:     Allergies   Allergen Reactions     Morphine Other (See Comments)     Twitching     Amoxicillin Rash     Sulfa Drugs Rash            Psychiatric Examination:     /85 (BP Location: Left arm)   Pulse 105   Temp 98.2  F (36.8  C) (Oral)   Resp 17   Ht 1.6 m (5' 3\")   Wt 53.5 kg (117 lb 14.4 oz)   LMP 06/08/2021   SpO2 97%   BMI 20.89 kg/m      Appearance:  awake, alert, adequately groomed and dressed in hospital scrubs  Attitude:  cooperative  Eye Contact:  good  Mood:  \"up and down\"  Affect:  mildly blunted  Speech:  clear, coherent  Psychomotor Behavior:  no evidence of " tardive dyskinesia, dystonia, or tics  Thought Process:  linear and goal oriented  Associations:  no loose associations  Thought Content:  no evidence of psychotic thought, reports suicidal ideation with a plan to overdose, reports urges to engage in self-injury   Insight:  partial  Judgment:  limited  Oriented to:  date, time, person, and place  Attention Span and Concentration:  reports mild impairment  Recent and Remote Memory:  reports mild short term impairment  Language:  intact, fluent English  Fund of Knowledge:  appropriate  Muscle Strength and Tone : normal  Gait and Station:  normal         Labs:     Recent Results (from the past 24 hour(s))   Comprehensive metabolic panel    Collection Time: 06/21/21  8:57 AM   Result Value Ref Range    Sodium 140 133 - 144 mmol/L    Potassium 4.0 3.4 - 5.3 mmol/L    Chloride 108 94 - 109 mmol/L    Carbon Dioxide 25 20 - 32 mmol/L    Anion Gap 7 3 - 14 mmol/L    Glucose 108 (H) 70 - 99 mg/dL    Urea Nitrogen 7 7 - 30 mg/dL    Creatinine 0.83 0.52 - 1.04 mg/dL    GFR Estimate >90 >60 mL/min/[1.73_m2]    GFR Estimate If Black >90 >60 mL/min/[1.73_m2]    Calcium 9.0 8.5 - 10.1 mg/dL    Bilirubin Total 0.3 0.2 - 1.3 mg/dL    Albumin 4.0 3.4 - 5.0 g/dL    Protein Total 7.4 6.8 - 8.8 g/dL    Alkaline Phosphatase 52 40 - 150 U/L    ALT 19 0 - 50 U/L    AST 19 0 - 45 U/L   Magnesium    Collection Time: 06/21/21  8:57 AM   Result Value Ref Range    Magnesium 2.1 1.6 - 2.3 mg/dL   Phosphorus    Collection Time: 06/21/21  8:57 AM   Result Value Ref Range    Phosphorus 2.9 2.5 - 4.5 mg/dL

## 2021-06-21 NOTE — PLAN OF CARE
Assessment/Intervention/Current Symptoms and Care Coordination:   VM from pt's therapist at [facility], Cit[place]aly Mae, [phone]. Would like the NED faxed over to [phone]. Hoping to discuss aftercare once they receive the NED.   [facility] faxed the NED    Discharge Plan or Goal:  Discharge to current services    Barriers to Discharge:  Pt continues to threaten self-harm, engage in head banging    Referral Status:   No referrals, pt has current services    Legal Status:   Voluntary

## 2021-06-22 PROCEDURE — 250N000013 HC RX MED GY IP 250 OP 250 PS 637: Performed by: PSYCHIATRY & NEUROLOGY

## 2021-06-22 PROCEDURE — 90853 GROUP PSYCHOTHERAPY: CPT

## 2021-06-22 PROCEDURE — 99233 SBSQ HOSP IP/OBS HIGH 50: CPT | Performed by: NURSE PRACTITIONER

## 2021-06-22 PROCEDURE — G0177 OPPS/PHP; TRAIN & EDUC SERV: HCPCS

## 2021-06-22 PROCEDURE — 124N000002 HC R&B MH UMMC

## 2021-06-22 PROCEDURE — 250N000013 HC RX MED GY IP 250 OP 250 PS 637: Performed by: NURSE PRACTITIONER

## 2021-06-22 RX ORDER — SALIVA STIMULANT COMB. NO.3
2 SPRAY, NON-AEROSOL (ML) MUCOUS MEMBRANE 4 TIMES DAILY PRN
Status: DISCONTINUED | OUTPATIENT
Start: 2021-06-22 | End: 2021-06-28 | Stop reason: HOSPADM

## 2021-06-22 RX ADMIN — Medication 1000 UNITS: at 09:20

## 2021-06-22 RX ADMIN — FLUOXETINE HYDROCHLORIDE 40 MG: 20 CAPSULE ORAL at 12:31

## 2021-06-22 RX ADMIN — LAMOTRIGINE 150 MG: 150 TABLET ORAL at 22:30

## 2021-06-22 RX ADMIN — THIAMINE HCL TAB 100 MG 100 MG: 100 TAB at 09:19

## 2021-06-22 RX ADMIN — OLANZAPINE 10 MG: 5 TABLET, FILM COATED ORAL at 09:19

## 2021-06-22 RX ADMIN — MELATONIN TAB 3 MG 3 MG: 3 TAB at 22:31

## 2021-06-22 RX ADMIN — OLANZAPINE 10 MG: 5 TABLET, FILM COATED ORAL at 18:41

## 2021-06-22 RX ADMIN — LURASIDONE HYDROCHLORIDE 80 MG: 20 TABLET, FILM COATED ORAL at 22:31

## 2021-06-22 RX ADMIN — HYDROXYZINE HYDROCHLORIDE 25 MG: 25 TABLET, FILM COATED ORAL at 00:58

## 2021-06-22 RX ADMIN — MELATONIN TAB 3 MG 3 MG: 3 TAB at 00:58

## 2021-06-22 RX ADMIN — POLYETHYLENE GLYCOL 3350 17 G: 17 POWDER, FOR SOLUTION ORAL at 18:41

## 2021-06-22 RX ADMIN — Medication 1 TABLET: at 09:20

## 2021-06-22 RX ADMIN — LAMOTRIGINE 150 MG: 150 TABLET ORAL at 09:19

## 2021-06-22 ASSESSMENT — ACTIVITIES OF DAILY LIVING (ADL)
HYGIENE/GROOMING: INDEPENDENT
LAUNDRY: WITH SUPERVISION
ORAL_HYGIENE: INDEPENDENT
DRESS: SCRUBS (BEHAVIORAL HEALTH)

## 2021-06-22 ASSESSMENT — MIFFLIN-ST. JEOR: SCORE: 1247.09

## 2021-06-22 NOTE — PLAN OF CARE
"Patient was having thoughts to self-harm (\"cut myself\", \"bang my head\") this morning after seeing her weight. Order for blind weights was obtained  from the provider. Patient was willing to take Olanzapine 10 prn. Patient refused Prozac because she thinks it is causing her to gain weight. Patient tried to grab a bunch of stir sticks in the lounge and they were promptly removed. Patient has not actually tried to bang her head or managed to cut herself. She continues on a 5 foot SIO with specific instructions that staff are to follow. Patient ate the egg whites only of 2 hard-boiled eggs with a couple sips Vitality water.  Will continue monitor.  Blood pressure 123/82, pulse 63, temperature 97.2  F (36.2  C), temperature source Temporal, resp. rate 17, height 1.6 m (5' 3\"), weight 54.3 kg (119 lb 11.2 oz), last menstrual period 06/08/2021, SpO2 100 %, not currently breastfeeding.    "

## 2021-06-22 NOTE — PLAN OF CARE
Patient slept approximately 6.25 hours during the overnight shift; appeared comfortable with even and unlabored breathing when asleep. Currently awake, resting in bed. SIO in place according to order. Nursing will continue to monitor.

## 2021-06-22 NOTE — PLAN OF CARE
"  Problem: Suicidal Behavior  Goal: Suicidal Behavior is Absent or Managed  Outcome: No Change    Pt cont to be on SIO 1:1 for SI/SIB with no related event occurring this evening. Pt had a flat affect but smiled here and there during the assessment. Pt attended a  educational group. She denied SI saying \"Not right now\". No SIB noted .Pt also denied hallucination,sleep, meds SE and constipation .She endorsed anxiety,depression, and headache but declined medication. She said that her appetite was so,so. Pt described her mood as numb but she is looking forward to her  visit this evening. Pt took her HS meds but refused the Naltrexone saying that she does not need it.  "

## 2021-06-22 NOTE — PROGRESS NOTES
"[facility],  Psychiatric Progress Note      Impression:     Corinne \"Cori\" Melinda is a 25-year-old female admitted to [facility] on 6/8/2021.  She was admitted on a 72-hour hold through the ER following an overdose on 3000 mg of Neurontin as a suicide attempt.  She had been restricting her food intake and scratching herself.  Since admission, she tapered off Cymbalta.  Prozac was initiated.  Lamictal was continued.  Latuda was continued.  A petition for commitment MI was initiated in [facility] but was not supported, and she subsequently signed in voluntarily.  On 6/9 she superficially cut her right wrist and right antecubital area.  On 6/12 she opened old wounds on her ankles.  SIO was initiated on 6/13 after she informed staff that she tried to insert plasticware and pens into the electrical outlets in her room.  She superficially cut herself with a straw in her bathroom on 6/16.  On 6/17 she engaged in light head banging and was not redirectable, so IM Zyprexa was administered.  She has been head banging daily since then and occasionally receiving PO or IM Zyprexa.  She continues to endorse suicidal ideation and urges to engage in self-injury.  She has been eating at least 500 calories per day but little more.          Diagnoses:     Major depressive disorder, severe, recurrent, without psychosis  Anorexia nervosa  Generalized anxiety disorder  OCD  Panic disorder  Borderline personality disorder         Plan:      Medications:  Continue Prozac 40 mg daily.  Continue Lamictal 150 mg BID.  Continue Latuda 80 mg at HS; provided education it does not work appropriately without consuming at least 350 calories with it.  Continue Naltrexone 50 mg at HS.  Continue PRNs of Melatonin, Hydroxyzine and Zyprexa.  Add PRN Biotene.      Continue status individual observation.  No groups with potentially harmful objects such as scissors, pens, etc until provider " reassesses safety.      Monitor and record PO intake.     Petition for commitment MI in [facility] was not supported.  She signed in voluntarily.  She has 3 therapists and reports having a therapy appointment on 6/29 with the therapist who was on a leave of absence.  She is enrolled in DBT.  She has psychiatry and therapy through [facility].  She is considering treatment at [place] in [place].  On 6/18 provider completed disability paperwork recommending reassessment of work status in 3 weeks.        Attestation:   Patient has been seen and evaluated by me, KITTY Vicente CNP  The patient was counseled on nature of illness and treatment plan/options  Care was coordinated with treatment team, left message for her       Clinical Global Impressions  First:  Considering your total clinical experience with this particular patient population, how severe are the patient's symptoms at this time?: 7 (06/14/21 0804)  Compared to the patient's condition at the START of treatment, this patient's condition is: 4 (06/14/21 0804)  Most recent:  Considering your total clinical experience with this particular patient population, how severe are the patient's symptoms at this time?: 7 (06/14/21 0804)  Compared to the patient's condition at the START of treatment, this patient's condition is: 4 (06/14/21 0804)            Interim History:     The patient's care was discussed with the treatment team and chart notes were reviewed.  Pt was documented as sleeping 6.25 hours during the overnight shift.  Yesterday she again engaged in head banging behavior and received IM Zyprexa.  She continues to refuse Naltrexone.  She refused Prozac today as she believes it has contributed to weight gain.  She agreed to take it after being provided with education regarding potential for weight gain with her various medications.  Also complains of dry mouth, so PRN Biotene is available.  She took PRNs of Melatonin and Hydroxyzine during the  "overnight shift.  Stated that she had urges to cut herself today after seeing her weight, back up to admission weight of 119# after being as low as 116#.  When asked about suicidal ideation, she responded, \"I'm fine\" but later did endorse suicidal ideation.  She says her mood is \"up and down.\"  She appeared more irritable today and states she has been feeling angry.  States that her trauma response is activated in the hospital because of her history of cleft palate surgeries, and now seeing another newly admitted patient who is very thin.  She said, \"I'm not going to eat and I don't care if one of the repercussions is to die.\"  Stated that her symptoms would be improved at home.  Reminded her that she overdosed when she was at home.  Discussed specific criteria for discontinuation of status individual observation, easing current restrictions, and discharge.  Provider left a message for her .         Medications:     Current Facility-Administered Medications   Medication     acetaminophen (TYLENOL) tablet 650 mg     alum & mag hydroxide-simethicone (MAALOX) suspension 30 mL     artificial saliva (BIOTENE MT) solution 2 spray     FLUoxetine (PROzac) capsule 40 mg     hydrOXYzine (ATARAX) tablet 25 mg     lamoTRIgine (LaMICtal) tablet 150 mg     lurasidone (LATUDA) tablet 80 mg     melatonin tablet 3 mg     multivitamin w/minerals (THERA-VIT-M) tablet 1 tablet     naltrexone (DEPADE/REVIA) tablet 50 mg     OLANZapine (zyPREXA) tablet 5-10 mg    Or     OLANZapine (zyPREXA) injection 10 mg     polyethylene glycol (MIRALAX) Packet 17 g     thiamine (B-1) tablet 100 mg     Vitamin D3 (CHOLECALCIFEROL) tablet 1,000 Units             Allergies:     Allergies   Allergen Reactions     Morphine Other (See Comments)     Twitching     Amoxicillin Rash     Sulfa Drugs Rash            Psychiatric Examination:     /82   Pulse 63   Temp 97.2  F (36.2  C) (Temporal)   Resp 17   Ht 1.6 m (5' 3\")   Wt 54.3 kg (119 lb " "11.2 oz)   LMP 06/08/2021   SpO2 100%   BMI 21.20 kg/m      Appearance:  awake, alert, adequately groomed and dressed in hospital scrubs  Attitude:  cooperative  Eye Contact:  good  Mood:  \"up and down,\" angrier today  Affect:  irritable  Speech:  clear, coherent  Psychomotor Behavior:  no evidence of tardive dyskinesia, dystonia, or tics  Thought Process:  linear and goal oriented  Associations:  no loose associations  Thought Content:  no evidence of psychotic thought, reports suicidal ideation, reports urges to engage in self-injury   Insight:  partial  Judgment:  limited  Oriented to:  date, time, person, and place  Attention Span and Concentration:  reports mild impairment  Recent and Remote Memory:  reports mild short term impairment  Language:  intact, fluent English  Fund of Knowledge:  appropriate  Muscle Strength and Tone : normal  Gait and Station:  normal         Labs:     No results found for this or any previous visit (from the past 24 hour(s)).  "

## 2021-06-22 NOTE — PLAN OF CARE
Assessment/Intervention/Current Symptoms and Care Coordination: reviewed chart and attended team discussion.  Called  back yesterday to answer his questions.  Pt remains highly symptomatic and acts out behaviorally, consistent with BPD dynamic.    Discharge Plan or Goal: stabilize and discharge back to current providers.      Barriers to Discharge: pt reports unable to be safe off of the unit.     Referral Status: pt has established providers    Legal Status: voluntary

## 2021-06-22 NOTE — PLAN OF CARE
At 0100, patient stated she was having issues sleeping; She requested and received PRN hydroxyzine and melatonin (see MAR). Mouth check completed as per patient care order.     Patient also requested and received 2 apple juices in a paper cup, which she finished 2/3 of (approximately 155 mL).     SIO in place according to order. No other issues or concerns. Will continue to monitor.

## 2021-06-22 NOTE — PLAN OF CARE
Attended 1 OT Life Skills Group x 45 minutes with 2 peers and 1:1 staff.  Participated in cognitive task requiring focus, problem solving and planning. Attentive to details and developing a strategy. Social with peers. Noted benefit of using her brain and interacting with others to distract and structure her time.

## 2021-06-22 NOTE — PLAN OF CARE
Pt attended mental health education group. Discussed social systems, boundaries, identifying where individuals where in their lives. Discussed traits of relationships and emotional intimacy in the relationship.   Pt brought up relationships with professionals, discussed how those relationships are bound by ethics, boundaries and even legal considerations and so are outside of the social system but could be a different part of a support system.

## 2021-06-23 PROCEDURE — 250N000013 HC RX MED GY IP 250 OP 250 PS 637: Performed by: NURSE PRACTITIONER

## 2021-06-23 PROCEDURE — 250N000011 HC RX IP 250 OP 636: Performed by: NURSE PRACTITIONER

## 2021-06-23 PROCEDURE — 99233 SBSQ HOSP IP/OBS HIGH 50: CPT | Performed by: NURSE PRACTITIONER

## 2021-06-23 PROCEDURE — G0177 OPPS/PHP; TRAIN & EDUC SERV: HCPCS

## 2021-06-23 PROCEDURE — 250N000013 HC RX MED GY IP 250 OP 250 PS 637: Performed by: PSYCHIATRY & NEUROLOGY

## 2021-06-23 PROCEDURE — 124N000002 HC R&B MH UMMC

## 2021-06-23 RX ADMIN — HYDROXYZINE HYDROCHLORIDE 25 MG: 25 TABLET, FILM COATED ORAL at 17:51

## 2021-06-23 RX ADMIN — FLUOXETINE HYDROCHLORIDE 40 MG: 20 CAPSULE ORAL at 08:53

## 2021-06-23 RX ADMIN — THIAMINE HCL TAB 100 MG 100 MG: 100 TAB at 08:54

## 2021-06-23 RX ADMIN — LAMOTRIGINE 150 MG: 150 TABLET ORAL at 08:54

## 2021-06-23 RX ADMIN — LAMOTRIGINE 150 MG: 150 TABLET ORAL at 21:57

## 2021-06-23 RX ADMIN — Medication 1 TABLET: at 08:53

## 2021-06-23 RX ADMIN — Medication 1000 UNITS: at 08:54

## 2021-06-23 RX ADMIN — LURASIDONE HYDROCHLORIDE 80 MG: 20 TABLET, FILM COATED ORAL at 21:56

## 2021-06-23 RX ADMIN — MELATONIN TAB 3 MG 3 MG: 3 TAB at 22:24

## 2021-06-23 NOTE — PROGRESS NOTES
Behavioral Health  Note    Behavioral Health  Spirituality Group Note    [facility]    Name: Corinne N Palm YOB: 1995   MRN: [id number] Age: 25 year old      Patient attended -led group, which included discussion of spirituality, coping with illness and building resilience.    Patient attended group for 1 hrs.    The patient actively participated in group discussion and patient demonstrated an appreciation of topic's application for their personal circumstances.      Thania Squires  Chaplain Resident  Pager: [phone]

## 2021-06-23 NOTE — PLAN OF CARE
"Patient endorses chronic SIB urges and SI thoughts. Patient presented crumpled tin foil (juice top), to staff, stating \"I don't want to hurt myself right now.\" Patient has been isolative to room (SIO staff present for safety). Patient reports (does not rate ) both depression and anxiety. Patient napped 1 hr. Patient did attend group.    Patient does not contract for safety.     Nursing will continue to monitor.  "

## 2021-06-23 NOTE — PLAN OF CARE
Attended 1 OT Life Skills Group focused on positive coping skills x 45 minutes with 2 peers. Attentive/focused entire group. Bright and pleasant. Able to contribute to group effort in developing a positive coping skills list. Made a personalized list for self for future reference .

## 2021-06-23 NOTE — PLAN OF CARE
Problem: Behavior Regulation Impairment (Nonsuicidal Self-Injury)  Goal: Improved Behavior Regulation and Impulse Control (Nonsuicidal Self-Injury)  Outcome: Improving   Patient states she wants to go home and has been told that to make that happen she must cease head banging and SIB - neither of which occurred this evening.  Patient had an episode of agitation this evening during which she paced rapidly in the scott while breathing heavily.  Patient declined any intervention from staff and eventually calmed on her own.  Later she denied any trigger but then brought up how gross and bloated she feels, and stated she is relieved that blind weights have been instituted.  She still has thoughts of overdosing at home but states  has her pills in a lock box and contracts for safety in the hospital.  Patient rated her depression 8 and anxiety 3 out of 10.  She endorsed poor recall but denied psychosis and medication side effects.  Patient denied physical problems when questioned but when later asked about constipation stated she hadn't had a BM in five days.  Patient was given PRN miralax as well as PRN melatonin, and declined her naltrexone.

## 2021-06-23 NOTE — PLAN OF CARE
Patient slept approximately 7.25 hours during the overnight shift; appeared comfortable with even and unlabored breathing while asleep. No issues or concerns reported or observed. SIO in place according to order. Nursing will continue to monitor.

## 2021-06-23 NOTE — PROGRESS NOTES
"[facility],  Psychiatric Progress Note      Impression:     Corinne \"Cori\" Melinda is a 25-year-old female admitted to [facility] on 6/8/2021.  She was admitted on a 72-hour hold through the ER following an overdose on 3000 mg of Neurontin as a suicide attempt.  She had been restricting her food intake and scratching herself.  Since admission, she tapered off Cymbalta.  Prozac was initiated.  Lamictal was continued.  Latuda was continued.  A petition for commitment MI was initiated in [facility] but was not supported, and she subsequently signed in voluntarily.  On 6/9 she superficially cut her right wrist and right antecubital area.  On 6/12 she opened old wounds on her ankles.  SIO was initiated on 6/13 after she informed staff that she tried to insert plasticware and pens into the electrical outlets in her room.  She superficially cut herself with a straw in her bathroom on 6/16.  On 6/17 she engaged in light head banging and was not redirectable, so IM Zyprexa was administered.  She was head banging daily until 6/22.  She has been receiving PO/IM Zyprexa when she engages in this.  She continues to endorse suicidal ideation and urges to engage in self-injury.  She has been eating at least 500 calories per day but little more.          Diagnoses:     Major depressive disorder, severe, recurrent, without psychosis  Anorexia nervosa  Generalized anxiety disorder  OCD  Panic disorder  Borderline personality disorder         Plan:      Medications:  Continue Prozac 40 mg daily.  Continue Lamictal 150 mg BID.  Continue Latuda 80 mg at HS; provided education it does not work appropriately without consuming at least 350 calories with it.  Continue Naltrexone 50 mg at HS.  Continue PRNs of Melatonin, Hydroxyzine and Zyprexa.     Continue status individual observation.  No groups with potentially harmful objects such as scissors, pens, etc until provider " "reassesses safety.      Monitor and record PO intake.     Petition for commitment MI in [facility] was not supported.  She signed in voluntarily.  She has 3 therapists and reports having a therapy appointment on 6/29 with the therapist who was on a leave of absence.  She is enrolled in DBT.  She has psychiatry and therapy through [facility].  She is considering treatment at [place] in [place].  On 6/18 provider completed disability paperwork recommending reassessment of work status in 3 weeks.        Attestation:   Patient has been seen and evaluated by me, KITTY Vicente CNP  The patient was counseled on nature of illness and treatment plan/options  Care was coordinated with treatment team, 15-minute phone conversation with her  Robin      Clinical Global Impressions  First:  Considering your total clinical experience with this particular patient population, how severe are the patient's symptoms at this time?: 7 (06/14/21 0804)  Compared to the patient's condition at the START of treatment, this patient's condition is: 4 (06/14/21 0804)  Most recent:  Considering your total clinical experience with this particular patient population, how severe are the patient's symptoms at this time?: 7 (06/14/21 0804)  Compared to the patient's condition at the START of treatment, this patient's condition is: 4 (06/14/21 0804)            Interim History:     The patient's care was discussed with the treatment team and chart notes were reviewed.  Pt was documented as sleeping 7.25 hours during the overnight shift.  She remains on status individual observation.  Yesterday she received PRN Zyprexa x 2 and PRN Miralax x 1.  Staff noted that she tried to grab straw/stir sticks yesterday but was redirected.  She did not engage in any self-injurious behaviors yesterday.  States that she was better able to use coping skills yesterday.  She reports her mood is \"fine\" but mildly depressed about 90% of the time, and 10% of the " "time she feels \"like nothing is real, nothing matters, and I can do anything, self-destructive.\"  She reports suicidal ideation with a plan to overdose.  \"If I leave here that's what I want to do; that's what I'm going to do.\"  She reports \"high\" urges to engage in self-injury.  She says she is \"obsessed with my weight, freaking out.\"  She continues to eat about 500 calories daily.  She talked to her  and mother last night.  States that her  said that if she tries to leave the hospital at this point he would support commitment.  Provider spoke with her  and provided an update regarding her progress and plan of care.           Medications:     Current Facility-Administered Medications   Medication     acetaminophen (TYLENOL) tablet 650 mg     alum & mag hydroxide-simethicone (MAALOX) suspension 30 mL     artificial saliva (BIOTENE MT) solution 2 spray     FLUoxetine (PROzac) capsule 40 mg     hydrOXYzine (ATARAX) tablet 25 mg     lamoTRIgine (LaMICtal) tablet 150 mg     lurasidone (LATUDA) tablet 80 mg     melatonin tablet 3 mg     multivitamin w/minerals (THERA-VIT-M) tablet 1 tablet     naltrexone (DEPADE/REVIA) tablet 50 mg     OLANZapine (zyPREXA) tablet 5-10 mg    Or     OLANZapine (zyPREXA) injection 10 mg     polyethylene glycol (MIRALAX) Packet 17 g     thiamine (B-1) tablet 100 mg     Vitamin D3 (CHOLECALCIFEROL) tablet 1,000 Units             Allergies:     Allergies   Allergen Reactions     Morphine Other (See Comments)     Twitching     Amoxicillin Rash     Sulfa Drugs Rash            Psychiatric Examination:     /82   Pulse 63   Temp 98.1  F (36.7  C)   Resp 16   Ht 1.6 m (5' 3\")   Wt 54.3 kg (119 lb 11.2 oz)   LMP 06/08/2021   SpO2 100%   BMI 21.20 kg/m      Appearance:  awake, alert, adequately groomed and dressed in hospital scrubs  Attitude:  cooperative  Eye Contact:  good  Mood:  \"fine\" but mildly depressed most of the time, other times very dysregulated  Affect:  " mildly blunted  Speech:  clear, coherent  Psychomotor Behavior:  no evidence of tardive dyskinesia, dystonia, or tics  Thought Process:  linear and goal oriented  Associations:  no loose associations  Thought Content:  no evidence of psychotic thought, reports suicidal ideation with a plan to overdose, reports urges to engage in self-injury   Insight:  partial  Judgment:  limited  Oriented to:  date, time, person, and place  Attention Span and Concentration:  reports mild impairment  Recent and Remote Memory:  reports mild short term impairment  Language:  intact, fluent English  Fund of Knowledge:  appropriate  Muscle Strength and Tone : normal  Gait and Station:  normal         Labs:     No results found for this or any previous visit (from the past 24 hour(s)).

## 2021-06-23 NOTE — PROGRESS NOTES
"Patient ate egg whites and banana for breakfast and ate no lunch. Patient fluid water and juice 1000 ml this shift . Patient complains of \"weak and sometimes dizzy\" (food and fluids encouraged)-NP updates.    Nursing will continue to monitor.  "

## 2021-06-24 PROCEDURE — 250N000013 HC RX MED GY IP 250 OP 250 PS 637: Performed by: PSYCHIATRY & NEUROLOGY

## 2021-06-24 PROCEDURE — 250N000013 HC RX MED GY IP 250 OP 250 PS 637: Performed by: NURSE PRACTITIONER

## 2021-06-24 PROCEDURE — 99233 SBSQ HOSP IP/OBS HIGH 50: CPT | Performed by: NURSE PRACTITIONER

## 2021-06-24 PROCEDURE — 124N000002 HC R&B MH UMMC

## 2021-06-24 PROCEDURE — H2032 ACTIVITY THERAPY, PER 15 MIN: HCPCS

## 2021-06-24 PROCEDURE — G0177 OPPS/PHP; TRAIN & EDUC SERV: HCPCS

## 2021-06-24 RX ORDER — HALOPERIDOL 5 MG/ML
5 INJECTION INTRAMUSCULAR EVERY 4 HOURS PRN
Status: DISCONTINUED | OUTPATIENT
Start: 2021-06-24 | End: 2021-06-28 | Stop reason: HOSPADM

## 2021-06-24 RX ORDER — HALOPERIDOL 5 MG/1
5 TABLET ORAL EVERY 4 HOURS PRN
Status: DISCONTINUED | OUTPATIENT
Start: 2021-06-24 | End: 2021-06-28 | Stop reason: HOSPADM

## 2021-06-24 RX ORDER — DIPHENHYDRAMINE HCL 50 MG
50 CAPSULE ORAL EVERY 4 HOURS PRN
Status: DISCONTINUED | OUTPATIENT
Start: 2021-06-24 | End: 2021-06-28 | Stop reason: HOSPADM

## 2021-06-24 RX ADMIN — LAMOTRIGINE 150 MG: 150 TABLET ORAL at 08:23

## 2021-06-24 RX ADMIN — LAMOTRIGINE 150 MG: 150 TABLET ORAL at 22:27

## 2021-06-24 RX ADMIN — LURASIDONE HYDROCHLORIDE 80 MG: 20 TABLET, FILM COATED ORAL at 22:27

## 2021-06-24 RX ADMIN — MELATONIN TAB 3 MG 3 MG: 3 TAB at 22:27

## 2021-06-24 RX ADMIN — THIAMINE HCL TAB 100 MG 100 MG: 100 TAB at 08:24

## 2021-06-24 RX ADMIN — Medication 1000 UNITS: at 08:24

## 2021-06-24 RX ADMIN — Medication 1 TABLET: at 08:23

## 2021-06-24 RX ADMIN — HALOPERIDOL 5 MG: 5 TABLET ORAL at 13:11

## 2021-06-24 RX ADMIN — FLUOXETINE HYDROCHLORIDE 40 MG: 20 CAPSULE ORAL at 08:23

## 2021-06-24 RX ADMIN — HYDROXYZINE HYDROCHLORIDE 25 MG: 25 TABLET, FILM COATED ORAL at 13:11

## 2021-06-24 ASSESSMENT — ACTIVITIES OF DAILY LIVING (ADL)
DRESS: INDEPENDENT
ORAL_HYGIENE: INDEPENDENT
HYGIENE/GROOMING: INDEPENDENT

## 2021-06-24 NOTE — PROGRESS NOTES
"[facility],  Psychiatric Progress Note      Impression:     Corinne \"Cori\" Melinda is a 25-year-old female admitted to [facility] on 6/8/2021.  She was admitted on a 72-hour hold through the ER following an overdose on 3000 mg of Neurontin as a suicide attempt.  She had been restricting her food intake and scratching herself.  Since admission, she tapered off Cymbalta.  Prozac was initiated.  Lamictal was continued.  Latuda was continued.  Naltrexone was continued but she has been refusing it.  PRN Hydroxyzine was initiated.  PRN Zyprexa was initiated but then replaced with PRN Haldol as she would not take PRN Zyprexa due to potential for weight gain.  A petition for commitment MI was initiated in [facility] but was not supported, and she subsequently signed in voluntarily.  On 6/9 she superficially cut her right wrist and right antecubital area.  On 6/12 she opened old wounds on her ankles.  SIO was initiated on 6/13 after she informed staff that she tried to insert plasticware and pens into the electrical outlets in her room.  She superficially cut herself with a straw in her bathroom on 6/16.  On 6/17 she engaged in light head banging and was not redirectable, so IM Zyprexa was administered.  She has engaged in head banging all but one day since.  She has been receiving PO/IM Zyprexa and later Haldol when she engages in this.  She continues to endorse suicidal ideation and urges to engage in self-injury.  She has been eating at least 500 calories per day but little more.          Diagnoses:     Major depressive disorder, severe, recurrent, without psychosis  Anorexia nervosa  Generalized anxiety disorder  OCD  Panic disorder  Borderline personality disorder         Plan:      Medications:  Continue Prozac 40 mg daily.  Continue Lamictal 150 mg BID.  Continue Latuda 80 mg at HS; provided education it does not work appropriately without consuming at " least 350 calories with it.  Continue to offer Naltrexone 50 mg at HS.  Continue PRNs of Melatonin and Hydroxyzine.  Discontinue PRN Zyprexa and begin PRN Haldol.    Continue status individual observation.  No groups with potentially harmful objects such as scissors, pens, etc until provider reassesses safety.      Monitor and record PO intake.     Petition for commitment MI in [facility] was not supported.  She signed in voluntarily.  She has 3 therapists and reports having a therapy appointment on 6/29 with the therapist who was on a leave of absence.  She is enrolled in DBT.  She has psychiatry and therapy through [facility].   On 6/18 provider completed disability paperwork recommending reassessment of work status in 3 weeks.      Transfer care to Dr. Wing.      Attestation:   Patient has been seen and evaluated by me, KITTY Vicente CNP  The patient was counseled on nature of illness and treatment plan/options  Care was coordinated with treatment team    Clinical Global Impressions  First:  Considering your total clinical experience with this particular patient population, how severe are the patient's symptoms at this time?: 7 (06/14/21 0804)  Compared to the patient's condition at the START of treatment, this patient's condition is: 4 (06/14/21 0804)  Most recent:  Considering your total clinical experience with this particular patient population, how severe are the patient's symptoms at this time?: 7 (06/22/21 0630)  Compared to the patient's condition at the START of treatment, this patient's condition is: 5 (06/22/21 0630)          Interim History:     The patient's care was discussed with the treatment team and chart notes were reviewed.  Pt was documented as sleeping 7 hours during the overnight shift.  She remains on status individual observation.  She took PRN Melatonin x 1 and PRN Hydroxyzine x 1 yesterday.  She engaged in head banging behavior yesterday but refused PRN Zyprexa  "(historically very beneficial) because of potential for weight gain.  She agrees to try PRN Haldol instead.  She has been refusing Naltrexone as she says it is not beneficial.  She reports suicidal ideation with a plan to overdose, slightly reduced compared to admission.  She reports strong urges to engage in self-injury by cutting and head banging.  She says that she is \"really obsessed\" with her weight, eating bout 500 calories daily.  She is \"feeling trapped\" in the hospital.  States that her  told her that he doesn't feel safe with her at home due to the extent of her suicidal ideation and self-injurious behaviors.  She said she feels relatively \"normal\" 90% of the time but that she has dissociative symptoms, very dysregulated and out of control about 10% of the time.  She cannot identify triggers for this.  Pt states that using a weighted shoulder wrap and squeezing a ball have been helpful.  She will also try ice.           Medications:     Current Facility-Administered Medications   Medication     acetaminophen (TYLENOL) tablet 650 mg     alum & mag hydroxide-simethicone (MAALOX) suspension 30 mL     artificial saliva (BIOTENE MT) solution 2 spray     diphenhydrAMINE (BENADRYL) capsule 50 mg     FLUoxetine (PROzac) capsule 40 mg     haloperidol (HALDOL) tablet 5 mg    Or     haloperidol lactate (HALDOL) injection 5 mg     hydrOXYzine (ATARAX) tablet 25 mg     lamoTRIgine (LaMICtal) tablet 150 mg     lurasidone (LATUDA) tablet 80 mg     melatonin tablet 3 mg     multivitamin w/minerals (THERA-VIT-M) tablet 1 tablet     naltrexone (DEPADE/REVIA) tablet 50 mg     polyethylene glycol (MIRALAX) Packet 17 g     thiamine (B-1) tablet 100 mg     Vitamin D3 (CHOLECALCIFEROL) tablet 1,000 Units             Allergies:     Allergies   Allergen Reactions     Morphine Other (See Comments)     Twitching     Amoxicillin Rash     Sulfa Drugs Rash            Psychiatric Examination:     /74 (BP Location: Left arm) " "  Pulse 81   Temp 98.6  F (37  C) (Oral)   Resp 17   Ht 1.6 m (5' 3\")   Wt 54.3 kg (119 lb 11.2 oz)   LMP 06/08/2021   SpO2 98%   BMI 21.20 kg/m      Appearance:  awake, alert, adequately groomed and dressed in hospital scrubs  Attitude:  cooperative   Eye Contact:  good  Mood:  mildly depressed most of the time, other times very dysregulated  Affect:  mildly blunted  Speech:  clear, coherent  Psychomotor Behavior:  no evidence of tardive dyskinesia, dystonia, or tics  Thought Process:  linear and goal oriented  Associations:  no loose associations  Thought Content:  no evidence of psychotic thought, reports suicidal ideation with a plan to overdose, reports urges to engage in self-injury by cutting and head banging  Insight:  partial  Judgment:  limited  Oriented to:  date, time, person, and place  Attention Span and Concentration:  reports mild impairment  Recent and Remote Memory:  reports mild short term impairment  Language:  intact, fluent English  Fund of Knowledge:  appropriate  Muscle Strength and Tone : normal  Gait and Station:  normal         Labs:     No results found for this or any previous visit (from the past 24 hour(s)).  "

## 2021-06-24 NOTE — PLAN OF CARE
Night Shift Summary (6/23/21 into 06/24/21)    Pt in bed sleeping at start of shift. Breathing quiet and unlabored. Appears to have slept 7 hours.    Pt continues on 1:1  SIO with 5 foot ft staff space distance. Suicide, Self-injury, Cheeking and Fall precautions in addition to No Roommate order, with no related events occurring this shift.     Will continue to monitor and assess.       Problem: Behavioral Health Plan of Care  Goal: Absence of New-Onset Illness or Injury  Outcome: Improving   Remains safe on the unit.     Problem: Sleep Disturbance (Depressive Signs/Symptoms)  Goal: Improved Sleep (Depressive Signs/Symptoms)  Outcome: Improving   Slept ? 6 hours.

## 2021-06-24 NOTE — PROGRESS NOTES
@ 4:00-4:10 PM, pt was observed eating 1 apple, 3 packets of saltines (6 total), and 1 cranberry juice

## 2021-06-24 NOTE — PLAN OF CARE
"  Problem: Behavior Regulation Impairment (Nonsuicidal Self-Injury)  Goal: Improved Behavior Regulation and Impulse Control (Nonsuicidal Self-Injury)  Outcome: Declining   Patient went into the bathroom and had extended episode of head banging around 1645.  Writer informed her she would receive a dose of zyprexa unless she ceased the head banging and returned to a space where staff could fully observe her.  She eventually complied, requested and received PRN Vistaril, and had an episode of extremely fast pacing in the scott.  Patient later told writer she had no idea what precipitated the head banging episode - \"it was like it came out of nowhere and I was watching myself do it.\"  She also told writer that she would not take zyprexa due to possibility of weight gain.  Suicide plan and levels of depression and anxiety are unchanged from yesterday.  At  patient refused Naltrexone and received PRN Melatonin per her norm.  "

## 2021-06-24 NOTE — PLAN OF CARE
Attended 1 OT Group focused on emotions identification and appropriate expression. Appropriately expressed self when discussing random emotions. Noted envious and jealously were triggering thus declined discussing. Discussed working with her staff to identify emotions and goals to proactively use emotion regulation strategies for improved self management.

## 2021-06-24 NOTE — PLAN OF CARE
"  Problem: Suicidal Behavior  Goal: Suicidal Behavior is Absent or Managed  Outcome: No Change   \"I think I'm better. I feel a bit more grounded. I feel like everyday I don't want to take pills every minute. I get in those moods where I feel nothing matters, like I could do anything and it wouldn't matter. Patient states is able to focus better. Attends groups, is social with peers. Denies pain, states is dizzy at times. Water encouraged. Continues to have suicidal thoughts and thoughts of self harm. States suicidal plan is to overdose and would not do it in the hospital. Has thoughts of self harm, is not able to contract for safety. For breakfast ate egg whites and [id number] of strawberry/kiwi Vitality water. For lunch ate all of caesar chicken, half of broccoli, all of cantaloupe chunks, all of chocolate chip cookie and all of wheat dinner roll. Drank all of strawberry/kiwi Vitality water. Went to group this afternoon and afterwards started pacing fast in hallway. Patient c/o anxiety. Haldol and hydroxyzine was administered without relief of anxiety. At 1500 patient was in bed with head covered c/o anxiety. Writer turned off lights, shut the blinds, gave patient an ice pack which she put on her forehead. Weighted blanket and weighted shoulder pad placed on patient and writer worked with patient on paced breathing for approximately 15 minutes. At that time patient said the anxiety had improved. Patient got up and went and sat in the lounge.  "

## 2021-06-24 NOTE — PROGRESS NOTES
CLINICAL NUTRITION SERVICES - REASSESSMENT NOTE     Nutrition Prescription    RECOMMENDATIONS FOR MDs/PROVIDERS TO ORDER:  None at this time    Malnutrition Status:    Previously noted, severe    Recommendations already ordered by Registered Dietitian (RD):  Continue current    Future/Additional Recommendations:  Monitor intake, need to adjust supplements again     EVALUATION OF THE PROGRESS TOWARD GOALS   Diet: Regular, safe tray, no utensils, vitality water on trays  Supplement: Vanilla Ensure Enlive with lunch (changed from Ensure Clear this morning per pt request)  Intake: Intake slowly improving, per RN patient consumed a dinner roll, have of the broccoli, a cookie, a chicken caesar salad, and cantaloupe for lunch- 384 kcal, 24 g protein.     NEW FINDINGS   Wt Readings from Last 10 Encounters:   06/22/21 54.3 kg (119 lb 11.2 oz)   06/17/21 53.5 kg (117 lb 14.4 oz)   06/15/21 52.6 kg (116 lb)   06/08/21 54.2 kg (119lb 6.4 oz)   11/17/20 48.6 kg (107 lb 1.6 oz)   02/07/16 58.6 kg (129 lb 1.6 oz)   03/12/15 54.4 kg (120 lb)       MALNUTRITION  % Intake: </= 50% for >/= 5 days (severe)  % Weight Loss: Unable to assess- pt does not provide recent weight history, gain noted presumably while in residential treatment, but unsure what weight was at time of discharge before she began restricting again.   Subcutaneous Fat Loss: Facial region:  Orbital- moderate  Muscle Loss: Temporal:  Mild to moderate  Fluid Accumulation/Edema: None noted  Malnutrition Diagnosis: Severe malnutrition in the context of acute on chronic illness as evidenced by severe restriction of intake and signs of fat and muscle loss    Previous Goals   Pt will meet at least 50% of needs through PO intake.  Evaluation: progressing, may be meeting at times, inconsistent    Previous Nutrition Diagnosis  Severe malnutrition in the context of acute on chronic illness as evidenced by severe restriction of intake and signs of fat and muscle loss  Evaluation:  No change    CURRENT NUTRITION DIAGNOSIS  Severe malnutrition in the context of acute on chronic illness as evidenced by severe restriction of intake and signs of fat and muscle loss    INTERVENTIONS  Implementation  Adjusted supplement this morning, spoke with  requesting that they honor patient's choices as long as they are available as she is somewhat restricted in her options d/t finger foods    Goals  Pt to meet >75% estimated needs    Monitoring/Evaluation  Progress toward goals will be monitored and evaluated per protocol.    Thania Martinez RD, LD  ICU/5A/OB/Mental Health Pager (M-F): [phone]  On Call Pager (weekends only): [phone]

## 2021-06-25 PROCEDURE — 250N000013 HC RX MED GY IP 250 OP 250 PS 637: Performed by: PSYCHIATRY & NEUROLOGY

## 2021-06-25 PROCEDURE — G0177 OPPS/PHP; TRAIN & EDUC SERV: HCPCS

## 2021-06-25 PROCEDURE — 250N000013 HC RX MED GY IP 250 OP 250 PS 637: Performed by: NURSE PRACTITIONER

## 2021-06-25 PROCEDURE — 99233 SBSQ HOSP IP/OBS HIGH 50: CPT | Performed by: PSYCHIATRY & NEUROLOGY

## 2021-06-25 PROCEDURE — 250N000011 HC RX IP 250 OP 636: Performed by: NURSE PRACTITIONER

## 2021-06-25 PROCEDURE — 124N000002 HC R&B MH UMMC

## 2021-06-25 RX ADMIN — LAMOTRIGINE 150 MG: 150 TABLET ORAL at 08:05

## 2021-06-25 RX ADMIN — Medication 1 TABLET: at 08:05

## 2021-06-25 RX ADMIN — HALOPERIDOL LACTATE 5 MG: 5 INJECTION, SOLUTION INTRAMUSCULAR at 19:42

## 2021-06-25 RX ADMIN — FLUOXETINE HYDROCHLORIDE 40 MG: 20 CAPSULE ORAL at 08:05

## 2021-06-25 RX ADMIN — LAMOTRIGINE 150 MG: 150 TABLET ORAL at 21:57

## 2021-06-25 RX ADMIN — LURASIDONE HYDROCHLORIDE 80 MG: 20 TABLET, FILM COATED ORAL at 21:57

## 2021-06-25 RX ADMIN — Medication 1000 UNITS: at 08:04

## 2021-06-25 RX ADMIN — THIAMINE HCL TAB 100 MG 100 MG: 100 TAB at 08:05

## 2021-06-25 RX ADMIN — MELATONIN TAB 3 MG 3 MG: 3 TAB at 21:57

## 2021-06-25 NOTE — PLAN OF CARE
Attended 1 OT Life Skills Group x 45 minutes with 3 peers. Initiated and actively participated in social activity. Focused and organized x 45 minutes. Full range of affect during activity. Did note appreciation for socialization and structure as a means to distract and focus on positives. Encouraged to explore interests further and begin looking at how to structure her time post discharge for improved self management.

## 2021-06-25 NOTE — PLAN OF CARE
BEHAVIORAL TEAM DISCUSSION    Participants: MIRNA Headley MD; Tyra Saravia and LIZ Jay's; Jacqui Landers RN  Progress: minimal progress.  Pt continues to restrict food, occasional head banging, continues to state she would overdose if discharged to home.  Anticipated length of stay: 5-7 days  Continued Stay Criteria/Rationale: SI, poor judgment, failure at OP LOC  Medical/Physical: superficial scratches  Precautions:   Behavioral Orders   Procedures     Cheeking Precautions (behavioral units)     Patient Observed swallowing PO medications; Patient asked to drink water after swallowing medication; Patient in Staff line of sight for 15 minutes after medication given; Mouth checks after PO administration (patient asked to open mouth and stick out their tongue).     Code 1 - Restrict to Unit     Discontinue 1:1 attendant for suicide risk     Order Specific Question:   I have performed an in person assessment of the patient     Answer:   Based on this assessment the patient no longer requires a one on one attendant at this point in time.     Order Specific Question:   Rationale     Answer:   Medical Record Reviewed     Order Specific Question:   Rationale     Answer:   Patient States able to remain safe in hospital     Order Specific Question:   Rationale     Answer:   Modifications to care environment made to mitigate safety risk     Order Specific Question:   Rationale     Answer:   Routine observations are sufficient to monitor safety.     Fall precautions     Routine Programming     As clinically indicated     Self Injury Precaution     Status 15     Every 15 minutes.     Status Individual Observation     Patient SIO status reviewed with team/RN.  Please also refer to RN/team documentation for add'l detail.    -SIO staff to monitor following which have contributed to patient being on SIO:  Since admission, she has engaged in head banging, utilized objects in her room to scratch herself, picked at old  wounds and tried to insert a plastic fork and pen into an electrical outlet.  She has had thoughts of using a scissors to harm/kill herself in groups.  She is unable to contract for safety with regard to suicide and self-injury.  -Possible interventions SIO staff could use to support patient's treatment progress:  Observation for safety.  Minimal verbal engagement except for safety purposes.  She is not allowed to attend groups with potentially harmful objects such as pens and scissors.  Minimal objects in room, and no access to small objects with which she could potentially harm herself including plastic ware, pens, marker caps and straws.  If she puts a sheet over her head, hands must be in view.    -When following observed, team will review discontinuation of SIO:  Ability to maintain safety on the unit.     Order Specific Question:   CONTINUOUS 24 hours / day     Answer:   5 feet     Order Specific Question:   Indications for SIO     Answer:   Self-injury risk     Suicide precautions     Patients on Suicide Precautions should have a Combination Diet ordered that includes a Diet selection(s) AND a Behavioral Tray selection for Safe Tray - with utensils, or Safe Tray - NO utensils       Plan: pt will meet with psychiatry today and staff will continue to observe for SIB and SI  Rationale for change in precautions or plan:  No change

## 2021-06-25 NOTE — PLAN OF CARE
"Patient alert and oriented to person, place, and time, adequately groomed. Pt is pleasant but blunted smiling a few times this shift, good eye contact, cooperative, medication compliant. No PRN's given. Pt on suicidal, SIB, falls precautions with no behaviors observed. Pt endorses SI/SIB, rates anxiety and depression both \"moderate\". Pt contracts for safety while here but states she has a plan for suicide at home. Pt did not demonstrate delusional thinking, did not appear to be responding to internal stimuli. Pt engaged with a few peers, observed unit milieu playing cards, appropriate with peers and staff. Pt communicates and verbalizes needs to staff. No behaviors noted. Pt continues on SIO for endors ing self injurious behavior. Will continue to monitor behavior and encourage engagement.        Patient rates depression 5* c/o Feelings of worthlessness or excessive/inappropriate guilt  Recurrent thoughts of death or suicide  Patient rates anxiety at 5 c/o Feeling nervous or on edge  Patient describes mood as \"depressed and anxious\" and affect is anxious, flat    Patient is cooperative, pleasant, and calm appearing Blunted/Flat and Anxious/Nervous. Patient is med-compliant, is eating 25% and reports \"sleeps through the night\". Patient is not attending groups.    Patient reports suicidal ideation with out intention or a suicidal plan while here at the hospital.  SIB frequently has thoughts of self harm.  HI: denies current or recent homicidal ideation or behaviors.  Patient denies  auditory hallucinations.  Patient denies  visual hallucinations.    VS reviewed: /79   Pulse 94   Temp 98.5  F (36.9  C) (Oral)   Resp 16   Ht 1.6 m (5' 3\")   Wt 54.3 kg (119 lb 11.2 oz)   LMP 06/08/2021   SpO2 97%   BMI 21.20 kg/m   . Patient denies  pain.    Patient evaluation continues. Assessed mood,anxiety,thoughts and behavior. Patient is encouraged to safely participate in groups and assisted to develop healthy coping " skills.     Length of stay: 17    Refer to daily team meeting notes for individualized plan of care. Nursing will continue to assess.    * Scale is offered as scale of 1 to 10 with 10 being the worst                                                                           Problem: Adult Inpatient Plan of Care  Goal: Plan of Care Review  Outcome: Improving  Flowsheets (Taken 6/25/2021 1300)  Plan of Care Reviewed With: patient     Problem: Adult Inpatient Plan of Care  Goal: Patient-Specific Goal (Individualized)  Outcome: Improving     Problem: Adult Inpatient Plan of Care  Goal: Optimal Comfort and Wellbeing  Outcome: Improving  Intervention: Provide Person-Centered Care  Recent Flowsheet Documentation  Taken 6/25/2021 1300 by Elizabeth Delgado RN  Trust Relationship/Rapport:   care explained   choices provided   emotional support provided   empathic listening provided   questions answered   questions encouraged   reassurance provided   thoughts/feelings acknowledged     Problem: Suicidal Behavior  Goal: Suicidal Behavior is Absent or Managed  6/25/2021 1426 by Elizabeth Delgado RN  Outcome: Improving  6/25/2021 1422 by Elizabeth Delgado RN  Outcome: Improving     Problem: Decreased Participation and Engagement (Depressive Signs/Symptoms)  Goal: Increased Participation and Engagement (Depressive Signs/Symptoms)  Outcome: Improving  Intervention: Facilitate Participation and Engagement  Recent Flowsheet Documentation  Taken 6/25/2021 1300 by Elizabeth Delgado RN  Supportive Measures: active listening utilized     Problem: Decreased Participation and Engagement (Depressive Signs/Symptoms)  Goal: Increased Participation and Engagement (Depressive Signs/Symptoms)  Intervention: Facilitate Participation and Engagement  Recent Flowsheet Documentation  Taken 6/25/2021 1300 by Elizabeth Delgado RN  Supportive Measures: active listening utilized     Problem: Mood Impairment (Depressive Signs/Symptoms)  Goal:  Improved Mood Symptoms (Depressive Signs/Symptoms)  Outcome: Improving  Intervention: Promote Mood Improvement  Recent Flowsheet Documentation  Taken 6/25/2021 1300 by Elizabeth Delgado RN  Supportive Measures: active listening utilized     Problem: Social, Occupational or Functional Impairment (Depressive Signs/Symptoms)  Goal: Enhanced Social, Occupational or Functional Skills (Depressive Signs/Symptoms)  Outcome: Improving  Intervention: Promote Social, Occupational and Functional Ability  Recent Flowsheet Documentation  Taken 6/25/2021 1300 by Elizabeth Delgado RN  Trust Relationship/Rapport:   care explained   choices provided   emotional support provided   empathic listening provided   questions answered   questions encouraged   reassurance provided   thoughts/feelings acknowledged     Problem: Mood Impairment (Nonsuicidal Self-Injury)  Goal: Improved Mood Symptoms (Nonsuicidal Self-Injury)  Outcome: Improving  Intervention: Optimize Emotion and Mood  Recent Flowsheet Documentation  Taken 6/25/2021 1300 by Elizabeth Delgado, RN  Supportive Measures: active listening utilized

## 2021-06-25 NOTE — PLAN OF CARE
Problem: Behavior Regulation Impairment (Nonsuicidal Self-Injury)  Goal: Improved Behavior Regulation and Impulse Control (Nonsuicidal Self-Injury)  Outcome: Improving   Patient continues to have thoughts of med overdose at home but contracts for safety here.  She rated her depression 8 and anxiety 5.  No head banging or rapid pacing tonight.  Eye contact is improved and she appears less preoccupied.  She states he short term memory is poor and events of the past two weeks are all a blur.  Patient talked about how much she wants to be discharged home.  She received her usual prn melatonin and refused hs naltrexone per her norm.  Beside food listed in previous note, patient reports eating her entire hamburger and some crackers.  Fluid intake 400 ml.  Patient stated she had a small bm yesterday.

## 2021-06-25 NOTE — PLAN OF CARE
"Music Therapy Group note    Total time in session: 55 minutes     Number of patients in group: 4    Scope of service: psychodynamic     Patient progress: steady     Intervention: Music and Gratitude     Goal of group: positive thinking, grounding    Patient response/reaction to treatment intervention(s): Lida is displaying lapses in memory.  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    During session, she appeared wistful, but cooperative/compliant.  She requested the song \"Ninety Six\" by jovana, the same song she has requested each session.  Each time she discloses after listening to the song that \"it's about her coming out a lesbian or bi\", but then quickly adding \"but it can have many different meanings..\" . This could be clinically significant to what Lida is dealing with or not, it's not possible to explore in the short-term group session.     Lida left quickly after group ended.  She had previously stated her goal is to do \"DBT grounding techniques\" tomorrow.     Belen Mchugh, [facility]  Board-Certified Music Therapist           "

## 2021-06-25 NOTE — PLAN OF CARE
Assessment/Intervention/Current Symptoms and Care Coordination: reviewed chart and attended team discussion.  Entered team note.     minimal progress.  Pt continues to restrict food, occasional head banging, continues to state she would overdose if discharged to home but able to contract for safety on unit.    Discharge Plan or Goal: stabilize and discharge to OP LOC    Barriers to Discharge:   Pt is not able to contract for safety outside of hospital at this time    Referral Status: has established providers    Legal Status: voluntary

## 2021-06-25 NOTE — PROGRESS NOTES
"[facility],  Psychiatric Progress Note      Impression:     Corinne \"Cori\" Melinda is a 25-year-old female admitted to [facility] on 6/8/2021.  She was admitted on a 72-hour hold through the ER following an overdose on 3000 mg of Neurontin as a suicide attempt.  She had been restricting her food intake and scratching herself.  Since admission, she tapered off Cymbalta.  Prozac was initiated.  Lamictal was continued.  Latuda was continued.  Naltrexone was continued but she has been refusing it.  PRN Hydroxyzine was initiated.  PRN Zyprexa was initiated but then replaced with PRN Haldol as she would not take PRN Zyprexa due to potential for weight gain.  A petition for commitment MI was initiated in [facility] but was not supported, and she subsequently signed in voluntarily.  On 6/9 she superficially cut her right wrist and right antecubital area.  On 6/12 she opened old wounds on her ankles.  SIO was initiated on 6/13 after she informed staff that she tried to insert plasticware and pens into the electrical outlets in her room.  She superficially cut herself with a straw in her bathroom on 6/16.  On 6/17 she engaged in light head banging and was not redirectable, so IM Zyprexa was administered.  She has engaged in head banging all but one day since.  She has been receiving PO/IM Zyprexa and later Haldol when she engages in this.  She continues to endorse suicidal ideation and urges to engage in self-injury.  She has been eating at least 500 calories per day but little more.          Diagnoses:     Major depressive disorder, severe, recurrent, without psychosis  Anorexia nervosa  Generalized anxiety disorder  OCD  Panic disorder  Borderline personality disorder         Plan:      Medications:  Continue Prozac 40 mg daily.  Continue Lamictal 150 mg BID.  Continue Latuda 80 mg at HS; pt has been provided education it does not work appropriately without " consuming at least 350 calories with it.  Continue to offer Naltrexone 50 mg at HS.  Continue PRNs of Melatonin and Hydroxyzine.  Discontinue PRN Zyprexa and begin PRN Haldol.    Continue status individual observation.  No groups with potentially harmful objects such as scissors, pens, etc until provider reassesses safety.      Monitor and record PO intake.     Petition for commitment MI in [facility] was not supported.  She signed in voluntarily.  She has 3 therapists and reports having a therapy appointment on 6/29 with the therapist who was on a leave of absence.  She is enrolled in DBT.  She has psychiatry and therapy through [facility].   On 6/18 provider completed disability paperwork recommending reassessment of work status in 3 weeks.      Attestation:   Patient has been seen and evaluated by me, Denise Headley, DO  The patient was counseled on nature of illness and treatment plan/options  Care was coordinated with treatment team    Clinical Global Impressions  First:  Considering your total clinical experience with this particular patient population, how severe are the patient's symptoms at this time?: 7 (06/14/21 0804)  Compared to the patient's condition at the START of treatment, this patient's condition is: 4 (06/14/21 0804)  Most recent:  Considering your total clinical experience with this particular patient population, how severe are the patient's symptoms at this time?: 7 (06/22/21 0630)  Compared to the patient's condition at the START of treatment, this patient's condition is: 5 (06/22/21 0630)          Interim History:     The patient's care was discussed with the treatment team and chart notes were reviewed.  Staff report patient continues to endorse suicidal thoughts and self harm urges, has been engaging in head banging, and reporting some dizziness and lightheadedness. Intake being monitored and being encouraged to eat/drink. Taking scheduled medications with exception of naltrexone.  "Remains on SIO for inability to contract for safety and self harm behaviors.     Upon interview writer introduced self as covering physician. She asked about ongoing restrictions on objects and groups and reviewed that no restrictions will be lifted at this time. She also inquired about SIO and will continue 1:1 staff. She reports her suicidal thoughts are a bit improved in that she is not constantly thinking about overdosing on medications. She still has very high self harm urges, denies any plan for using any objects on unit currently. Inquired about discharge and does feel she is progressing towards discharge due to her SI being less intense. Still having some dizziness, encouraged to increase intake of food/fluids. Tolerating medications, denies any new/worsening side effects. Denies AVH. No additional concerns.          Medications:     Current Facility-Administered Medications   Medication     acetaminophen (TYLENOL) tablet 650 mg     alum & mag hydroxide-simethicone (MAALOX) suspension 30 mL     artificial saliva (BIOTENE MT) solution 2 spray     diphenhydrAMINE (BENADRYL) capsule 50 mg     FLUoxetine (PROzac) capsule 40 mg     haloperidol (HALDOL) tablet 5 mg    Or     haloperidol lactate (HALDOL) injection 5 mg     hydrOXYzine (ATARAX) tablet 25 mg     lamoTRIgine (LaMICtal) tablet 150 mg     lurasidone (LATUDA) tablet 80 mg     melatonin tablet 3 mg     multivitamin w/minerals (THERA-VIT-M) tablet 1 tablet     naltrexone (DEPADE/REVIA) tablet 50 mg     polyethylene glycol (MIRALAX) Packet 17 g     thiamine (B-1) tablet 100 mg     Vitamin D3 (CHOLECALCIFEROL) tablet 1,000 Units             Allergies:     Allergies   Allergen Reactions     Morphine Other (See Comments)     Twitching     Amoxicillin Rash     Sulfa Drugs Rash            Psychiatric Examination:     /71 (BP Location: Left arm)   Pulse 87   Temp 98.5  F (36.9  C) (Oral)   Resp 16   Ht 1.6 m (5' 3\")   Wt 54.3 kg (119 lb 11.2 oz)   LMP " "06/08/2021   SpO2 98%   BMI 21.20 kg/m      Appearance:  awake, alert, adequately groomed and dressed in hospital scrubs  Attitude:  cooperative   Eye Contact:  good  Mood:  \"a little better\", still depressed with periods of dyregulation  Affect:  Mood congruent, mildly blunted  Speech:  clear, coherent  Psychomotor Behavior:  no evidence of tardive dyskinesia, dystonia, or tics  Thought Process:  linear and goal oriented  Associations:  no loose associations  Thought Content:  no evidence of psychotic thought, reports suicidal ideation with plan to overdose is less intense, reports high urges to engage in self-injury   Insight:  partial   Judgment:  limited  Oriented to:  date, time, person, and place  Attention Span and Concentration:  reports mild impairment  Recent and Remote Memory:  reports mild short term impairment  Language:  intact, fluent English  Fund of Knowledge:  appropriate  Muscle Strength and Tone : normal  Gait and Station:  normal         Labs:     No results found for this or any previous visit (from the past 24 hour(s)).  "

## 2021-06-26 PROCEDURE — 124N000002 HC R&B MH UMMC

## 2021-06-26 PROCEDURE — 250N000013 HC RX MED GY IP 250 OP 250 PS 637: Performed by: PSYCHIATRY & NEUROLOGY

## 2021-06-26 PROCEDURE — 250N000013 HC RX MED GY IP 250 OP 250 PS 637: Performed by: NURSE PRACTITIONER

## 2021-06-26 RX ADMIN — THIAMINE HCL TAB 100 MG 100 MG: 100 TAB at 08:06

## 2021-06-26 RX ADMIN — Medication 1000 UNITS: at 08:06

## 2021-06-26 RX ADMIN — FLUOXETINE HYDROCHLORIDE 40 MG: 20 CAPSULE ORAL at 08:06

## 2021-06-26 RX ADMIN — MELATONIN TAB 3 MG 3 MG: 3 TAB at 22:44

## 2021-06-26 RX ADMIN — Medication 1 TABLET: at 08:06

## 2021-06-26 RX ADMIN — LURASIDONE HYDROCHLORIDE 80 MG: 20 TABLET, FILM COATED ORAL at 22:44

## 2021-06-26 RX ADMIN — LAMOTRIGINE 150 MG: 150 TABLET ORAL at 22:44

## 2021-06-26 RX ADMIN — LAMOTRIGINE 150 MG: 150 TABLET ORAL at 08:06

## 2021-06-26 ASSESSMENT — ACTIVITIES OF DAILY LIVING (ADL)
ORAL_HYGIENE: INDEPENDENT
ORAL_HYGIENE: INDEPENDENT
DRESS: SCRUBS (BEHAVIORAL HEALTH)
LAUNDRY: WITH SUPERVISION
HYGIENE/GROOMING: INDEPENDENT
DRESS: INDEPENDENT
HYGIENE/GROOMING: INDEPENDENT

## 2021-06-26 NOTE — PLAN OF CARE
"RN/    Patient is hopeful stating \"I am irritated that I can't leave, but don't want to leave AMA. I know what I have to do to discharge\" and stated GOAL is \"to discharge by next Tuesday (6/29/21).\" Pt identified stressors and specific pos coping strategies in hospital and community. Per pt report judgment and insight is improving but is seemingly inappropriately focused on discharge.    Patient rates depression 4* c/o Feelings of worthlessness or excessive/inappropriate guilt  Recurrent thoughts of death or suicide  Patient rates anxiety at 4* c/o Easily annoyed or irritable  Patient describes mood as \"depressed and irritable\" and affect is Appropriate to situation    Patient is cooperative, pleasant, and calm appearing Reactive/Full range. Patient is med-compliant, is eating 25% and reports \"sleeps through the night and taking naps\". Patient is attending groups.    Patient reports suicidal ideation with out intention or a suicidal plan   SIB acknowledges Scratching and Head-banging  HI: denies current or recent homicidal ideation or behaviors.  Patient denies  auditory hallucinations.  Patient denies  visual hallucinations.    VS reviewed: /78 (BP Location: Left arm)   Pulse 82   Temp 98  F (36.7  C) (Temporal)   Resp 16   Ht 1.6 m (5' 3\")   Wt 54.3 kg (119 lb 11.2 oz)   LMP 06/08/2021   SpO2 98%   BMI 21.20 kg/m   . Patient denies  pain.    Patient evaluation continues. Assessed mood,anxiety,thoughts and behavior. Patient is progressing towards goals. Patient is encouraged to participate in groups and assisted to develop healthy coping skills.     Length of stay: 18    Refer to daily team meeting notes for individualized plan of care. Nursing will continue to assess.    * Scale is offered as scale of 1 to 10 with 10 being the worst                                                                          "

## 2021-06-26 NOTE — PLAN OF CARE
"  Problem: Mood Impairment (Depressive Signs/Symptoms)  Goal: Improved Mood Symptoms (Depressive Signs/Symptoms)  Outcome: Declining   Day staff reported patient was unhappy because  had told her she could not come home in her current state.  Patient made several phone calls, then had an episode of crying and agitation in her room.  She eventually agreed to take IM Haldol, which was given in her arm, but said she would not take anything orally.  Patient refused to discuss source of her sadness but eventually came out to lounge and looked less sad.  She told writer her day had been \"shitty\" but rapidly and emphatically denied any SI, which she has consistently endorsed every evening previously. Patient reported moderate depression and anxiety in dismissive fashion.  No SIB noted or reported this evening.  Patient had pepperoni pizza, broccoli, cantaloupe and two bottles of Vitality water at dinner.   is scheduled to visit tomorrow.  "

## 2021-06-27 PROCEDURE — 250N000013 HC RX MED GY IP 250 OP 250 PS 637: Performed by: PSYCHIATRY & NEUROLOGY

## 2021-06-27 PROCEDURE — 250N000011 HC RX IP 250 OP 636: Performed by: NURSE PRACTITIONER

## 2021-06-27 PROCEDURE — 250N000013 HC RX MED GY IP 250 OP 250 PS 637: Performed by: NURSE PRACTITIONER

## 2021-06-27 PROCEDURE — 90853 GROUP PSYCHOTHERAPY: CPT

## 2021-06-27 PROCEDURE — 124N000002 HC R&B MH UMMC

## 2021-06-27 RX ADMIN — LURASIDONE HYDROCHLORIDE 80 MG: 20 TABLET, FILM COATED ORAL at 21:27

## 2021-06-27 RX ADMIN — LAMOTRIGINE 150 MG: 150 TABLET ORAL at 08:33

## 2021-06-27 RX ADMIN — FLUOXETINE HYDROCHLORIDE 40 MG: 20 CAPSULE ORAL at 08:33

## 2021-06-27 RX ADMIN — THIAMINE HCL TAB 100 MG 100 MG: 100 TAB at 08:33

## 2021-06-27 RX ADMIN — HALOPERIDOL LACTATE 5 MG: 5 INJECTION, SOLUTION INTRAMUSCULAR at 13:36

## 2021-06-27 RX ADMIN — MELATONIN TAB 3 MG 3 MG: 3 TAB at 21:27

## 2021-06-27 RX ADMIN — Medication 1000 UNITS: at 08:33

## 2021-06-27 RX ADMIN — Medication 1 TABLET: at 08:33

## 2021-06-27 RX ADMIN — LAMOTRIGINE 150 MG: 150 TABLET ORAL at 21:27

## 2021-06-27 ASSESSMENT — ACTIVITIES OF DAILY LIVING (ADL)
HYGIENE/GROOMING: INDEPENDENT
DRESS: INDEPENDENT
LAUNDRY: WITH SUPERVISION
DRESS: INDEPENDENT
LAUNDRY: WITH SUPERVISION
ORAL_HYGIENE: INDEPENDENT
HYGIENE/GROOMING: INDEPENDENT
ORAL_HYGIENE: INDEPENDENT

## 2021-06-27 ASSESSMENT — MIFFLIN-ST. JEOR: SCORE: 1235.75

## 2021-06-27 NOTE — PLAN OF CARE
"Patient has been redirected from rapid pacing this shift and states \"I cut myself\" presenting superficial scratches to staff stating \"I don't know what I used.\" Patient has been in lounge watching TV and is currently sitting in room with blanket over head. Patient alerts staff \"I need something for anxiety\" and requested/provided PRN IM haldol stating \"I do not want to take pills.\" Patient, then, attended group.    Patient states \"I don't want to hurt myself and I am safe at home\" while inappropriately focused/preoccupied on discharge.    /80 (BP Location: Left arm)   Pulse 83   Temp 98.2  F (36.8  C) (Oral)   Resp 16   Ht 1.6 m (5' 3\")   Wt 53.2 kg (117 lb 3.2 oz)   LMP 06/08/2021   SpO2 96%   BMI 20.76 kg/m  . Patient did not c/o pain.    Patient received PRN Anti-Psychotic (Zyprexa/Thorazine/Haldol/Risperdal/Seroquel/Abilify) ([facility])     What was the patient doing that led to the PRN medication? agitated Anxiety    Did patient require R/S? NO   Side effect* that can be related to the PRN medication none and .   1 Hour later the patient appeared: cooperative and calm and Partipating in groups.         (*example: Zyprexa tics, fever, rigidity, swelling, hallucinations, confusion and sedation).      Nursing will continue to monitor.    "

## 2021-06-27 NOTE — PLAN OF CARE
"  Problem: Behavioral Health Plan of Care  Goal: Adheres to Safety Considerations for Self and Others  Outcome: No Change   Patient had several instances of rapid pacing tonight during which she declined the offer of haldol and hydroxyzine which her provider said she should received when exhibiting this behavior.  Patient refused request both times, stated she felt she could walk as fast as she wanted because of her recently improved food intake.  She was informed that staff would document for provider her degree of compliance.  Patient still declined   haldol and hydroxyzine but slowed her pace notably.  She was later social with peers for several  hours, which has not been her norm previously.  When asked why she continued to refuse nightly dose of Naltrexone, patient replied \"that's for self-injury and I don't need that.\"  When asked about SI, patient replied \"I'm fine\" but when writer repeated the question she admitted to passive SI involving ovedose at home, and rated both anxiety and depression at 4 out of 10.  Said visit with  today went well.  Patient still wants discharge to home asap.    At dinner patient ate a cheeseburger, cantaloupe, chocolate chip cookie, and two bottles of vitality water.  "

## 2021-06-28 VITALS
TEMPERATURE: 98.7 F | HEIGHT: 63 IN | HEART RATE: 111 BPM | RESPIRATION RATE: 16 BRPM | SYSTOLIC BLOOD PRESSURE: 127 MMHG | WEIGHT: 117.2 LBS | OXYGEN SATURATION: 100 % | BODY MASS INDEX: 20.77 KG/M2 | DIASTOLIC BLOOD PRESSURE: 82 MMHG

## 2021-06-28 PROCEDURE — 250N000013 HC RX MED GY IP 250 OP 250 PS 637: Performed by: NURSE PRACTITIONER

## 2021-06-28 PROCEDURE — 99239 HOSP IP/OBS DSCHRG MGMT >30: CPT | Performed by: PSYCHIATRY & NEUROLOGY

## 2021-06-28 PROCEDURE — 250N000013 HC RX MED GY IP 250 OP 250 PS 637: Performed by: PSYCHIATRY & NEUROLOGY

## 2021-06-28 PROCEDURE — G0177 OPPS/PHP; TRAIN & EDUC SERV: HCPCS

## 2021-06-28 RX ORDER — FLUOXETINE 40 MG/1
40 CAPSULE ORAL DAILY
Qty: 30 CAPSULE | Refills: 0 | Status: ON HOLD | OUTPATIENT
Start: 2021-06-29 | End: 2021-07-20

## 2021-06-28 RX ORDER — MULTIPLE VITAMINS W/ MINERALS TAB 9MG-400MCG
1 TAB ORAL DAILY
Qty: 30 TABLET | Refills: 0 | Status: SHIPPED | OUTPATIENT
Start: 2021-06-29 | End: 2024-01-12

## 2021-06-28 RX ORDER — LANOLIN ALCOHOL/MO/W.PET/CERES
100 CREAM (GRAM) TOPICAL DAILY
Qty: 30 TABLET | Refills: 0 | Status: SHIPPED | OUTPATIENT
Start: 2021-06-29 | End: 2024-01-12

## 2021-06-28 RX ORDER — LAMOTRIGINE 150 MG/1
150 TABLET ORAL 2 TIMES DAILY
Qty: 60 TABLET | Refills: 0 | Status: SHIPPED | OUTPATIENT
Start: 2021-06-28 | End: 2024-01-12

## 2021-06-28 RX ADMIN — FLUOXETINE HYDROCHLORIDE 40 MG: 20 CAPSULE ORAL at 07:49

## 2021-06-28 RX ADMIN — Medication 1000 UNITS: at 07:49

## 2021-06-28 RX ADMIN — THIAMINE HCL TAB 100 MG 100 MG: 100 TAB at 07:49

## 2021-06-28 RX ADMIN — Medication 1 TABLET: at 07:49

## 2021-06-28 RX ADMIN — LAMOTRIGINE 150 MG: 150 TABLET ORAL at 07:49

## 2021-06-28 ASSESSMENT — ACTIVITIES OF DAILY LIVING (ADL)
HYGIENE/GROOMING: INDEPENDENT
DRESS: INDEPENDENT
ORAL_HYGIENE: INDEPENDENT
LAUNDRY: WITH SUPERVISION

## 2021-06-28 NOTE — PLAN OF CARE
Patient slept approximately 7.5 hours during the overnight shift; appeared comfortable with even and unlabored breathing while asleep. SIO in place according to order. No issues or concerns reported or observed. Nursing will continue to monitor.

## 2021-06-28 NOTE — PLAN OF CARE
"RN/    Patient stated GOAL is \"to not hurt myself so I can discharge on Monday.\" Patient engaged in SIB earlier today and stated she was upset w/ herself for engaging in SIB. When asked to provide object used for SIB, patient handed staff a partial piece of a staple from her tea bag. Upon completion of room search, no other objects intended for SIB were identified. Patient continues to be inappropriately focused on discharging.    Patient rates depression 8* c/o Feelings of worthlessness or excessive/inappropriate guilt  Patient rates anxiety at 4* c/o Easily annoyed or irritable  Patient describes mood as \"depressed, anxious, and shame/guilt \" and affect is anxious, sad, and tense.    Patient is cooperative appearing Dysphoric and Anxious/Nervous. Patient is med-compliant, is eating 100% and reports \"sleeps through the night and taking naps\". Patient is attending groups.    Patient denies current or recent suicidal ideation    SIB denies  head-banging and scratching.  HI: denies current or recent homicidal ideation or behaviors.  Patient denies  auditory hallucinations.  Patient denies  visual hallucinations.    VS reviewed: /71 (BP Location: Right arm)   Pulse 69   Temp 98  F (36.7  C) (Temporal)   Resp 16   Ht 1.6 m (5' 3\")   Wt 53.2 kg (117 lb 3.2 oz)   LMP 06/08/2021   SpO2 99%   BMI 20.76 kg/m   . Patient denies  pain.    Patient evaluation continues. Assessed mood,anxiety,thoughts and behavior. Patient is not progressing towards goals. Patient is encouraged to participate in groups and assisted to develop healthy coping skills.     Patient asked/was given her usual PRN melatonin and refused naltrexone.    Length of stay: 19    Refer to daily team meeting notes for individualized plan of care. Nursing will continue to assess.    * Scale is offered as scale of 1 to 10 with 10 being the worst                                                                          "

## 2021-06-28 NOTE — PLAN OF CARE
BEHAVIORAL TEAM DISCUSSION    Participants: Provider: Denise Headley MD  CTC: Nidia Michael MS,LP   Nurse:    Denae Gee, RN    Progress: Pt has denied suicidal ideation, has engaged in superficial SIB over the weekend. Pt typically is pleasant with staff though frequently questions the limits being set, engages in behaviors that require continued 1:1. Pt paces frequently. Pt displays very limited insight or accountability.  Pt has requested discharge; provider advised pt that she can sign a 12 hour intent and would be discharged AMA. Pt has not yet signed the 12 hour intent.     Anticipated length of stay: 1-2 days    Continued Stay Criteria/Rationale: Pt has continued to require a 1:1, displays limited insight and recent engagement in SIB.    Medical/Physical: anorexia, superficial scratches  Precautions:   Behavioral Orders   Procedures     Cheeking Precautions (behavioral units)     Patient Observed swallowing PO medications; Patient asked to drink water after swallowing medication; Patient in Staff line of sight for 15 minutes after medication given; Mouth checks after PO administration (patient asked to open mouth and stick out their tongue).     Code 1 - Restrict to Unit     Discontinue 1:1 attendant for suicide risk     Order Specific Question:   I have performed an in person assessment of the patient     Answer:   Based on this assessment the patient no longer requires a one on one attendant at this point in time.     Order Specific Question:   Rationale     Answer:   Medical Record Reviewed     Order Specific Question:   Rationale     Answer:   Patient States able to remain safe in hospital     Order Specific Question:   Rationale     Answer:   Modifications to care environment made to mitigate safety risk     Order Specific Question:   Rationale     Answer:   Routine observations are sufficient to monitor safety.     Fall precautions     Routine Programming     As clinically indicated     Self  Injury Precaution     Status 15     Every 15 minutes.     Status Individual Observation     Patient SIO status reviewed with team/RN.  Please also refer to RN/team documentation for add'l detail.    -SIO staff to monitor following which have contributed to patient being on SIO:  Since admission, she has engaged in head banging, utilized objects in her room to scratch herself, picked at old wounds and tried to insert a plastic fork and pen into an electrical outlet.  She has had thoughts of using a scissors to harm/kill herself in groups.  She is unable to contract for safety with regard to suicide and self-injury.  -Possible interventions SIO staff could use to support patient's treatment progress:  Observation for safety.  Minimal verbal engagement except for safety purposes.  She is not allowed to attend groups with potentially harmful objects such as pens and scissors.  Minimal objects in room, and no access to small objects with which she could potentially harm herself including plastic ware, pens, marker caps and straws.  If she puts a sheet over her head, hands must be in view.    -When following observed, team will review discontinuation of SIO:  Ability to maintain safety on the unit.     Order Specific Question:   CONTINUOUS 24 hours / day     Answer:   5 feet     Order Specific Question:   Indications for SIO     Answer:   Self-injury risk     Suicide precautions     Patients on Suicide Precautions should have a Combination Diet ordered that includes a Diet selection(s) AND a Behavioral Tray selection for Safe Tray - with utensils, or Safe Tray - NO utensils       Plan: The patient will continue to meet w/ the treatment team for assessment and treatment planning, CTC will continue to work w/ for discharge planning.

## 2021-06-28 NOTE — DISCHARGE INSTRUCTIONS
Behavioral Discharge Planning and Instructions    Summary: You were admitted on 6/8/2021  due to Depression, Suicidal Ideations and Suicide Attempt.  You were treated by Dr. Humberto Dowling, Sariah Solano NP and Denise Headley MD. You are being discharged on 06/28/2021 from station 32 to Home   A petition for commitment was made to [facility]; this was not supported. You did sign in voluntarily. You were on a 1;1 the majority of the time that you were in the hospital due to engaging in superficial self-injurious behavior. You have reported that you no longer have suicidal ideation or intent. You have requested discharge. Because of the SIB, you are being discharged AMA.    Main Diagnosis:   Major depressive disorder, severe, recurrent, without psychosis  Anorexia nervosa  Generalized anxiety disorder  OCD  Panic disorder  Borderline personality disorder    Health Care Follow-up:    You stated that you already have appointments scheduled with your psychiatrist at [facility] and so do not need assistance scheduling these:   Psychiatrist Giulia Vigil at [facility].  [facility]  Located in: [facility]  Address: [facility] [address], [place] [address]  Phone: [phone]    You reported that you would make an appointment with your therapist at [facility], Citlaly Mae  [facility] Filomena Gonzalez\[facility]\""  [address]  [place] [address]  Phone: [phone]  Fax: [phone]    Please follow up with your [facility] Care Coordinator: Bekah [phone]    You have expressed an interest in case management services. This is obtained through your home Critical access hospital. Please contact [facility]  at [phone].     Attend all scheduled appointments with your outpatient providers. Call at least 24 hours in advance if you need to reschedule an appointment to ensure continued access to your outpatient providers.     Major Treatments, Procedures and Findings:  You were provided with: a  "psychiatric assessment, medication evaluation and/or management and milieu management    Symptoms to Report: mood getting worse or thoughts of suicide    Early warning signs can include: increased depression or anxiety sleep disturbances increased thoughts or behaviors of suicide or self-harm  increased unusual thinking, such as paranoia or hearing voices    Safety and Wellness:  Take all medicines as directed.  Make no changes unless your doctor suggests them.      Follow treatment recommendations.  Refrain from alcohol and non-prescribed drugs.  If there is a concern for safety, call 911.    Resources:   Crisis Intervention: 220.163.1676 or 762-555-5102 (TTY: 576.901.9183).  Call anytime for help.  National [place] on Mental Illness (www.[place].tanja.org): 603.386.5538 or 339-122-4926.  Suicide Awareness Voices of Education (SAVE) (www.save.org): 688-411-DJAF (7283)  [facility] Crisis Response 953-077-1153  Text 4 Life: txt \"LIFE\" to 88694 for immediate support and crisis intervention  Crisis text line: Text \"[place]\" to 290438. Free, confidential, 24/7.  Crisis Intervention: 777.479.4045 or 591-053-4271. Call anytime for help.     General Medication Instructions:   See your medication sheet(s) for instructions.   Take all medicines as directed.  Make no changes unless your doctor suggests them.   Go to all your doctor visits.  Be sure to have all your required lab tests. This way, your medicines can be refilled on time.  Do not use any drugs not prescribed by your doctor.  Avoid alcohol.    Advance Directives:   Scanned document on file with [facility]? No scanned doc  Is document scanned? Pt states no documents  Honoring Choices Your Rights Handout: Informed and given  Was more information offered? Pt declined    The Treatment team has appreciated the opportunity to work with you. If you have any questions or concerns about your recent admission, you can contact the unit which can receive your call 24 hours a day, 7 " days a week. They will be able to get in touch with a Provider if needed. The unit number is [phone].

## 2021-06-28 NOTE — DISCHARGE SUMMARY
Psychiatric Discharge Summary    Corinne N Palm MRN# [id number]   Age: 25 year old YOB: 1995     Date of Admission:  6/8/2021  Date of Discharge:  6/28/2021  4:58 PM  Admitting Physician:  Humberto Dowling MD  Discharge Physician:  Denise Headley DO         Event Leading to Hospitalization:   This is one of the several [facility] psychiatric admissions for this 25-year-old  white female with a long history of anorexia, depression, generalized anxiety, as well as panic disorder, and a history of suicidality, as well as history of obsessive compulsive disorder, who presented to our emergency department after a suicide attempt, whereby she took about ten 300 mg gabapentin tablets with the intent to kill herself.  She did it at work, and then upon return home, took some more pills (she is not sure what kind and how much).  She felt somewhat unreal and disorganized after the overdose.  She has been feeling down, felt that her life has no purpose, but went to see her psychiatrist, Dr. Giulia Vigil, who referred her to our emergency department for admission.  The patient was evaluated in our emergency department, where she continued to feel suicidal and had a plan to get out, take some more pills, and crash her car.  She was transferred to the station 32, with suicide precautions.       See Admission note by Humberto Dowling MD on 6/9/21 for additional details.          Diagnoses:     Major depressive disorder, severe, recurrent, without psychosis  Anorexia nervosa  Generalized anxiety disorder  OCD  Panic disorder  Borderline personality disorder         Labs:     Recent Results (from the past 672 hour(s))   CBC with platelets differential    Collection Time: 06/08/21  3:53 PM   Result Value Ref Range    WBC 8.1 4.0 - 11.0 10e9/L    RBC Count 4.85 3.8 - 5.2 10e12/L    Hemoglobin 14.0 11.7 - 15.7 g/dL    Hematocrit 41.5 35.0 - 47.0 %    MCV 86 78 - 100 fl    MCH 28.9 26.5 - 33.0 pg    MCHC 33.7 31.5 -  36.5 g/dL    RDW 11.7 10.0 - 15.0 %    Platelet Count 337 150 - 450 10e9/L    Diff Method Automated Method     % Neutrophils 56.9 %    % Lymphocytes 34.7 %    % Monocytes 5.5 %    % Eosinophils 1.6 %    % Basophils 1.2 %    % Immature Granulocytes 0.1 %    Nucleated RBCs 0 0 /100    Absolute Neutrophil 4.6 1.6 - 8.3 10e9/L    Absolute Lymphocytes 2.8 0.8 - 5.3 10e9/L    Absolute Monocytes 0.4 0.0 - 1.3 10e9/L    Absolute Eosinophils 0.1 0.0 - 0.7 10e9/L    Absolute Basophils 0.1 0.0 - 0.2 10e9/L    Abs Immature Granulocytes 0.0 0 - 0.4 10e9/L    Absolute Nucleated RBC 0.0    Comprehensive metabolic panel    Collection Time: 06/08/21  3:53 PM   Result Value Ref Range    Sodium 139 133 - 144 mmol/L    Potassium 3.9 3.4 - 5.3 mmol/L    Chloride 108 94 - 109 mmol/L    Carbon Dioxide 24 20 - 32 mmol/L    Anion Gap 7 3 - 14 mmol/L    Glucose 76 70 - 99 mg/dL    Urea Nitrogen 11 7 - 30 mg/dL    Creatinine 0.73 0.52 - 1.04 mg/dL    GFR Estimate >90 >60 mL/min/[1.73_m2]    GFR Estimate If Black >90 >60 mL/min/[1.73_m2]    Calcium 9.4 8.5 - 10.1 mg/dL    Bilirubin Total 0.4 0.2 - 1.3 mg/dL    Albumin 4.3 3.4 - 5.0 g/dL    Protein Total 8.2 6.8 - 8.8 g/dL    Alkaline Phosphatase 63 40 - 150 U/L    ALT 19 0 - 50 U/L    AST 18 0 - 45 U/L   Salicylate level    Collection Time: 06/08/21  3:53 PM   Result Value Ref Range    Salicylate Level <2 mg/dL   Acetaminophen level    Collection Time: 06/08/21  3:53 PM   Result Value Ref Range    Acetaminophen Level <2 mg/L   HCG qualitative urine (UPT)    Collection Time: 06/08/21  9:37 PM   Result Value Ref Range    HCG Qual Urine Negative NEG^Negative   Drug abuse screen 6 urine (chem dep)    Collection Time: 06/08/21  9:37 PM   Result Value Ref Range    Amphetamine Qual Urine Negative NEG^Negative    Barbiturates Qual Urine Negative NEG^Negative    Benzodiazepine Qual Urine Negative NEG^Negative    Cannabinoids Qual Urine Negative NEG^Negative    Cocaine Qual Urine Negative NEG^Negative     Ethanol Qual Urine Negative NEG^Negative    Opiates Qualitative Urine Negative NEG^Negative   Asymptomatic SARS-CoV-2 COVID-19 Virus (Coronavirus) by PCR    Collection Time: 06/08/21 10:12 PM    Specimen: Nasopharyngeal   Result Value Ref Range    SARS-CoV-2 Virus Specimen Source Nasopharyngeal     SARS-CoV-2 PCR Result NEGATIVE     SARS-CoV-2 PCR Comment (Note)    Lipid panel    Collection Time: 06/09/21  7:42 AM   Result Value Ref Range    Cholesterol 165 <200 mg/dL    Triglycerides 57 <150 mg/dL    HDL Cholesterol 74 >49 mg/dL    LDL Cholesterol Calculated 80 <100 mg/dL    Non HDL Cholesterol 91 <130 mg/dL   TSH with free T4 reflex and/or T3 as indicated    Collection Time: 06/09/21  7:42 AM   Result Value Ref Range    TSH 1.77 0.40 - 4.00 mU/L   Magnesium    Collection Time: 06/15/21  7:33 AM   Result Value Ref Range    Magnesium 2.1 1.6 - 2.3 mg/dL   Phosphorus    Collection Time: 06/15/21  7:33 AM   Result Value Ref Range    Phosphorus 3.7 2.5 - 4.5 mg/dL   Comprehensive metabolic panel    Collection Time: 06/15/21  7:33 AM   Result Value Ref Range    Sodium 141 133 - 144 mmol/L    Potassium 3.9 3.4 - 5.3 mmol/L    Chloride 107 94 - 109 mmol/L    Carbon Dioxide 28 20 - 32 mmol/L    Anion Gap 6 3 - 14 mmol/L    Glucose 80 70 - 99 mg/dL    Urea Nitrogen 8 7 - 30 mg/dL    Creatinine 1.07 (H) 0.52 - 1.04 mg/dL    GFR Estimate 71 >60 mL/min/[1.73_m2]    GFR Estimate If Black 82 >60 mL/min/[1.73_m2]    Calcium 9.1 8.5 - 10.1 mg/dL    Bilirubin Total 0.5 0.2 - 1.3 mg/dL    Albumin 3.9 3.4 - 5.0 g/dL    Protein Total 7.3 6.8 - 8.8 g/dL    Alkaline Phosphatase 52 40 - 150 U/L    ALT 19 0 - 50 U/L    AST 21 0 - 45 U/L   Asymptomatic SARS-CoV-2 COVID-19 Virus (Coronavirus) by PCR    Collection Time: 06/16/21  2:10 PM    Specimen: Nasopharyngeal   Result Value Ref Range    SARS-CoV-2 Virus Specimen Source Nasopharyngeal     SARS-CoV-2 PCR Result NEGATIVE     SARS-CoV-2 PCR Comment (Note)    Glucose by meter     "Collection Time: 06/19/21  2:54 PM   Result Value Ref Range    Glucose 91 70 - 99 mg/dL   Comprehensive metabolic panel    Collection Time: 06/21/21  8:57 AM   Result Value Ref Range    Sodium 140 133 - 144 mmol/L    Potassium 4.0 3.4 - 5.3 mmol/L    Chloride 108 94 - 109 mmol/L    Carbon Dioxide 25 20 - 32 mmol/L    Anion Gap 7 3 - 14 mmol/L    Glucose 108 (H) 70 - 99 mg/dL    Urea Nitrogen 7 7 - 30 mg/dL    Creatinine 0.83 0.52 - 1.04 mg/dL    GFR Estimate >90 >60 mL/min/[1.73_m2]    GFR Estimate If Black >90 >60 mL/min/[1.73_m2]    Calcium 9.0 8.5 - 10.1 mg/dL    Bilirubin Total 0.3 0.2 - 1.3 mg/dL    Albumin 4.0 3.4 - 5.0 g/dL    Protein Total 7.4 6.8 - 8.8 g/dL    Alkaline Phosphatase 52 40 - 150 U/L    ALT 19 0 - 50 U/L    AST 19 0 - 45 U/L   Magnesium    Collection Time: 06/21/21  8:57 AM   Result Value Ref Range    Magnesium 2.1 1.6 - 2.3 mg/dL   Phosphorus    Collection Time: 06/21/21  8:57 AM   Result Value Ref Range    Phosphorus 2.9 2.5 - 4.5 mg/dL              Consults:   No consultations were requested during this admission         Hospital Course:   Corinne \"Cori\" Melinda is a 25-year-old female admitted to [address] on 6/8/2021.  She was admitted on a 72-hour hold through the ER following an overdose on 3000 mg of Neurontin as a suicide attempt.  She had been restricting her food intake and scratching herself.  Since admission, she tapered off Cymbalta.  Prozac was initiated.  Lamictal was continued. Latuda was continued.  Naltrexone was continued but she refused it throughout admission.  PRN Hydroxyzine was initiated.  PRN Zyprexa was initiated but then replaced with PRN Haldol as she would not take PRN Zyprexa due to potential for weight gain.  A petition for commitment MI was initiated in [facility] but was not supported, and she subsequently signed in voluntarily.  On 6/9 she superficially cut her right wrist and right antecubital area.  On 6/12 she opened old wounds on her " ankles.  SIO was initiated on 6/13 after she informed staff that she tried to insert plasticware and pens into the electrical outlets in her room.  She superficially cut herself with a straw in her bathroom on 6/16.  On 6/17 she engaged in light head banging and was not redirectable, so IM Zyprexa was administered.  She has engaged in head banging frequently.  She received PO/IM Zyprexa and later Haldol when she engaged in this with some improvement  She continued to endorse suicidal ideation and urges to engage in self-injury.  She had been eating at least 500 calories per day. On 6/18 provider completed disability paperwork recommending reassessment of work status in 3 weeks. Care transferred to Dr. Headley on 6/25. Patient reported that SI thoughts were less frequent and less intense. She continued to engage in some self harm over weekend with scratching with staple she removed from tea bag. She reported that SI thoughts were absent on 6/28 and requested to discharge home. Treatment team reported concern with ongoing self harm and need for further stabilization, patient signed 12 hour intent to discharge.     Today Corinne N Palm reports she has urges to engage in scratching/head banging, denies intent; denies having any thoughts of suicide. In addition, she has notable risk factors for self-harm, including anxiety and previous suicide attempts. However, risk is mitigated by commitment to family, sobriety, ability to volunteer a safety plan and history of seeking help when needed. Therefore, based on all available evidence including the factors cited above, she does not appear to be at imminent risk for self-harm, does not meet criteria for a 72-hr hold, and therefore remains appropriate for ongoing outpatient level of care. Voluntary ongoing hospitalization was recommended, however patient declined and therefore discharged AGAINST MEDICAL ADVICE.    Corinne N Palm was discharged AMA to home with . At the  "time of discharge Corinne N Palm was determined to not be a danger to herself or others.          Discharge Medications:     Current Discharge Medication List      START taking these medications    Details   FLUoxetine (PROZAC) 40 MG capsule Take 1 capsule (40 mg) by mouth daily  Qty: 30 capsule, Refills: 0    Associated Diagnoses: Major depressive disorder, recurrent episode, moderate (H)      multivitamin w/minerals (THERA-VIT-M) tablet Take 1 tablet by mouth daily  Qty: 30 tablet, Refills: 0    Associated Diagnoses: Eating disorder, unspecified type      thiamine (B-1) 100 MG tablet Take 1 tablet (100 mg) by mouth daily  Qty: 30 tablet, Refills: 0    Associated Diagnoses: Eating disorder, unspecified type         CONTINUE these medications which have CHANGED    Details   lamoTRIgine (LAMICTAL) 150 MG tablet Take 1 tablet (150 mg) by mouth 2 times daily  Qty: 60 tablet, Refills: 0    Associated Diagnoses: Major depressive disorder, recurrent episode, moderate (H)         CONTINUE these medications which have NOT CHANGED    Details   levonorgestrel (KYLEENA) 19.5 MG IUD 1 each by Intrauterine route      lurasidone (LATUDA) 80 MG TABS tablet Take 80 mg by mouth At Bedtime      polyethylene glycol (MIRALAX) 17 GM/Dose powder Take 17 g by mouth daily       Vitamin D, Cholecalciferol, 25 MCG (1000 UT) TABS Take 1,000 Units by mouth daily          STOP taking these medications       DULoxetine (CYMBALTA) 30 MG capsule Comments:   Reason for Stopping:         DULoxetine (CYMBALTA) 60 MG capsule Comments:   Reason for Stopping:         gabapentin (NEURONTIN) 300 MG capsule Comments:   Reason for Stopping:                    Psychiatric Examination:   Appearance:  awake, alert, adequately groomed and dressed in hospital scrubs  Attitude:  cooperative   Eye Contact:  good  Mood:  \"a little better\", still depressed with periods of dyregulation  Affect:  Mood congruent, mildly blunted  Speech:  clear, coherent  Psychomotor " Behavior:  no evidence of tardive dyskinesia, dystonia, or tics  Thought Process:  linear and goal oriented  Associations:  no loose associations  Thought Content:  no evidence of psychotic thought, reports suicidal ideation with plan to overdose is less intense, reports high urges to engage in self-injury   Insight:  partial   Judgment:  limited  Oriented to:  date, time, person, and place  Attention Span and Concentration:  reports mild impairment  Recent and Remote Memory:  reports mild short term impairment  Language:  intact, fluent English  Fund of Knowledge:  appropriate  Muscle Strength and Tone : normal  Gait and Station:  normal         Discharge Plan:   Patient discharged AMA to home with .    Health Care Follow-up:    You stated that you already have appointments scheduled with your psychiatrist at [facility] and so do not need assistance scheduling these:   Psychiatrist Giulia Vigil at [facility].  [facility]  Located in: [facility]  Address: [facility] [address], [place] [address]  Phone: [phone]     You reported that you would make an appointment with your therapist at [facility], Citlaly Mae  [address] [place] [address]  [address]  [place] [address]  Phone: [phone]  Fax: [phone]     Please follow up with your [facility] Care Coordinator: Bekah [phone]     You have expressed an interest in case management services. This is obtained through your [facility]. Please contact [facility]  at [phone].      Attend all scheduled appointments with your outpatient providers. Call at least 24 hours in advance if you need to reschedule an appointment to ensure continued access to your outpatient providers.        Attestation:  Patient has been seen and evaluated by me, Denies Headley DO on day of discharge. 35 minutes were spent in coordination of discharge planning.

## 2021-06-28 NOTE — PLAN OF CARE
"Assessment/Intervention/Current Symptoms and Care Coordination: reviewed chart and attempted to call back pt's OP therapist, Citlaly Pena at [phone].  She noted that pt attends DBT at [facility] three days a week and pt is welcome to return to this program.  Called Citlaly back and LVM that pt would likely discharge soon given she has requested discharge although that will be AMA.  Requested Citlaly call back with days of the week that pt attends group.    Citlaly later called back and reports that pt can continue with her DBT therapist but they will refer pt to \"a higher level of care\".  Citlaly stated she will call pt on unit to inform and arrange.  This writer attempted to call Citlaly again but call went to .    Pt had rescinded her NED to  but signed a new one so this CTC placed call to  and coordinated transportation.  He will pick her up around 4:15 to 5:15.   does feel pt has benefited from her stay on the unit overall.  He was appreciative for efforts made.  He will assist pt with OP attendance.      Discharge Plan or Goal: pt is requesting discharge and was told she would need to sign 12 hour intent then would be AMA.    Barriers to Discharge: see above    Referral Status: pt has established providers    Legal Status:  Pt is voluntary and while a petition for commitment was initiated, it was not upheld    "

## 2021-06-28 NOTE — PROGRESS NOTES
Lida attended 1 of 3 OT groups today. She attended a Life Skills group this a.m. that involved sharing reflections according to question prompts. Withdrawn, minimal verbal contribution to the session. Reports that she is ready for discharge and she feels no need to stay on the unit longer.      06/28/21 1400   Occupational Therapy   Type of Intervention structured groups   Response Participates with encouragement   Hours 1

## 2021-06-28 NOTE — PLAN OF CARE
Pt was irritable about not being able to attend groups and stated that if she's not allowed to go to groups then she wants to sign a 12-hr intent and go home. Pt stated that she's been doing better with her SIB urges and doesn't know why she can't attend groups. However, pt engaged in SIB yesterday by scratching herself with a staple from a tea bag. Pt continues on SIO for safety. She spent at least 30 minutes pacing quickly up and down the scott with the weighted shoulder wrap. Will continue to monitor.

## 2021-06-28 NOTE — PROGRESS NOTES
"Number of patients attending the group:  5  Group Length:  1 Hour    Group Therapy     Summary of Group / Topics Discussed:    The  psychotherapy group goal is to promote insight to positive choice and change. Group processing was within a supportive and safe environment. Patients processed emotions using verbal group and expressive psychotherapy interventions.  This patient reported her goal to be \"needing help staying less frustrated, due to a lot of things going on.\"    Assessment:  Pt received personalized feedback from writer and group members on ways to focus on de-cluttering her mind so as to stay motivated for success. Following pt's report that she was feeling irritable, writer utilized socratic questioning in encouraging writer to brainstorm on what strategies could help her avoid catastrophic thoughts and responses. Pt reported she did not know how to approach the task, but received positive feedback and structured feedback on steps to take to address her needs.    Patient Response:   Pt presented with flat affect. Participated in group. Provided and gave feedback to group members. Pt reported willingness to practice skills discussed, especially, the part about writing out her plans, meticulously, and considering them in terms of the one with the best possible, civil outcome.                    "

## 2021-07-05 ENCOUNTER — TELEPHONE (OUTPATIENT)
Dept: BEHAVIORAL HEALTH | Facility: CLINIC | Age: 26
End: 2021-07-05

## 2021-07-05 ENCOUNTER — HOSPITAL ENCOUNTER (INPATIENT)
Facility: CLINIC | Age: 26
LOS: 14 days | Discharge: HOME | DRG: 885 | End: 2021-07-20
Attending: PSYCHIATRY & NEUROLOGY | Admitting: PSYCHIATRY & NEUROLOGY
Payer: COMMERCIAL

## 2021-07-05 DIAGNOSIS — Z86.59 HISTORY OF OCD (OBSESSIVE COMPULSIVE DISORDER): ICD-10-CM

## 2021-07-05 DIAGNOSIS — R45.851 SUICIDAL IDEATION: ICD-10-CM

## 2021-07-05 DIAGNOSIS — F60.3 BORDERLINE PERSONALITY DISORDER (H): ICD-10-CM

## 2021-07-05 DIAGNOSIS — Z20.822 COVID-19 RULED OUT BY LABORATORY TESTING: ICD-10-CM

## 2021-07-05 DIAGNOSIS — F33.2 SEVERE EPISODE OF RECURRENT MAJOR DEPRESSIVE DISORDER, WITHOUT PSYCHOTIC FEATURES (H): ICD-10-CM

## 2021-07-05 DIAGNOSIS — E55.9 VITAMIN D DEFICIENCY: Primary | ICD-10-CM

## 2021-07-05 LAB
AMPHETAMINES UR QL SCN: NEGATIVE
BARBITURATES UR QL: NEGATIVE
BENZODIAZ UR QL: NEGATIVE
CANNABINOIDS UR QL SCN: NEGATIVE
COCAINE UR QL: NEGATIVE
ETHANOL UR QL SCN: NEGATIVE
HCG UR QL: NEGATIVE
LABORATORY COMMENT REPORT: NORMAL
OPIATES UR QL SCN: NEGATIVE
SARS-COV-2 RNA RESP QL NAA+PROBE: NEGATIVE
SPECIMEN SOURCE: NORMAL

## 2021-07-05 PROCEDURE — C9803 HOPD COVID-19 SPEC COLLECT: HCPCS | Performed by: PSYCHIATRY & NEUROLOGY

## 2021-07-05 PROCEDURE — 250N000013 HC RX MED GY IP 250 OP 250 PS 637: Performed by: FAMILY MEDICINE

## 2021-07-05 PROCEDURE — 99285 EMERGENCY DEPT VISIT HI MDM: CPT | Mod: 25 | Performed by: PSYCHIATRY & NEUROLOGY

## 2021-07-05 PROCEDURE — 80320 DRUG SCREEN QUANTALCOHOLS: CPT | Performed by: PSYCHIATRY & NEUROLOGY

## 2021-07-05 PROCEDURE — 80307 DRUG TEST PRSMV CHEM ANLYZR: CPT | Performed by: PSYCHIATRY & NEUROLOGY

## 2021-07-05 PROCEDURE — 87635 SARS-COV-2 COVID-19 AMP PRB: CPT | Performed by: PSYCHIATRY & NEUROLOGY

## 2021-07-05 PROCEDURE — 99285 EMERGENCY DEPT VISIT HI MDM: CPT | Performed by: PSYCHIATRY & NEUROLOGY

## 2021-07-05 PROCEDURE — 81025 URINE PREGNANCY TEST: CPT | Performed by: PSYCHIATRY & NEUROLOGY

## 2021-07-05 RX ORDER — HYDROXYZINE HYDROCHLORIDE 25 MG/1
50 TABLET, FILM COATED ORAL ONCE
Status: COMPLETED | OUTPATIENT
Start: 2021-07-05 | End: 2021-07-05

## 2021-07-05 RX ORDER — LURASIDONE HYDROCHLORIDE 80 MG/1
80 TABLET, FILM COATED ORAL AT BEDTIME
Status: DISCONTINUED | OUTPATIENT
Start: 2021-07-05 | End: 2021-07-19

## 2021-07-05 RX ORDER — MULTIPLE VITAMINS W/ MINERALS TAB 9MG-400MCG
1 TAB ORAL DAILY
Status: DISCONTINUED | OUTPATIENT
Start: 2021-07-06 | End: 2021-07-20 | Stop reason: HOSPADM

## 2021-07-05 RX ADMIN — LURASIDONE HYDROCHLORIDE 80 MG: 80 TABLET, FILM COATED ORAL at 22:55

## 2021-07-05 RX ADMIN — HYDROXYZINE HYDROCHLORIDE 50 MG: 25 TABLET, FILM COATED ORAL at 19:31

## 2021-07-05 RX ADMIN — LAMOTRIGINE 150 MG: 100 TABLET ORAL at 22:55

## 2021-07-05 ASSESSMENT — ENCOUNTER SYMPTOMS
GASTROINTESTINAL NEGATIVE: 1
MUSCULOSKELETAL NEGATIVE: 1
NERVOUS/ANXIOUS: 1
CONSTITUTIONAL NEGATIVE: 1
RESPIRATORY NEGATIVE: 1
CARDIOVASCULAR NEGATIVE: 1
DECREASED CONCENTRATION: 1
EYES NEGATIVE: 1
NEUROLOGICAL NEGATIVE: 1

## 2021-07-05 NOTE — TELEPHONE ENCOUNTER
S: 1:29pDemi Supervisor called intake w/ a DEC bypass.     B: The pt has a hx of borderline personality disorder and MDD.    The pt was recent was discharged AMA- Unit 32.     The pt is presenting w/ SI w/ a plan.    R: The pt is currently in the [facility] awaiting bed placement.     1:30pm Intake awaiting clinical and medical clearance.

## 2021-07-05 NOTE — ED NOTES
Sign out Provider: Troung      Sign out Plan: Patient with history of major depression, eating disorder, and borderline personality disorder who presented as severely depressed and suicidal. She was evaluated and placed in the queue for admission after medical clearance.      Reassessment: No events on the shift.      Disposition: Patient is awaiting bed placement.       Kerwin Briseno MD  07/05/21 3076

## 2021-07-05 NOTE — TELEPHONE ENCOUNTER
Patient cleared and ready for behavioral bed placement: Yes - Labs pending  S:  Call received from Dr. Baez requesting inpatient mental health admission.  DEC bypass previously approved.  B:  25 year old female who is here accompanied by spouse. Patient admits to struggling past week since discharge from the hospital and now feels acutely suicidal and unsafe. She has urges to cut to bleed out or to overdose on her pills. She has been looking for ways to get the key to the med lock box kept by her spouse. Patient has history of recurrent MDD, eating disorder, borderline personality disorder. She has history of suicide attempt by ingestion. She was recently hospitalized but left AMA 1 week ago as she felt better. She started to feel worse 2 days after discharge and been feeling hopeless, helpless and acutely suicidal past 3 days. Spouse no longer feels he can maintain safety for her in the home. She has intrusive thoughts of wanting to act on her thoughts and feels unsafe being home.  Labs pending  A:  Vol

## 2021-07-05 NOTE — ED PROVIDER NOTES
ED Provider Note  [facility]      History     Chief Complaint   Patient presents with     Suicidal     HPI  Corinne N Palm is a 25 year old female who is here accompanied by spouse. Patient admits to struggling past week since discharge from the hospital and now feels acutely suicidal and unsafe. She has urges to cut to bleed out or to overdose on her pills. She has been looking for ways to get the key to the med lock box kept by her spouse. Patient has history of recurrent MDD, eating disorder, borderline personality disorder. She has history of suicide attempt by ingestion. She was recently hospitalized but left AMA 1 week ago as she felt better. She started to feel worse 2 days after discharge and been feeling hopeless, helpless and acutely suicidal past 3 days. Spouse no longer feels he can maintain safety for her in the home. She has intrusive thoughts of wanting to act on her thoughts and feels unsafe being home.    Patient has not followed up with her outpatient services. She was recommended for higher level PHP but nothing is available in the near future. Patient denies using drugs or drinking. She denies acute medical concerns. She denies COVID symptoms. She has bene taking her meds.    PERSONAL MEDICAL HISTORY  Past Medical History:   Diagnosis Date     OCD (obsessive compulsive disorder)      PAST SURGICAL HISTORY  Past Surgical History:   Procedure Laterality Date     ENT SURGERY       FAMILY HISTORY  No family history on file.  SOCIAL HISTORY  Social History     Tobacco Use     Smoking status: Never Smoker   Substance Use Topics     Alcohol use: No     MEDICATIONS  No current facility-administered medications for this encounter.      Current Outpatient Medications   Medication     FLUoxetine (PROZAC) 40 MG capsule     lamoTRIgine (LAMICTAL) 150 MG tablet     levonorgestrel (KYLEENA) 19.5 MG IUD     lurasidone (LATUDA) 80 MG TABS tablet     multivitamin w/minerals  (THERA-VIT-M) tablet     polyethylene glycol (MIRALAX) 17 GM/Dose powder     thiamine (B-1) 100 MG tablet     Vitamin D, Cholecalciferol, 25 MCG (1000 UT) TABS     ALLERGIES  Allergies   Allergen Reactions     Morphine Other (See Comments)     Twitching     Amoxicillin Rash     Sulfa Drugs Rash          Review of Systems   Constitutional: Negative.    HENT: Negative.    Eyes: Negative.    Respiratory: Negative.    Cardiovascular: Negative.    Gastrointestinal: Negative.    Genitourinary: Negative.    Musculoskeletal: Negative.    Skin: Negative.    Neurological: Negative.    Psychiatric/Behavioral: Positive for decreased concentration, self-injury and suicidal ideas. The patient is nervous/anxious.    All other systems reviewed and are negative.        Physical Exam   BP: 125/86  Pulse: 72  Temp: 98.3  F (36.8  C)  Resp: 16  SpO2: 100 %  Physical Exam  Vitals signs and nursing note reviewed.   HENT:      Head: Normocephalic.   Eyes:      Pupils: Pupils are equal, round, and reactive to light.   Neck:      Musculoskeletal: Normal range of motion.   Pulmonary:      Effort: Pulmonary effort is normal.   Musculoskeletal: Normal range of motion.   Neurological:      General: No focal deficit present.      Mental Status: She is alert and oriented to person, place, and time. Mental status is at baseline.   Psychiatric:         Attention and Perception: Attention and perception normal. She does not perceive auditory or visual hallucinations.         Mood and Affect: Mood is anxious and depressed. Affect is blunt.         Speech: Speech normal.         Behavior: Behavior is withdrawn. Behavior is not agitated, aggressive, hyperactive or combative. Behavior is cooperative.         Thought Content: Thought content is not paranoid or delusional. Thought content includes suicidal ideation. Thought content does not include homicidal ideation. Thought content includes suicidal plan.         Cognition and Memory: Cognition and  memory normal.         Judgment: Judgment normal.         ED Course      Procedures           No results found for any visits on 07/05/21.  Medications - No data to display     Assessments & Plan (with Medical Decision Making)   Patient with MDD, eating disorder, and borderline personality disorder who is here as she feels severely depressed and now acutely suicidal. She does not feel safe being home and has urges to overdose or cut. She would like to voluntarily check herself into the hospital. She is referred.    I have reviewed the nursing notes. I have reviewed the findings, diagnosis, plan and need for follow up with the patient.    New Prescriptions    No medications on file       Final diagnoses:   Severe episode of recurrent major depressive disorder, without psychotic features (H)   Suicidal ideation   Borderline personality disorder (H)       --  Mir Beaz MD  [facility] EMERGENCY DEPARTMENT  7/5/2021     Mir Baez MD  07/05/21 1242

## 2021-07-05 NOTE — PHARMACY-ADMISSION MEDICATION HISTORY
Admission medication history interview status for the 7/5/2021 admission is complete. See Epic admission navigator for allergy information, pharmacy, prior to admission medications and immunization status.     Medication history interview sources:  Discharge summary 6/30/21; dispense report    Changes made to PTA medication list (reason)  Added: none  Deleted: none  Changed: none    Prior to Admission medications    Medication Sig Last Dose Taking? Auth Provider   FLUoxetine (PROZAC) 40 MG capsule Take 1 capsule (40 mg) by mouth daily  Yes Denise Headley MD   lamoTRIgine (LAMICTAL) 150 MG tablet Take 1 tablet (150 mg) by mouth 2 times daily  Yes Denise Headley MD   levonorgestrel (KYLEENA) 19.5 MG IUD 1 each by Intrauterine route  Yes Reported, Patient   lurasidone (LATUDA) 80 MG TABS tablet Take 80 mg by mouth At Bedtime  Yes Unknown, Entered By History   multivitamin w/minerals (THERA-VIT-M) tablet Take 1 tablet by mouth daily  Yes Denise Headley MD   polyethylene glycol (MIRALAX) 17 GM/Dose powder Take 17 g by mouth daily   Yes Reported, Patient   thiamine (B-1) 100 MG tablet Take 1 tablet (100 mg) by mouth daily  Yes Denise Hedaley MD   Vitamin D, Cholecalciferol, 25 MCG (1000 UT) TABS Take 1,000 Units by mouth daily   Yes Unknown, Entered By History     Medication history completed by:   Chio Palm, PharmD   Pediatric Clinical Pharmacist

## 2021-07-06 ENCOUNTER — TELEPHONE (OUTPATIENT)
Dept: BEHAVIORAL HEALTH | Facility: CLINIC | Age: 26
End: 2021-07-06

## 2021-07-06 PROBLEM — F33.2 SEVERE EPISODE OF RECURRENT MAJOR DEPRESSIVE DISORDER, WITHOUT PSYCHOTIC FEATURES (H): Status: ACTIVE | Noted: 2021-07-06

## 2021-07-06 PROCEDURE — 124N000002 HC R&B MH UMMC

## 2021-07-06 PROCEDURE — 250N000013 HC RX MED GY IP 250 OP 250 PS 637: Performed by: PSYCHIATRY & NEUROLOGY

## 2021-07-06 PROCEDURE — 250N000013 HC RX MED GY IP 250 OP 250 PS 637: Performed by: FAMILY MEDICINE

## 2021-07-06 PROCEDURE — 99222 1ST HOSP IP/OBS MODERATE 55: CPT | Mod: AI | Performed by: PSYCHIATRY & NEUROLOGY

## 2021-07-06 PROCEDURE — 250N000013 HC RX MED GY IP 250 OP 250 PS 637: Performed by: EMERGENCY MEDICINE

## 2021-07-06 RX ORDER — BUSPIRONE HYDROCHLORIDE 10 MG/1
10 TABLET ORAL 2 TIMES DAILY
Status: DISCONTINUED | OUTPATIENT
Start: 2021-07-06 | End: 2021-07-08

## 2021-07-06 RX ORDER — POLYETHYLENE GLYCOL 3350 17 G/17G
17 POWDER, FOR SOLUTION ORAL DAILY
Status: DISCONTINUED | OUTPATIENT
Start: 2021-07-06 | End: 2021-07-20 | Stop reason: HOSPADM

## 2021-07-06 RX ORDER — VITAMIN B COMPLEX
1000 TABLET ORAL DAILY
Status: DISCONTINUED | OUTPATIENT
Start: 2021-07-07 | End: 2021-07-20 | Stop reason: HOSPADM

## 2021-07-06 RX ORDER — MAGNESIUM HYDROXIDE/ALUMINUM HYDROXICE/SIMETHICONE 120; 1200; 1200 MG/30ML; MG/30ML; MG/30ML
30 SUSPENSION ORAL EVERY 4 HOURS PRN
Status: DISCONTINUED | OUTPATIENT
Start: 2021-07-06 | End: 2021-07-20 | Stop reason: HOSPADM

## 2021-07-06 RX ORDER — AMOXICILLIN 250 MG
1 CAPSULE ORAL 2 TIMES DAILY PRN
Status: DISCONTINUED | OUTPATIENT
Start: 2021-07-06 | End: 2021-07-20 | Stop reason: HOSPADM

## 2021-07-06 RX ORDER — CITALOPRAM HYDROBROMIDE 10 MG/1
10 TABLET ORAL DAILY
Status: DISCONTINUED | OUTPATIENT
Start: 2021-07-06 | End: 2021-07-09

## 2021-07-06 RX ORDER — OLANZAPINE 10 MG/1
10 TABLET ORAL 3 TIMES DAILY PRN
Status: DISCONTINUED | OUTPATIENT
Start: 2021-07-06 | End: 2021-07-20 | Stop reason: HOSPADM

## 2021-07-06 RX ORDER — HYDROXYZINE HYDROCHLORIDE 25 MG/1
25 TABLET, FILM COATED ORAL ONCE
Status: COMPLETED | OUTPATIENT
Start: 2021-07-06 | End: 2021-07-06

## 2021-07-06 RX ORDER — HYDROXYZINE HYDROCHLORIDE 50 MG/1
50 TABLET, FILM COATED ORAL EVERY 4 HOURS PRN
Status: DISCONTINUED | OUTPATIENT
Start: 2021-07-06 | End: 2021-07-20 | Stop reason: HOSPADM

## 2021-07-06 RX ORDER — ACETAMINOPHEN 325 MG/1
650 TABLET ORAL EVERY 4 HOURS PRN
Status: DISCONTINUED | OUTPATIENT
Start: 2021-07-06 | End: 2021-07-20 | Stop reason: HOSPADM

## 2021-07-06 RX ORDER — TRAZODONE HYDROCHLORIDE 50 MG/1
50 TABLET, FILM COATED ORAL
Status: DISCONTINUED | OUTPATIENT
Start: 2021-07-06 | End: 2021-07-20 | Stop reason: HOSPADM

## 2021-07-06 RX ORDER — ACETAMINOPHEN 325 MG/1
975 TABLET ORAL ONCE
Status: COMPLETED | OUTPATIENT
Start: 2021-07-06 | End: 2021-07-06

## 2021-07-06 RX ORDER — OLANZAPINE 10 MG/2ML
10 INJECTION, POWDER, FOR SOLUTION INTRAMUSCULAR 3 TIMES DAILY PRN
Status: DISCONTINUED | OUTPATIENT
Start: 2021-07-06 | End: 2021-07-20 | Stop reason: HOSPADM

## 2021-07-06 RX ORDER — LANOLIN ALCOHOL/MO/W.PET/CERES
100 CREAM (GRAM) TOPICAL DAILY
Status: DISCONTINUED | OUTPATIENT
Start: 2021-07-07 | End: 2021-07-20 | Stop reason: HOSPADM

## 2021-07-06 RX ADMIN — POLYETHYLENE GLYCOL 3350 17 G: 17 POWDER, FOR SOLUTION ORAL at 16:56

## 2021-07-06 RX ADMIN — LAMOTRIGINE 150 MG: 100 TABLET ORAL at 08:00

## 2021-07-06 RX ADMIN — LAMOTRIGINE 150 MG: 100 TABLET ORAL at 20:35

## 2021-07-06 RX ADMIN — CITALOPRAM HYDROBROMIDE 10 MG: 10 TABLET ORAL at 16:54

## 2021-07-06 RX ADMIN — BUSPIRONE HYDROCHLORIDE 10 MG: 10 TABLET ORAL at 20:35

## 2021-07-06 RX ADMIN — Medication 1 TABLET: at 07:47

## 2021-07-06 RX ADMIN — LURASIDONE HYDROCHLORIDE 80 MG: 80 TABLET, FILM COATED ORAL at 20:35

## 2021-07-06 RX ADMIN — HYDROXYZINE HYDROCHLORIDE 25 MG: 25 TABLET, FILM COATED ORAL at 03:55

## 2021-07-06 RX ADMIN — ACETAMINOPHEN 975 MG: 325 TABLET, FILM COATED ORAL at 02:51

## 2021-07-06 RX ADMIN — FLUOXETINE HYDROCHLORIDE 40 MG: 20 CAPSULE ORAL at 07:47

## 2021-07-06 ASSESSMENT — MIFFLIN-ST. JEOR: SCORE: 1227.58

## 2021-07-06 NOTE — ED NOTES
Psych associate removed a padmini pin from pt which was in the process of being bent. Pt has swallow scratches on her L arm. MD notified

## 2021-07-06 NOTE — PLAN OF CARE
Admission Note:    Situation:   Patient is a 25 year old female admitted from [facility] after having SI with plan to overdose on pills.    Background:   Patient has a history of MDD, eating disorder, borderline personality disorder, suicide attempt, and prior  admissions. Most recent was over a week ago and she left AMA. No history of CD admission. No known CD issues. UTOX was negative. Denies smoking. History of suicide attempt by uncle. Current plan is to overdose by getting the key to the med lock box that her  has or to cut self.    Assessment:   Patient presented as calm with a flat and withdrawn affect. She didn't give eye contact to writer and looked down holding herself. She was cooperative with assessment questions and voiced compliance with medications. Patient did say she is taking her Lamictal once a day since that's how she thought she was suppose to be taking it however it is prescribed twice daily. Patient voiced having a good support system from her parents, , and friends.  Patient feels that things would be better if she didn't have so many thoughts to hurt herself. Patient said that she can contract for safety while on the unit.    Recommendation:   Status is voluntary. Labs and precautions will be ordered.

## 2021-07-06 NOTE — ED NOTES
ED to Behavioral Floor Handoff    SITUATION  Corinne N Palm is a 25 year old female who speaks English and lives in a home with a spouse The patient arrived in the ED by private car from home with a complaint of Suicidal  .The patient's current symptoms started/worsened 1 week(s) ago and during this time the symptoms have increased.   In the ED, pt was diagnosed with   Final diagnoses:   Severe episode of recurrent major depressive disorder, without psychotic features (H)   Suicidal ideation   Borderline personality disorder (H)        Initial vitals were: BP: 125/86  Pulse: 72  Temp: 98.3  F (36.8  C)  Resp: 16  SpO2: 100 %   --------  Is the patient diabetic? No   If yes, last blood glucose? --     If yes, was this treated in the ED? --  --------  Is the patient inebriated (ETOH) No or Impaired on other substances? No  MSSA done? N/A  Last MSSA score: --    Were withdrawal symptoms treated? N/A  Does the patient have a seizure history? No. If yes, date of most recent seizure--  --------  Is the patient patient experiencing suicidal ideation? reports the following suicide factors: Does not know what is making her feel suicidal but had plan to cut or OD on her meds but  had them locked up.  Pt scrataching herself with a padmini pin in ED.    Homicidal ideation? denies current or recent homicidal ideation or behaviors.    Self-injurious behavior/urges? reports current or recent self injurious behavior or ideation including scratching her self with padmini pin. and urege to cut..  ------  Was pt aggressive in the ED No  Was a code called No  Is the pt now cooperative? Yes  -------  Meds given in ED:   Medications   FLUoxetine (PROzac) capsule 40 mg (40 mg Oral Given 7/6/21 0747)   lamoTRIgine (LaMICtal) tablet 150 mg (150 mg Oral Given 7/6/21 0800)   lurasidone (LATUDA) tablet 80 mg (80 mg Oral Given 7/5/21 1135)   multivitamin w/minerals (THERA-VIT-M) tablet 1 tablet (1 tablet Oral Given 7/6/21 0747)    hydrOXYzine (ATARAX) tablet 50 mg (50 mg Oral Given 7/5/21 1931)   acetaminophen (TYLENOL) tablet 975 mg (975 mg Oral Given 7/6/21 0251)   hydrOXYzine (ATARAX) tablet 25 mg (25 mg Oral Given 7/6/21 4449)      Family present during ED course? Yes  Family currently present? No    BACKGROUND  Does the patient have a cognitive impairment or developmental disability? No  Allergies:   Allergies   Allergen Reactions     Morphine Other (See Comments)     Twitching     Amoxicillin Rash     Sulfa Drugs Rash   .   Social demographics are   Social History     Socioeconomic History     Marital status:      Spouse name: Not on file     Number of children: Not on file     Years of education: Not on file     Highest education level: Not on file   Occupational History     Not on file   Social Needs     Financial resource strain: Not on file     Food insecurity     Worry: Not on file     Inability: Not on file     Transportation needs     Medical: Not on file     Non-medical: Not on file   Tobacco Use     Smoking status: Never Smoker   Substance and Sexual Activity     Alcohol use: No     Drug use: No     Sexual activity: Not on file   Lifestyle     Physical activity     Days per week: Not on file     Minutes per session: Not on file     Stress: Not on file   Relationships     Social connections     Talks on phone: Not on file     Gets together: Not on file     Attends Jainism service: Not on file     Active member of club or organization: Not on file     Attends meetings of clubs or organizations: Not on file     Relationship status: Not on file     Intimate partner violence     Fear of current or ex partner: Not on file     Emotionally abused: Not on file     Physically abused: Not on file     Forced sexual activity: Not on file   Other Topics Concern     Not on file   Social History Narrative     Not on file        ASSESSMENT  Labs results   Labs Ordered and Resulted from Time of ED Arrival Up to the Time of Departure  from the ED   SARS-COV-2 (COVID-19) VIRUS RT-PCR   DRUG ABUSE SCREEN 6 CHEM DEP URINE ([facility])   HCG QUALITATIVE URINE   DOCUMENT IN LEGAL HOLD NAVIGATOR      Imaging Studies: No results found for this or any previous visit (from the past 24 hour(s)).   Most recent vital signs /82   Pulse 88   Temp 98.4  F (36.9  C) (Oral)   Resp 18   LMP 06/08/2021   SpO2 98%    Abnormal labs/tests/findings requiring intervention:---   Pain control: pt had none  Nausea control: poor    RECOMMENDATION  Are any infection precautions needed (MRSA, VRE, etc.)? No If yes, what infection? --  ---  Does the patient have mobility issues? independently. If yes, what device does the pt use? ---  ---  Is patient on 72 hour hold or commitment? No If on 72 hour hold, have hold and rights been given to patient? N/A  Are admitting orders written if after 10 p.m. ?N/A  Tasks needing to be completed:---     Cassandra Velasquez RN   [facility]-- [phone] [place] [facility]   [phone]

## 2021-07-06 NOTE — ED NOTES
Pt given tylenol for headache. Patient expressed concern that other people were moving ahead of her for admission. RN told patient that we care about her and that she was not being overlooked or forgotten, and that as soon as a bed is available for her that she will be able to go on the unit. At this time she was told of plan that [facility] is closing and that she will go to the main ER to wait for a room.

## 2021-07-06 NOTE — PLAN OF CARE
"Assessment/Intervention/Current Symtoms and Care Coordination  Patient here after leaving [facility] AMA and feeling unsafe at home.  Gets support from mother and .  Has outpatient resources that include a DBT Therapist at [facility] and also Psychiatrist Giulia Vigil at [facility] for ED.  Patient at risk for continued SIB here. She was on continuous SIO on [facility] 32 for head banging, superficially cutting with staples she pulled out of teabags, sticking utensils into the outlets in her room.  Patient is currently bryanna for safety.  \"I guess they are changing my medications.\"  Quickly reviewed her outpatient providers and she reported being enrolled to start [facility] next week.     Discharge Plan or Goal  Patient is to go to staff if she feels unsafe.    Barriers to Discharge   Needs medication review, management and stabilization    Referral Status  None    Legal Status  Voluntary  "

## 2021-07-06 NOTE — H&P
Admitted: 07/05/2021    The patient was seen for 53 minutes face-to-face on station 10 of [facility].  Greater than 50% of the time was spent on counseling and discussion with the patient, her past medication trials, presence of support in community.  Her goals for this hospitalization and also suggested that she stay in the hospital voluntarily, not try to leave against medical advice.    CHIEF COMPLAINT AND REASON FOR ADMISSION:  The patient is a 25-year-old female who was recently discharged from station 30 of [facility].  She reports that she started feeling miserable, highly anxious, depressed and had no urges to cut herself.  She came to the hospital voluntarily on suggestion of her .    HISTORY OF PRESENT ILLNESS:  The patient reports that she was diagnosed in the past with major depressive disorder.  Borderline personality disorder.  Panic disorder, generalized anxiety disorder and anorexia nervosa.  She agrees with most of diagnosis, also does not think that she has full blown borderline personality disorder, but only traits.  Stated that she got out of station 30 (patient exhibited self-injurious behavior such as head banging, trying to scratch or cut herself, needed to be on status of individual observation.  A petition for commitment was filed during that hospitalization, but not supported by the [facility].  The patient was discharged back to community.  Said that she felt pretty bad.  She went back to work, which was stressful.  Said that she started having again bouts of severe anxiety, including panic attacks and having suicidal thoughts of overdosing or cutting herself in hopes to bleed out.  She also has been looking for ways to get the key to the medication lock box kept by her spouse.  She reports that her eating disorder has become active and she has been restricting her food, but still, keeps her ideal body weight of 115 pounds.  She denied  purging behavior.  Denied using street drugs.  Her urine drug screen was negative for all substances.  Reports that she does not drink alcohol very often.    PAST PSYCHIATRIC HISTORY:  The patient has a history of psychiatric admissions and also numerous hospitalizations at [facility].  Reports that her official diagnosis is anorexia.  She never had bingeing or purging.  She never had bingeing on food.  She sees a therapist, Giulia Vigil who also works there at [facility].  The patient also sees a therapist from [facility].  She also says has DBT therapist and regular therapist. Apparently have tried multiple medications including a number of SSRIs.  She was able to recall being on Zoloft, Luvox, Prozac, Effexor, Cymbalta, tried risperidone, Seroquel, currently on Latuda, states that she does not want to take any medications, which could possibly increase her weight.  The patient reports that she never experienced any manic episodes and depression has been augmented for many years.  Apparently, she was also diagnosed with obsessive compulsive disorder.  The patient never tried lithium, MAOI, TCA, never tried TCQ or TMS.  She reportedly tried Abilify, Seroquel, currently on Latuda.    PAST MEDICAL HISTORY:  Reported no significant medical history.    PAST SURGICAL HISTORY: Cleft palate with numerous repairs x12. Patient actually knows that there is a component of medical trauma from several repeated surgeries.    HOME MEDICATIONS:  Fluoxetine 40 mg daily, lamotrigine was supposed to take 150 mg 2 times a day, but was taking only 150 once a day, levonorgestrel 19.5 mg intrauterine device, Latuda 80 mg at bedtime, multivitamins with minerals 1 tablet daily, polyethylene glycol 17 grams daily, thiamine 100 mg daily, vitamin D, cholecalciferol 1000 units daily.      ALLERGIES:  REPORTED BEING ALLERGIC TO MORPHINE, AMOXICILLIN, SULFA.     PSYCHIATRIC HISTORY:  The patient denies using street drugs  "and described herself as very infrequent alcohol drinker.    FAMILY HISTORY AND SOCIAL HISTORY:  The patient is from [place] town[place] in [place].  Said that her parents still live there.  She does not have any siblings.  Lives at home.  She resides with her  with whom she has been for 13 years and  to him for the past two.  They do not have children.  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  She does consider work somewhat stressful.  She reports history of mental illness in her family said that uncle was diagnosed with chronic paranoid schizophrenia.  Cousin with chemical addiction and also psychosis, grandmother had anxiety with agoraphobia.    For physical examination and 12-point review of systems, please refer to Dr. Baez note from  07/05/2021.  I reviewed this note and agree with it.    PHYSICAL EXAMINATION:    VITAL SIGNS:  Temperature 98.2, heart rate 89, blood pressure 113/84.  MENTAL STATUS EXAMINATION:  The patient is female of short stature, dressed in hospital garbs. She is pleasant and cooperative, although at the same time appears to be somewhat guarded.  Maintains limited eye contact.  Speech is spontaneous, normal rate, but somewhat monotone in nature.  She describes her mood as depressed.  Affect restricted. The patient does not appear to be so depressed as she describes.  She reports passive suicidal thoughts.  When asked if she could contract for safety, said, \"I will try\".  She does not believe that she needs to be on status of individual observation.  Denies homicidal ideation.  Denies auditory or visual hallucinations.  Did not voice any delusional ideas.  Thought processes were linear, logical, goal directed.  Associations tight.  She was alert and oriented x3.  Fund of knowledge was average with proper usage of vocabulary.  Ability to focus, concentrate immediate short and long-term memories were intact.  Gait and " posture all within normal limits.  Insight and judgment moderately impaired.    IMPRESSION:  Major depressive disorder, recurrent, moderate severity.  Generalized anxiety disorder.  Panic disorder without agoraphobia.  Anorexia nervosa, restrictive type, borderline personality disorder.    TREATMENT PLAN:  The patient presents as someone who is very chronic depression going borderline personality disorder and anxiety.  She believes that anxiety symptoms have been more prominent and more bothersome than depressive symptoms.  We discussed with starting her on benzodiazepines.  She said that she tried to get them from Dr. Vigil, but she was reluctant to treat Corinne with benzodiazepines. As alternative we discussed cutting down on Prozac, trying Corinne on citalopram said she has never been treated with before and at same time starting her on buspirone, Corinne inquired about what would have happened if she decided to leave.  I advised her that probably in light of her continued self-injurious behavior she was discharged against medical advice and the fact that during previous hospitalization here provider tried to commit her.  She probably would be put on a 72-hour hold and petition for commitment would be reconsidered. After hearing this Corinne said that she would stay in the hospital voluntarily.  She is waiting for intake at partial hospitalization program in [place], [place], which is going to be virtual, it will be next Tuesday, 1 week from now.  She says that she would be safer waiting at this facility for a few more days.    Floyd Cerda MD        D: 2021   T: 2021   MT: [id number]    Name:     PALM, CORINNE N.  MRN:      [id number]        Account:     [id number]   :      1995           Admitted:    2021       Document: [id number]

## 2021-07-06 NOTE — TELEPHONE ENCOUNTER
R:  Patient cleared and ready for behavioral bed placement: Yes   Provider - Dr Cerda paged @ 9:36am to present for unit 10 / Dr Cerda  Provider called back at 9:40am and accepted for 10.    Pt placed in units que @ 9:41am and unit called with disposition @ 9:42am  ED Updated with placement @ 9:46am

## 2021-07-06 NOTE — PROGRESS NOTES
IN PATIENT ROOM:     IN PATIENT LOCKER: cell phone (NO ),  tweezers, brown purse, sandals, 2 tops, 3 prs of pants, 4 prs of underwear, cloth mask, 4 pens, 3 lotions, 2 coloring books, 3 regular books, lanyard with one key and wallet attached, 's license, [employer] ID, 2 Health Insurance cards, 4 non-gemstone bracelets.    IN SECURITY: (Envelope # [id number]) 6 gift cards and 5 credit cards, NO MONEY. Wedding Ring sent to security    ADMISSION:  I am responsible for any personal items that are not sent to the safe or pharmacy. [facility] is not responsible for loss, theft or damage of any property in my possession.    Patient Signature _____________________ Date/Time _____________________    Staff Signature _______________________ Date/Time _____________________  2nd Staff person, if patient is unable/unwilling to sign  ___________________________________ Date/Time _____________________  DISCHARGE:  All personal items have been returned to me.    Patient Signature _____________________ Date/Time _____________________    Staff Signature _______________________ Date/Time _____________________

## 2021-07-06 NOTE — PLAN OF CARE
"Initial Psychosocial Assessment    I have reviewed the chart, met with the patient, and developed Care Plan.     Presenting Problem:  The patient is a is a 25 year-old,  female \"Cori\" who was readmitted stating she had been struggling since being discharged from [facility] Dr. Headley's team on 6/28/21 against medical advice AMA. Dr. Headley's Discharge Summary indicated that throughout her admit, patient ate less than 500 calories per day; engaged in head banging \"frequently\", took the staples out of tea bags to scratch herself, inserted plasticware and pens into electrical outlets in her room and superficially cut herself with a straw in her bathroom.  Her  accompanied her back to the hospital.  Patient said she had urges to cut deep enough to bleed out and overdose on her pills.  She was also looking for ways to get the key to her medication lock box kept by her . Hx of MDD, ED, BPD. No alcohol or illicit drug history.    History of Mental Health and Chemical Dependency:  Patient has had five other admissions to [facility].  Just discharged 6/28/2021 from (Dr. Denise Headley) [facility] \"AMA\".  Patient was in [facility] and then [facility] from Nov of 2020 until February of 2021.    Family Description (Constellation, Family Psychiatric History):  The patient is  and has no children of issue.  Per Epic, the patient has anxiety in paternal grandmother; schizohrenia in a maternal uncle.     Significant Life Events (Illness, Abuse, Trauma, Death):  Reported 12 surgeries for cleft palate when she was a child.     Living Situation:  Lives with her  in [place].    Educational Background:  College educated.    Financial status:  [employer]    Legal Issues:  None    Ethnic/Cultural Issues:  None    Spiritual Orientation:  Restorationism     Service History:  None    Social Functioning (organization, " interests):  Music    Current Treatment Providers are:  Psychiatrist Giulia Vigil [address] [phone]  [facility] Therapist Citlaly Neria [address] [place] [address] [address] [place]  [address]  [phone]  [facility] Care Coordinator is Bekah at [phone]  [facility]  [phone]   Newly enrolled to start [facility] next week (virtual).    Social Service Assessment/Plan:  The patient needs psychiatric assessment medication management and stabilization.  She has history of needed SIO due to continuous self-injurious behaviors on [facility] several weeks ago.  Patient will be encouraged to attend all programming offered on the unit.  She will let the staff know if she feels unsafe or has questions/concerns.  CTC to ensure that she has a comprehensive care plan in place at discharge.

## 2021-07-06 NOTE — PROGRESS NOTES
07/06/21 1449   General Information   Has Not Attended OT as of: 07/06/21     Plan: OT staff will meet with pt to review the role of occupational therapy and explain the value of having them involved in their treatment plan including options to meet current needs/self-identified goals. As group attendance is established, Pt will be given self-assessment to inform OT initial assessment.

## 2021-07-06 NOTE — ED NOTES
Patient searched again and changed into scrubs. Gave staff the second josh pin from her pockets. Patient on 1 to 1. RN notified.

## 2021-07-06 NOTE — ED NOTES
Handoff report to JR Mendez.  Informed of course of ED stay and plan of care.  Vanessa verbalized understanding.

## 2021-07-06 NOTE — ED NOTES
Following up a call from the camera room, pt relinquished a josh pin potentially used for scratching/cutting. RN notified.

## 2021-07-07 PROCEDURE — 124N000002 HC R&B MH UMMC

## 2021-07-07 PROCEDURE — 250N000013 HC RX MED GY IP 250 OP 250 PS 637: Performed by: PSYCHIATRY & NEUROLOGY

## 2021-07-07 PROCEDURE — 99232 SBSQ HOSP IP/OBS MODERATE 35: CPT | Performed by: PSYCHIATRY & NEUROLOGY

## 2021-07-07 PROCEDURE — 250N000013 HC RX MED GY IP 250 OP 250 PS 637: Performed by: FAMILY MEDICINE

## 2021-07-07 PROCEDURE — H2032 ACTIVITY THERAPY, PER 15 MIN: HCPCS

## 2021-07-07 RX ADMIN — HYDROXYZINE HYDROCHLORIDE 50 MG: 50 TABLET, FILM COATED ORAL at 17:24

## 2021-07-07 RX ADMIN — CITALOPRAM HYDROBROMIDE 10 MG: 10 TABLET ORAL at 09:02

## 2021-07-07 RX ADMIN — LAMOTRIGINE 150 MG: 100 TABLET ORAL at 09:01

## 2021-07-07 RX ADMIN — BUSPIRONE HYDROCHLORIDE 10 MG: 10 TABLET ORAL at 20:21

## 2021-07-07 RX ADMIN — BUSPIRONE HYDROCHLORIDE 10 MG: 10 TABLET ORAL at 09:01

## 2021-07-07 RX ADMIN — HYDROXYZINE HYDROCHLORIDE 50 MG: 50 TABLET, FILM COATED ORAL at 11:05

## 2021-07-07 RX ADMIN — LURASIDONE HYDROCHLORIDE 80 MG: 80 TABLET, FILM COATED ORAL at 20:20

## 2021-07-07 RX ADMIN — FLUOXETINE 20 MG: 20 CAPSULE ORAL at 09:01

## 2021-07-07 RX ADMIN — LAMOTRIGINE 150 MG: 100 TABLET ORAL at 20:21

## 2021-07-07 RX ADMIN — Medication 1000 UNITS: at 09:01

## 2021-07-07 RX ADMIN — THIAMINE HCL TAB 100 MG 100 MG: 100 TAB at 09:01

## 2021-07-07 RX ADMIN — POLYETHYLENE GLYCOL 3350 17 G: 17 POWDER, FOR SOLUTION ORAL at 09:02

## 2021-07-07 RX ADMIN — Medication 1 TABLET: at 09:01

## 2021-07-07 ASSESSMENT — ACTIVITIES OF DAILY LIVING (ADL)
HYGIENE/GROOMING: INDEPENDENT
DRESS: SCRUBS (BEHAVIORAL HEALTH);INDEPENDENT
LAUNDRY: WITH SUPERVISION
ORAL_HYGIENE: INDEPENDENT
ORAL_HYGIENE: INDEPENDENT
LAUNDRY: WITH SUPERVISION
HYGIENE/GROOMING: INDEPENDENT
DRESS: SCRUBS (BEHAVIORAL HEALTH);INDEPENDENT

## 2021-07-07 NOTE — PLAN OF CARE
Problem: Adult Inpatient Plan of Care  Goal: Plan of Care Review  Outcome: No Change  Flowsheets (Taken 7/6/2021 8467)  Plan of Care Reviewed With: patient  Patient is isolative and withdrawn, resting in bed majority of the shift. She came out at supper but ate a little. Flat and blunted affect. Endorsed anxiety 2/10 and depression 8/10. Has SI thoughts but contracts for safety here at the hospital. Compliant with taking her meds. Denied and side effects of meds. Will continue to monitor and assess pt.

## 2021-07-07 NOTE — PLAN OF CARE
Problem: Suicide Risk  Goal: Absence of Self-Harm  Outcome: Declining     Problem: Suicidal Behavior  Goal: Suicidal Behavior is Absent or Managed  Outcome: Declining   Nursing Assessment    Recent Vitals: B/P: 118/80, T: 98.5, P: 99    Sleep:  Hours of sleep at night: 6.25    General Shift Summary  Patient has isolated to her room for the shift. She presents as withdrawn, depressed, and has a flat affect. She sat in her bathroom with her head on her knees, she refused to give eye contact to staff or respond to questions and gave short nods. Patient started to head bang in her bathroom around 1100. Staff intervened and she would stop however a minute later she would start up again. After staff stopped patient a few times it was decided patient should be put on a 1:1 due to self injurious behavior. Patient continued to attempt to head bang however staff stopped her each time. After a conversation with the doctor her SIO was discontinued at 1300 and patient has not attempted to head band since then. She continues to voice SI with SIB thoughts however denied having any plan and stated she could continue to contract for safety. Patient voiced high anxiety and depression. She denied any HI or AVH. Compliant with morning meds. No voiced side effects.    She is eating with each meal however it is a small amount. Hygiene is good and she showered. Patient received PRN Atarax after 1100 to help with anxiety. She denies any pain.    Plan is to continue to monitor patient status q 15 mins, assess response to medications, and maintain the patients safety.    Crystal Alexander, RN MSN

## 2021-07-07 NOTE — PLAN OF CARE
"  Problem: Adult Inpatient Plan of Care  Goal: Plan of Care Review  Outcome: No Change  Flowsheets  Taken 7/7/2021 1717  Plan of Care Reviewed With: patient  Progress: no change  Taken 7/7/2021 1707  Plan of Care Reviewed With: patient     Patient was present in the milieu at early part of shift. She was busy with the bike exercises. Stated helps her with her anxiety and to stay active. She still has suicidal thoughts but said she is bryanna for safety and will let staff know immediately when she feels the urge to harm herself. She did actually let staff know that she has urges of self harm at past 1700. Writer gave her PRN Hydroxyzine 50 mg at 1724 and let her stay at the lounge before supper in which she agreed but she went back to bed resting after a few minutes of sitting. She was prompted to get up at supper. Ate only a bun. Stated she did not get the strawberry kiwi juice she ordered. Asked her is she alerted staff but she said, \"No.\" Instructed her to let staff know immediately if she has a problem about her menu so staff will communicate to dietary or kitchen staff and she agreed. She was compliant with her medications. Endorsed depression 6/10 and anxiety 5/10. Denied pain and side effects of meds. Will continue to monitor and assess pt.   "

## 2021-07-07 NOTE — PROGRESS NOTES
"[facility], [place]   Psychiatric Progress Note        Interim History:   The patient's care was discussed with the treatment team during the daily team meeting and/or staff's chart notes were reviewed.  Staff report patient was isolated and withdrawn during the evening shift last night. She has been eating well. Slept 6.25 hours last night. Patient endorsed suicidal ideation but was able to contract for safety. Lida has intake scheduled for [facility] next Tuesday. Later in the morning, patient was placed on SIO 1:1 after head banging. [facility] faxed letter to patients employer regarding hospitalization.     Met with patient: in her room. Patient was calm and more cooperative. Lida says that she is doing okay today and that she slept okay last night. She says that she did not feel like she was real or that she was here so she started to bang her head. She says that self injurious behaviors, such as head banging, come out of know where and states she does not head bang at home. Patient says that she does self-harm while in the hospital as she feels as though it will \"get her out sooner\". We explained to patient that due to her multiple mental health hospitalizations and a history of recent petition for committement last week, during her most recent hospitalization, that she will likely be committed this time if she continues to partake in self injurious behavior. Patient states understanding saying that she does not want to be committed. Patient says that she does not like being on SIO 1:1. Patient says that she has chronic and passive suicide ideation but was able to contract for safety. We encouraged Lida to use her coping skills and to ask staff for help if she has urges to self harm. We agreed to discontinue SIO 1:1 and encouraged patient attend time outside her room. We discussed with Lida that we recommend that she stays in the hospital until next Monday and " "will be discharged to complete [facility]. Lida was receptive to plan. No further questions or concerns.          Medications:       busPIRone  10 mg Oral BID     citalopram  10 mg Oral Daily     FLUoxetine  20 mg Oral Daily     lamoTRIgine  150 mg Oral BID     lurasidone  80 mg Oral At Bedtime     multivitamin w/minerals  1 tablet Oral Daily     polyethylene glycol  17 g Oral Daily     thiamine  100 mg Oral Daily     Vitamin D3  1,000 Units Oral Daily          Allergies:     Allergies   Allergen Reactions     Morphine Other (See Comments)     Twitching     Amoxicillin Rash     Sulfa Drugs Rash          Labs:   No results found for this or any previous visit (from the past 24 hour(s)).       Psychiatric Examination:     /80   Pulse 99   Temp 98.5  F (36.9  C) (Oral)   Resp 16   Ht 1.6 m (5' 3\")   Wt 52.3 kg (115 lb 6.4 oz)   LMP 06/08/2021   SpO2 98%   BMI 20.44 kg/m    Weight is 115 lbs 6.4 oz  Body mass index is 20.44 kg/m .  Orthostatic Vitals       Most Recent      Sitting Orthostatic /80 07/07 0910    Sitting Orthostatic Pulse (bpm) 99 07/07 0910    Standing Orthostatic /81 07/07 0910    Standing Orthostatic Pulse (bpm) 108 07/07 0910        Appearance: awake, alert, dressed in hospital scrubs and slightly unkempt. Awoken from sleep.   Attitude:  guarded and somewhat cooperative  Eye Contact:  poor   Mood:  anxious, sad  and depressed  Affect:  intensity is blunted and restricted range  Speech:  clear, coherent  Psychomotor Behavior:  no evidence of tardive dyskinesia, dystonia, or tics  Throught Process:  goal oriented  Associations:  no loose associations  Thought Content:  no evidence of active suicidal ideation or homicidal ideation, no auditory hallucinations present and no visual hallucinations present. Chronic passive suicidal ideation, but patient is able to contract for safety.   Insight:  partial  Judgement:  limited  Oriented to:  time, person, and " place  Attention Span and Concentration:  fair  Recent and Remote Memory:  limited    Clinical Global Impressions  First: 6/4 7/7/2021      Most recent:            Precautions:     Behavioral Orders   Procedures     Code 1 - Restrict to Unit     Routine Programming     As clinically indicated     Self Injury Precaution     Status 15     Every 15 minutes.     Suicide precautions     Patients on Suicide Precautions should have a Combination Diet ordered that includes a Diet selection(s) AND a Behavioral Tray selection for Safe Tray - with utensils, or Safe Tray - NO utensils            DIagnoses:     Major depressive disorder, recurrent, moderate severity.  Generalized anxiety disorder.  Panic disorder without agoraphobia.  Anorexia nervosa.  Restrictive type, borderline personality disorder.         Plan:     Discontinued SIO 1:1. Continue Citalopram 10 mg daily. Plan to tenitive discharge on Monday.     FRIDA Manriquez-Student      I was present with PA student who participated in the service and in the documentation of the note. I have verified the history and personally performed the physical exam and medical decision making. I agree with the assessment and plan of care as documented in the note.     Floyd Cerda MD  [facility] Psychiatry

## 2021-07-07 NOTE — PLAN OF CARE
Assessment/Intervention/Current Symtoms and Care Coordination  Patient very unstable and began head banging this morning and put on an SIO for safety.  Had call from [facility] Care Manager.  She wanted us to know that the patient's highest level of care for mental health  (on her employer's [employer] Plan) is hospital and outpatient day treatment.  She does NOT have coverage for IRTS.   Patient now off the SIO and requested letter be sent to her employer [employer] [facility] [employer] [facility] (VIRI Fraser at [phone]) with date she was hospitalized. Letter faxed over today and copy given to the patient for her records.    Discharge Plan or Goal  Patient will return home with outpatient services.    Barriers to Discharge   Patient needs medication management and stabilization.    Referral Status  None needed    Legal Status  Voluntary

## 2021-07-07 NOTE — PROGRESS NOTES
"SPIRITUAL HEALTH SERVICES  SPIRITUAL ASSESSMENT Progress Note  [facility]     REFERRAL SOURCE: request at admission for chaplaincy care    On this visit, Lida reflected on prior hospitalizations and said she \"doesn't think [she] needs to be here anymore.\" I acknowledged her response to this difficult situation and affirmed her presence on the unit.    Lida expressed wanting to explore her spirituality and where she feels most connected. I accompanied her in considering where she feels most connected to herself, to others, and to something larger than herself. She named having difficulty feeling like herself in the hospital and the support/connection of her family and friends as important to her. 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    Lida is looking for a griselda community in which she might feel comfortable participating. She grew up Gnosticist but feels her spirituality shifting. I encouraged her efforts to seek community and connection with others.    PLAN: Will continue to follow. [facility] remains available, as needed.    Isabela Sanchez  Chaplain Resident  Pager: [phone]   "

## 2021-07-08 PROCEDURE — 250N000013 HC RX MED GY IP 250 OP 250 PS 637: Performed by: PSYCHIATRY & NEUROLOGY

## 2021-07-08 PROCEDURE — 90853 GROUP PSYCHOTHERAPY: CPT

## 2021-07-08 PROCEDURE — G0177 OPPS/PHP; TRAIN & EDUC SERV: HCPCS

## 2021-07-08 PROCEDURE — 124N000002 HC R&B MH UMMC

## 2021-07-08 PROCEDURE — H2032 ACTIVITY THERAPY, PER 15 MIN: HCPCS

## 2021-07-08 PROCEDURE — 250N000013 HC RX MED GY IP 250 OP 250 PS 637: Performed by: FAMILY MEDICINE

## 2021-07-08 RX ORDER — BUSPIRONE HYDROCHLORIDE 15 MG/1
15 TABLET ORAL 2 TIMES DAILY
Status: DISCONTINUED | OUTPATIENT
Start: 2021-07-08 | End: 2021-07-15

## 2021-07-08 RX ADMIN — THIAMINE HCL TAB 100 MG 100 MG: 100 TAB at 08:05

## 2021-07-08 RX ADMIN — POLYETHYLENE GLYCOL 3350 17 G: 17 POWDER, FOR SOLUTION ORAL at 08:04

## 2021-07-08 RX ADMIN — BUSPIRONE HYDROCHLORIDE 10 MG: 10 TABLET ORAL at 08:04

## 2021-07-08 RX ADMIN — FLUOXETINE 20 MG: 20 CAPSULE ORAL at 08:05

## 2021-07-08 RX ADMIN — HYDROXYZINE HYDROCHLORIDE 50 MG: 50 TABLET, FILM COATED ORAL at 12:15

## 2021-07-08 RX ADMIN — Medication 1 TABLET: at 08:05

## 2021-07-08 RX ADMIN — LURASIDONE HYDROCHLORIDE 80 MG: 80 TABLET, FILM COATED ORAL at 20:13

## 2021-07-08 RX ADMIN — LAMOTRIGINE 150 MG: 100 TABLET ORAL at 08:05

## 2021-07-08 RX ADMIN — Medication 1000 UNITS: at 08:05

## 2021-07-08 RX ADMIN — LAMOTRIGINE 150 MG: 100 TABLET ORAL at 20:13

## 2021-07-08 RX ADMIN — CITALOPRAM HYDROBROMIDE 10 MG: 10 TABLET ORAL at 08:05

## 2021-07-08 RX ADMIN — BUSPIRONE HYDROCHLORIDE 15 MG: 15 TABLET ORAL at 20:13

## 2021-07-08 ASSESSMENT — ACTIVITIES OF DAILY LIVING (ADL)
ORAL_HYGIENE: INDEPENDENT
HYGIENE/GROOMING: INDEPENDENT
HYGIENE/GROOMING: INDEPENDENT
DRESS: SCRUBS (BEHAVIORAL HEALTH);INDEPENDENT
DRESS: STREET CLOTHES
ORAL_HYGIENE: INDEPENDENT
LAUNDRY: WITH SUPERVISION
LAUNDRY: WITH SUPERVISION

## 2021-07-08 ASSESSMENT — MIFFLIN-ST. JEOR: SCORE: 1222.59

## 2021-07-08 NOTE — PROVIDER NOTIFICATION
07/08/21 0600   Sleep/Rest/Relaxation   Sleep/Rest/Relaxation (WDL) Ex   Sleep/Rest/Relaxation appears asleep;unable to go back to sleep   Night Time # Hours 4 hours     Pt had a quiet night with no behavior or safety concerns, but pt apparently had some sleep disturbances that prevented him from falling and staying asleep fully last night. Refused prn medications.

## 2021-07-08 NOTE — PROGRESS NOTES
"   07/08/21 1400   Groups   Details group   Number of patients attending the group:  3  Group Length:  1.0 Hours    Group Therapy     Summary of Group / Topics Discussed: My Strengths and Qualities      The  Psychotherapy group goal is to promote insight to positive choice and change. Group processing is within a supportive and safe environment. Patients will process emotions using verbal group and expressive psychotherapy interventions.    Assessment   CTC urged group members to think about and list things they are good at, how they have helped other, compliments they have received, challenges they have overcome, times when they have made others happy and the things they value most.  At end they were to rate their self esteem between l (low) and 10(high).    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  She helps other by providing therapy through her music and being a good listener.  She has overcome the challenges of being born with a cleft palate and having over 8 surgeries to correct it.  She was able to pass her therapy boards.  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  She values her family and friends.  Lida felt more depressed today and rated her self esteem at a \"2\".                     "

## 2021-07-08 NOTE — PLAN OF CARE
Assessment/Intervention/Current Symtoms and Care Coordination  Patient bryanna for safety and attending programming.  She has psychiatrist, therapist and outpatient day treatment set up.  She cannot go to an IRTS because her employee health plan does not cover the cost.    Discharge Plan or Goal  Patient will return home to her  and resume care with her outpatient providers and start [facility].    Barriers to Discharge   Needs further stabilization and medication management    Referral Status  None needed    Legal Status  Voluntary

## 2021-07-08 NOTE — PLAN OF CARE
Nursing Assessment    Recent Vitals: B/P: 115/81, T: 96.4, P: 90    Sleep:  Hours of sleep at night: 4    General Shift Summary  Patient has been visible in the milieu doing crafts or pacing the scott. She doesn't interact with peers and makes needs known to staff. She voiced having high anxiety and depression with strong urges to head bang. Patient did bang head about 2-3 times. A few staff members talked with patient for at least 15 to 30 minutes each to deescalate patient and provided comfort care. She voiced having SI without a plan. Denies HI/AVH.     Hygiene is good and she showered in the morning. Appetite is poor, patient ate a small amount of breakfast and lunch. Staff is to record meals.    She is medication compliant with no voiced side effects. She received Atarax around 1300 for increased anxiety and strong urges to head bang.    Plan is to continue to monitor patient status q 15 mins, assess response to medications, and maintain the patients safety.    Crystal Alexander RN MSN

## 2021-07-08 NOTE — PROGRESS NOTES
07/08/21 1437   General Information   Date Initially Attended OT 07/08/21     OT Groups Attended: Clinic x2    Affect/Hygiene/Presentation: Calm, engaged with structure, flat affect. No apparent hygiene concerns.     Patient Performance/Response: Pt actively participated in occupational therapy clinic with 5 patients for 90 minutes. Pt was able to ask for assistance with prompting, and independently engage in a novel, goal-directed task with assistance for task selection (demonstrated some difficulty with decision-making skills), and setup. Pt demonstrated good focus (30 minutes without interruption), planning, and attention to detail during task completion. Pt appeared comfortable minimally interacting with peers and writer as needed, however she generally kept to herself and appeared somewhat guarded and withdrawn.    Plan: More information needed to complete OT Initial Assessment; OT staff will meet with pt to review the role of occupational therapy and explain the value of having them involved in their treatment plan including options to meet current needs/self-identified goals. As group attendance is established, Pt will be given self-assessment to inform OT initial assessment.

## 2021-07-08 NOTE — PROGRESS NOTES
"CLINICAL NUTRITION SERVICES - ASSESSMENT NOTE     Nutrition Prescription    RECOMMENDATIONS FOR MDs/PROVIDERS TO ORDER:  Recommend monitoring of electrolytes, especially if intake picks up    Malnutrition Status:    Severe malnutrition in the context of chronic illness    Recommendations already ordered by Registered Dietitian (RD):  - Medical food supplement therapy- Vanilla Ensure Enlive with lunch, place order for 2 bags of pretzels to be supplied to unit daily for patient to have as she would like  - added Vitality water to all trays to promote fluid intake  - Place patient care order for nursing to document all intake including supplement    Future/Additional Recommendations:  - monitor intake, weights, acceptance of supplements--adjust prn     REASON FOR ASSESSMENT  Corinne N Palm is a/an 25 year old female assessed by the dietitian for MST score of 3 d/t \"unsure\" of possible weight loss and decreased PO. Pt presents d/t SI. PMH significant for eating disorder, depression and anxiety.    NUTRITION HISTORY  - Information obtained from chart and patient. Pt is known to me from admission last month.  Per RD note from 6/10/21 \"pt has been at [facility] in the past and undergoes outpatient therapy there. She reports residential treatment there a few months ago which led to weight gain, which she finds distressing and she would be happier at a lower body weight  - She reports that using her eating d/o symptoms actually improves how she feels temporarily as it gives her a sense of control. She muses that maybe if she starves herself for long enough that may kill her.   - Supplements- Ensure and blended supplement drinks while at [facility]  - Pt reports she is currently \"all about restricting.\" She is aware of the physical impact of restriction. She notes that she likes pretzels to snack on, which we will send to the unit to be given to her prn. Her RN also notes her BP has been soft so requests " "Vitality water on all trays.   - NKFA    CURRENT NUTRITION ORDERS  Diet: Regular  Intake/Tolerance: poor- egg whites and a banana at breakfast this morning    LABS  Labs reviewed    Electrolytes  Sodium (mmol/L)   Date Value   06/21/2021 140   06/15/2021 141   06/08/2021 139     Potassium (mmol/L)   Date Value   06/21/2021 4.0   06/15/2021 3.9   06/08/2021 3.9     Magnesium (mg/dL)   Date Value   06/21/2021 2.1   06/15/2021 2.1     Phosphorus (mg/dL)   Date Value   06/21/2021 2.9   06/15/2021 3.7    Renal  Urea Nitrogen (mg/dL)   Date Value   06/21/2021 7   06/15/2021 8   06/08/2021 11     Creatinine (mg/dL)   Date Value   06/21/2021 0.83   06/15/2021 1.07 (H)   06/08/2021 0.73     Inflammatory Markers  WBC (10e9/L)   Date Value   06/08/2021 8.1   11/13/2020 10.4   02/03/2016 9.5     Albumin (g/dL)   Date Value   06/21/2021 4.0   06/15/2021 3.9   06/08/2021 4.3      Blood Glucose  Glucose (mg/dL)   Date Value   06/21/2021 108 (H)   06/15/2021 80   06/08/2021 76   11/13/2020 75   02/03/2016 92    Hepatic  ALT (U/L)   Date Value   06/21/2021 19     AST (U/L)   Date Value   06/21/2021 19     Alkaline Phosphatase (U/L)   Date Value   06/21/2021 52     Bilirubin Total (mg/dL)   Date Value   06/21/2021 0.3    Additional  Triglycerides (mg/dL)   Date Value   06/09/2021 57     Ketones Urine (mg/dL)   Date Value   11/15/2020 10 (A)        MEDICATIONS    busPIRone  10 mg Oral BID     citalopram  10 mg Oral Daily     FLUoxetine  20 mg Oral Daily     lamoTRIgine  150 mg Oral BID     lurasidone  80 mg Oral At Bedtime     multivitamin w/minerals  1 tablet Oral Daily     polyethylene glycol  17 g Oral Daily     thiamine  100 mg Oral Daily     Vitamin D3  1,000 Units Oral Daily        ANTHROPOMETRICS  Height: 160 cm (5' 3\")  Most Recent Weight: 51.8 kg (114 lb 4.8 oz)    IBW: 52.2 kg  Body mass index is 20.25 kg/m .  BMI: Normal BMI  Weight History:   Wt Readings from Last 10 Encounters:   07/08/21 51.8 kg (114 lb 4.8 oz) "   07/01/21 52.7 kg (116 lb 3.2 oz)   06/27/21 53.2 kg (117 lb 3.2 oz)   06/08/21 54.2 kg (119 lb 6.4 oz)   11/17/20 48.6 kg (107 lb 1.6 oz)   02/07/16 58.6 kg (129 lb 1.6 oz)   03/12/15 54.4 kg (120 lb)     0.9 kg (1.7%) loss over 1 week, 2.4 kg (4.4%) weight loss over 1 month    Dosing Weight: 51.8 kg    ASSESSED NUTRITION NEEDS  Estimated Energy Needs: 3596-9171 kcals/day (30 - 35 kcals/kg )  Justification: Repletion  Estimated Protein Needs: 62-78 grams protein/day (1.2 - 1.5 grams of pro/kg)  Justification: Repletion  Estimated Fluid Needs: 4034-1302 mL/day (1 mL/kcal)   Justification: Maintenance    MALNUTRITION  % Intake: </= 50% for >/= 5 days (severe)  % Weight Loss: Up to 1-2% in 1 week (non-severe)  Subcutaneous Fat Loss: Facial region:  Orbital- moderate to severe  Muscle Loss: Unable to assess- pt in public milieu  Fluid Accumulation/Edema: None noted  Malnutrition Diagnosis: Severe malnutrition in the context of chronic illness    NUTRITION DIAGNOSIS  Malnutrition related to eating disorder and worsening SIB as evidenced by ongoing weight loss, severe restriction of <50% of needs per day, and visible fat wasting      INTERVENTIONS  Implementation  Nutrition Education: Discussed nutrition history and prior to and since admission.  Discussed oral nutrition supplements. Encouraged adequate PO of food and fluids and discussed the impact of ongoing poor PO on both mental and physical health.   Medical food supplement therapy- Vanilla Ensure Enlive with lunch, place order for 2 bags of pretzels to be supplied to unit daily for patient to have as she would like  - added Vitality water to all trays to promote fluid intake  - Place patient care order for nursing to document all intake including supplement    Goals  Patient to consume >50 of nutritionally adequate meal trays TID, or the equivalent with supplements/snacks.     Monitoring/Evaluation  Progress toward goals will be monitored and evaluated per  protocol.  Thania Martinez RD, LD  ICU/5A/OB/Mental Health Pager (M-F): [phone]  On Call Pager (weekends only): [phone]

## 2021-07-08 NOTE — PROGRESS NOTES
[facility], [place]   Psychiatric Progress Note        Interim History:   The patient's care was discussed with the treatment team during the daily team meeting and/or staff's chart notes were reviewed.  Staff report patient slept 4 hours last night. Lida required prn hydroxyzine for anxiety . Patient apologized to RN for head banging yesterday. Patient continues to endorse anxiety and depression but appeared brighter today. RN reports Lida has been pacing the halls this morning.      Met with patient: in the lounge. Patient states that she slept most of the day yesterday and that she has been exercising, alternating between walking and biking, for ~2 hours. Lida says that she doesn't know what else to do, that she is worried about her weight, and that exercising decreases her anxiety and relieves concerns about weight gain. Patient additionally reports not eating very much yesterday says she only ate a roll and a cookie. Lida reports thoughts of self harm, says she wanted to put metal in the light switch but got help from staff instead of acting on this thought. Patient says she wanted to self harm as she wants to leave. We discussed with Lida that this would not help her leave faster. Additionally, we discussed her current medications: lamictal, latuda, prozac, buspar, and celexa. Lida says that she has been on higher doses of Prozac in the past but that it was not helpful. We discussed increased her Buspar dose and patient agreed with plan. Additionally, patient requested her own clothes and shoes. Discussed plan to leave next week in time for her PHP intake on Tuesday, but reminded patient that her behavior is in her hands.          Medications:       busPIRone  10 mg Oral BID     citalopram  10 mg Oral Daily     FLUoxetine  20 mg Oral Daily     lamoTRIgine  150 mg Oral BID     lurasidone  80 mg Oral At Bedtime     multivitamin w/minerals  1 tablet Oral Daily     polyethylene glycol   "17 g Oral Daily     thiamine  100 mg Oral Daily     Vitamin D3  1,000 Units Oral Daily          Allergies:     Allergies   Allergen Reactions     Morphine Other (See Comments)     Twitching     Amoxicillin Rash     Sulfa Drugs Rash          Labs:   No results found for this or any previous visit (from the past 24 hour(s)).       Psychiatric Examination:     /81   Pulse 90   Temp 96.4  F (35.8  C) (Tympanic)   Resp 16   Ht 1.6 m (5' 3\")   Wt 51.8 kg (114 lb 4.8 oz)   LMP 06/08/2021   SpO2 97%   BMI 20.25 kg/m    Weight is 114 lbs 4.8 oz  Body mass index is 20.25 kg/m .  Orthostatic Vitals       Most Recent      Sitting Orthostatic /81 07/08 0753    Sitting Orthostatic Pulse (bpm) 90 07/08 0753    Standing Orthostatic /76 07/08 0753    Standing Orthostatic Pulse (bpm) 90 07/08 0753        Appearance: awake, alert and dressed in hospital scrubs.  Attitude:  guarded and somewhat cooperative  Eye Contact:  poor , patient looked down at table a majority of the time  Mood:  anxious and depressed  Affect:  intensity is blunted and restricted range  Speech:  clear, coherent  Psychomotor Behavior:  no evidence of tardive dyskinesia, dystonia, or tics  Throught Process:  goal oriented  Associations:  no loose associations  Thought Content:  no evidence of active suicidal ideation or homicidal ideation, no auditory hallucinations present and no visual hallucinations present. Chronic passive suicidal ideation, but patient is able to contract for safety.   Insight:  partial  Judgement:  limited  Oriented to:  time, person, and place  Attention Span and Concentration:  fair  Recent and Remote Memory:  limited    Clinical Global Impressions  First: 6/4 7/7/2021      Most recent:            Precautions:     Behavioral Orders   Procedures     Code 1 - Restrict to Unit     Routine Programming     As clinically indicated     Self Injury Precaution     Status 15     Every 15 minutes.     Suicide precautions     " Patients on Suicide Precautions should have a Combination Diet ordered that includes a Diet selection(s) AND a Behavioral Tray selection for Safe Tray - with utensils, or Safe Tray - NO utensils            DIagnoses:     Major depressive disorder, recurrent, moderate severity.  Generalized anxiety disorder.  Panic disorder without agoraphobia.  Anorexia nervosa.  Restrictive type, borderline personality disorder.         Plan:     Continue Citalopram 10 mg daily. Will increase Buspar dose. Plan to tenitive discharge on Monday. Will, per patient's request call her . Will be able to wear her own clothes and shoes.     FRIDA Manriquez-Student     I was present with PA student who participated in the service and in the documentation of the note. I have verified the history and personally performed the physical exam and medical decision making. I agree with the assessment and plan of care as documented in the note.     Floyd Cerda MD  [facility] Psychiatry

## 2021-07-08 NOTE — PROGRESS NOTES
Pt participated passively in dance/movement therapy (DMT) using imagery and movement to improve mood-based symptoms. She demonstrated bound flow and only occasionally smiled, though was responsive to therapist verbal prompts.       07/07/21 0900   Dance Movement Therapy   Type of Intervention structured groups   Response participates with encouragement   Hours 1

## 2021-07-08 NOTE — PROGRESS NOTES
07/07/21 1505   Groups   Details Processing Group     Number of patients attending the group:  1  Group Length:  1 Hours    Group Therapy     Summary of Group / Topics Discussed: Sleep Hygiene and it impact on MH management      The  Psychotherapy group goal is to promote insight to positive choice and change. Group processing is within a supportive and safe environment. Patients will process emotions using verbal group and expressive psychotherapy interventions.        Assessment- CTC (writer) lead group related to promoting good sleep hygiene and the impact sleep has on people's ability to manage mental health symptoms. CTC engaged with patient about the topic in discussion to foster insight and understanding of what sleep hygiene is and develop skills in how to implement that information into their daily routine.            Patient Response- Pt demonstrated basic understanding of the topic at the beginning of group and was able to show improved insight by discussing how her sleep impacts the presentation/experience of her mental health symptoms as group progressed. Pt identified current habits that she could work on decreasing, excessive napping, and skills she could utilize, mindfulness techniques, to promote better sleep habits. Pt recognized how a lack of sleep can exacerbate her symptoms and that excessive sleeping is not helpful in reducing her chances of future hospitalization as it is pt's was of using sleep to ignore her stressors/triggers.

## 2021-07-09 PROCEDURE — G0177 OPPS/PHP; TRAIN & EDUC SERV: HCPCS

## 2021-07-09 PROCEDURE — 90853 GROUP PSYCHOTHERAPY: CPT

## 2021-07-09 PROCEDURE — 99232 SBSQ HOSP IP/OBS MODERATE 35: CPT | Performed by: PSYCHIATRY & NEUROLOGY

## 2021-07-09 PROCEDURE — 250N000013 HC RX MED GY IP 250 OP 250 PS 637: Performed by: PSYCHIATRY & NEUROLOGY

## 2021-07-09 PROCEDURE — 250N000013 HC RX MED GY IP 250 OP 250 PS 637: Performed by: FAMILY MEDICINE

## 2021-07-09 PROCEDURE — 124N000002 HC R&B MH UMMC

## 2021-07-09 RX ORDER — CITALOPRAM HYDROBROMIDE 20 MG/1
20 TABLET ORAL DAILY
Status: DISCONTINUED | OUTPATIENT
Start: 2021-07-10 | End: 2021-07-13

## 2021-07-09 RX ORDER — FLUOXETINE 10 MG/1
10 CAPSULE ORAL DAILY
Status: DISCONTINUED | OUTPATIENT
Start: 2021-07-10 | End: 2021-07-13

## 2021-07-09 RX ADMIN — LURASIDONE HYDROCHLORIDE 80 MG: 80 TABLET, FILM COATED ORAL at 20:21

## 2021-07-09 RX ADMIN — HYDROXYZINE HYDROCHLORIDE 50 MG: 50 TABLET, FILM COATED ORAL at 04:12

## 2021-07-09 RX ADMIN — FLUOXETINE 20 MG: 20 CAPSULE ORAL at 08:44

## 2021-07-09 RX ADMIN — Medication 1 TABLET: at 08:44

## 2021-07-09 RX ADMIN — LAMOTRIGINE 150 MG: 100 TABLET ORAL at 08:43

## 2021-07-09 RX ADMIN — POLYETHYLENE GLYCOL 3350 17 G: 17 POWDER, FOR SOLUTION ORAL at 08:43

## 2021-07-09 RX ADMIN — LAMOTRIGINE 150 MG: 100 TABLET ORAL at 20:21

## 2021-07-09 RX ADMIN — CITALOPRAM HYDROBROMIDE 10 MG: 10 TABLET ORAL at 08:44

## 2021-07-09 RX ADMIN — TRAZODONE HYDROCHLORIDE 50 MG: 50 TABLET ORAL at 21:03

## 2021-07-09 RX ADMIN — Medication 1000 UNITS: at 08:44

## 2021-07-09 RX ADMIN — BUSPIRONE HYDROCHLORIDE 15 MG: 15 TABLET ORAL at 20:21

## 2021-07-09 RX ADMIN — THIAMINE HCL TAB 100 MG 100 MG: 100 TAB at 08:44

## 2021-07-09 RX ADMIN — BUSPIRONE HYDROCHLORIDE 15 MG: 15 TABLET ORAL at 08:44

## 2021-07-09 ASSESSMENT — ACTIVITIES OF DAILY LIVING (ADL)
ORAL_HYGIENE: INDEPENDENT
DRESS: STREET CLOTHES
HYGIENE/GROOMING: INDEPENDENT
LAUNDRY: WITH SUPERVISION

## 2021-07-09 NOTE — PLAN OF CARE
Assessment/Intervention/Current Symtoms and Care Coordination  Patient mood sad/blunted;  Poor insight into her mental health.  Is attending some of the programming on the unit. Patient has outpatient providers and a plan in place.    Discharge Plan or Goal  Plan is to discharge patient home on Monday to start her [facility] on Tuesday.     Barriers to Discharge   Needs further stabilization and medication management.     Referral Status  None needed    Legal Status  Voluntary

## 2021-07-09 NOTE — PLAN OF CARE
Music Therapy Group note    Total time in session: 45 minutes    Number of patients in group: 5    Scope of service: psychodynamic     Patient progress: initial encounter this admission     Intervention: Future Self Letter    Goal of group: self-reflection and validation     Patient response/reaction to treatment intervention(s): Lida engaged in listening to song example of a Future Self Letter in the form on a song, then wrote her own future self letter over a period of about 30 minutes.  She shared that her process was difficult, and she felt intimidated by the letters others shared, as hers was shorter, she said.  MT affirmed her for participating and Lida opted not to share her writing with the group.  Her affect was subdued. She thanked MT for session at the end of group.  Lida appears to have a lot of critical and negative self-talk currently.    Belen Mchugh, MT-BC  Board-Certified Music Therapist

## 2021-07-09 NOTE — PLAN OF CARE
" Assessment    Patient is alert and oriented, calm and cooperative. Patient's affect is sad, blunted and flat. Speech is Normal. Patient's insight is Poor. Patient is Neatly groomed.  Patient has been actively participating in group, present in the milieu, but keeps to self. The patient voices their needs appropriately. Patient endorses wishing to be dead, depression, and anxiety, but denied SI,SIB, racing thoughts,Hallucinations, and HI. Patient had an episode of head banging on day shift, patient denied urges this shift. She stated, \" I don't know why I do it. I do it when I'm stressed but I don't know what triggers it. I started head banging a month ago and now that's all I think about when I get upset.\" No evidence of delusional thinking or psychotic symptoms observed this shift. Patients  came to visit. Patient's sleep is adequate. She has been restricting food and continues to count calories, she stated, \" I've probably only had 350-400 calories. I did drink the two vitamin drinks I get on my tray. Last night I felt dizzy so I got up and got an orange juice. It's probably because I haven't been eating. My goal is to stay between 115-120lbs. Right now I think I'm 114, so just right under my goal weight.\" Patient paces in the scott and exercises in her room throughout the day. She's encouraged not to and is redirected. For dinner tonight she had a cup of ice cream and cantaloupe.  One of her major stressors is that she is worried about her job, she's already taken 3 leave of absence this year. 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 Medication side effects were not observed or reported this shift. From what writer could assess, patient's skin is intact, free from injuries or open sores. Plan is to continue to monitor patient status q 15 mins, assess response to medications, and maintain the patients " safety.    Vital signs are stable  Denied pain

## 2021-07-09 NOTE — PLAN OF CARE
OT Groups Attended: Mindfulness, Clinic x2    Affect/Hygiene/Presentation: Calm, engaged, generally flat affect. No apparent hygiene concerns.     Patient Performance/Response:  -Mindfulness: Pt actively participated in a mindfulness group with 3 patients for 60 minutes focusing on reduction of anxiety, improvement of mood, coping skill exploration, and healthy routine development. The mindfulness practice was offered via supportive approaches including group therapeutic discussion, deep breathing, stretching, essential oils, and a guided body scan. Pt verbalized feeling restless and anxious at the beginning of the group, and calm and relaxed at the end of the group. She was able to remain focused for the full duration.   -Clinic: Pt actively participated in occupational therapy clinic with 4 patients for 90 minutes. Pt was able to ask for assistance as needed, and independently initiate a familiar, creative expression task with minimal assistance for task setup. Pt demonstrated good focus (60 minutes without interruption), planning, and attention to detail during task completion. Pt appeared comfortable interacting with peers and writer as needed, however she generally kept to herself and appeared focused on her selected task.     Plan: More information needed to complete OT Initial Assessment; OT staff will meet with pt to review the role of occupational therapy and explain the value of having them involved in their treatment plan including options to meet current needs/self-identified goals. As group attendance is established, Pt will be given self-assessment to inform OT initial assessment.

## 2021-07-09 NOTE — PLAN OF CARE
"NOC Shift Report    Pt in bed at beginning of shift, breathing quiet and unlabored. Pt slept through shift. Pt slept 6 hours.     Pt came out to desk around 0400 complaining \"I can't sleep\" and asking for orange juice and what to do. She was given 2 orange juices at that time. PRN hydroxyzine given around 0400 for anxiety and to help with sleep. Other options offered include sleepytime tea, lavender patch, and sound machine. Pt did accept a lavender patch. Will continue to monitor. She still appeared to be restless for rest of the night and got up around 0630.    Precautions: suicide, SIB    "

## 2021-07-09 NOTE — PLAN OF CARE
Pt spent most of the shift within the milieu playing board games, pacing and listening to music, and attending groups. Pt's affect was flat. Pt endorsed passively wishing to be dead, depression, and anxiety. Pt endorsed previous urges to hurt herself and she turned in a orange juice foiled lid that she had rolled up and said she had plans to stick it in a light socket. Pt was praised for turning in the foil, then talked to staff about her feelings and thoughts. Pt has little insight as to what triggers her SIB urges. She agreed to listen to music and attend groups. A patient care order was placed to have the foil lids removed from her tray meals and snacks. Pt denied active SI/SIB/HI/AVH and racing thoughts. Pt denied pain. Pt's BP was 96/65 and pt was encouraged to drink fluids. Pt denied pain. Med compliant. No side effects were noted or reported.    For breakfast, the pt ate 2 egg whites, banana, and 8 oz of vitality water. For lunch, the pt ate pea, cantaloupe, pudding, and 8 oz of vitality water. Pt was seen by a dietician. Ensure and vitality water will now be included with all meals and pretzels will be snacks twice a day. Pt encouraged to increase her caloric intake.

## 2021-07-09 NOTE — PROGRESS NOTES
"[facility], [place]   Psychiatric Progress Note        Interim History:   The patient's care was discussed with the treatment team during the daily team meeting and/or staff's chart notes were reviewed.  Staff report patient was restricting her calories yesterday, patient has diagnosis of anorexia restricting type, and was exercising frequently. Patient this morning, ate 40% of her breakfast. Was seen socializing with a peer playing a game in the lounge and going to groups. Patient remains sad and blunted. Dr. Apple spoke with Lida's  yesterday who insightful and agrees to encourage Lida to stay until Monday. Beth's  states that at home she participates in self injurious behavior, cutting, and states that he would be supportive of commitment if needed. Self later approached us regarding Lida's plan to use foil from her juice to stick in an electrical socket. Patient turned foil top in, staff requested that tops be removed from all juice containers prior to giving them to the patient.     Met with patient: in the lounge. Patient continues to be blunted but cooperative. Lida states that things are going good and that her mood is \"neutral\" today. Patient states that earlier she was experiencing suicidal thoughts and self injurious behavior but is able to contract for safety. Patient reports that she feels safe in the hospital. Discussed with Lida plan to increase her Celexa dose and decrease her Prozac, patient agreed with plan. Lida asked if the plan was to go home on Monday and we confirmed this plan as long as she continues to show improvement and does not partake in self-injurious behavior. Patient states understanding. No further questions or concerns.          Medications:       busPIRone  15 mg Oral BID     [START ON 7/10/2021] citalopram  20 mg Oral Daily     [START ON 7/10/2021] FLUoxetine  10 mg Oral Daily     lamoTRIgine  150 mg Oral BID     lurasidone  80 " "mg Oral At Bedtime     multivitamin w/minerals  1 tablet Oral Daily     polyethylene glycol  17 g Oral Daily     thiamine  100 mg Oral Daily     Vitamin D3  1,000 Units Oral Daily          Allergies:     Allergies   Allergen Reactions     Morphine Other (See Comments)     Twitching     Amoxicillin Rash     Sulfa Drugs Rash          Labs:   No results found for this or any previous visit (from the past 24 hour(s)).       Psychiatric Examination:     BP 96/65   Pulse 96   Temp 98.3  F (36.8  C) (Oral)   Resp 16   Ht 1.6 m (5' 3\")   Wt 51.8 kg (114 lb 4.8 oz)   SpO2 97%   BMI 20.25 kg/m    Weight is 114 lbs 4.8 oz  Body mass index is 20.25 kg/m .     Orthostatic Vitals       Most Recent      Sitting Orthostatic BP 96/65 07/09 0820    Sitting Orthostatic Pulse (bpm) 96 07/09 0820    Standing Orthostatic /74 07/09 0820    Standing Orthostatic Pulse (bpm) 99 07/09 0820          Appearance: awake, alert, dressed in her own clothes and neatly groomed.  Attitude:  guarded and somewhat cooperative  Eye Contact:  poor , but improved as occasionally patient made eye contact.   Mood:  sad  and depressed  Affect:  intensity is blunted and restricted range  Speech:  clear, coherent  Psychomotor Behavior:  no evidence of tardive dyskinesia, dystonia, or tics  Throught Process:  goal oriented  Associations:  no loose associations  Thought Content:  no evidence of active suicidal ideation or homicidal ideation, no auditory hallucinations present and no visual hallucinations present. Chronic passive suicidal ideation, but patient is able to contract for safety.   Insight:  partial  Judgement:  limited  Oriented to:  time, person, and place  Attention Span and Concentration:  fair  Recent and Remote Memory:  limited    Clinical Global Impressions  First: 6/4 7/7/2021      Most recent:            Precautions:     Behavioral Orders   Procedures     Code 1 - Restrict to Unit     Routine Programming     As clinically indicated "     Self Injury Precaution     Status 15     Every 15 minutes.     Suicide precautions     Patients on Suicide Precautions should have a Combination Diet ordered that includes a Diet selection(s) AND a Behavioral Tray selection for Safe Tray - with utensils, or Safe Tray - NO utensils            DIagnoses:     Major depressive disorder, recurrent, moderate severity.  Generalized anxiety disorder.  Panic disorder without agoraphobia.  Anorexia nervosa.  Restrictive type, borderline personality disorder.         Plan:     Will increase Celexa and decrease Prozac today. Plan to tenitive discharge on Monday. Will continue to provide support and structure and encourage patient to be safe.    FRIDA Manriquez-Student     I was present with PA student who participated in the service and in the documentation of the note. I have verified the history and personally performed the physical exam and medical decision making. I agree with the assessment and plan of care as documented in the note.     Floyd Cerda MD  Health system Psychiatry

## 2021-07-10 PROCEDURE — 250N000013 HC RX MED GY IP 250 OP 250 PS 637: Performed by: PSYCHIATRY & NEUROLOGY

## 2021-07-10 PROCEDURE — 124N000002 HC R&B MH UMMC

## 2021-07-10 PROCEDURE — 250N000013 HC RX MED GY IP 250 OP 250 PS 637: Performed by: FAMILY MEDICINE

## 2021-07-10 RX ADMIN — HYDROXYZINE HYDROCHLORIDE 50 MG: 50 TABLET, FILM COATED ORAL at 13:20

## 2021-07-10 RX ADMIN — BUSPIRONE HYDROCHLORIDE 15 MG: 15 TABLET ORAL at 21:44

## 2021-07-10 RX ADMIN — LAMOTRIGINE 150 MG: 100 TABLET ORAL at 08:35

## 2021-07-10 RX ADMIN — CITALOPRAM HYDROBROMIDE 20 MG: 20 TABLET ORAL at 08:35

## 2021-07-10 RX ADMIN — BUSPIRONE HYDROCHLORIDE 15 MG: 15 TABLET ORAL at 08:35

## 2021-07-10 RX ADMIN — FLUOXETINE 10 MG: 10 CAPSULE ORAL at 08:37

## 2021-07-10 RX ADMIN — Medication 1000 UNITS: at 08:36

## 2021-07-10 RX ADMIN — POLYETHYLENE GLYCOL 3350 17 G: 17 POWDER, FOR SOLUTION ORAL at 08:37

## 2021-07-10 RX ADMIN — LURASIDONE HYDROCHLORIDE 80 MG: 80 TABLET, FILM COATED ORAL at 22:18

## 2021-07-10 RX ADMIN — LAMOTRIGINE 150 MG: 100 TABLET ORAL at 21:44

## 2021-07-10 RX ADMIN — Medication 1 TABLET: at 08:38

## 2021-07-10 RX ADMIN — THIAMINE HCL TAB 100 MG 100 MG: 100 TAB at 08:35

## 2021-07-10 RX ADMIN — TRAZODONE HYDROCHLORIDE 50 MG: 50 TABLET ORAL at 21:44

## 2021-07-10 NOTE — PLAN OF CARE
Night Shift Summary (7/9/21 into 07/10/21)    Pt in bed sleeping at start of shift. Breathing quiet and unlabored. Appears to have slept 6.75 hours.    Pt continues on standard every 15 min safety checks. Patient is on Suicide and Self-injury precautions. Patient denied SI,SIB at 0215. She got up once for sunitha.     Will continue to monitor and assess.

## 2021-07-10 NOTE — PLAN OF CARE
Problem: Suicide Risk  Goal: Absence of Self-Harm  Outcome: Declining     Problem: Suicidal Behavior  Goal: Suicidal Behavior is Absent or Managed  Outcome: Declining     Problem: Suicidal Behavior  Goal: Suicidal Behavior is Absent or Managed  Outcome: Declining     Pt is in an irritable mood, endorses anxiety 9/10. Writer administered PRN Hydroxyzine 50 mg PO and a lavender patch. Pt has active SI, by using a stapler pin from the tea bag to slit her wrist with the intentions to bleed to death. Pt has bruises on right forearm from the SIB. Pt is not bryanna for safety at this point. Pt's items were searched, room swiped down, personal clothing which have pockets were taken away and Pt was given scrubs for safety. Pt is placed on SIO 1:1 for safety and will be discontinued when Pt contracts for safety with no signs of SI/SIB. On call doctor was paged, waiting for callback. Pt is medication compliant, and ate well.

## 2021-07-10 NOTE — PLAN OF CARE
" Assessment    Patient is alert and oriented, calm and cooperative. Patient's affect is sad, blunted, and flat. Speech is Normal. Patient's insight is Fair. Patient is Neatly groomed, she showered.  Patient has been a present in the milieu, communicates to staff but keeps to herself. Patient had an episode where she came to writer stating she \" has thoughts of self harm. Her plan was \" to take the staple out of the tea bag that comes up on the trays and cut or hit her head.\" Writer and patient talked for about an hour regarding her worsening symptoms and coping skills she could utilize when in this mind set. We also discussed long term goals, short terms goals, and a safety plan. Patient contracted for safety and remained safe al evening. She thanked writer later. Patient talked about her triggers, she got upset because her \"  was upset with her and wouldn't come visit because he needed to work on himself\", but later had a good talk with him.  Patient endorses depression, anxiety, SIB, and racing thoughts, but denied SI and HI.  Patient reported that she is restricting but after the long conversation this evening she was observed eating 3 packets of crackers and a large cookie. Tonight for dinner, she had half her bun, cup of fruit and ice. Patient is medication compliant, doesn't need reminders. Medication side effects were not observed or reported this shift. From what writer could assess, patient's skin is intact, free from injuries or open sores. Plan is to continue to monitor patient status q 15 mins, assess response to medications, and maintain the patients safety.    Vital signs are stable  Denied pain        "

## 2021-07-10 NOTE — PROGRESS NOTES
"   07/09/21 2200   Groups   Details   (Psychotherapy)   Number of patients attending the group:  3  Group Length:  1 Hours    Group Therapy Type: Psychotherapy    Summary of Group / Topics Discussed:      The  Psychotherapy group goal is to promote insight to positive choice and change. Group processing is within a supportive and safe environment. Patients will process emotions using verbal group and expressive psychotherapy interventions including visual art/writing interventions.    Group interventions support patients by: self compassion, emotional regulation, hope/optimism and mental health management    Modalities to reach these goals include: DBT (Dialectical Behavioral Therapy) and Expressive Arts Therapies    Subjective -patient report of mood today- anxious and afraid of self sabotage    Objective/ Intervention- Goal of group and Therapeutic modality utilized- DBT wave of emotions and process discussion about self compassion    Group Response- engaged and conversational    Patient Response-engaged. She wants to get better. 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    Winston Turner, CLIFF, ATR-BC          "

## 2021-07-11 PROCEDURE — 90853 GROUP PSYCHOTHERAPY: CPT

## 2021-07-11 PROCEDURE — 250N000013 HC RX MED GY IP 250 OP 250 PS 637: Performed by: FAMILY MEDICINE

## 2021-07-11 PROCEDURE — G0177 OPPS/PHP; TRAIN & EDUC SERV: HCPCS

## 2021-07-11 PROCEDURE — 250N000013 HC RX MED GY IP 250 OP 250 PS 637: Performed by: PSYCHIATRY & NEUROLOGY

## 2021-07-11 PROCEDURE — 124N000002 HC R&B MH UMMC

## 2021-07-11 RX ADMIN — FLUOXETINE 10 MG: 10 CAPSULE ORAL at 09:00

## 2021-07-11 RX ADMIN — LURASIDONE HYDROCHLORIDE 80 MG: 80 TABLET, FILM COATED ORAL at 22:03

## 2021-07-11 RX ADMIN — BUSPIRONE HYDROCHLORIDE 15 MG: 15 TABLET ORAL at 09:00

## 2021-07-11 RX ADMIN — BUSPIRONE HYDROCHLORIDE 15 MG: 15 TABLET ORAL at 22:03

## 2021-07-11 RX ADMIN — LAMOTRIGINE 150 MG: 100 TABLET ORAL at 09:00

## 2021-07-11 RX ADMIN — Medication 1 TABLET: at 09:00

## 2021-07-11 RX ADMIN — TRAZODONE HYDROCHLORIDE 50 MG: 50 TABLET ORAL at 22:03

## 2021-07-11 RX ADMIN — CITALOPRAM HYDROBROMIDE 20 MG: 20 TABLET ORAL at 09:00

## 2021-07-11 RX ADMIN — HYDROXYZINE HYDROCHLORIDE 50 MG: 50 TABLET, FILM COATED ORAL at 14:47

## 2021-07-11 RX ADMIN — Medication 1000 UNITS: at 09:01

## 2021-07-11 RX ADMIN — POLYETHYLENE GLYCOL 3350 17 G: 17 POWDER, FOR SOLUTION ORAL at 09:01

## 2021-07-11 RX ADMIN — THIAMINE HCL TAB 100 MG 100 MG: 100 TAB at 09:01

## 2021-07-11 RX ADMIN — LAMOTRIGINE 150 MG: 100 TABLET ORAL at 22:03

## 2021-07-11 ASSESSMENT — ACTIVITIES OF DAILY LIVING (ADL)
LAUNDRY: WITH SUPERVISION
HYGIENE/GROOMING: INDEPENDENT
LAUNDRY: WITH SUPERVISION
HYGIENE/GROOMING: WITH SUPERVISION
ORAL_HYGIENE: WITH SUPERVISION
ORAL_HYGIENE: INDEPENDENT
DRESS: SCRUBS (BEHAVIORAL HEALTH)
DRESS: SCRUBS (BEHAVIORAL HEALTH)

## 2021-07-11 ASSESSMENT — MIFFLIN-ST. JEOR: SCORE: 1235.75

## 2021-07-11 NOTE — PLAN OF CARE
"MH Assessment    Patient is alert and oriented, calm and cooperative. Patient's affect is sad, blunted, and flat. Speech is Normal. Patient's insight is Poor. Patient is Neatly groomed.  Patient has been present in the milieu, The patient voices their needs appropriately. The patient has strong urges of SIB to bang her head. Patient stated \" It's never going to get better. I will always have urges to head bang or self harm. I don't want to but that's all I think about.\" Patient was asking about getting her clothes back, discharge, and when she is getting off the SIO. Patient can't contract for safety while in the hospital. She was referred to speak with her provider on Monday. She reported the reason she attempted to cut is because she had a bad phone call with her  and she was upset with herself that she ate a whole pizza for lunch. Patient endorses depression, SI,SIB, racing thoughts, but denied hallucinations and HI. Patient ate a bun, fruit, some chicken and pretzels this shift. She stated, \" I'm not drinking those ensures, I don't even know why even send them.\"  Patient is medication compliant, doesn't need reminders. Medication side effects were not observed or reported this shift. From what writer could assess, patient's skin is intact, free from injuries or open sores.Plan is to continue to monitor patient status q 15 mins, assess response to medications, and maintain the patients safety.    Vital signs are stable  Denied pain    PRN's given this shift: Trazodone for sleep      "

## 2021-07-11 NOTE — PROGRESS NOTES
"Number of patients attending the group:  5  Group Length:  1 Hour     Group Therapy      Summary of Group / Topics Discussed:  The psychotherapy group goal is to promote insight to positive choice and change. Group processing is within a supportive and safe environment. Patients processed emotions using verbal group and expressive psychotherapy interventions.     Pt participated in a group that focused on distress tolerance and positive image building skills. Each patient reported a source of current stress. Writer and group members provided positive feedback and obtained commitment from patients on their willingness to practice the skills reviewed.      Assessment: This patient presented with a flat affect. She was reserved and quiet in group. She also provided feedback to group members. Pt reported reported \"needing help with accepting the fact that I am suffering from mental illness, as I am a provider, myself \" She provided positive feedback to some participants in the group.   Demonstrated ways to practice positive self-talks and use of affirmations to influence thinking. Reviewed ways to address stigma through normalization of mental illness. Group members provided feedback on behavioral strategies to cope with anxiety, depression, and acceptance.     Patient Response: Pt participated in the group. Pt identified one of his positive skills as being insightful.   She provided  insightful feedback to group members. Pt voiced understanding of the interventions. Reported willingness to practice positive coping skills.  "

## 2021-07-11 NOTE — PLAN OF CARE
"MH Assessment    Patient was alert and oriented. She had a sad, flat affect. Patient verbalized strong urges to hit her head on the wall. She stated, \" I asked for a tea bag and I was upset they wouldn't give it to me.\" She wouldn't contract for safety while in the hospital, she stated, \" I mean I don't know if I can keep myself safe. I don't want to hurt myself but it could happen.\" Patient was upset when she saw her weight today, she's been \" heavily restricting food and fluids. Patient reported that she's been exercising more to burn calories. Patient endorses wishing to be dead, depression, anxiety, racing thoughts, and passive SI thoughts. She stated, \" I couldn't kill myself here.\" Patient denied hallucinations and HI. She asked about discharging. She was referred to talk with her provider. For dinner she ate 100% of lasagna, green beans, salad, two yogurts, and two vitality rice. Patient took the lid from her snack, rolled it up, and put it in her sock. SIO staff noticed it wasn't on her tray when she through it away. SIO staff confronted patient and she handed it over. Writer assess, patient had intentions on cutting herself with the item. Patient could not contract for safety. She stated, \" I'm constantly trying to find ways to self harm.\" Oncall provider was called and orders were obtained to discontinue the order for street clothes and sandals, new order placed stating she can't have her own clothes, sandals, earrings, and wedding ring, change diet to finger foods, staff supervision in bathroom and shower, no hygiene supplies in her room unless supervised by staff and they need to be placed back in her locker, and female only SIO. Patient expressed frustration regarding her restrictions. Patient was closely monitored throughout shift and no self harm was observed. Patient watched TV till 10pm and went to bed.     Medical concerns: BP was low 88/63 due to restricting fluids. She was encouraged to drink " fluids and is being monitored by staff. Recheck 109/75.

## 2021-07-11 NOTE — PLAN OF CARE
Problem: Suicide Risk  Goal: Absence of Self-Harm  Outcome: No Change  Note: Pt slept through the night with no SIB concerns noted.     NOC Shift Report    Pt in bed at beginning of shift, breathing quiet and unlabored. Pt slept through shift. Pt slept 6.75 hours.     No pt complaints or concerns at this time. Pt continues on SIO at 5 ft for SIB risk, 24 hrs/day.     No PRNs given. Will continue to monitor.     Precautions: Suicide, Self-Injury

## 2021-07-11 NOTE — PLAN OF CARE
Patient pleasant and cooperative, visible in milieu.  Continue to be on SIO.  Flat affect, denies SI/SIB/HI, rated depression at 7/10, anxiety 7/10.  Received prn vistaril for anxiety.  Patient attended a group meeting; did some coloring.  Would like to talk to her doctor on Monday about discharge.  No aggressive behavior noted, will continue to monitor closely.

## 2021-07-11 NOTE — PROGRESS NOTES
Lida  attended a Life Skills group today that involved sharing reflections according to question prompts. Agreeable with quiet speech and minimal eye contact. Discussed her fear that she will soon lose her job due to her frequent need for mental health services. Accepted the supportive words of peers.      07/11/21 1500   Occupational Therapy   Type of Intervention structured groups   Response Initiates, socially acceptable   Hours 1

## 2021-07-12 PROCEDURE — G0177 OPPS/PHP; TRAIN & EDUC SERV: HCPCS

## 2021-07-12 PROCEDURE — 99233 SBSQ HOSP IP/OBS HIGH 50: CPT | Performed by: PSYCHIATRY & NEUROLOGY

## 2021-07-12 PROCEDURE — 250N000013 HC RX MED GY IP 250 OP 250 PS 637: Performed by: PSYCHIATRY & NEUROLOGY

## 2021-07-12 PROCEDURE — 124N000002 HC R&B MH UMMC

## 2021-07-12 PROCEDURE — 250N000013 HC RX MED GY IP 250 OP 250 PS 637: Performed by: FAMILY MEDICINE

## 2021-07-12 RX ADMIN — BUSPIRONE HYDROCHLORIDE 15 MG: 15 TABLET ORAL at 07:59

## 2021-07-12 RX ADMIN — LAMOTRIGINE 150 MG: 100 TABLET ORAL at 20:27

## 2021-07-12 RX ADMIN — LURASIDONE HYDROCHLORIDE 80 MG: 80 TABLET, FILM COATED ORAL at 20:29

## 2021-07-12 RX ADMIN — Medication 1000 UNITS: at 07:59

## 2021-07-12 RX ADMIN — POLYETHYLENE GLYCOL 3350 17 G: 17 POWDER, FOR SOLUTION ORAL at 07:59

## 2021-07-12 RX ADMIN — FLUOXETINE 10 MG: 10 CAPSULE ORAL at 08:02

## 2021-07-12 RX ADMIN — HYDROXYZINE HYDROCHLORIDE 50 MG: 50 TABLET, FILM COATED ORAL at 13:09

## 2021-07-12 RX ADMIN — HYDROXYZINE HYDROCHLORIDE 50 MG: 50 TABLET, FILM COATED ORAL at 07:59

## 2021-07-12 RX ADMIN — BUSPIRONE HYDROCHLORIDE 15 MG: 15 TABLET ORAL at 20:27

## 2021-07-12 RX ADMIN — THIAMINE HCL TAB 100 MG 100 MG: 100 TAB at 08:02

## 2021-07-12 RX ADMIN — ALUMINUM HYDROXIDE, MAGNESIUM HYDROXIDE, AND SIMETHICONE 30 ML: 200; 200; 20 SUSPENSION ORAL at 09:55

## 2021-07-12 RX ADMIN — LAMOTRIGINE 150 MG: 100 TABLET ORAL at 08:00

## 2021-07-12 RX ADMIN — CITALOPRAM HYDROBROMIDE 20 MG: 20 TABLET ORAL at 08:02

## 2021-07-12 RX ADMIN — Medication 1 TABLET: at 07:59

## 2021-07-12 NOTE — PLAN OF CARE
BEHAVIORAL TEAM DISCUSSION    Participants: Dr. Floyd Cerda; Yesica Gould PA Student; Linda SINGHSW; Lesley NORRIS  Progress: Patient was to be discharged today, was taken off of her SIO but then told the doctor she couldn't keep herself safe if she returned home so was put back on the SIO.  She also reported that the [facility] she signed herself up for pior to admit would not be a good fit for her and that she cannot return to her DBT therapist.  Anticipated length of stay: Unknown  Continued Stay Criteria/Rationale: Patient continue to report being unsafe if discharged home.  Medical/Physical: Anorexia Nervosa; Restricting Type  Precautions:   Behavioral Orders   Procedures    Code 1 - Restrict to Unit    Routine Programming     As clinically indicated    Self Injury Precaution    Status 15     Every 15 minutes.    Status Individual Observation     Patient SIO status reviewed with team/RN.  Please also refer to RN/team documentation for add'l detail.    -SIO staff to monitor following which have contributed to patient being on SIO: self injurious behavior, can't contract for safety.  -Possible interventions SIO staff could use to support patient's treatment progress:  Redirections, offering prn meds, distraction.  -When following observed, team will review discontinuation of SIO:  Can contract for safety, absence of Suicidal ideation and Self injurious behavior.     Order Specific Question:   CONTINUOUS 24 hours / day     Answer:   5 feet     Order Specific Question:   Indications for SIO     Answer:   Self-injury risk    Suicide precautions     Patients on Suicide Precautions should have a Combination Diet ordered that includes a Diet selection(s) AND a Behavioral Tray selection for Safe Tray - with utensils, or Safe Tray - NO utensils       Plan: Doctor plans to call the DBT therapist. Patient may need a new DBT program.    Rationale for change in precautions or plan: No changes

## 2021-07-12 NOTE — PLAN OF CARE
Assessment/Intervention/Current Symtoms and Care Coordination  Patient was on an SIO and she met with the doctor who removed the SIO to discharge her.  She reported that she didn't think the [facility] would be a good fit and that her DBT Therapist doesn't want her back. Patient told the doctor she could not contract for safety if discharged so Order was discontinued. Doctor plans to call her DBT therapist.     Patient now needs her hospital letter faxed to another person at [facility], Jackie Parker. Faxed the letter and gave patient a copy of this letter as well.    Discharge Plan or Goal  Patient will not contract for safety at this point.    Barriers to Discharge   Patient on SIO for safety needs further stabilization.    Referral Status  Patient has providers at present time.    Legal Status  Voluntary

## 2021-07-12 NOTE — PROGRESS NOTES
[facility], [place]   Psychiatric Progress Note        Interim History:   The patient's care was discussed with the treatment team during the daily team meeting and/or staff's chart notes were reviewed.  Staff report patient was placed on SIO over the weekend as she could not contract for safety last night. Additionally, staff reports that Lida was upset about her weight gain over the weekend and stated that she has been restricting food and liquids. RN reports Lida drank this morning and reported feeling bloated. Patient continues to have a flat affect, rating anxiety and depression at 6/10. RN reports this morning, patient contracted to safety. Patient had an episode of low blood pressure yesterday which as since improved today.       Met with patient: in the conference room. Additionally, we spoke with Gaurang, Lida's , in her presence and per her request. We had a long conversation with Lida regarding her plans for discharge, original plan to discharge today. Lida was guarded, at times visibly angry, and labile. Lida states that she had thoughts of self harm and suicidal ideation over the weekend. Today states that she wants to be discharged but at the same time she does not want to as she doesn't know if she should be as she feels she will partake in Self injurious behavior. Patient states that she does not want to pursue outpatient PHP virtually as she doesn't think it will be helpful. At the same time, Lida states that she wants to partake in a 3 day group DBT therapy but that her therapist doesn't feel as though she was making any improvements. Additionally, patient states that she has childhood trauma due to multiple surgeries as a child for a cleft palate that she feels like is the root of her problems. We discussed with patient and her  change of plans to continue inpatient hospitalization as patient has changed her mind regarding outpatient plans. Both patient and  "her  are in agreement to plan to stay. Patient requested that we speak to her therapist, Joseph Jordan at [facility], to get further insight. We discussed the high liklihood that patient has borderline personality disorder, patient was hesitant to this diagnosis but does acknowledge that she has many of the characteristics of Borderline personality disorder. 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 We encouraged patient to call her boss and to speak to her further. Lida states that she cannot agree to keeping her self safe and we discussed plans to place back on SIO 1:1. Patient had no further questions or concerns.          Medications:       busPIRone  15 mg Oral BID     citalopram  20 mg Oral Daily     FLUoxetine  10 mg Oral Daily     lamoTRIgine  150 mg Oral BID     lurasidone  80 mg Oral At Bedtime     multivitamin w/minerals  1 tablet Oral Daily     polyethylene glycol  17 g Oral Daily     thiamine  100 mg Oral Daily     Vitamin D3  1,000 Units Oral Daily          Allergies:     Allergies   Allergen Reactions     Morphine Other (See Comments)     Twitching     Amoxicillin Rash     Sulfa Drugs Rash          Labs:   No results found for this or any previous visit (from the past 24 hour(s)).       Psychiatric Examination:     /66   Pulse 71   Temp 98.3  F (36.8  C) (Oral)   Resp 16   Ht 1.6 m (5' 3\")   Wt 53.2 kg (117 lb 3.2 oz)   SpO2 97%   BMI 20.76 kg/m    Weight is 117 lbs 3.2 oz  Body mass index is 20.76 kg/m .     Orthostatic Vitals       Most Recent      Sitting Orthostatic /66 07/12 0756    Sitting Orthostatic Pulse (bpm) 71 07/12 0756    Standing Orthostatic /68 07/12 0756    Standing Orthostatic Pulse (bpm) 88 07/12 0756        Appearance: awake, alert and dressed in hospital scrubs.  Attitude:  guarded and less cooperative  Eye Contact:  poor   Mood:  sad  and depressed, at " times irritable and angry  Affect:  intensity is blunted and restricted range  Speech:  clear, coherent, but soft  Psychomotor Behavior:  no evidence of tardive dyskinesia, dystonia, or tics  Throught Process:  goal oriented  Associations:  no loose associations  Thought Content:  no evidence of active suicidal ideation or homicidal ideation, no auditory hallucinations present and no visual hallucinations present. Chronic passive suicidal ideation. Patient is not able to contract for safety, see above.   Insight:  limited  Judgement:  limited  Oriented to:  time, person, and place  Attention Span and Concentration:  fair  Recent and Remote Memory:  limited    Clinical Global Impressions  First: 6/4 7/7/2021      Most recent:            Precautions:     Behavioral Orders   Procedures     Code 1 - Restrict to Unit     Routine Programming     As clinically indicated     Self Injury Precaution     Status 15     Every 15 minutes.     Status Individual Observation     Patient SIO status reviewed with team/RN.  Please also refer to RN/team documentation for add'l detail.    -SIO staff to monitor following which have contributed to patient being on SIO: self injurious behavior, can't contract for safety.  -Possible interventions SIO staff could use to support patient's treatment progress:  Redirections, offering prn meds, distraction.  -When following observed, team will review discontinuation of SIO:  Can contract for safety, absence of Suicidal ideation and Self injurious behavior.     Order Specific Question:   CONTINUOUS 24 hours / day     Answer:   5 feet     Order Specific Question:   Indications for SIO     Answer:   Self-injury risk     Suicide precautions     Patients on Suicide Precautions should have a Combination Diet ordered that includes a Diet selection(s) AND a Behavioral Tray selection for Safe Tray - with utensils, or Safe Tray - NO utensils            DIagnoses:     Major depressive disorder, recurrent,  moderate severity.  Generalized anxiety disorder.  Panic disorder without agoraphobia.  Anorexia nervosa.  Restrictive type, borderline personality disorder.         Plan:     Patient will stay in the hospital for further stabilization and organization of outpatient plans. No medication changes today. Will call patients DBT therapist, Joseph Jordan. Will continue to provide support and structure and encourage patient to be safe.    FRIDA Manriquez-Student     I was present with PA student who participated in the service and in the documentation of the note. I have verified the history and personally performed the physical exam and medical decision making. I agree with the assessment and plan of care as documented in the note.     Floyd Cerda MD  [facility]

## 2021-07-12 NOTE — PLAN OF CARE
NOC Shift Report    Pt in bed at beginning of shift, breathing quiet and unlabored. Pt slept through shift. Pt slept 7 hours.     Pt continues on SIO for self-injury and suicide risk. This SIO is at 5 ft and 24 hrs/day. SIO is female only. Pt was monitored closely for SIB, none observed nor stated during the night. No pt complaints or concerns at this time.     No PRNs given. Will continue to monitor.     Precautions: Suicide, Self-Injury

## 2021-07-12 NOTE — PLAN OF CARE
Problem: OT General Care Plan  Goal: OT Goal 1    Pt attended 2 out of 3 OT groups offered. Pt actively participated in occupational therapy clinic with 5 patients total x60 minutes. Pt was able to ask for assistance as needed, and independently initiate a self-directed, creative expression task. Pt demonstrated good focus, planning, problem solving, and attention to detail. Minimal interactions with peers observed, as she spent a majority of group quietly engaged in her task. Pleasant on approach, and accepted a compliment on her project. Demonstrated safety with task materials, and appropriately handed materials directly back to writer at the end of group. Pt actively participated in a structured occupational therapy group of 1-2 patients total x45 minutes with a focus on visuospatial problem solving and social engagement via a group game. Pt quickly demonstrated understanding of the novel 3-step task after an initial explanation. Pt remained focused and engaged throughout the full duration of group. Strategic in her task approach, and shared that she enjoyed the game at the end of group. Calm, pleasant, and cooperative throughout groups. Affect appeared to brighten slightly in interactions.

## 2021-07-12 NOTE — PLAN OF CARE
"Pt is pleasant and cooperative this morning but has a flat affect.  Pt rates anxiety this morning 6/10 and depressio 6/10.  Pt denies hearing any voices. Pt admits to thoughts of SI/SIB but states she has no plan this morning.  Pt received prn hydroxyzine for anxiety.  Pt was med compliant.  Pt was able to contract for safety with writer on my shift.  Pt remains on SIO for thoughts of self-harm.  [place] [facility] [place] art project but withdrawn.  Pt states hydroxyzine helped a little bit but states anxiety is still a 5/10.  PT c/o feeling bloated and request prn for bloating.  Pt continues to admit to thoughts of hurting herself but continues to contract for safety and continues on an SIO.  Pt attended group. Pt states her anxiety is really high again and rates it and 8/10.  Pt states, \"I really want to bang my head against the wall.\"  Pt agrees to be safe for the remainder of my shift and inform writer if hydroxyzine is not helpful.  Pt requests removing some restrictions such as bathroom and using utensils.  Writer informed her that she needs to be able to contract for safety.  Pt states understanding and agreed to see how today goes and will discuss with provider tomorrow.  Pt states prn hydroxyzine helped a little and she was feeling, \"a little better.\"  Pt remains on SIO.    "

## 2021-07-12 NOTE — PLAN OF CARE
Shift Summary  No new concern. Visible in milieu for dinner. Still having SI/SIB thought but no plan. Contracts for safety. Remains on one to one observation. Reported depression and anxiety. Took bed time medication with no difficulties. Will continue to monitor.

## 2021-07-13 PROCEDURE — 124N000002 HC R&B MH UMMC

## 2021-07-13 PROCEDURE — 250N000011 HC RX IP 250 OP 636: Performed by: PSYCHIATRY & NEUROLOGY

## 2021-07-13 PROCEDURE — 250N000013 HC RX MED GY IP 250 OP 250 PS 637: Performed by: PSYCHIATRY & NEUROLOGY

## 2021-07-13 PROCEDURE — 99233 SBSQ HOSP IP/OBS HIGH 50: CPT | Performed by: PSYCHIATRY & NEUROLOGY

## 2021-07-13 PROCEDURE — 250N000013 HC RX MED GY IP 250 OP 250 PS 637: Performed by: FAMILY MEDICINE

## 2021-07-13 PROCEDURE — 99207 PR CDG-MDM COMPONENT: MEETS HIGH - UP CODED: CPT | Performed by: PSYCHIATRY & NEUROLOGY

## 2021-07-13 PROCEDURE — G0177 OPPS/PHP; TRAIN & EDUC SERV: HCPCS

## 2021-07-13 RX ADMIN — LURASIDONE HYDROCHLORIDE 80 MG: 80 TABLET, FILM COATED ORAL at 21:42

## 2021-07-13 RX ADMIN — BUSPIRONE HYDROCHLORIDE 15 MG: 15 TABLET ORAL at 08:38

## 2021-07-13 RX ADMIN — LAMOTRIGINE 150 MG: 100 TABLET ORAL at 21:42

## 2021-07-13 RX ADMIN — Medication 1000 UNITS: at 08:38

## 2021-07-13 RX ADMIN — BUSPIRONE HYDROCHLORIDE 15 MG: 15 TABLET ORAL at 21:43

## 2021-07-13 RX ADMIN — FLUOXETINE 10 MG: 10 CAPSULE ORAL at 08:38

## 2021-07-13 RX ADMIN — LAMOTRIGINE 150 MG: 100 TABLET ORAL at 08:38

## 2021-07-13 RX ADMIN — CITALOPRAM HYDROBROMIDE 20 MG: 20 TABLET ORAL at 08:38

## 2021-07-13 RX ADMIN — POLYETHYLENE GLYCOL 3350 17 G: 17 POWDER, FOR SOLUTION ORAL at 08:38

## 2021-07-13 RX ADMIN — THIAMINE HCL TAB 100 MG 100 MG: 100 TAB at 08:38

## 2021-07-13 RX ADMIN — OLANZAPINE 10 MG: 10 INJECTION, POWDER, LYOPHILIZED, FOR SOLUTION INTRAMUSCULAR at 16:31

## 2021-07-13 RX ADMIN — Medication 1 TABLET: at 08:38

## 2021-07-13 RX ADMIN — HYDROXYZINE HYDROCHLORIDE 50 MG: 50 TABLET, FILM COATED ORAL at 13:25

## 2021-07-13 ASSESSMENT — ACTIVITIES OF DAILY LIVING (ADL)
ORAL_HYGIENE: WITH SUPERVISION
HYGIENE/GROOMING: WITH SUPERVISION
DRESS: SCRUBS (BEHAVIORAL HEALTH)
LAUNDRY: WITH SUPERVISION

## 2021-07-13 NOTE — PROVIDER NOTIFICATION
07/13/21 1645   Justification   Clinical Justification Self     Please see psych associate who was with patient at time of incident. De-escalation techniques done by staff first. However, patient was unable to be redirected with her head banging behaviors thus duress beeper was pressed by staff on SIO. 5 pt restraints applied by staff present. Administered Zyprexa 10 mg IM to her right gluteus. Physical assessment completed after restraints applied. ROM WDL x4. No complaints of pain. No apparent injury noted to patient and staff.

## 2021-07-13 NOTE — PROGRESS NOTES
"[facility], [place]   Psychiatric Progress Note        Interim History:   The patient's care was discussed with the treatment team during the daily team meeting and/or staff's chart notes were reviewed.  Staff report patient continues to be on SIO 1:1. Patient is eating, has thoughts of Self injurious behavior but denies SI. CTC will look into outpatient DBT programs for patient as patient does not have insurance to cover a residential program at this time.     Met with patient: in her room. Lida states that she is \"not having a good day\". Patient was blunted and irritable during our conversation. Patient continues to have difficulty with deciding on a discharge plan. Patient says that her , Gaurang, is upset with her as she said she did not want to proceed with PHP and reports that he called her childish. Patient also reports her top desire is to do DBT therapy but that she is unable to partake in groups per her DBT therapist at [facility]. Lida says she still is receiving [place] services and does not want to loose those. Lida states that she is now reconsidering PHP and that she received a call from the outpatient [facility] program, will be rescheduled to later this week. Patient says they told her they would likely have a spot on July 26th. Lida says that she is also thinking about a residental program which would require a change in insurance to her husbands with a reduction of work hours, also states that she does not want to loose her individual DBT therapist or [place] providers. Lida states that she wants to go home to take a bunch of pills. Lida says that she cannot promise to be safe due to Self injurious behavior urges such as head banging and feels helpless and upset as she feels that she tries to do her best to resist urges?! We spent significant amount of time talking to her that she could and should do more to resist self injurious urges and reach out for help " "when she feels she needs it. Lida was only somewhat receptive. As patient cannot contract for safety, we agreed to continue SIO 1:1 for one more day. Patient had no further questions or concerns.          Medications:       busPIRone  15 mg Oral BID     citalopram  20 mg Oral Daily     FLUoxetine  10 mg Oral Daily     lamoTRIgine  150 mg Oral BID     lurasidone  80 mg Oral At Bedtime     multivitamin w/minerals  1 tablet Oral Daily     polyethylene glycol  17 g Oral Daily     thiamine  100 mg Oral Daily     Vitamin D3  1,000 Units Oral Daily          Allergies:     Allergies   Allergen Reactions     Morphine Other (See Comments)     Twitching     Amoxicillin Rash     Sulfa Drugs Rash          Labs:   No results found for this or any previous visit (from the past 24 hour(s)).       Psychiatric Examination:     BP 99/66   Pulse 76   Temp 98.2  F (36.8  C) (Oral)   Resp 16   Ht 1.6 m (5' 3\")   Wt 53.2 kg (117 lb 3.2 oz)   SpO2 95%   BMI 20.76 kg/m    Weight is 117 lbs 3.2 oz  Body mass index is 20.76 kg/m .     Orthostatic Vitals       Most Recent      Sitting Orthostatic BP 99/66 07/13 0753    Sitting Orthostatic Pulse (bpm) 76 07/13 0753    Standing Orthostatic BP 96/68 07/13 0753    Standing Orthostatic Pulse (bpm) 79 07/13 0753          Appearance: awake, alert and dressed in hospital scrubs.  Attitude:  guarded and less cooperative  Eye Contact:  poor   Mood:  sad  and depressed, at times irritable   Affect:  intensity is blunted and restricted range  Speech:  clear, coherent, but soft. Speaks in short sentence length.   Psychomotor Behavior:  no evidence of tardive dyskinesia, dystonia, or tics  Throught Process:  goal oriented  Associations:  no loose associations  Thought Content:  no evidence of active suicidal ideation or homicidal ideation, no auditory hallucinations present and no visual hallucinations present. Chronic passive suicidal ideation. Patient is not able to contract for safety, see above.  "   Insight:  limited  Judgement:  limited  Oriented to:  time, person, and place  Attention Span and Concentration:  fair  Recent and Remote Memory:  limited    Clinical Global Impressions  First: 6/4 7/7/2021      Most recent:            Precautions:     Behavioral Orders   Procedures     Code 1 - Restrict to Unit     Routine Programming     As clinically indicated     Self Injury Precaution     Status 15     Every 15 minutes.     Status Individual Observation     Patient SIO status reviewed with team/RN.  Please also refer to RN/team documentation for add'l detail.    -SIO staff to monitor following which have contributed to patient being on SIO: self injurious behavior, can't contract for safety.  -Possible interventions SIO staff could use to support patient's treatment progress:  Redirections, offering prn meds, distraction.  -When following observed, team will review discontinuation of SIO:  Can contract for safety, absence of Suicidal ideation and Self injurious behavior.     Order Specific Question:   CONTINUOUS 24 hours / day     Answer:   5 feet     Order Specific Question:   Indications for SIO     Answer:   Self-injury risk     Suicide precautions     Patients on Suicide Precautions should have a Combination Diet ordered that includes a Diet selection(s) AND a Behavioral Tray selection for Safe Tray - with utensils, or Safe Tray - NO utensils            DIagnoses:     Major depressive disorder, recurrent, moderate severity.  Generalized anxiety disorder.  Panic disorder without agoraphobia.  Anorexia nervosa.  Restrictive type, borderline personality disorder.         Plan:     Patient will stay in the hospital for further stabilization and organization of outpatient plans. Will discontinue Prozac and increase Citalopram dose. Will call patients DBT therapist, Joseph Jordan. Encouraged patient to speak to [facility] regarding her plans and . Will continue to provide support and structure and encourage  patient to be safe.    FRIDA Manriquez-Student     I was present with PA student who participated in the service and in the documentation of the note. I have verified the history and personally performed the physical exam and medical decision making. I agree with the assessment and plan of care as documented in the note.     Floyd Cerda MD  [facility]

## 2021-07-13 NOTE — PLAN OF CARE
Problem: OT General Care Plan  Goal: OT Goal 1  Description: Pt will fully complete OT self-assessment form in order to assist treatment team with understanding their needs and goals while present on the unit within 3 days.     OT Groups Attended: Clinic x2    Affect/Hygiene/Presentation: Calm, engaged, flat affect. No apparent hygiene concerns.     Patient Performance/Response: Pt actively participated in occupational therapy clinic with 3-4 patients for 90 minutes. Pt was able to ask for assistance as needed, and independently initiate a novel, goal-directed task with minimal assistance for task setup. Pt demonstrated good focus (30 minutes without interruption), planning, and attention to detail during task completion. Pt appeared comfortable interacting with peers and writer as needed, however she generally kept to herself and appeared focused on her selected task.     Plan: Pt will be encouraged to maintain group attendance for continued ongoing assessment and support in completion of occupational therapy treatment goals.     Outcome: Improving

## 2021-07-13 NOTE — PLAN OF CARE
Shift Summary  Psych/Physical  No new concern. Pt reported that she does not feel safe at the hospital ,but feels safe outside hospital??? Later, pt reported to Dr leon that she wants to go home to take a bunch of pills and she cannot promise to be safe due to self injurious behavior.   Visible in milieu for meals. Ate ok. Took medication with no problem. Noted crying after lunch while talking on phone. Hydroxyzine given per request. Seems effective. Denies pain. Reported no problem with B/B.   Continue to monitor pt's status Q 15 minutes and stabilize the patient's symptoms with the use of medications and therapeutic programming.

## 2021-07-13 NOTE — PROVIDER NOTIFICATION
07/13/21 1716   Debriefing   Debriefing DO   Does patient understand why the event happened? Yes   Does patient agree to safe behaviors? Yes   What can we do differently so this doesn't happen again? Other (comment)  (Pt contracted for safety.)   Plan of care reviewed and modified No     Patient calmed down and contracted for safety. Pt verbalized understanding the reason why she was put in restraints, and said that she would prevent future restraints by attending group therapy, listening to music via head phones and accepting medications as needed when offered by staff. Restraints were discontinued at 1716.

## 2021-07-13 NOTE — PROVIDER NOTIFICATION
"   07/13/21 1648   Seclusion or Restraint Order   In Person Face to Face Assessment Conducted Yes-Eval of pt's immediate situation, reaction to intervention, complete review of systems assessment, behavioral assessment & review/assessment of hx, drugs & meds, recent labs, etc, behavioral condition, need to continue/terminate restraint/seclusion   Patient Experienced No adverse physical outcome from seclusion/restraint initiation   Continuation of Seclus/Restraint indicated at this time Yes     Pt continues to remain in restraints. Pt denies any injuries with head banging and code to place pt in restraints.  Pt is calm and states, \"she feels like we over exaggerated by placing her in restraints.\"  Writer explained we could not risk her hurting herself.  Pt denies any discomfort while in restraints. Writer reviewed most recent medications, notes, labs and vital signs. Pt states she can contract for safety at this time and primary nurse notified.    "

## 2021-07-13 NOTE — PLAN OF CARE
Problem: Suicidal Behavior  Goal: Suicidal Behavior is Absent or Managed  Outcome: No Change  Note: Pt continues to sleep through the night with no SI/SIB behaviors observed.     NOC Shift Report    Pt in bed at beginning of shift, breathing quiet and unlabored. Pt slept through shift. Pt slept 7 hours.     No pt complaints or concerns at this time. Pt continues on SIO at 5ft for self-injurious behaviors risk. This SIO is 24 hrs/day and female only.     No PRNs given. Will continue to monitor.     Precautions: Suicide, SIB

## 2021-07-13 NOTE — PLAN OF CARE
"Assessment/Intervention/Current Symtoms and Care Coordination  Patient affect is flat and depressed.  She asked to call [facility] Intake because they were suppose to call her today to do an Intake for their program -- (she was not yet enrolled as she told us on admission.)  She did complete the [facility] Intake and can start on Monday, 7/26 and it is virtual.  This writer asked her about the DBT therapist at [facility]?  She reported that she can still continue to see him individually but HE did not feel she was getting anything out of participating in the group modules.  I asked her why that would be?  She reported she has \"no idea.\"  She lives in [place] so I told her I would call [facility] and see if they had a program and what the wait-list would be if they do.  Patient then said she was also on a wait list for another \"open\" DBT Program At [facility] in [place]. Let her know that it is not at all unusual to have to wait for mental health programs.  They are in high demand due to the pandemic. She reported that she completed the mental health program at [facility] in 2016 but since that time, her insurance through [facility] does not cover inpatient mental health treatment and she would need to switch to her 's insurance.  I discussed this with her last week and at that time she had planned to discharge home and discuss the insurance ramifications with her .  Insurance companies do not simply drop patients and add them without a process. Patient does not know what she wants and said she cannot contract for safety if we discharge her home.    Calls made and information provided on DBT Programs to patient. This information was discussed with patient and added to her discharge summary as well:  [facility]BT([place]) - Patient reported to me that her DBT therapist will still see her but he does not feel she would benefit from group modules at this time.  She could not tell me why he feels " "this way. I told patient to have a roseanne discussion with her DBT Therapist and ask what she needs to do to demonstrate that she is ready to re-enter their group modules.   [facility] ([place]) Patient has been on their list for quite some time.  She is now about 2 weeks or less to be called.  She signed up for their DBT program called \"Radically Open\" which she said would work well for her.  [facility] ([place]) This program is a commitment of 1 year (like [facility]) and she would meet have individual therapy and also spend one day a week for 2 hours in a weekly module.    Patient returned to ask me what she needs to do to get discharged?  I told her that she must contract for safety because if she tells Dr. GARCÍA \"if I go home I will not be able to keep myself safe\" he won't discharge her.   Encouraged her to work out a safety plan for herself as well if she wants to discharge.     Discharge Plan or Goal  Get patent to a point where a discharge plan can be implemented because at this point, the patient has no idea what she wants.    Barriers to Discharge   Patient's inability to contract for safety. Remains on an SIO    Referral Status  None made yet    Legal Status  Voluntary    "

## 2021-07-13 NOTE — PROVIDER NOTIFICATION
07/13/21 1648   Seclusion or Restraint Order   Length of Order 4   Order Obtained Yes   Attending Physician Notified Yes   Attending Physician's Name Dr. Cerda     Paged the primary doctor and obtained order for Seclusion and Restraints at 1648. Physical assessment done to pt and no injury noted. Will discontinue seclusion and restraints when patient contracts for safety.

## 2021-07-13 NOTE — PROGRESS NOTES
"1530-Pt exited group when it was done and went straight to her room.  She got a blanket, wrapped herself in it and sat in her bathroom.  Staff engaged in conversation with her asking if group triggered anything.  Pt stated that it didn't.    1535-Pt asked to listen to music to try and help with her anxiety. Staff played some of her favorite music to try an ease the anxiety she was experiencing.      1545-Pt declined PRN medication offered by RN.  Staff observed her breathing getting more rapid and staff asked if she could take a few deep breaths.  Pt was able to.    1550-Staff offered a shower to help, ice, lavender, coloring, and pt declined them all.  She stated that she couldn't move and was stuck.      1605-Pt told staff that she wanted the feeling to go away and the only way to make it go away is to bang her head really hard.  Staff reminded her about the repercussions of head banging and pt stated \"Nothing.  I can do it if I want because nothing ever happens to me.\"  She asked staff to step out of the room so she could bang her head and staff told her that staff couldn't do that.      1610-Pt told staff that she was going to bang her head and then staff put their hand behind her head as she was trying to hit it against the wall. She felt bad because pt felt she hurt staff's hand.  Staff informed her that she didn't hurt their hand.  Pt stated she was frustrated with her doctor because of his open-ended lies.  She wanted a different doctor, Dr. SIMS, and staff informed her that they couldn't do anything tonight but they could revisit and discuss tomorrow about getting a different doctor.       1615-Pt continued to tell staff that she wanted staff to leave the room so she could head bang.  Staff offered for her to go to her bed and staff would be near the doorway and she stated that she couldn't move.  Pt also stated that she was frustrated with her doctor and staff and wanted them to leave.  Staff reminded hr that " staff couldn't.       1620-Pt attempted to hit her head again but staff was able to get their hand behind their head before hitting the wall with it. At that point, staff pushed the duress beeper and then she attempted to bang her head again.      1630-Pt was put in restraints but complied with little struggle.       Staff will continue to monitor.

## 2021-07-14 PROCEDURE — 90853 GROUP PSYCHOTHERAPY: CPT

## 2021-07-14 PROCEDURE — 99233 SBSQ HOSP IP/OBS HIGH 50: CPT | Performed by: PSYCHIATRY & NEUROLOGY

## 2021-07-14 PROCEDURE — 250N000013 HC RX MED GY IP 250 OP 250 PS 637: Performed by: PSYCHIATRY & NEUROLOGY

## 2021-07-14 PROCEDURE — 250N000013 HC RX MED GY IP 250 OP 250 PS 637: Performed by: FAMILY MEDICINE

## 2021-07-14 PROCEDURE — 99207 PR CDG-MDM COMPONENT: MEETS HIGH - UP CODED: CPT | Performed by: PSYCHIATRY & NEUROLOGY

## 2021-07-14 PROCEDURE — 124N000002 HC R&B MH UMMC

## 2021-07-14 PROCEDURE — G0177 OPPS/PHP; TRAIN & EDUC SERV: HCPCS

## 2021-07-14 RX ADMIN — POLYETHYLENE GLYCOL 3350 17 G: 17 POWDER, FOR SOLUTION ORAL at 09:29

## 2021-07-14 RX ADMIN — CITALOPRAM HYDROBROMIDE 30 MG: 20 TABLET ORAL at 09:28

## 2021-07-14 RX ADMIN — HYDROXYZINE HYDROCHLORIDE 50 MG: 50 TABLET, FILM COATED ORAL at 04:18

## 2021-07-14 RX ADMIN — THIAMINE HCL TAB 100 MG 100 MG: 100 TAB at 09:28

## 2021-07-14 RX ADMIN — ALUMINUM HYDROXIDE, MAGNESIUM HYDROXIDE, AND SIMETHICONE 30 ML: 200; 200; 20 SUSPENSION ORAL at 19:18

## 2021-07-14 RX ADMIN — ACETAMINOPHEN 650 MG: 325 TABLET, FILM COATED ORAL at 13:01

## 2021-07-14 RX ADMIN — Medication 1 TABLET: at 09:28

## 2021-07-14 RX ADMIN — BUSPIRONE HYDROCHLORIDE 15 MG: 15 TABLET ORAL at 19:18

## 2021-07-14 RX ADMIN — LAMOTRIGINE 150 MG: 100 TABLET ORAL at 19:18

## 2021-07-14 RX ADMIN — BUSPIRONE HYDROCHLORIDE 15 MG: 15 TABLET ORAL at 09:28

## 2021-07-14 RX ADMIN — LAMOTRIGINE 150 MG: 100 TABLET ORAL at 09:28

## 2021-07-14 RX ADMIN — LURASIDONE HYDROCHLORIDE 80 MG: 80 TABLET, FILM COATED ORAL at 21:40

## 2021-07-14 RX ADMIN — HYDROXYZINE HYDROCHLORIDE 50 MG: 50 TABLET, FILM COATED ORAL at 17:52

## 2021-07-14 RX ADMIN — Medication 1000 UNITS: at 09:29

## 2021-07-14 ASSESSMENT — ACTIVITIES OF DAILY LIVING (ADL)
DRESS: STREET CLOTHES
LAUNDRY: WITH SUPERVISION
ORAL_HYGIENE: WITH ASSISTANCE
HYGIENE/GROOMING: WITH SUPERVISION

## 2021-07-14 NOTE — PLAN OF CARE
Assessment/Intervention/Current Symtoms and Care Coordination  The patient has been cooperative with care.  Still on her SIO. Attended groups today.      Discharge Plan or Goal  Patient will return home and resume care with outpatient providers.    Barriers to Discharge   Patient cannot contract for safety for any period of time remains on SIO    Referral Status  Done    Legal Status  Voluntary

## 2021-07-14 NOTE — PROGRESS NOTES
07/14/21 1500   Groups   Details processing group   Number of patients attending the group:  3  Group Length:  1 Hours    Group Therapy     Summary of Group / Topics Discussed:      The  Psychotherapy group goal is to promote insight to positive choice and change. Group processing is within a supportive and safe environment. Patients will process emotions using verbal group and expressive psychotherapy interventions.        Assessment- CTC (writer) lead group about stress and coping skills development. CTC encouraged group to discuss how stress impact their ability to manage their mental health and overall wellbeing. Life stressors were identified and physical responses to stress experiences was discussed as well. CTC asked the group questions related to current coping skills, finding new coping skills, and how to identify helpful coping skills, to determine effectiveness of learning material.             Patient Response- Pt participated in group and demonstrated a decent understanding of the topic matter by providing feedback to questions with minimal prompting. Pt was able to engage with peers by providing personal opinions and responding to others statements related to the topic matter.

## 2021-07-14 NOTE — PLAN OF CARE
Problem: Adult Inpatient Plan of Care  Goal: Plan of Care Review  Outcome: No Change  Flowsheets (Taken 7/13/2021 1921)  Plan of Care Reviewed With: patient     Patient had a Code 21 at past 1600 due to banging her head to the wall. She was administered with IM Zyprexa 10 mg which was effective. She contracted for safety but still in SIO due to SIB behaviors. She had been sleeping in bed since past 1700. Did not eat her supper. Will give her scheduled medications when she wakes up. Pt continues on an SIO with 1:1 staff supervision. Will continue care plan.     Addendum: Patient was awakened at past 2100. Checked her vital signs (WDL) and offered her food to eat. She requested 2 hard boiled eggs and a cup of orange juice. She is compliant with taking her night time medications. She is busy using the telephone at this time at 2150. SIO staff is supervising her. Will continue to monitor and assess pt.

## 2021-07-14 NOTE — PROGRESS NOTES
[facility]  Occupational Therapy Behavioral Health Assessment    Patient Name: Corinne N Palm    Description: OT staff met with pt to review the role of occupational therapy and explain the value of having them involved in their treatment plan including options to meet current needs/self-identified goals. The below evaluation is a compilation of functional performance observation and information obtained from an OT self-assessment.     Clinical impression through direct observation:        07/08/21 1437   General Information   Date Initially Attended OT 07/08/21   Clinical Impression   Affect Flat;Appropriate to situation;Fluctuates   Orientation Oriented to person, place and time   Appearance and ADLs General cleanliness observed in most areas   Attention to Internal Stimuli No observed signs   Interaction Skills Interacts appropriately with staff;Initiates appropriately with staff;Interacts appropriately with peers;Initiates appropriately with peers   Ability to Communicate Needs Does so with prompts   Verbal Content Clear;Appropriate to topic;Selective   Ability to Maintain Boundaries Maintains appropriate physical boundaries;Maintains appropriate verbal boundaries   Participation Independently participates;Initiates participation   Concentration Concentrates 50 minutes   Ability to Concentrate With structure   Follows and Comprehends Directions Independently follows 2 step verbal directions   Memory Delayed and immediate recall intact   Organization Independently organizes medium tasks   Decision Making Independent;Changes mind frequently   Planning and Problem Solving Occasionally needs assist/feedback;Indentifies problems but not alternatives   Ability to Apply and Learn Concepts Applies within group structure   Frustrations / Stress Tolerance Utilizes coping skills with assistance   Level of Insight Some insight   Self Esteem Takes risks with support and encouragement;Accepts positive feedback   Social  "Supports Has knowledge of support systems       Patient indicated success in: (Bolded items indicate activities the pt selected)   Time spent with family or friends  Relaxing and enjoying myself  Having a satisfying routine  Work or volunteering  Concentrating on my tasks     Living independently  Taking care of the place where I live  Transportation  Managing my finances  Managing my basic needs (food, medicine, sleep)  Learning new things  Ability to pursue my goals  Other:    Patient indicated barriers to success are: (Bolded items indicate activities the pt selected)   Difficulty concentrating  Memory problems  Physical health/Chronic Pain  Lacks support (emotional/physical/spiritual)  Motivation/mood  Finances  Using drugs or alcohol  Sleeping too much or not enough  Missing work/appointments  Bothered by lights or sounds in the environment  Bothered by touch, texture, or movement  Lack of satisfying daily routine   Living environment  Transportation  Other:          Patient indicated these supports: (Bolded items indicate activities the pt selected)   Safe place to live  Family, friends or caregivers to help out when needed  A best friend or significant other  Pets  Belief in myself and abilities  Routines and rituals that support my wellness  Access to email, social media, phone calls  Leisure supplies to support my interests and hobbies  Professionals: , Therapist, etc.  Other:      Patient identified these emotional, physical or mental health concerns: \"depression, anxiety, eating disorder\"    Patient murtaza with these concerns: \"art music, journaling\"    Patient enjoys spending time with: \", parents, family and friends\"    Identified enjoyment in activities: \"crocheting, art, playing music, aerial arts\"    Stressors or changes in the past year: \"new job, covid, new house\"    Patient identified values: \"connection\"    Patient's goal for the future is \"have kids\"    Goals selected by " patient to work on with OT: (Bolded items indicate activities the pt selected)   Have hope for the future  Feel better  Learn ways to stay well and avoid coming to the hospital  Share my thoughts and feelings  Feel more confident  Improve my sleep  Improve my concentration  Relax and enjoy myself  Improve my relationships with others  Handle my frustrations  Develop a satisfying daily routine  Manage my physical pain  Explore use of sensory coping strategies  Other:     Assessment: Patient would benefit from continue engagement in OT groups that support healthy recovery, specifically exploration of positive coping skills for symptom management/relapse prevention.    Plan: Initiate care plan goals and interventions.    Patient participated in goal(s) selection and understands the plan of care: Yes    IP OT Goal: Pt will practice using >2 coping strategies to manage stress and reduce symptoms to demonstrate increased readiness for discharge.     Plan for Next Treatment: Provide graded occupation-based activities for increased success and ongoing assessment.     Leida Baker, OT on 7/14/2021 at 2:30 PM

## 2021-07-14 NOTE — PROGRESS NOTES
"   07/14/21 1800   Groups   Details Psychotherapy   Number of patients attending the group:  3    Group Length:  1 Hours    Group Therapy Type: Psychotherapy/ art     Summary of Group / Topics Discussed:      The  Psychotherapy group goal is to promote insight to positive choice and change. Group processing is within a supportive and safe environment. Patients will process emotions using verbal group and expressive psychotherapy interventions including visual art/writing interventions.    Group interventions support patients by: fostering self awareness, creative self expression, self esteem and hope/optimism    Modalities to reach these goals include: Expressive Arts Therapies    Subjective -patient report of mood today- \"Frustrated\"    Objective/ Intervention- Goal of group and Therapeutic modality utilized- Collage of images to represent self    Group Response- Group was engaged in art making and appropriate conversation    Patient Response- Pt engaged in conversation about identity, finishing the sentence \"I am\" with \"music therapist\" and \"bubble tea enthusiast.\" Pt participated in art making. She created a collage of images representing things that bring her juana. She shared her piece with the group and explained that she is unsure if these things will actually work. She talked about making the image \"messy\" to depict her ambivalence. When asked what she can do for herself tonight she stated \"Not do something stupid.\" Therapist reframed the statement to the more positive statement of \"I'll stay safe\" and highlighted the pt's goals.         Winston Turner, HEDYFT, ATR-BC          "

## 2021-07-14 NOTE — PLAN OF CARE
Problem: OT General Care Plan  Goal: OT Goal 1  Description: Pt will practice using >2 coping strategies to manage stress and reduce symptoms to demonstrate increased readiness for discharge.     OT Groups Attended: Clinic x2    Affect/Hygiene/Presentation: Calm/pleasant, engaged, congruent affect. No apparent hygiene concerns.     Patient Performance/Response: Pt actively participated in occupational therapy clinic with 3-4 patients for 90 minutes. Pt was able to ask for assistance as needed, and independently initiate a novel, goal-directed task without difficulty. Pt demonstrated good focus (50 minutes without interruption) and attention to detail during task completion. Pt appeared comfortable interacting with peers and writer, and pleasantly initiated conversation with the group during her time in clinic.    Goal Progress: GOAL REVIEW: care plan reviewed, plan/goal adjustment made to allow for further progress and additional opportunities for skill acquisition.     Plan: Pt will be encouraged to maintain group attendance for continued ongoing assessment and support in completion of occupational therapy treatment goals.     Outcome: Improving

## 2021-07-14 NOTE — PLAN OF CARE
Shift Summary  Psych/Physical  No new concern noted. No behavioral outburst this shift.  Denies all mental health symptoms except anxiety. Declined need for prn medication. Not sure why pt seems guarded and tense during checking in. Visible in milieu for meals. No evidence of psychosis, paranoid, delusional thoughts or disorganized behavior. No evidence of self injury behavior noted. participated two group activity.   Does not appear to be in any kind of distress or pain. Vital sings WNL (see flow sheet for details). Slept 3.5 hours last night. Good medication compliance is noted. Seems tolerating medications well. No side effect reported by pt or noted by this writer. Appetite adequate. No problem with bowel and bladder per pt. Took shower.   Prn: none  Continue to monitor pt's status Q 15 minutes and stabilize the patient's symptoms with the use of medications and therapeutic programming.

## 2021-07-14 NOTE — PROGRESS NOTES
"[facility], [place]   Psychiatric Progress Note        Interim History:   The patient's care was discussed with the treatment team during the daily team meeting and/or staff's chart notes were reviewed.  Staff report patient continues to be on SIO 1:1 and had a code 21 called last night where she was placed in 5 point restraints after attempting to head bang. [facility] states that patient has many outpatient providers including a personal DBT therapist at [facility] and a therapist and psychiatrist at [place]. [facility] reports Lida is on a list at [facility] in [place] for radically open DBT and says she will provide patient with [facility] DBT resources upon discharge. Additionally, [facility] reports Lida completed her [facility] PHP intake and is set to start the program on July 26th. Patients insurance will not cover an intensive program such as [facility].     Met with patient: in the conference room with the presence of a psych associate. Lida says that she is doing okay today. She reports last night she was feeling anxious which escalated to the code being called due to her attempts to head bang. Patient says that she voiced to staff multiple times about the urge to head bag and feels as though the code was \"unneccessary\". Patient reports that she feels embarrassed and says she acted \"stupid\". Lida says that sometimes she experiences an out of body feeling as if she \"isn't there\" which leads to the head banging. Patient says that listening to music, art, cold showers, and ice packs help her when she is feeling urges to partake in Self injurious behavior. Lida says that she is still interested in doing the PHP program but still desires to do an intensive residential program, but her insurance will not cover it. Patient states that [facility] has an intensive residential program for Anorexia but that she does not feel as though she needs to gain weight so that she will not be accepted. Lida " "states that she feels safer in the hospital as there will be nothing to stop her Self injurious behavior at home. We discussed in detail commitment with patient and all agreed at this time it would not be beneficial. However, we encourage Lida to continue to try to keep herself safe and to avoid Self injurious behavior. We discussed tenative discharge on Friday but this will require patient to be off of SIO 1:1 prior. We encouraged her to talk to staff if she has urges to self harm and to use the distractions she listed above. Lida stated understanding. Patient says that she is slowly coming around to the idea of borderline personality disorder as a diagnosis but does state that she was angry at us yesterday which made her feel as though she should lie about feeling safe to get out of the hospital. Lida requested her own clothes, utensils to be used during meal and to be placed back on her tray afterwards, and also asked for us to call her mom or  to provide an update. We agreed to the above requests with the stipulation that patient tries her best to keep herself safe and to avoid Self injurious behavior. Patient had no further questions or concerns.          Medications:      busPIRone  15 mg Oral BID    citalopram  30 mg Oral Daily    lamoTRIgine  150 mg Oral BID    lurasidone  80 mg Oral At Bedtime    multivitamin w/minerals  1 tablet Oral Daily    polyethylene glycol  17 g Oral Daily    thiamine  100 mg Oral Daily    Vitamin D3  1,000 Units Oral Daily          Allergies:     Allergies   Allergen Reactions    Morphine Other (See Comments)     Twitching    Amoxicillin Rash    Sulfa Drugs Rash          Labs:   No results found for this or any previous visit (from the past 24 hour(s)).       Psychiatric Examination:     /72   Pulse 93   Temp 98.3  F (36.8  C) (Oral)   Resp 16   Ht 1.6 m (5' 3\")   Wt 53.2 kg (117 lb 3.2 oz)   SpO2 96%   BMI 20.76 kg/m    Weight is 117 lbs 3.2 oz  Body mass index " is 20.76 kg/m .     Orthostatic Vitals         Most Recent      Sitting Orthostatic /72 07/14 0925    Sitting Orthostatic Pulse (bpm) 93 07/14 0925    Standing Orthostatic /74 07/14 0925    Standing Orthostatic Pulse (bpm) 111 07/14 0925          Appearance: awake, alert and dressed in hospital scrubs.   Attitude:  guarded and more cooperative  Eye Contact:  poor   Mood: Still appears sad  and depressed, but less.   Affect:  intensity is blunted and restricted range  Speech:  clear, coherent, but mostly soft, at times elevated.   Psychomotor Behavior:  no evidence of tardive dyskinesia, dystonia, or tics  Throught Process:  goal oriented  Associations:  no loose associations  Thought Content:  no evidence of active suicidal ideation or homicidal ideation, no auditory hallucinations present and no visual hallucinations present. Chronic passive suicidal ideation. Patient is not able to contract for safety, see above.    Insight:  limited  Judgement:  limited  Oriented to:  time, person, and place  Attention Span and Concentration:  fair  Recent and Remote Memory:  limited    Clinical Global Impressions  First: 6/4 7/14/21      Most recent:            Precautions:     Behavioral Orders   Procedures    Code 1 - Restrict to Unit    Routine Programming     As clinically indicated    Self Injury Precaution    Status 15     Every 15 minutes.    Status Individual Observation     Patient SIO status reviewed with team/RN.  Please also refer to RN/team documentation for add'l detail.    -SIO staff to monitor following which have contributed to patient being on SIO: self injurious behavior, can't contract for safety.  -Possible interventions SIO staff could use to support patient's treatment progress:  Redirections, offering prn meds, distraction.  -When following observed, team will review discontinuation of SIO:  Can contract for safety, absence of Suicidal ideation and Self injurious behavior.     Order Specific  Question:   CONTINUOUS 24 hours / day     Answer:   5 feet     Order Specific Question:   Indications for SIO     Answer:   Self-injury risk    Suicide precautions     Patients on Suicide Precautions should have a Combination Diet ordered that includes a Diet selection(s) AND a Behavioral Tray selection for Safe Tray - with utensils, or Safe Tray - NO utensils            DIagnoses:     Major depressive disorder, recurrent, moderate severity.  Generalized anxiety disorder.  Panic disorder without agoraphobia.  Anorexia nervosa.  Restrictive type, borderline personality disorder.         Plan:     Patient will stay in the hospital for further stabilization and organization of outpatient plans. No medication changes. Will call patients mother regarding updates and patient progress, per patient request. Will continue SIO 1:1. Will continue to provide support and structure and encourage patient to be safe. Tentative discharge plans for Friday.     Patient may wear her own clothes and use utensils with meals but they most be returned after the meal.     FRIDA Manriquez-Student     I was present with PA student who participated in the service and in the documentation of the note. I have verified the history and personally performed the physical exam and medical decision making. I agree with the assessment and plan of care as documented in the note.     Floyd Cerda MD  [facility] Psychiatry

## 2021-07-14 NOTE — PLAN OF CARE
Problem: Suicidal Behavior  Goal: Suicidal Behavior is Absent or Managed  Outcome: No Change  Note: Pt restlessness noted. No SI/SIB behaviors observed this shift.     NOC Shift Report    Pt in bed at beginning of shift, breathing quiet and unlabored. Pt awake for majority of the shift, presenting restless throughout the night. Pt slept 3.5 hours.     Pt continues on SIO at 5 ft per self-injurious behaviors risk. This SIO is for 24 hrs/day and is female only.      Around 0420, pt was observed tossing and turning in bed. Writer went to assess pt. Pt verbalized experiencing some feelings of restlessness and anxiety. PRN Hydroxyzine 50 mg was given at this time. Pt was given lavender pt to encourage relaxation. No further pt complaints or concerns at this time. Will continue to monitor.     Precautions: Suicide, Self-Injury

## 2021-07-15 PROCEDURE — 99233 SBSQ HOSP IP/OBS HIGH 50: CPT | Performed by: PSYCHIATRY & NEUROLOGY

## 2021-07-15 PROCEDURE — G0177 OPPS/PHP; TRAIN & EDUC SERV: HCPCS

## 2021-07-15 PROCEDURE — 250N000013 HC RX MED GY IP 250 OP 250 PS 637: Performed by: PSYCHIATRY & NEUROLOGY

## 2021-07-15 PROCEDURE — 250N000013 HC RX MED GY IP 250 OP 250 PS 637: Performed by: FAMILY MEDICINE

## 2021-07-15 PROCEDURE — H2032 ACTIVITY THERAPY, PER 15 MIN: HCPCS

## 2021-07-15 PROCEDURE — 124N000002 HC R&B MH UMMC

## 2021-07-15 RX ORDER — BUSPIRONE HYDROCHLORIDE 10 MG/1
20 TABLET ORAL 2 TIMES DAILY
Status: DISCONTINUED | OUTPATIENT
Start: 2021-07-15 | End: 2021-07-20 | Stop reason: HOSPADM

## 2021-07-15 RX ADMIN — BUSPIRONE HYDROCHLORIDE 15 MG: 15 TABLET ORAL at 08:40

## 2021-07-15 RX ADMIN — BUSPIRONE HYDROCHLORIDE 20 MG: 10 TABLET ORAL at 22:28

## 2021-07-15 RX ADMIN — Medication 1000 UNITS: at 08:42

## 2021-07-15 RX ADMIN — LAMOTRIGINE 150 MG: 100 TABLET ORAL at 08:41

## 2021-07-15 RX ADMIN — THIAMINE HCL TAB 100 MG 100 MG: 100 TAB at 08:42

## 2021-07-15 RX ADMIN — HYDROXYZINE HYDROCHLORIDE 50 MG: 50 TABLET, FILM COATED ORAL at 12:04

## 2021-07-15 RX ADMIN — POLYETHYLENE GLYCOL 3350 17 G: 17 POWDER, FOR SOLUTION ORAL at 08:40

## 2021-07-15 RX ADMIN — Medication 1 TABLET: at 08:41

## 2021-07-15 RX ADMIN — LAMOTRIGINE 150 MG: 100 TABLET ORAL at 22:28

## 2021-07-15 RX ADMIN — CITALOPRAM HYDROBROMIDE 30 MG: 20 TABLET ORAL at 08:41

## 2021-07-15 RX ADMIN — LURASIDONE HYDROCHLORIDE 80 MG: 80 TABLET, FILM COATED ORAL at 22:29

## 2021-07-15 ASSESSMENT — ACTIVITIES OF DAILY LIVING (ADL)
HYGIENE/GROOMING: INDEPENDENT
HYGIENE/GROOMING: INDEPENDENT
DRESS: STREET CLOTHES
LAUNDRY: UNABLE TO COMPLETE
LAUNDRY: UNABLE TO COMPLETE
ORAL_HYGIENE: INDEPENDENT
DRESS: STREET CLOTHES
ORAL_HYGIENE: INDEPENDENT

## 2021-07-15 NOTE — PLAN OF CARE
Patient pleasant and cooperative, visible in milieu.  Continue to be SIO for SIB.  Flat affect, denies SI/SIB, depression. Endorses anxiety as still high after receiving prn vistaril this afternoon.  Patient sitting in the dinning working on puzzles.  No aggressive behavior noted.  Will continue to monitor closely.

## 2021-07-15 NOTE — PLAN OF CARE
"MH Assessment    Patient is alert and oriented, calm. Patient's affect is sad, blunted, and flat. Speech is Normal. Patient's insight is Poor, she is inappropriately focused on discharge and judgement is poor. Patient is Neatly groomed.  Patient has been present in the milieu coloring, withdrawn.The patient voices their needs appropriately. Patient endorses wishing to be dead, depression, anxiety, SI,SIB, and racing thoughts. She denied Hallucinations, and HI. Her suicide plan is to stick something in an outlet. She has active thoughts to bang her head and she \" feels hopeless and a burden on her family.\" The safety plan is to take medications and stay out of her room; sit in the lounge and color or watch TV. Patient received PRN hydroxyzine 50mg for anxiety and thoughts. Patient also received PRN Maalox this shift. She stated, \" I feel bloated and it makes me want to bang my head.\"  Patient has been eating, hydrating, and sleeping adequately. She ate 100% of dinner. She had a cup of fruit and chicken nuggets. She is also drinking the vitamin rice that are sent up on her tray. Patient is medication compliant, doesn't need reminders. Medication side effects were not observed or reported this shift.From what writer could assess, patient's skin is intact, free from injuries or open sores. Plan is to continue to monitor patient status q 15 mins, assess response to medications, and maintain the patients safety.    Vital signs are stable  Denied pain    Pt continues on 1:1  SIO with 5 foot ft staff space distance. She is on the SIO for SIB, with no related events occurring this shift.    "

## 2021-07-15 NOTE — PROGRESS NOTES
"[facility], [place]   Psychiatric Progress Note        Interim History:   The patient's care was discussed with the treatment team during the daily team meeting and/or staff's chart notes were reviewed.  Staff report patient appears to be doing okay this morning. However, staff reports that last night but patient continued to endorse thoughts of wishing to be dead, endorses anxiety, depression, and thoughts of self injurious behavior. Lida has been present in the milieu. CTC will approach patient for an NED to be signed for [facility]. Patient is not able to pursue a residential program for mental health with her current insurance policy.     Met with patient: in her room with the presence of a psych associate. Lida says that she is feeling anxious with Self injurious behavior urges to do head banging, and had just taken a PRN hydroxyzine prior to our conversation. Lida does not endorse any specific trigger but does report a phone call with her  that went poorly. Lida was unable to contract for safety as she had plans to go into the bathroom to head bang and also had a pen that she planned to put in the outlet as she \"feels frustrated with life\" and \"does not want to live\". Patient reports overall feelings of hopelessness and we explained that she would be required to be able to keep her self safe, off of SIO, in order to discharge. Lida states understanding, reports listening to music, art, journaling help distract her from self injurious thoughts. Lida would like to enter a residential program but is knowledgeable of insurance restrictions. Patient had no further questions or concerns.     After visit with patient provider had 10 min long phone conversation with the patient's  Gaurang.  was informed about Lida's lack of progress and not being able to contract for safety and need to continue her on SIO. We discussed that she would benefit from being in " "residential behavioral program like [facility] in [place] with special focus on DBT. Gaurang indicated that he understood and would try to talk to HR on his work to get Lida on his health insurance. He understood that Lida would not be likely discharged home tomorrow.          Medications:       busPIRone  15 mg Oral BID     citalopram  30 mg Oral Daily     lamoTRIgine  150 mg Oral BID     lurasidone  80 mg Oral At Bedtime     multivitamin w/minerals  1 tablet Oral Daily     polyethylene glycol  17 g Oral Daily     thiamine  100 mg Oral Daily     Vitamin D3  1,000 Units Oral Daily          Allergies:     Allergies   Allergen Reactions     Morphine Other (See Comments)     Twitching     Amoxicillin Rash     Sulfa Drugs Rash          Labs:   No results found for this or any previous visit (from the past 24 hour(s)).       Psychiatric Examination:     /75   Pulse 70   Temp 97  F (36.1  C) (Oral)   Resp 16   Ht 1.6 m (5' 3\")   Wt 53.2 kg (117 lb 3.2 oz)   SpO2 99%   BMI 20.76 kg/m    Weight is 117 lbs 3.2 oz  Body mass index is 20.76 kg/m .     Orthostatic Vitals       Most Recent      Sitting Orthostatic /72 07/14 0925    Sitting Orthostatic Pulse (bpm) 93 07/14 0925    Standing Orthostatic /76 07/15 0700    Standing Orthostatic Pulse (bpm) 80 07/15 0700        Appearance: awake, alert and dressed in hospital scrubs.   Attitude:  guarded and minimallycooperative  Eye Contact:  poor   Mood: More irritable, frustrated, and angry. Still appears sad  and depressed,    Affect:  mood congruent and restricted range  Speech:  clear, coherent, but mostly soft, at times elevated.   Psychomotor Behavior:  no evidence of tardive dyskinesia, dystonia, or tics  Throught Process:  goal oriented  Associations:  no loose associations  Thought Content:  no evidence of active suicidal ideation or homicidal ideation, no auditory hallucinations present and no visual hallucinations present. Chronic passive " suicidal ideation. Reports strong urges for Self injurious behavior. Patient is not able to contract for safety, see above.    Insight:  limited  Judgement:  limited  Oriented to:  time, person, and place  Attention Span and Concentration:  fair  Recent and Remote Memory:  limited    Clinical Global Impressions  First: 6/4 7/14/21      Most recent:            Precautions:     Behavioral Orders   Procedures     Code 1 - Restrict to Unit     Routine Programming     As clinically indicated     Self Injury Precaution     Status 15     Every 15 minutes.     Status Individual Observation     Patient SIO status reviewed with team/RN.  Please also refer to RN/team documentation for add'l detail.    -SIO staff to monitor following which have contributed to patient being on SIO: self injurious behavior, can't contract for safety.  -Possible interventions SIO staff could use to support patient's treatment progress:  Redirections, offering prn meds, distraction.  -When following observed, team will review discontinuation of SIO:  Can contract for safety, absence of Suicidal ideation and Self injurious behavior.     Order Specific Question:   CONTINUOUS 24 hours / day     Answer:   5 feet     Order Specific Question:   Indications for SIO     Answer:   Self-injury risk     Suicide precautions     Patients on Suicide Precautions should have a Combination Diet ordered that includes a Diet selection(s) AND a Behavioral Tray selection for Safe Tray - with utensils, or Safe Tray - NO utensils            DIagnoses:     Major depressive disorder, recurrent, moderate severity.  Generalized anxiety disorder.  Panic disorder without agoraphobia.  Anorexia nervosa.  Restrictive type. Borderline personality disorder.         Plan:     Patient will stay in the hospital for further stabilization and organization of outpatient plans. Will increase Buspar. Will call patients Gaurang parker, regarding updates and patient progress, per patient  request. Will continue SIO 1:1. Tentative discharge plans for tomorrow, however less likely given patient inability to contract for safety.     FRIDA Manriquez-Student     I was present with PA student who participated in the service and in the documentation of the note. I have verified the history and personally performed the physical exam and medical decision making. I agree with the assessment and plan of care as documented in the note.     Floyd Cerda MD  [facility] Psychiatry

## 2021-07-15 NOTE — PLAN OF CARE
"Assessment/Intervention/Current Symtoms and Care Coordination  Patient last evening was making statements to the RN to harm herself.  But today was visible in the milieu and is now back in attending an OT group. No sign of distress.  She is on an SIO but team discussed taking her off of it since she told the physician yesterday afternoon that she wanted to discharge on Friday. She has multiple treatment providers that include [facility] Psychiatrist Giulia Vigil, [place] Therapist, DBT Therapist at [facility],  She plans to start the [facility] (Virtual) through [place] on May 26.  Prior to discharge, she had also put herself on the wait-list for [facility] for their \"Radical\" DBT program and is near the top of that list.    At request of Dr. GARCÍA asked patient to sign NED for [facility]. Met with the patient and said that I understood she still cannot contract for safety and it was my understand she was not going to be discharged tomorrow.  Patient stated, \"Well, I can't contract for safety today.\"  Asked patient if we could look at [facility] but she would not sign the NED because she has already been through the [facility] and [facility] there.  She wants to go to the [facility] through [facility] on July 26th.    Discharge Plan or Goal  Patient will return home at discharge and resume care with her outpatient providers.    Barriers to Discharge   Needs to be safe off of the SIO today and this evening prior to discharge tomorrow.    Referral Status  All referrals completed.    Legal Status  Voluntary    "

## 2021-07-15 NOTE — PLAN OF CARE
NOC Shift Report     Pt in bed at beginning of shift, breathing quiet and unlabored. Pt came to desk to request a snack and headphones at 0210, given and pt returned to bed  Pt slept through shift. Pt slept 6.5 hours.      No pt complaints or concerns at this time.      No PRNs given. Will continue to monitor.    Donal Coto RN

## 2021-07-15 NOTE — PLAN OF CARE
Problem: OT General Care Plan  Goal: OT Goal 1  Description: Pt will practice using >2 coping strategies to manage stress and reduce symptoms to demonstrate increased readiness for discharge.     OT Groups Attended: Clinic x2    Affect/Hygiene/Presentation: Calm/pleasant, engaged, congruent affect. No apparent hygiene concerns.     Patient Performance/Response: Pt actively participated in occupational therapy clinic with 2-3 patients for 100 minutes. Pt was able to ask for assistance as needed, and independently return to a novel, goal-directed task without difficulty. Pt demonstrated good focus (60 minutes without interruption), planning, and attention to detail during task completion. Pt appeared comfortable interacting with peers and writer, and occasionally initiated conversation with writer during her time in clinic.    Plan: Pt will be encouraged to maintain group attendance for continued ongoing assessment and support in completion of occupational therapy treatment goals.     Outcome: Improving

## 2021-07-16 PROCEDURE — 99207 PR CDG-MDM COMPONENT: MEETS HIGH - UP CODED: CPT | Performed by: PSYCHIATRY & NEUROLOGY

## 2021-07-16 PROCEDURE — 250N000013 HC RX MED GY IP 250 OP 250 PS 637: Performed by: PSYCHIATRY & NEUROLOGY

## 2021-07-16 PROCEDURE — 250N000013 HC RX MED GY IP 250 OP 250 PS 637: Performed by: FAMILY MEDICINE

## 2021-07-16 PROCEDURE — 124N000002 HC R&B MH UMMC

## 2021-07-16 PROCEDURE — G0177 OPPS/PHP; TRAIN & EDUC SERV: HCPCS

## 2021-07-16 PROCEDURE — 90853 GROUP PSYCHOTHERAPY: CPT

## 2021-07-16 PROCEDURE — 99233 SBSQ HOSP IP/OBS HIGH 50: CPT | Performed by: PSYCHIATRY & NEUROLOGY

## 2021-07-16 RX ADMIN — POLYETHYLENE GLYCOL 3350 17 G: 17 POWDER, FOR SOLUTION ORAL at 08:40

## 2021-07-16 RX ADMIN — CITALOPRAM HYDROBROMIDE 30 MG: 20 TABLET ORAL at 08:40

## 2021-07-16 RX ADMIN — LAMOTRIGINE 150 MG: 100 TABLET ORAL at 08:40

## 2021-07-16 RX ADMIN — THIAMINE HCL TAB 100 MG 100 MG: 100 TAB at 08:39

## 2021-07-16 RX ADMIN — BUSPIRONE HYDROCHLORIDE 20 MG: 10 TABLET ORAL at 08:39

## 2021-07-16 RX ADMIN — LAMOTRIGINE 150 MG: 100 TABLET ORAL at 19:49

## 2021-07-16 RX ADMIN — BUSPIRONE HYDROCHLORIDE 20 MG: 10 TABLET ORAL at 19:49

## 2021-07-16 RX ADMIN — ACETAMINOPHEN 650 MG: 325 TABLET, FILM COATED ORAL at 20:11

## 2021-07-16 RX ADMIN — LURASIDONE HYDROCHLORIDE 80 MG: 80 TABLET, FILM COATED ORAL at 19:49

## 2021-07-16 RX ADMIN — HYDROXYZINE HYDROCHLORIDE 50 MG: 50 TABLET, FILM COATED ORAL at 15:02

## 2021-07-16 RX ADMIN — Medication 1 TABLET: at 08:39

## 2021-07-16 RX ADMIN — Medication 1000 UNITS: at 08:39

## 2021-07-16 ASSESSMENT — ACTIVITIES OF DAILY LIVING (ADL)
HYGIENE/GROOMING: INDEPENDENT
DRESS: STREET CLOTHES;SCRUBS (BEHAVIORAL HEALTH);INDEPENDENT
ORAL_HYGIENE: INDEPENDENT
LAUNDRY: UNABLE TO COMPLETE

## 2021-07-16 NOTE — PLAN OF CARE
"MH Assessment    Patient is alert and oriented. Patient was calm, but was tearful toward the middle of the shift because of the events on the unit and about the talk she had with her . Patient's affect is sad, blunted, and flat. Speech is Normal. Patient's insight is Poor. She is hyper focused on discharge. Patient is Neatly groomed.  Patient has been actively participating in group, present in the milieu, but withdrawn. The patient voices their needs appropriately. Patient endorses depression, anxiety, SI,SIB, and racing thoughts. She denied Hallucinations and HI. Patient has active thoughts of wishing to be dead and stated, \" I really just want to bang my head. I should have lied to the Doctor just to get out of here tomorrow.\"  To calm down patient used art, communicated with RN, and music to cope with her emotions. Patient has  been eating, hydrating, and sleeping adequately. She ate 100% of dinner. Patient kept refusing her medications all evening until about 2240, till they were brought to her. Medication side effects were not observed or reported this shift.From what writer could assess, patient's skin is intact, free from injuries or open sores. Plan is to continue to monitor patient status q 15 mins, assess response to medications, and maintain the patients safety.    Patient had a slight temperature change 99.1  Denied pain      "

## 2021-07-16 NOTE — PROGRESS NOTES
[facility], [place]   Psychiatric Progress Note        Interim History:   The patient's care was discussed with the treatment team during the daily team meeting and/or staff's chart notes were reviewed.  Staff report patient during evening shift had strong surges for Self injurious behavior and suicidal ideation. Patient continues to have thoughts of head banging but has been able to keep resist urges at this time. Per [facility], Lida refused to sign NED for [facility] and expressed that she does not want to go to their program. Lida told CTC that she will plan to do [facility] starting on July 26th. Patient has been medication complaint.     Met with patient: in the lounge with the presence of a psych associate. Lida says that she is feeling anxious with Self injurious behavior urges to do head banging after having a discussion with her . Lida reports feeling guilty for not being home as her  stated he misses her and is lonely. Patent reports , [employer]ang, is actively working with HR to attempt to figure out insurance challenges. Lida reports that she called [facility] and the wait is ~1-2 weeks out, assuming patient is accepted and insurance is worked out. Patient admits she is not sure how long it would be to figure out insurance and acknowledges that she will likely need to decrease her hours at work. Additionally, patient reports she spoke to [facility] today and they will not take her due to her needing higher level of care than they can at this time provide. Lida says that she does not want to rely on SIO but is unable to contract for safety. Psych associate states that Lida told her that her plans if she were to go home includes a suicide plan to take pills. Patient requested transfer to another unit due to peer disruption which will not be available. Lida is agreed to stay the weekend and asked about timeline for discharge, we discussed our concerns  "with her regarding her passive suicidal ideation and inability to keep her self safe as barriers to discharge. Patient had no further questions or concerns.          Medications:      busPIRone  20 mg Oral BID    citalopram  30 mg Oral Daily    lamoTRIgine  150 mg Oral BID    lurasidone  80 mg Oral At Bedtime    multivitamin w/minerals  1 tablet Oral Daily    polyethylene glycol  17 g Oral Daily    thiamine  100 mg Oral Daily    Vitamin D3  1,000 Units Oral Daily          Allergies:     Allergies   Allergen Reactions    Morphine Other (See Comments)     Twitching    Amoxicillin Rash    Sulfa Drugs Rash          Labs:   No results found for this or any previous visit (from the past 24 hour(s)).       Psychiatric Examination:     /81   Pulse 85   Temp 97.7  F (36.5  C) (Tympanic)   Resp 16   Ht 1.6 m (5' 3\")   Wt 53.2 kg (117 lb 3.2 oz)   SpO2 96%   BMI 20.76 kg/m    Weight is 117 lbs 3.2 oz  Body mass index is 20.76 kg/m .     Orthostatic Vitals         Most Recent      Standing Orthostatic /82 07/16 0809    Standing Orthostatic Pulse (bpm) 94 07/16 0809            Appearance: awake, alert and dressed in hospital scrubs.   Attitude:  guarded and somewhat cooperative  Eye Contact:  poor   Mood: More irritable, frustrated, and angry. Still appears sad  and depressed,    Affect:  mood congruent and restricted range  Speech:  clear, coherent. Soft volume, but at times speaks with elevated volume.    Psychomotor Behavior:  no evidence of tardive dyskinesia, dystonia, or tics  Throught Process:  goal oriented  Associations:  no loose associations  Thought Content:  no evidence of active suicidal ideation or homicidal ideation, no auditory hallucinations present and no visual hallucinations present. Chronic passive suicidal ideation. Reports strong urges for Self injurious behavior. Patient is not able to contract for safety, see above.    Insight:  limited  Judgement:  limited  Oriented to:  time, " person, and place  Attention Span and Concentration:  fair  Recent and Remote Memory:  limited    Clinical Global Impressions  First: 6/4 7/14/21      Most recent:            Precautions:     Behavioral Orders   Procedures    Code 1 - Restrict to Unit    Routine Programming     As clinically indicated    Self Injury Precaution    Status 15     Every 15 minutes.    Status Individual Observation     Patient SIO status reviewed with team/RN.  Please also refer to RN/team documentation for add'l detail.    -SIO staff to monitor following which have contributed to patient being on SIO: self injurious behavior, can't contract for safety.  -Possible interventions SIO staff could use to support patient's treatment progress:  Redirections, offering prn meds, distraction.  -When following observed, team will review discontinuation of SIO:  Can contract for safety, absence of Suicidal ideation and Self injurious behavior.     Order Specific Question:   CONTINUOUS 24 hours / day     Answer:   5 feet     Order Specific Question:   Indications for SIO     Answer:   Self-injury risk    Suicide precautions     Patients on Suicide Precautions should have a Combination Diet ordered that includes a Diet selection(s) AND a Behavioral Tray selection for Safe Tray - with utensils, or Safe Tray - NO utensils            DIagnoses:     Major depressive disorder, recurrent, moderate severity.  Generalized anxiety disorder.  Panic disorder without agoraphobia.  Anorexia nervosa.  Restrictive type. Borderline personality disorder.         Plan:     Patient will stay in the hospital for further stabilization and organization of outpatient plans. No medication changes. Barriers to discharge: patients continued inability to contract for safety and Self injurious behavior. Will continue on SIO. Plans for discharge likely next week.      FRIDA Manriquez-Student     I was present with PA student who participated in the service and in the  documentation of the note. I have verified the history and personally performed the physical exam and medical decision making. I agree with the assessment and plan of care as documented in the note.     Floyd Cerda MD  [facility] Psychiatry

## 2021-07-16 NOTE — PLAN OF CARE
Problem: OT General Care Plan  Goal: OT Goal 1  Description: Pt will practice using >2 coping strategies to manage stress and reduce symptoms to demonstrate increased readiness for discharge.    OT Groups Attended: Clinic x2    Affect/Hygiene/Presentation: Calm/pleasant, engaged, congruent affect. No apparent hygiene concerns.     Patient Performance/Response: Pt continues to demonstrate adequate and successful performance skills and patterns during participation in occupational therapy clinic groups. See prior notes for further details.     Plan: Pt will be encouraged to maintain group attendance for continued ongoing assessment and support in completion of occupational therapy treatment goals.      Outcome: Improving

## 2021-07-16 NOTE — PROGRESS NOTES
CLINICAL NUTRITION SERVICES - REASSESSMENT NOTE     Nutrition Prescription    RECOMMENDATIONS FOR MDs/PROVIDERS TO ORDER:  Monitor electrolytes with improving intakes     Malnutrition Status:    Severe malnutrition in the context of chronic illness     Recommendations already ordered by Registered Dietitian (RD):  Discontinue ensure per pt request  Add cottage cheese with PRN snack    Future/Additional Recommendations:  - monitor intake, weights, acceptance of supplements--adjust prn     EVALUATION OF THE PROGRESS TOWARD GOALS   Diet: Regular  Nutrition Support: vanilal ensue with lunch (poured in cup)  Snacks: pretzels PRN  Intake: 50% of meals per pt     NEW FINDINGS   Pt stated she feels like she has been eating more. Per RN note she had 60% of breakfast and lunch today. She has been eating the pretzels for a snack but not the ensure with lunch. Pt requested it be discontinued as she does not want to waste it. Pt also requested cottage cheese to be available for a snack PRN. She feels it is helpful to eat frequent meals/snacks, usually 6 per day. Has gained 2lbs since admit.    7/13: pt declined wt  07/11/21 53.2 kg (117 lb 3.2 oz)   07/08/21 51.8 kg (114 lb 4.8 oz)   07/06/21 52.3 kg (115 lb 6.4 oz)   07/01/21 52.7 kg (116 lb 3.2 oz)   06/27/21 53.2 kg (117 lb 3.2 oz)   06/08/21 54.2 kg (119 lb 6.4 oz)   11/17/20 48.6 kg (107 lb 1.6 oz)   02/07/16 58.6 kg (129 lb 1.6 oz)   03/12/15 54.4 kg (120 lb)     MALNUTRITION  % Intake: </=75% for >/= 1 month (severe)  % Weight Loss: Weight loss does not meet criteria  Subcutaneous Fat Loss: Subcutaneous Fat Loss: Facial region:  Orbital- moderate to severe-visual only  Muscle Loss: None observed--visual only, pt wearing long sleeves and pants  Fluid Accumulation/Edema: None noted  Malnutrition Diagnosis: Severe malnutrition in the context of chronic illness     Previous Goals   Patient to consume >50 of nutritionally adequate meal trays TID, or the equivalent with  supplements/snacks.  Evaluation: improving    Previous Nutrition Diagnosis  Malnutrition related to eating disorder and worsening SIB as evidenced by ongoing weight loss, severe restriction of <50% of needs per day, and visible fat wasting    Evaluation: Improving    CURRENT NUTRITION DIAGNOSIS  Malnutrition related to eating disorder and worsening SIB as evidenced by restriction (with improving intakes since admit) and visible fat wasting     INTERVENTIONS  Implementation  Discontinue ensure per pt request  Add cottage cheese with PRN snack    Goals  Patient to consume >50% of nutritionally adequate meal trays TID, or the equivalent with supplements/snacks.    Monitoring/Evaluation  Progress toward goals will be monitored and evaluated per protocol.    Maddi Reed MS, RD, LDN  Unit Pager [phone]

## 2021-07-16 NOTE — PLAN OF CARE
Nursing Assessment    Recent Vitals: B/P: 118/81, T: 97.7, P: 85, R: 16     Goal: voiced possibly wanting to discharge due to recent patients being a trigger, worries what the doctor will do or say if she tries to leave.    General Shift Summary  Patient has withdrew to room and doesn't interact with peers. She does interact with staff and makes needs known. Affect is sad to full range. She presents as withdrawn and sad usually isolating to her room. She states that some of the patients are triggers for her wanting to self harm or stay in her room. She voiced having high anxiety and depression. Her plan to self harm would be head banging in her bathroom. Staff has used therapeutic talk and de-escalation techniques to prevent her from self harming.     She is eating about 60% of her food from both breakfast and lunch. Hygiene is good. Compliant with medication with no voiced side effects. No PRN's received this shift.    Plan is to continue to stabilize symptoms, monitor patient status q 15 mins, assess response to medications, and maintain the patients safety.    Crystal Alexander, RN MSN

## 2021-07-16 NOTE — PLAN OF CARE
Patient continues to be on SIO with 1:1 staffing for safety as ordered. Patient appeared to be asleep for 6.25 during this shift. Patient was awake by 0500. Patient sat on bed for a while and did some art work. Later, patient listened to the head phones and went back to sleep. No SIB or self injuries noted or reported. Patient was calm and pleasant. Will continue to monitor and update if there are changes.

## 2021-07-16 NOTE — PLAN OF CARE
NOC Shift Report     Pt in bed at beginning of shift, breathing quiet and unlabored. Pt slept through shift. Pt slept 6.25 hours.      No pt complaints or concerns at this time.      No PRNs given. Will continue to monitor.    Donal Coto RN

## 2021-07-16 NOTE — PLAN OF CARE
Assessment/Intervention/Current Symtoms and Care Coordination  CTC (writer) met with trx team, provided update, and reviewed chart. Pt spent most of her time in group and isolating to her room today. Pt identified peers on the unit being loud/disruptive making her feel she couldn't be in the lounge. Pt continues to express needing an SIO and doesn't feel she can contract for safety at this time.      Discharge Plan or Goal  Patient will return home at discharge and resume care with her outpatient providers.     Barriers to Discharge   Needs to be safe off of the SIO today and this evening prior to discharge tomorrow.     Referral Status  All referrals completed.     Legal Status  Voluntary

## 2021-07-16 NOTE — PLAN OF CARE
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  She also declined to share what emotions she was feeling at the start of group, saying \"I biked for an hour and my legs are sore\".  When MT asked her more about a particular song meaning to her, she just said, \"oh, I liked it\".  She did explain about the [place] song, which she has selected in every session, and each time she recounts how the writer of the song came out as a lesbian through the song.  Then she always adds, \"but to me, I just like the song\".   She also selected another song that dealt with beauty and self-confidence, but once again did not verbally process it.     Belen Mchugh, [facility]  Board-Certified Music Therapist           "

## 2021-07-17 PROCEDURE — G0177 OPPS/PHP; TRAIN & EDUC SERV: HCPCS

## 2021-07-17 PROCEDURE — 250N000013 HC RX MED GY IP 250 OP 250 PS 637: Performed by: FAMILY MEDICINE

## 2021-07-17 PROCEDURE — 250N000013 HC RX MED GY IP 250 OP 250 PS 637: Performed by: PSYCHIATRY & NEUROLOGY

## 2021-07-17 PROCEDURE — 124N000002 HC R&B MH UMMC

## 2021-07-17 RX ADMIN — BUSPIRONE HYDROCHLORIDE 20 MG: 10 TABLET ORAL at 08:59

## 2021-07-17 RX ADMIN — LURASIDONE HYDROCHLORIDE 80 MG: 80 TABLET, FILM COATED ORAL at 19:41

## 2021-07-17 RX ADMIN — LAMOTRIGINE 150 MG: 100 TABLET ORAL at 19:41

## 2021-07-17 RX ADMIN — HYDROXYZINE HYDROCHLORIDE 50 MG: 50 TABLET, FILM COATED ORAL at 13:14

## 2021-07-17 RX ADMIN — Medication 1000 UNITS: at 08:59

## 2021-07-17 RX ADMIN — POLYETHYLENE GLYCOL 3350 17 G: 17 POWDER, FOR SOLUTION ORAL at 08:59

## 2021-07-17 RX ADMIN — Medication 1 TABLET: at 08:59

## 2021-07-17 RX ADMIN — HYDROXYZINE HYDROCHLORIDE 50 MG: 50 TABLET, FILM COATED ORAL at 18:36

## 2021-07-17 RX ADMIN — THIAMINE HCL TAB 100 MG 100 MG: 100 TAB at 09:05

## 2021-07-17 RX ADMIN — BUSPIRONE HYDROCHLORIDE 20 MG: 10 TABLET ORAL at 19:41

## 2021-07-17 RX ADMIN — CITALOPRAM HYDROBROMIDE 30 MG: 20 TABLET ORAL at 08:59

## 2021-07-17 RX ADMIN — TRAZODONE HYDROCHLORIDE 50 MG: 50 TABLET ORAL at 19:41

## 2021-07-17 RX ADMIN — LAMOTRIGINE 150 MG: 100 TABLET ORAL at 08:59

## 2021-07-17 ASSESSMENT — ACTIVITIES OF DAILY LIVING (ADL)
LAUNDRY: WITH SUPERVISION
HYGIENE/GROOMING: INDEPENDENT
DRESS: STREET CLOTHES;SCRUBS (BEHAVIORAL HEALTH);INDEPENDENT
ORAL_HYGIENE: INDEPENDENT

## 2021-07-17 NOTE — PLAN OF CARE
Problem: Adult Inpatient Plan of Care  Goal: Plan of Care Review  Outcome: No Change  Flowsheets  Taken 7/17/2021 1734  Plan of Care Reviewed With: patient  Progress: no change  Taken 7/17/2021 1700  Plan of Care Reviewed With: patient     Patient is resting in bed at start of shift. Woke up and ate some snacks. Did not attend group therapy this shift. Noted covering her head with a blanket. She said she still has SI thoughts but contracts for safety. Rated depression 7/10 and anxiety 3/10. Eating 50% of her meals. Stated that sleep is getting better. She wanted to be off the SIO tomorrow and be discharged by Monday. Writer encouraged pt to keep doing well, not to hurt herself and treatment team will evaluate her on Monday. Compliant with her medications. Stated that she has shaky hands yesterday and does not know if it's a side effect of her medications. Denied pain when asked. No PRN medications given this shift.     Addendum: Patient's  placed a call at past 2100, wanting to talk to patient but she is already sleeping. Pt's  wanted to be placed in the list of visitors tomorrow at 1300. He said he will visit patient.

## 2021-07-17 NOTE — PLAN OF CARE
NOC Shift Report     Pt in bed at beginning of shift, breathing quiet and unlabored. Pt slept through shift. Pt slept 5.5 hours.      No pt complaints or concerns at this time.      No PRNs given. Will continue to monitor.    Donal Coto RN

## 2021-07-17 NOTE — PLAN OF CARE
"Nursing Assessment    Recent Vitals: B/P: 109/73, T: 98.8, P: 80    Sleep:  Hours of sleep at night: 5.5    General Shift Summary  Patient has been in and out of milieu. She makes need known to staff. She presents as withdrawn with a sad affect. Patient voiced feeling suicidal and having thoughts to head bang. Multiple staff members have done therapeutic conversation with her to help decreased her anxiety. Patient voices wanting to be off her SIO by Sunday. She was been talked to about having non self injurious behavior for at least 24 hours. Patient was encouraged to write her goals down and how she think she could accomplish each one. Patient agreed.     She had a visitor at 1315 and her affect brightened up when her visitor came.    Patient took PRN Atarax around 1300 for increased anxiety. She stated she was feeling anxious due to what she ate for lunch and is feeling \"fat\". She ate rice, a cookie, and pudding.    Hygiene is good. She is medication compliant with no voiced side effects. Voiced having better sleep.    Plan is to continue to monitor patient status q 15 mins, assess response to medications, and maintain the patients safety.    Crystal Alexander RN MSN  "

## 2021-07-17 NOTE — PLAN OF CARE
Problem: Adult Inpatient Plan of Care  Goal: Plan of Care Review  7/16/2021 1933 by Carol Vidal RN  Outcome: No Change  Flowsheets (Taken 7/16/2021 1933)  Plan of Care Reviewed With: patient  Progress: no change     Patient is sitting in her bed at start of shift. Stated she  has SI thoughts, wanting to bang her head. Rated anxiety 6/10 and depression 7/10. Offered her PRN Zyprexa but she declined and said that she does not like Zyprexa because it makes her gain weight. Redirected her then to other interventions she likes. She did some bike exercises, attended the group therapy and listening to music. She has also a cold pack behind her neck for comfort. She said she's in pain but she does not need a PRN pain medication. She ate 1/2 half of pizza, whole rice, cantaloupe chunks and 2 vitality water served to her. She did have her bike exercises again at past 1900. Compliant with her medications. PRN Tylenol 650 mg was given at 2011 for complaints of head ache. She is requesting to be off her SIO by Sunday and said she wanted to be discharged by Monday. She said she will continue to contract for safety. Will continue to monitor and assess pt.

## 2021-07-17 NOTE — PROGRESS NOTES
07/16/21 1900   Groups   Details    (Psychotherapy)   Number of patients attending the group:  5  Group Length: 1 hour     Group Therapy Type: Psychotherapy     Summary of Group / Topics Discussed:        The  Psychotherapy group goal is to promote insight to positive choice and change. Group processing is within a supportive and safe environment. Patients will process emotions using verbal group and expressive psychotherapy interventions including visual art/writing interventions.     Group interventions support patients by: creative self expression, self esteem,communication/social skills and supports, self efficacy/empowerment and emotional regulation     Modalities to reach these goals include: Affirmation, Expressive Arts Therapies and Mindfulness practices     Subjective -patient report of mood today- better now, anxious earlier     Objective/ Intervention- Goal of group and Therapeutic modality utilized- tempera paint sticks and tape - affirmation poster     Group Response- engaged     Patient Response- Pt  was quietly engaged throughout group. She said riding the bike had helped her anxiety. She did a thoughtful project and used song lyrics for many of her affirmations.     Winston Turner, CLIFF, ATR-BC

## 2021-07-18 PROCEDURE — 124N000002 HC R&B MH UMMC

## 2021-07-18 PROCEDURE — 250N000013 HC RX MED GY IP 250 OP 250 PS 637: Performed by: PSYCHIATRY & NEUROLOGY

## 2021-07-18 PROCEDURE — 250N000013 HC RX MED GY IP 250 OP 250 PS 637: Performed by: FAMILY MEDICINE

## 2021-07-18 RX ADMIN — Medication 1 TABLET: at 09:28

## 2021-07-18 RX ADMIN — Medication 1000 UNITS: at 09:28

## 2021-07-18 RX ADMIN — LAMOTRIGINE 150 MG: 100 TABLET ORAL at 09:27

## 2021-07-18 RX ADMIN — TRAZODONE HYDROCHLORIDE 50 MG: 50 TABLET ORAL at 20:15

## 2021-07-18 RX ADMIN — CITALOPRAM HYDROBROMIDE 30 MG: 20 TABLET ORAL at 09:27

## 2021-07-18 RX ADMIN — LURASIDONE HYDROCHLORIDE 80 MG: 80 TABLET, FILM COATED ORAL at 20:14

## 2021-07-18 RX ADMIN — ACETAMINOPHEN 650 MG: 325 TABLET, FILM COATED ORAL at 13:00

## 2021-07-18 RX ADMIN — LAMOTRIGINE 150 MG: 100 TABLET ORAL at 20:14

## 2021-07-18 RX ADMIN — ALUMINUM HYDROXIDE, MAGNESIUM HYDROXIDE, AND SIMETHICONE 30 ML: 200; 200; 20 SUSPENSION ORAL at 13:00

## 2021-07-18 RX ADMIN — THIAMINE HCL TAB 100 MG 100 MG: 100 TAB at 09:27

## 2021-07-18 RX ADMIN — BUSPIRONE HYDROCHLORIDE 20 MG: 10 TABLET ORAL at 20:14

## 2021-07-18 RX ADMIN — BUSPIRONE HYDROCHLORIDE 20 MG: 10 TABLET ORAL at 09:27

## 2021-07-18 RX ADMIN — POLYETHYLENE GLYCOL 3350 17 G: 17 POWDER, FOR SOLUTION ORAL at 09:27

## 2021-07-18 ASSESSMENT — ACTIVITIES OF DAILY LIVING (ADL)
LAUNDRY: WITH SUPERVISION
HYGIENE/GROOMING: INDEPENDENT
ORAL_HYGIENE: INDEPENDENT
DRESS: STREET CLOTHES;SCRUBS (BEHAVIORAL HEALTH)

## 2021-07-18 NOTE — PLAN OF CARE
Shift Summary  Psych/Physical  No new concern.   Remains on one to one for head banging and self destructive behavior. Isolative and withdrawn to self. Pt was seem in milieu. Mood seems irritable and affect is flat. Reported having chronic passive suicidal thought almost all the time. Pt said she does not want to live. Feels safe at the hospital. During check in, pt was sitting in bed and covering her head with blanket. Pt said she is mad because she ate breakfast and does not want to gain weight. Denied any nausea, vomiting ,but had upset stomach. Maalox given and seemes effective. No self injury behavior noted. No evidence of psychosis, paranoid, delusional thoughts or disorganized behavior.   Prn: Maalox  Continue to monitor pt's status Q 15 minutes and stabilize the patient's symptoms with the use of medications and therapeutic programming.

## 2021-07-18 NOTE — PLAN OF CARE
NOC Shift Report     Pt in bed at beginning of shift, breathing quiet and unlabored. Pt slept through shift. Pt slept 7 hours.      No pt complaints or concerns at this time.      No PRNs given. Will continue to monitor.    Donal Coto RN

## 2021-07-18 NOTE — PLAN OF CARE
Problem: Adult Inpatient Plan of Care  Goal: Plan of Care Review  Outcome: No Change  Flowsheets  Taken 7/18/2021 1648  Plan of Care Reviewed With: patient  Progress: no change  Taken 7/18/2021 1639  Plan of Care Reviewed With: patient    Patient remains on SIO for SIB. Patient is visible in the milieu at early part of shift. She is busy exercising in the bike machine. She feels she gained weight and  thinks it's the side effects of her medications. She said she does not want to be within her normal BMI. She still has SI thoughts but contracts for safety. She rated both depression and anxiety 7/10. She is upset later this evening because she received a call from a friend. Her friend told her that she was denied of visiting with her this shift. Writer explained to her that patients can have only one visitor per day and visitors need to call before coming to the unit so they will be listed in the visitor's log/clip board. Patient understood and was thankful. She took all her medications and was noted sleeping by 2100. No complaints of pain. No PRN medications given this shift.

## 2021-07-18 NOTE — PROGRESS NOTES
Lida participated in creative journaling group this morning, actively reflecting on the prompts offered and sharing several of her written reflections with the group. Forthcoming when prompted. Discussed her plans for ongoing recovery post-discharge.      07/17/21 1400   Occupational Therapy   Type of Intervention structured groups   Response Initiates, socially acceptable   Hours 1

## 2021-07-19 PROCEDURE — G0177 OPPS/PHP; TRAIN & EDUC SERV: HCPCS

## 2021-07-19 PROCEDURE — 250N000013 HC RX MED GY IP 250 OP 250 PS 637: Performed by: PSYCHIATRY & NEUROLOGY

## 2021-07-19 PROCEDURE — 250N000013 HC RX MED GY IP 250 OP 250 PS 637: Performed by: FAMILY MEDICINE

## 2021-07-19 PROCEDURE — 99233 SBSQ HOSP IP/OBS HIGH 50: CPT | Performed by: PSYCHIATRY & NEUROLOGY

## 2021-07-19 PROCEDURE — 124N000002 HC R&B MH UMMC

## 2021-07-19 RX ADMIN — LAMOTRIGINE 150 MG: 100 TABLET ORAL at 20:54

## 2021-07-19 RX ADMIN — CITALOPRAM HYDROBROMIDE 30 MG: 20 TABLET ORAL at 08:43

## 2021-07-19 RX ADMIN — BUSPIRONE HYDROCHLORIDE 20 MG: 10 TABLET ORAL at 20:54

## 2021-07-19 RX ADMIN — ACETAMINOPHEN 650 MG: 325 TABLET, FILM COATED ORAL at 18:25

## 2021-07-19 RX ADMIN — BUSPIRONE HYDROCHLORIDE 20 MG: 10 TABLET ORAL at 08:44

## 2021-07-19 RX ADMIN — LURASIDONE HYDROCHLORIDE 100 MG: 80 TABLET, FILM COATED ORAL at 20:55

## 2021-07-19 RX ADMIN — LAMOTRIGINE 150 MG: 100 TABLET ORAL at 08:43

## 2021-07-19 RX ADMIN — THIAMINE HCL TAB 100 MG 100 MG: 100 TAB at 08:44

## 2021-07-19 RX ADMIN — POLYETHYLENE GLYCOL 3350 17 G: 17 POWDER, FOR SOLUTION ORAL at 08:43

## 2021-07-19 RX ADMIN — HYDROXYZINE HYDROCHLORIDE 50 MG: 50 TABLET, FILM COATED ORAL at 14:22

## 2021-07-19 RX ADMIN — Medication 1 TABLET: at 08:46

## 2021-07-19 RX ADMIN — Medication 1000 UNITS: at 08:44

## 2021-07-19 NOTE — PROGRESS NOTES
"Pt reports being bored and so when pt is feeling anxious, pt will head bang as \"there's nothing to do or nothing else I can do here.\"    RN encouraged pt to use coping skills for anxiety.    Will continue to assess for safety/SIB and SIO.    Addendum:  At 12:30PM staff can hear pt head banging at the  from pt's room. RN and staff attempted therapeutic communication several times. Pt continued to SIB/headbang hard on the wall and hid underneath blanket while sitting in bathroom. Pt reported if pt discharged, pt will attempt suicide by overdosing on medications.  "

## 2021-07-19 NOTE — PROVIDER NOTIFICATION
07/19/21 0600   Sleep/Rest/Relaxation   Sleep/Rest/Relaxation (WDL) WDL   Sleep/Rest/Relaxation appears asleep   Night Time # Hours 6.5 hours     Patient slept 6.5 hours this shift. No SIB noted. Remains on SIO. No complaints of pain. No PRN medications given at night.

## 2021-07-19 NOTE — PLAN OF CARE
BEHAVIORAL TEAM DISCUSSION    Participants: Dr. Floyd Cerda; Linda Anderson [facility]; Carmen Schilling  Progress: Patient still head banging and on an SIO. Threatens to go home and head bang if discharged  Anticipated length of stay: Unknown  Continued Stay Criteria/Rationale: Continues to refuse to contract for safety and head bang sporadically.  Medical/Physical: Nothing Noted  Precautions:   Behavioral Orders   Procedures    Code 1 - Restrict to Unit    Routine Programming     As clinically indicated    Self Injury Precaution    Status 15     Every 15 minutes.    Status Individual Observation     Patient SIO status reviewed with team/RN.  Please also refer to RN/team documentation for add'l detail.    -SIO staff to monitor following which have contributed to patient being on SIO: self injurious behavior, can't contract for safety.  -Possible interventions SIO staff could use to support patient's treatment progress:  Redirections, offering prn meds, distraction.  -When following observed, team will review discontinuation of SIO:  Can contract for safety, absence of Suicidal ideation and Self injurious behavior.     Order Specific Question:   CONTINUOUS 24 hours / day     Answer:   5 feet     Order Specific Question:   Indications for SIO     Answer:   Self-injury risk    Suicide precautions     Patients on Suicide Precautions should have a Combination Diet ordered that includes a Diet selection(s) AND a Behavioral Tray selection for Safe Tray - with utensils, or Safe Tray - NO utensils       Plan: Team would like to discharge her but the patient continues to refuse to contract for safety and is still on an SIO.  She also threatens to harm herself if discharged home.  Rationale for change in precautions or plan: No changes

## 2021-07-19 NOTE — PROVIDER NOTIFICATION
07/19/21 1400   Seclusion or Restraint Order   Length of Order 4   Order Obtained Yes   Attending Physician Notified Yes   Attending Physician's Name Dr Leon   Patient Experienced No adverse physical outcome from seclusion/restraint initiation   Continuation of Seclus/Restraint indicated at this time Yes   Restraint Monitoring Q15 Minutes   Psychological Status O   Physical Comfort Other  (pt lying calmly)   Circulation NS   Continuous Observation Yes   Restraint Type   Wrist - R (BH) Continued   Wrist - L (BH) Continued   Ankle - R (BH) Continued   Ankle - L (BH) Continued   5th Point Chest (BH) Continued     Justification:  After talking to Dr Leon this morning, pt reported to doctor that she does not feel safe to go home ,but ~ 20 minutes after conversation with doctor, pt reported to this writer that she just banged her head to the wall. Pt said she feels safe now and wants to go home. Pt back to room again but after few minutes this writer was notified by another staff that pt is head banging again. This writer paged Dr. Leon and updated him regarding pt's request. Meanwhile this writer offered pt music, prn medication, coloring book, lavender patch but pt declined all. Then after ~ 20 minutes Dr leon and this writer went to pt room and try to convinced her is safe to stay in hospital if she still feels suicidal. Both of us spend ~ 15-20 minutes in pt's room. After we left the room pt started to head bang again. Code 21 was activated and pt was put in restraints. Pt was complied with procedure. No physical injury noted. Physical assessmet done after 5 points applied. ROM WDL x 4. Pt denied any pain. Dr leon was updated.

## 2021-07-19 NOTE — PLAN OF CARE
Problem: OT General Care Plan  Goal: OT Goal 1  Description: Pt will practice using >2 coping strategies to manage stress and reduce symptoms to demonstrate increased readiness for discharge.     OT Groups Attended: Clinic x2    Affect/Hygiene/Presentation: Calm/pleasant, engaged, congruent affect. No apparent hygiene concerns.     Patient Performance/Response: Pt actively participated in occupational therapy clinic with 4 patients for 90 minutes. Pt was able to ask for assistance as needed, and independently return to a familiar, goal-directed task without difficulty. Pt demonstrated good focus (60 minutes without interruption) and attention to detail during task completion. Pt appeared comfortable interacting with writer as needed, however she did generally kept to herself and did not interact with peers.     Plan: Pt will be encouraged to maintain group attendance for continued ongoing assessment and support in completion of occupational therapy treatment goals.     Outcome: Improving

## 2021-07-19 NOTE — PLAN OF CARE
Assessment/Intervention/Current Symtoms and Care Coordination  Patient taken off of SIO.  Told the doctor she could not contract safety.  He wanted to discharge her home and she then told him she would head bang if she was sent home.  Patient currently head banging and then coming to the  to report it.      Discharge Plan or Goal  Patient will return home and resume care with her outpatient providers and start the IOP through [facility] on July 26th.    Barriers to Discharge   Cannot contract for safety    Referral Status  Done    Legal Status  Voluntary

## 2021-07-19 NOTE — PROVIDER NOTIFICATION
07/19/21 1645   Debriefing   Debriefing DO   Does patient understand why the event happened? Yes   Does patient agree to safe behaviors? Yes   What can we do differently so this doesn't happen again? Other (comment)  (not head bang anymore)   Plan of care reviewed and modified Yes     Debriefing:  Patient calmed down and contracted for safety. Pt verbalized understanding the reason why she was put in restraints and said she would prevent future restraints by not banging her head. Later pt verbalized using different way to cope with stress such as attending groups therapy or listening to music. Also pt said she will ask for PRN medication. Restraints were discontinued at 1630.

## 2021-07-19 NOTE — PROVIDER NOTIFICATION
"Face to Face Assessment  Results of Face to Face Assessment:   Pt will not contract for safety and stated \"The problem is I don't care.\"    Time Face to Face was conducted:  2:01PM Pt is breathing unlabored and experienced no adverse effects.    RN assessed head, there is no open wounds or bruising noted from SIB (headbanging). Pt denied headache and pain. RN recommended pt to notify staff if pt experiences pain.    Date/Time provider notified of results of Face to Face:  Paged with updates at Dr Cerda 7/19/21 1:50pm & 3:06pm       07/19/21 1345   Seclusion or Restraint Order   Length of Order 4   Order Obtained Yes   Attending Physician Notified Yes   Attending Physician's Name Dr Cerda   In Person Face to Face Assessment Conducted Yes-Eval of pt's immediate situation, reaction to intervention, complete review of systems assessment, behavioral assessment & review/assessment of hx, drugs & meds, recent labs, etc, behavioral condition, need to continue/terminate restraint/seclusion   Patient Experienced No adverse physical outcome from seclusion/restraint initiation   Continuation of Seclus/Restraint indicated at this time Yes       "

## 2021-07-19 NOTE — PROGRESS NOTES
[facility], [place]   Psychiatric Progress Note        Interim History:   The patient's care was discussed with the treatment team during the daily team meeting and/or staff's chart notes were reviewed.  Staff report patient over weekend appeared to be social with peers and SIO staff, still reported thoughts of Self injurious behavior (head banging) and had one episode of doing that. Was compliant with meds and tolerated them OK.     Met with patient on 2 occasions: in the lounge with the presence of a psych associate. Lida says that she is doing OK and is interested in being discharged back to community. She says that on Saturday she had an intake with [facility] and hopes to hear from them today or tomorrow whether they will take her. In order to qualify to go to [facility] patient should get on insurance of her . We spent significant amount of time talking about safety issues. Lida again said that she would like to be discharged home and wait for [facility] or other outpatient program at home, however, when I asked her whether she had Suicidal ideation, she responded that she did and would overdose on pills at home?! I emphasized that safety here and outside of hospital is a major concern and she needed to be safe in order to be discharged to community. Lida was minimally receptive, but agreed that staying at this hospital for few more days won't make too much difference because her Self injurious behavior and suicidal thoughts are pretty chronic. She promised to try to stay safe without SIO and I discontinued it. Shortly after that I was paged by RN and told that Lida again started head banging. When I came to her room with RN Lida was sitting on bathroom floor covered with blanket. She repeatedly stated that she would like to be discharged home, but refused to remove blanket from her head and get out of bathroom. She stated that she was upset with me  "because if wanted her to work harder to be safe. She was not receptive when I told her that she could harm herself in multiple ways after discharge even, by OTC meds or other substances. Lida still refused to get outside of bathroom and I restarted SIO. Shortly after that patient started doing head banging again. She would not stop doing that and continued to escalate and, in order to prevent serious injury I authorized 5 point restraint and seclusion.         Medications:       busPIRone  20 mg Oral BID     citalopram  30 mg Oral Daily     lamoTRIgine  150 mg Oral BID     lurasidone  80 mg Oral At Bedtime     multivitamin w/minerals  1 tablet Oral Daily     polyethylene glycol  17 g Oral Daily     thiamine  100 mg Oral Daily     Vitamin D3  1,000 Units Oral Daily          Allergies:     Allergies   Allergen Reactions     Morphine Other (See Comments)     Twitching     Amoxicillin Rash     Sulfa Drugs Rash          Labs:   No results found for this or any previous visit (from the past 24 hour(s)).       Psychiatric Examination:     BP 91/57   Pulse 80   Temp 98.4  F (36.9  C) (Oral)   Resp 16   Ht 1.6 m (5' 3\")   Wt 53.2 kg (117 lb 3.2 oz)   SpO2 97%   BMI 20.76 kg/m    Weight is 117 lbs 3.2 oz  Body mass index is 20.76 kg/m .     Orthostatic Vitals       Most Recent      Sitting Orthostatic /71 07/18 0753    Sitting Orthostatic Pulse (bpm) 77 07/18 0753    Standing Orthostatic /76 07/19 0801    Standing Orthostatic Pulse (bpm) 76 07/19 0801         Appearance: awake, alert and dressed in hospital scrubs.   Attitude:  guarded and minimally cooperative  Eye Contact:  poor   Mood: More irritable, frustrated, and angry. Still appears sad  and depressed,    Affect:  mood congruent and labile  Speech:  clear, coherent. Soft volume, but at times speaks with elevated volume.    Psychomotor Behavior:  no evidence of tardive dyskinesia, dystonia, or tics  Throught Process:  goal oriented  Associations:  no " loose associations  Thought Content:  no evidence of active suicidal ideation or homicidal ideation, no auditory hallucinations present and no visual hallucinations present. Chronic passive suicidal ideation. Reports strong urges for Self injurious behavior. Patient is not able to contract for safety, see above.    Insight:  limited  Judgement:  limited  Oriented to:  time, person, and place  Attention Span and Concentration:  fair  Recent and Remote Memory:  limited    Clinical Global Impressions  First: 6/4 7/14/21      Most recent:            Precautions:     Behavioral Orders   Procedures     Code 1 - Restrict to Unit     Routine Programming     As clinically indicated     Self Injury Precaution     Status 15     Every 15 minutes.     Status Individual Observation     Please make sure SIO staff is not socializing with pt or playing games. Pt is enjoying this kind of attention and wants stay on SIO.       Patient SIO status reviewed with team/RN.  Please also refer to RN/team documentation for add'l detail.  1- SIO staff to monitor following which have contributed to patient being on SIO:  .Self injurious behavior  .Can't contract for safety    2 -Possible interventions SIO staff could use to support patient's treatment progress:  .Redirection  .Offering prn medication  .Distraction    3 -When following observed, team will review discontinuation of SIO:  .Can contract for safety  .Absence of suicidal ideation   .Absence of self injurious behavior     Order Specific Question:   CONTINUOUS 24 hours / day     Answer:   5 feet     Order Specific Question:   Indications for SIO     Answer:   Self-injury risk     Suicide precautions     Patients on Suicide Precautions should have a Combination Diet ordered that includes a Diet selection(s) AND a Behavioral Tray selection for Safe Tray - with utensils, or Safe Tray - NO utensils            DIagnoses:     Major depressive disorder, recurrent, moderate severity.   Generalized anxiety disorder.  Panic disorder without agoraphobia.  Anorexia nervosa.  Restrictive type. Borderline personality disorder.         Plan:     Patient will stay in the hospital for further stabilization and organization of outpatient plans. Barriers to discharge: patients continued inability to contract for safety and Self injurious behavior. Will continue on SIO and today was put into 5 point restraints and taken into seclusion. Plans for discharge are still unclear. She may be able to go to [facility], though, will need to be able to contract for safety before doing so. Will increase Latuda to 100 mg daily.      Floyd Cerda MD  [facility] Psychiatry

## 2021-07-20 VITALS
OXYGEN SATURATION: 100 % | HEART RATE: 77 BPM | WEIGHT: 117.2 LBS | BODY MASS INDEX: 20.77 KG/M2 | TEMPERATURE: 98.2 F | RESPIRATION RATE: 16 BRPM | HEIGHT: 63 IN | DIASTOLIC BLOOD PRESSURE: 65 MMHG | SYSTOLIC BLOOD PRESSURE: 97 MMHG

## 2021-07-20 PROCEDURE — G0177 OPPS/PHP; TRAIN & EDUC SERV: HCPCS

## 2021-07-20 PROCEDURE — 250N000013 HC RX MED GY IP 250 OP 250 PS 637: Performed by: FAMILY MEDICINE

## 2021-07-20 PROCEDURE — 99239 HOSP IP/OBS DSCHRG MGMT >30: CPT | Performed by: PSYCHIATRY & NEUROLOGY

## 2021-07-20 PROCEDURE — 250N000013 HC RX MED GY IP 250 OP 250 PS 637: Performed by: PSYCHIATRY & NEUROLOGY

## 2021-07-20 RX ORDER — BUSPIRONE HYDROCHLORIDE 10 MG/1
20 TABLET ORAL 2 TIMES DAILY
Qty: 120 TABLET | Refills: 1 | Status: SHIPPED | OUTPATIENT
Start: 2021-07-20 | End: 2024-01-12

## 2021-07-20 RX ORDER — OLANZAPINE 10 MG/1
10 TABLET ORAL 3 TIMES DAILY PRN
Qty: 30 TABLET | Refills: 1 | Status: SHIPPED | OUTPATIENT
Start: 2021-07-20 | End: 2024-01-12

## 2021-07-20 RX ORDER — LURASIDONE HYDROCHLORIDE 20 MG/1
100 TABLET, FILM COATED ORAL AT BEDTIME
Qty: 150 TABLET | Refills: 1 | Status: SHIPPED | OUTPATIENT
Start: 2021-07-20 | End: 2024-01-12

## 2021-07-20 RX ORDER — CITALOPRAM HYDROBROMIDE 10 MG/1
30 TABLET ORAL DAILY
Qty: 90 TABLET | Refills: 1 | Status: SHIPPED | OUTPATIENT
Start: 2021-07-21 | End: 2024-01-12

## 2021-07-20 RX ORDER — MULTIVIT-MIN/IRON/FOLIC ACID/K 18-600-40
1000 CAPSULE ORAL DAILY
Qty: 30 TABLET | Refills: 1 | Status: SHIPPED | OUTPATIENT
Start: 2021-07-20 | End: 2024-01-12

## 2021-07-20 RX ORDER — HYDROXYZINE HYDROCHLORIDE 50 MG/1
50 TABLET, FILM COATED ORAL EVERY 4 HOURS PRN
Qty: 60 TABLET | Refills: 1 | Status: SHIPPED | OUTPATIENT
Start: 2021-07-20 | End: 2024-01-12

## 2021-07-20 RX ADMIN — THIAMINE HCL TAB 100 MG 100 MG: 100 TAB at 08:30

## 2021-07-20 RX ADMIN — Medication 1000 UNITS: at 08:30

## 2021-07-20 RX ADMIN — POLYETHYLENE GLYCOL 3350 17 G: 17 POWDER, FOR SOLUTION ORAL at 08:30

## 2021-07-20 RX ADMIN — CITALOPRAM HYDROBROMIDE 30 MG: 20 TABLET ORAL at 08:30

## 2021-07-20 RX ADMIN — Medication 1 TABLET: at 08:31

## 2021-07-20 RX ADMIN — LAMOTRIGINE 150 MG: 100 TABLET ORAL at 08:30

## 2021-07-20 RX ADMIN — BUSPIRONE HYDROCHLORIDE 20 MG: 10 TABLET ORAL at 08:30

## 2021-07-20 ASSESSMENT — ACTIVITIES OF DAILY LIVING (ADL)
ORAL_HYGIENE: INDEPENDENT
DRESS: STREET CLOTHES
HYGIENE/GROOMING: INDEPENDENT

## 2021-07-20 NOTE — PLAN OF CARE
Patient discharged from station 10 to home at 1358. Pt vebalized understanding of all discharge instructions and medications. Medications sent to [facility] in [place] per pt request. Pt left with all personal belongings. Patient denies any SI/HI/SIB and contracts for safety. Pt stated she will be safe at home with her . Pt escorted out with psych associate.

## 2021-07-20 NOTE — PLAN OF CARE
Assessment/Intervention/Current Symtoms and Care Coordination  Met with patient who informed me that her  is taking FMLA and she needs a letter from Dr. Cerda that her  will need to be home to be with her for a period of time that would extend from day of discharge from our hospital until she can get into the [facility] in [place], [place]. She reports calling them and that they were to review her records and call her back today about a possible bed in a few weeks.    Doctor confirmed that the patient's  will be on an FMLA and is providing a letter for him.  Patient is being discharged today.    Discharge Plan or Goal  Patient will return home with  supervising and she will return virtual to outpatient providers with plan to enter inpatient treatment at [facility] once accepted.    Barriers to Discharge   None    Referral Status  Done    Legal Status   Voluntary

## 2021-07-20 NOTE — DISCHARGE INSTRUCTIONS
"Behavioral Discharge Planning and Instructions    Summary: You were admitted on 7/5/2021  due to inability to keep herself safe at home and feeling suicidal and had active SIB.  You were treated by Dr. Floyd Cerda and discharged on 7/16 /21 from [facility]  to your home in [place] with transport being provided by your family. Your  is taking FMLA to stay home with you until you can get into the inpatient [facility] in [place].     Main Diagnosis:   Major Depressive Disorder; Recurrent, Severe  Generalized Anxiety Disorder  Panic without Agoraphobia  Anorexia Nervosa; Restrictive Type  Borderline Personality Disorder    Health Care Follow-up:   Psychiatrist Giulia Vigil (Patient to schedule appt at her convenience.)  [id number] [place], [place]   [phone]    [facility] DBT Therapist -  (Patient to schedule appt at her convenience)  *You need to have a roseanne discussion with your therapist about what you need to do to demonstrate you are ready to enter the modules.  "[employer] [address]  [phone]    [facility] - You completed the Intake and can start this virtual program on Monday, July 26th.    [facility] ([place]) - You signed up on their wait list for \"[facility]\" DBT Program and about 2 weeks out from getting admitted.    Additional DBT Resource: [facility]) - This is also a certified program which requires a 1 year commitment, individual DBT and a weekly Module 2 hrs long.      [facility] Insurance Care Coordinator Bekah at [phone]  She will be able to tell you more about your benefits who is in and out of network, etc.        Attend all scheduled appointments with your outpatient providers. Call at least 24 hours in advance if you need to reschedule an appointment to ensure continued access to your outpatient providers. "     Major Treatments, Procedures and Findings:  You were provided with: a psychiatric assessment, assessed for medical stability, medication evaluation and/or management, group therapy and milieu management    Symptoms to Report: losing more sleep, mood getting worse or thoughts of suicide    Early warning signs can include: increased thoughts or behaviors of suicide or self-harm     Safety and Wellness:  Take all medicines as directed.  Make no changes unless your doctor suggests them.      Follow treatment recommendations.  Refrain from alcohol and non-prescribed drugs.  If there is a concern for safety, call 911.    Resources:   Crisis Intervention: 600.171.8635 or 143-258-9067 (TTY: 789.364.8061).  Call anytime for help.  [employer] [place] [employer]ness (www.[place].tanja.org): 650.480.8715 or 166-876-0219.    General Medication Instructions:   See your medication sheet(s) for instructions.   Take all medicines as directed.  Make no changes unless your doctor suggests them.   Go to all your doctor visits.  Be sure to have all your required lab tests. This way, your medicines can be refilled on time.  Do not use any drugs not prescribed by your doctor.  Avoid alcohol.    Advance Directives:   Scanned document on file with [facility]? No scanned doc  Is document scanned? Pt states no documents  Honoring Choices Your Rights Handout: Informed and given  Was more information offered? Pt declined    The Treatment team has appreciated the opportunity to work with you. If you have any questions or concerns about your recent admission, you can contact the unit which can receive your call 24 hours a day, 7 days a week. They will be able to get in touch with a Provider if needed. The unit number is [phone]   .

## 2021-07-20 NOTE — PLAN OF CARE
Shift Summary  Psych/Physical  No new concern.   Still on one to one for head banging. No head banging this shift. Reporting suicidal ideation. Contracts for safety. Ate dinner. Stayed in milieu for short period of time. Took Tylenol for head ache.   Prn: Tylenol  Continue to monitor pt's status Q 15 minutes and stabilize the patient's symptoms with the use of medications and therapeutic programming.

## 2021-07-20 NOTE — PLAN OF CARE
Patient appeared asleep through the night with no behavioral or medical concerns. She slept a total of 6.5hrs and remains on 15min checks.

## 2021-07-21 NOTE — DISCHARGE SUMMARY
Service Date: 07/20/2021  Discharge Date: 07/20/2021    The patient was hospitalized between 07/05/2021-07/23/2021.  On the day of discharge 15 minutes was spent with the patient.  Greater than 50% of the time was spent on counseling, coordinating care, and during this time, altogether 25 minutes was spent in conference phone call with the patient's  Robin.  The patient was seen in presence of registered nurse.    CHIEF COMPLAINT AND REASON FOR ADMISSION:  The patient is a 25-year-old female with multiple mental health diagnosis, who was just recently discharged from [facility].  The patient reported that she started feeling miserable, highly anxious, depressed, and had urges to cut herself.  She came into the hospital voluntarily at the suggestion of her .  During initial visit, the patient reports that she was diagnosed in the past as major depressive disorder, borderline personality disorder, panic disorder, generalized anxiety disorder, anorexia nervosa.  reports that she agrees with multiple diagnoses, but does not think that she has full blown borderline personality disorder traits.  She stated that she got out of station 30 against medical advice. The patient said that during her hospitalization to station 30 she exhibited self-injurious behavior such as head banging and trying to scratch or cut herself.  She needed to be on status of individual observation.  A petition for commitment was filed during that hospitalization according to electronic records, but was not supported by the [facility].  The patient reports that after she was discharged, she felt pretty bad.  She went back to work, which was stressful.  She started having again bouts of severe anxiety, including panic attacks, having suicidal thoughts of overdosing or cutting herself in hopes to bleed out.  For more details about patient's presentation and past psychiatric history, please refer to   Floyd Cerda's note from 07/06/2021.    DISCHARGE DIAGNOSES:    1.  Major depressive disorder, recurrent, moderate severity.   2.  Generalized anxiety disorder.   3.  Panic disorder without agoraphobia.   4.  Anorexia nervosa, restrictive type.   5.  Borderline personality disorder.    CONSULTS:  No consults were done during this hospitalization.    LAB WORK:  Due to her recent hospitalization on station 30, only lab work which was done was a pregnancy test, which was negative, COVID-19 nasopharyngeal swab was negative and urine drug screen was negative for all screened substances.    HOSPITAL COURSE:  The patient presented as reasonably reliable historian, although at the same time appeared to be somewhat guarded.  She agreed with my suggestion to cut down and stop the Prozac and try her on citalopram, started her n buspirone.  She agreed to stay in the hospital voluntarily.  Said that she was waiting for an intake at partial hospitalization program in [place], [place], which is going to be virtual, said that she would feel safer if she could wait at this facility until intake is done. She proved to be not so easy to manage and was reported to have suicidal ideation or thoughts of self-harm off and on and exhibited self-injurious behavior such as head banging, which basically prevented her from being discharged from this hospital sooner.  The patient also needed to be on status of individual observation all the time because of her self-injurious behavior.  All our attempts to wean her off status of individual observation were unsuccessful.  The patient promised to notify staff about thoughts of self-injurious behavior, but immediately after it was discontinued, exhibited Self injurious behavior either without talking to anyone or would approach staff after her self-injurious behavior took place. She was reported to use foil from container of juice to try to insert in an electric socket and get injured it  "by doing that quite a number of times, was witnessed banging her head, parietal area, on the wall.  Interestingly enough, the patient did not subjectively appear to be very depressed, was reported to be social, going to groups, had quite multiple conversations with her and also with her  about what could be done differently to prevent her from doing this.  Our initial plan was to discharge the patient 1 week earlier.  However, on the day of discharge, the patient reported feeling suicidal.  Said that, \"You can discharge me if you want, but I don't feel safe and likely will overdose when I come home.\"  So, her discharge had to be canceled.  The patient appears to be minimally insightful and tended to get easily offended, even angry , when she was told that she needed to try harder to be able to keep herself safe.  Told us in fact had DBT therapist with whom she was doing DBT on individual basis, and she was not allowed to do DBT groups.  She did not want to go into details why, said that she had a therapist at [facility] and has a psychiatrist from [facility].  Attempts were made to refer the patient to intensive outpatient program at [facility].  She said that she went to [facility] in the past, did not benefit from it and would not go there again.  She and CTC discussed with  going to [facility].  The patient said that she would go there.  Unfortunately, she did not have insurance to cover.  I talked to her  who promised to talk to his human resources about putting the patient on his own insurance and would start doing that. The patient continued acting out behavior.  All attempts to discontinue SIO were unsuccessful even when she promised not to harm herself and SIO was discontinued, shortly after that she started banging her head on the wall, went in to bathroom and was doing repeatedly head banging, refused to come outside.  Even after SIO was restarted " "Lida continued with self-injurious behavior, had to be placed in seclusion and restraints.  Corinne presented as rather ambivalent attitude and that she would like to be discharged home, that she missed home and then said that she would not feel safe at home.  She admitted to positive suicidal thoughts and self-injurious behavior was pretty chronic.  This was confirmed by her  who said that Corinne tends to cut at home and did her banging here only because she could not cut herself at this hospital.  On the day of discharge, the patient reported being in a better mood when asked the reason for that said that she was able to talk to her therapist who came back from his vacation or from a leave, said that she felt safer.  I suggested to talk to her  about her plans and had a long phone conversation discussing for her symptoms.   said that he asked for a leave to take care of Corinne.  Asked me to write for him FMLA papers, which I promised to do.   says that he would spent all the time with Corinne and she would be safe if discharged home today.  Corinne herself confirmed that she would be safe, said that most likely she would start doing partial hospitalization program at [facility] on 07/26/2021 and they will try to get her into [facility] when insurance becomes available.  Repeatedly contracted for safety and told me\"Sorry for being a difficult patient.\"      She was discharged home on the following medication, buspirone 20 mg 2 times a day, escitalopram 30 mg daily, hydroxyzine 50 mg every 4 hours as needed for anxiety, lamotrigine 250 mg 2 times a day, intrauterine device, Latuda 100 mg at bedtime, multivitamins with minerals 1 tablet daily, Zyprexa 10 mg 2 times a day p.r.n. for psychosis or pedro or severe agitation, MiraLax 17 grams daily, thiamine 100 mg daily, and vitamin D 1000 units daily.    Medications, per patient request, were sent to " "[facility] in [place], [place].  The patient will schedule her own appointment with a psychiatrist Giulia Vigil at [facility] in [place], with her DBT therapist from [employer].      The patient completed intake at partial hospitalization program at [place] and supposed to start partial program on 2021.  The patient is signed up to wait for \"? Radically\".  DBT program, she is on a waiting list.  She again said that she was interested to go to [facility] and would likely go there as soon as insurance will become available.     Floyd Cerda MD        D: 2021   T: 2021   MT: [id number]    Name:     PALM, CORINNE N.  MRN:      [id number]        Account:      [id number]   :      1995           Service Date: 2021                                  Discharge Date: 2021     Document: [id number]    "

## 2021-07-26 ENCOUNTER — VIRTUAL VISIT (OUTPATIENT)
Dept: PHARMACY | Facility: CLINIC | Age: 26
End: 2021-07-26
Attending: PSYCHIATRY & NEUROLOGY
Payer: COMMERCIAL

## 2021-07-26 DIAGNOSIS — F60.3 BORDERLINE PERSONALITY DISORDER (H): ICD-10-CM

## 2021-07-26 DIAGNOSIS — F33.2 SEVERE EPISODE OF RECURRENT MAJOR DEPRESSIVE DISORDER, WITHOUT PSYCHOTIC FEATURES (H): Primary | ICD-10-CM

## 2021-07-26 PROCEDURE — 99607 MTMS BY PHARM ADDL 15 MIN: CPT | Performed by: PHARMACIST

## 2021-07-26 PROCEDURE — 99605 MTMS BY PHARM NP 15 MIN: CPT | Performed by: PHARMACIST

## 2021-07-26 NOTE — PATIENT INSTRUCTIONS
Recommendations from today's MTM visit:                                                    MTM (medication therapy management) is a service provided by a clinical pharmacist designed to help you get the most of out of your medicines.   Today we reviewed what your medicines are for, how to know if they are working, that your medicines are safe and how to make your medicine regimen as easy as possible.      1. Continue current medications.     2. Consider taking olanzapine 5 mg (half tablet) as needed vs whole tablet to see if you do not get as drowsy but get some benefit.     Follow-up: As needed    It was great to speak with you today.  I value your experience and would be very thankful for your time with providing feedback on our clinic survey. You may receive a survey via email or text message in the next few days.     To schedule another MTM appointment, please call the clinic directly or you may call the MTM scheduling line at [phone] or toll-free at [phone].     My Clinical Pharmacist's contact information:                                                      Please feel free to contact me with any questions or concerns you have.      Rome Narayan, Filippo, BCACP  Medication Therapy Management Pharmacist  Pager: [phone]

## 2021-07-26 NOTE — PROGRESS NOTES
Medication Therapy Management (MTM) Encounter    ASSESSMENT:                            Medication Adherence/Access: No issues identified    Borderline personality/Depression: Slightly improved - taking medications as prescribed. Not attending partial hospitalization program, but is on track for residential program.     PLAN:                            1. Continue current medications.     2. Consider taking olanzapine 5 mg (half tablet) as needed vs whole tablet to see if you do not get as drowsy but get some benefit.     Follow-up: As needed    SUBJECTIVE/OBJECTIVE:                          Corinne Palm is a 25 year old female called for a transitions of care visit. She was discharged from [facility] on 7/20/2021 for suicidal ideation.      Reason for visit: Hospital Follow-Up.    Allergies/ADRs: Reviewed in chart  Past Medical History: Reviewed in chart  Tobacco: She reports that she has never smoked. She does not have any smokeless tobacco history on file.  Alcohol: Less than 1 beverages / week      Medication Adherence/Access: Patient uses pill box(es).  Patient takes medications 2 time(s) per day.   Per patient, misses medication 0 times per week.   Medication barriers: none.   The patient fills medications at [facility]: NO, fills medications at [facility].    Borderline personality/Depression: Patient is taking citalopram 30 mg every morning, buspirone 20 mg twice daily, lamotrigine 150 mg twice daily (states that between hospital stays was only taking once daily - did not realize it was twice), and Latuda 80 mg daily (insurance will not cover 100 mg daily dose - prior authorization was not completed).  She is also taking a daily multivitamin, thiamine 100 mg daily, and vitamin D3 25 mcg daily. States that she continues to have low mood and some self-injurious thoughts (better than hospital slightly - denies any current plans).  Anxiety is slightly improved, but can be fairly significant. Saw  psychiatry (Giulia Vigil) last Wednesday, therapist on Friday, and her eating disorder therapist is scheduled for tomorrow.  She went to per-griselda at work so is changing to her 's insurance plan (this qualified as a life event) in order to attend [facility]. Did not start [facility] program due to unknowns with cost. Appetite has increased and this is a concern to patient/weight gain stresses patient out.  She has not used hydroxyzine much due to a lack of perceived benefit.  Olanzapine tends to knock her out so she has avoided - has not tried lower dose.   is home this week so she feels safe.     Today's Vitals: There were no vitals taken for this visit.     BP Readings from Last 3 Encounters:   07/20/21 97/65   06/28/21 127/82   11/18/20 112/78     Wt Readings from Last 5 Encounters:   06/27/21 117 lb 3.2 oz (53.2 kg)   11/17/20 107 lb 1.6 oz (48.6 kg)   02/07/16 129 lb 1.6 oz (58.6 kg)   03/12/15 120 lb (54.4 kg)     ----------------  Post Discharge Medication Reconciliation Status: discharge medications reconciled, continue medications without change.    I spent 17 minutes with this patient today. A copy of the visit note was provided to the patient's primary care provider.    The patient was sent via [facility] a summary of these recommendations.     Rome Narayan, TrixieD, BCACP  Medication Therapy Management Pharmacist  Pager: [phone]    Telemedicine Visit Details  Type of service:  Telephone visit  Start Time: 3:00 PM  End Time: 3:17 PM  Originating Location (patient location): Home  Distant Location (provider location):  [facility]     Medication Therapy Recommendations  No medication therapy recommendations to display

## 2021-08-19 DIAGNOSIS — F50.9 EATING DISORDER, UNSPECIFIED TYPE: ICD-10-CM

## 2021-08-23 RX ORDER — ASPIRIN 81 MG
TABLET, DELAYED RELEASE (ENTERIC COATED) ORAL
Qty: 30 TABLET | Refills: 0 | OUTPATIENT
Start: 2021-08-23

## 2021-08-23 RX ORDER — METHION/INOS/CHOL BT/B COM/LIV 110MG-86MG
CAPSULE ORAL
Qty: 30 TABLET | Refills: 0 | OUTPATIENT
Start: 2021-08-23

## 2021-08-23 NOTE — TELEPHONE ENCOUNTER
Refills denied  Not follow at [facility]  psychiatrist Giulia Vigil at [facility] in [place]

## 2021-10-03 ENCOUNTER — HEALTH MAINTENANCE LETTER (OUTPATIENT)
Age: 26
End: 2021-10-03

## 2022-02-07 NOTE — PLAN OF CARE
Problem: Suicidal Behavior  Goal: Suicidal Behavior is Absent or Managed  Outcome: No Change  Note: Pt continues to endorse Suicide ideation but stated she is not going to act on it. Pt has been havne some SIB behaviors in the unit. Pt psychiatrist notes they did think patient was ready for discharge, but after the psychiatrist spoke with the  he agreed that patient's condition might only get worst in the hospital and that she might benefit being discharge. While in the hospital patient was on close monitoring as she keeps attempting to hurt self with any thing she could find. Pt could not be allow to go to groups because she keeps snicking sharp instruments to hurt self. Pt was med compliant and plans to continue with groups. Pt plans to use her coping skills from DBT if urges come up to hurt self.        Received report from NORMAN Brito, pt in bed resting, respirations even and unlabored, no acute distress. Pt has 10/10 pain to the legs.Pt received all morning med's. Pt ate about 25% of her breakfast but was pocketing the food and medicines. Encourage pt to swallow both. Pt has been being turn Q2, wound care done later today. Safety precautions in place, call light in reach, will continue to monitor.

## 2022-05-15 ENCOUNTER — HEALTH MAINTENANCE LETTER (OUTPATIENT)
Age: 27
End: 2022-05-15

## 2022-07-07 NOTE — PLAN OF CARE
Assessment/Intervention/Current Symptoms and Care Coordination:   CTC met with pt who discussed wanting to go to residential treatment. Pt finished the [facility] eating disorder program about 2 months ago. Is interested in the [facility] residential program. Pt has been in the [facility] eating disorder program in the past.   Pt reports that her insurance covers the eating disorder program but not the residential mental health program. Discussed with pt that this may be the deciding factor. Pt then requested that someone call her psychiatrist to discuss; CTC advised her that she's already had this conversation with the unit provider and that would need to go through that individual.   Pt also discussed switching to her 's insurance to get the residential mental health program covered. CTC discussed with her the difficulty with this as there needs to be a change in life circumstance. Pt is considering dropping her hours to 15 so that she would not be covered by her insurance at work. CTC advised her to discuss with . Also advised that staying in the hospital for that duration of time was not an option; reminded her that the plan remains to be a goal of discharge on Monday, 6/21.      Discharge Plan or Goal:  Discharge home; pt to consider residential programs which are likely a future option    Barriers to Discharge:  Pt reports not safe    Referral Status:   No referrals at this time. Pt has current services and is not interested in day programming or partial programming.     Legal Status:   Voluntary   Writer engaged in conversation with pt; to which pt obsessive to be delusional, paranoid and religiously preoccupied stated \"I have 3 people in my body right now talking to me and telling me things evil and bad things, even though I repented for those things I did, they still want to torture me and I don't know why, why cant I just get them out, I tell Bahman everyday to remove them from my soul, the witches that are here have long hair, she is right there (pt pointed to corner of room to which no one is present).I am dealing with the demens in me white black it doesn't matter, I need to rid them of my body, I don't want no damn medication I need a  or clergy to help me remove the demons\". Writer encouraged pt about medication compliance and adherence and utilizing coping skills when negative symptoms are present to which pt stated \"I will not be taking medication here, I will sharon this hospital, I am not crazy, I have demons in me\". Pt refused medication with writer. Staff to continue to monitor for safety.

## 2022-09-11 ENCOUNTER — HEALTH MAINTENANCE LETTER (OUTPATIENT)
Age: 27
End: 2022-09-11

## 2023-06-03 ENCOUNTER — HEALTH MAINTENANCE LETTER (OUTPATIENT)
Age: 28
End: 2023-06-03

## 2024-01-12 ENCOUNTER — HOSPITAL ENCOUNTER (INPATIENT)
Facility: CLINIC | Age: 29
LOS: 2 days | Discharge: HOME | DRG: 885 | End: 2024-01-19
Attending: PSYCHIATRY & NEUROLOGY | Admitting: PSYCHIATRY & NEUROLOGY
Payer: COMMERCIAL

## 2024-01-12 ENCOUNTER — TELEPHONE (OUTPATIENT)
Dept: BEHAVIORAL HEALTH | Facility: CLINIC | Age: 29
End: 2024-01-12

## 2024-01-12 DIAGNOSIS — F50.9 EATING DISORDER, UNSPECIFIED TYPE: ICD-10-CM

## 2024-01-12 DIAGNOSIS — F32.A DEPRESSION WITH SUICIDAL IDEATION: ICD-10-CM

## 2024-01-12 DIAGNOSIS — F33.2 MDD (MAJOR DEPRESSIVE DISORDER), RECURRENT SEVERE, WITHOUT PSYCHOSIS (H): ICD-10-CM

## 2024-01-12 DIAGNOSIS — R45.851 DEPRESSION WITH SUICIDAL IDEATION: ICD-10-CM

## 2024-01-12 DIAGNOSIS — R45.851 SUICIDAL IDEATION: Primary | ICD-10-CM

## 2024-01-12 DIAGNOSIS — F33.2 SEVERE EPISODE OF RECURRENT MAJOR DEPRESSIVE DISORDER, WITHOUT PSYCHOTIC FEATURES (H): ICD-10-CM

## 2024-01-12 DIAGNOSIS — F60.3 BORDERLINE PERSONALITY DISORDER (H): ICD-10-CM

## 2024-01-12 DIAGNOSIS — Z86.59 HISTORY OF OCD (OBSESSIVE COMPULSIVE DISORDER): ICD-10-CM

## 2024-01-12 LAB
AMPHETAMINES UR QL SCN: NORMAL
BARBITURATES UR QL SCN: NORMAL
BENZODIAZ UR QL SCN: NORMAL
BZE UR QL SCN: NORMAL
CANNABINOIDS UR QL SCN: NORMAL
FENTANYL UR QL: NORMAL
HCG UR QL: NEGATIVE
OPIATES UR QL SCN: NORMAL
PCP QUAL URINE (ROCHE): NORMAL

## 2024-01-12 PROCEDURE — 99284 EMERGENCY DEPT VISIT MOD MDM: CPT | Performed by: PSYCHIATRY & NEUROLOGY

## 2024-01-12 PROCEDURE — 250N000013 HC RX MED GY IP 250 OP 250 PS 637: Performed by: PSYCHIATRY & NEUROLOGY

## 2024-01-12 PROCEDURE — 99285 EMERGENCY DEPT VISIT HI MDM: CPT | Performed by: PSYCHIATRY & NEUROLOGY

## 2024-01-12 PROCEDURE — 81025 URINE PREGNANCY TEST: CPT | Performed by: EMERGENCY MEDICINE

## 2024-01-12 PROCEDURE — 80307 DRUG TEST PRSMV CHEM ANLYZR: CPT | Performed by: EMERGENCY MEDICINE

## 2024-01-12 RX ORDER — LAMOTRIGINE 25 MG/1
50 TABLET ORAL EVERY MORNING
Status: DISCONTINUED | OUTPATIENT
Start: 2024-01-13 | End: 2024-01-19 | Stop reason: HOSPADM

## 2024-01-12 RX ORDER — HYDROXYZINE HYDROCHLORIDE 10 MG/1
10 TABLET, FILM COATED ORAL DAILY PRN
COMMUNITY

## 2024-01-12 RX ORDER — LURASIDONE HYDROCHLORIDE 120 MG/1
120 TABLET, FILM COATED ORAL
COMMUNITY

## 2024-01-12 RX ORDER — LAMOTRIGINE 25 MG/1
50 TABLET ORAL EVERY MORNING
COMMUNITY

## 2024-01-12 RX ORDER — FLUOXETINE 40 MG/1
80 CAPSULE ORAL DAILY
Status: ON HOLD | COMMUNITY
End: 2024-01-19

## 2024-01-12 RX ORDER — LAMOTRIGINE 200 MG/1
200 TABLET ORAL EVERY MORNING
COMMUNITY

## 2024-01-12 RX ORDER — HYDROXYZINE HYDROCHLORIDE 10 MG/1
10 TABLET, FILM COATED ORAL DAILY PRN
Status: DISCONTINUED | OUTPATIENT
Start: 2024-01-12 | End: 2024-01-17

## 2024-01-12 RX ORDER — LAMOTRIGINE 200 MG/1
200 TABLET ORAL EVERY MORNING
Status: DISCONTINUED | OUTPATIENT
Start: 2024-01-13 | End: 2024-01-19 | Stop reason: HOSPADM

## 2024-01-12 RX ADMIN — LURASIDONE HYDROCHLORIDE 120 MG: 80 TABLET, COATED ORAL at 20:17

## 2024-01-12 ASSESSMENT — ACTIVITIES OF DAILY LIVING (ADL)
ADLS_ACUITY_SCORE: 37

## 2024-01-12 NOTE — ED PROVIDER NOTES
[facility] [place] EMERGENCY DEPARTMENT ([facility])    24      ED PROVIDER NOTE   Mary PALACIOS   History     Chief Complaint   Patient presents with    Suicidal     BIBA from counseling session where pt endorsed SI with thoughts of overdosing. Pt presents on transport hold. Hx chronic SI.     The history is provided by the patient and medical records.     Corinne N Palm is a 28 year old female with a long history of anorexia, depression, OCD, generalized anxiety, Borderline personality disorder as well as panic disorder, and a history of suicidality, as well as history of obsessive compulsive disorder who presents with suicidal ideation with thoughts of overdosing. Patient reports being sent here after she saw her trauma therapist. Patient reports feeling stressed about her losses. She reports an acquaintance through her eating disorder program had . She has been ruminating about her loss and felt triggered. She denies feeling actively nor acutely suicidal and was upset that she got brought here. She wants to go home. She lives with her .    Patient has a long history of depression. She had been hospitalized previously. She admits to chronic SI. She has been taking her meds. She denies drug use. She denies any medical concerns.    Past Medical History  Past Medical History:   Diagnosis Date    OCD (obsessive compulsive disorder)      Past Surgical History:   Procedure Laterality Date    ENT SURGERY       busPIRone (BUSPAR) 10 MG tablet  citalopram (CELEXA) 10 MG tablet  hydrOXYzine (ATARAX) 50 MG tablet  lamoTRIgine (LAMICTAL) 150 MG tablet  levonorgestrel (KYLEENA) 19.5 MG IUD  lurasidone (LATUDA) 20 MG TABS tablet  multivitamin w/minerals (THERA-VIT-M) tablet  OLANZapine (ZYPREXA) 10 MG tablet  polyethylene glycol (MIRALAX) 17 GM/Dose powder  thiamine (B-1) 100 MG tablet  Vitamin D, Cholecalciferol, 25 MCG (1000 UT) TABS      Allergies   Allergen Reactions    Morphine Other (See Comments)      Twitching    Amoxicillin Rash    Sulfa Antibiotics Rash     Family History  History reviewed. No pertinent family history.  Social History   Social History     Tobacco Use    Smoking status: Never   Substance Use Topics    Alcohol use: No    Drug use: No         A medically appropriate review of systems was performed with pertinent positives and negatives noted in the HPI, and all other systems negative.    Physical Exam   BP: 121/79  Pulse: 79  Temp: 99.2  F (37.3  C)  Resp: 16  SpO2: 96 %  Physical Exam  Vitals and nursing note reviewed.   HENT:      Head: Normocephalic.   Eyes:      Pupils: Pupils are equal, round, and reactive to light.   Pulmonary:      Effort: Pulmonary effort is normal.   Musculoskeletal:         General: Normal range of motion.      Cervical back: Normal range of motion.   Neurological:      General: No focal deficit present.      Mental Status: She is alert.   Psychiatric:         Attention and Perception: Attention and perception normal. She does not perceive auditory or visual hallucinations.         Mood and Affect: Mood normal. Affect is blunt.         Speech: Speech normal.         Behavior: Behavior normal. Behavior is not agitated, aggressive, hyperactive or combative. Behavior is cooperative.         Thought Content: Thought content is not paranoid or delusional. Thought content includes suicidal ideation. Thought content does not include homicidal ideation.         Cognition and Memory: Cognition and memory normal.         Judgment: Judgment normal.           ED Course, Procedures, & Data      Procedures       A consult was attained from the  DEC service. The case was discussed with the  from that service. The consulting service's recommendations were provided at 6 PM.  10 minutes spent discussing case, care and disposition. 10 minutes spent reviewing prior records and intervention.  Mental Health Risk Assessment        PSS-3      Date and Time Over the past 2 weeks have  you felt down, depressed, or hopeless? Over the past 2 weeks have you had thoughts of killing yourself? Have you ever attempted to kill yourself? When did this last happen? User   01/12/24 1439 yes yes yes more than 6 months ago           C-SSRS ([place])      Date and Time Q1 Wished to be Dead (Past Month) Q2 Suicidal Thoughts (Past Month) Q3 Suicidal Thought Method Q4 Suicidal Intent without Specific Plan Q5 Suicide Intent with Specific Plan Q6 Suicide Behavior (Lifetime) Within the Past 3 Months? RETIRED: Level of Risk per Screen Screening Not Complete User   01/12/24 1439 yes yes yes no yes -- -- -- --                 Suicide assessment completed by mental health (D.E.C., LCSW, etc.)       Results for orders placed or performed during the hospital encounter of 01/12/24   HCG qualitative urine     Status: Normal   Result Value Ref Range    hCG Urine Qualitative Negative Negative   Urine Drug Screen Panel     Status: Normal   Result Value Ref Range    Amphetamines Urine Screen Negative Screen Negative    Barbituates Urine Screen Negative Screen Negative    Benzodiazepine Urine Screen Negative Screen Negative    Cannabinoids Urine Screen Negative Screen Negative    Cocaine Urine Screen Negative Screen Negative    Fentanyl Qual Urine Screen Negative Screen Negative    Opiates Urine Screen Negative Screen Negative    PCP Urine Screen Negative Screen Negative   Urine Drug Screen     Status: Normal    Narrative    The following orders were created for panel order Urine Drug Screen.  Procedure                               Abnormality         Status                     ---------                               -----------         ------                     Urine Drug Screen Panel[[id number]]      Normal              Final result                 Please view results for these tests on the individual orders.     Medications - No data to display  Labs Ordered and Resulted from Time of ED Arrival to Time of ED Departure    HCG QUALITATIVE URINE - Normal       Result Value    hCG Urine Qualitative Negative     URINE DRUG SCREEN PANEL - Normal    Amphetamines Urine Screen Negative      Barbituates Urine Screen Negative      Benzodiazepine Urine Screen Negative      Cannabinoids Urine Screen Negative      Cocaine Urine Screen Negative      Fentanyl Qual Urine Screen Negative      Opiates Urine Screen Negative      PCP Urine Screen Negative       No orders to display          Critical care was not performed.     Medical Decision Making  The patient's presentation was of moderate complexity (a chronic illness mild to moderate exacerbation, progression, or side effect of treatment).    The patient's evaluation involved:  an assessment requiring an independent historian (see separate area of note for details)  review of external note(s) from 2 sources (see separate area of note for details)    The patient's management necessitated moderate risk (limitations due to social determinants of health (inadequate community MH support)).    Assessment & Plan    Patient is here sent from her therapist's office as she disclosed feeling suicidal due to recent stressors. She however denies feeling acutely suicidal and denies any intent. She felt she could be safe going home and wants to go home. She does not feel she needs to be here in the ED. She does not feel she needs to be hospitalized.    Patient presented to the  with inability to safety plan. Spouse was concerned but he does not feel she should be hospitalized as it be a huge setback. She has not been hospitalized for 2+ years.    Patient was recommended to be monitored overnight in the ED under ED OBS status since she is not able to fully engage in safety planning. Spouse agrees to this plan.    Patient then felt that an overnight stay will not resolve how she feels. She is open to getting admitted. As she is unable to safety plan and disclosed thoughts of suicide with plan to  overdose, patient is referred for admission. She is voluntary.    I have reviewed the nursing notes. I have reviewed the findings, diagnosis, plan and need for follow up with the patient.    New Prescriptions    No medications on file       Final diagnoses:   Depression with suicidal ideation   Eating disorder, unspecified type       Mir Baez MD  [facility] EMERGENCY DEPARTMENT  1/12/2024     Mir Baez MD  01/12/24 9509       Mir Baez MD  01/12/24 4843

## 2024-01-12 NOTE — ED TRIAGE NOTES
PT confirms with EMS report of SI with plan to overdose on pills, Pt denies HI or any other drug use.       Triage Assessment (Adult)       Row Name 01/12/24 9321          Triage Assessment    Airway WDL WDL        Respiratory WDL    Respiratory WDL WDL        Skin Circulation/Temperature WDL    Skin Circulation/Temperature WDL WDL        Cardiac WDL    Cardiac WDL WDL        Peripheral/Neurovascular WDL    Peripheral Neurovascular WDL WDL        Cognitive/Neuro/Behavioral WDL    Cognitive/Neuro/Behavioral WDL WDL

## 2024-01-12 NOTE — ED NOTES
Bed: UREDH-D  Expected date: 1/12/24  Expected time: 2:45 PM  Means of arrival:   Comments:  28F,psych eval  [place]; shahnaz [phone]  yellow

## 2024-01-12 NOTE — PHARMACY-ADMISSION MEDICATION HISTORY
Pharmacy Intern Admission Medication History    Admission medication history is complete. The information provided in this note is only as accurate as the sources available at the time of the update.    Information Source(s):   Patient  Dispense Report    Pertinent Information:   Patient-reported medications are consistent with dispense history.     Changes made to PTA medication list:  Added:   Patient is taking, per patient and dispense report:  Fluoxetine 40 mg capsule  Deleted:   Patient is no longer taking, per patient:  Buspirone 10 mg tablet  Citalopram 10 mg tablet  Olanzapine 10 mg tablet  Polyethylene glycol 17 gm/dose powder  Changed: None    Allergies reviewed with patient and updates made in EHR: yes    Medication History Completed By: Isabela Tabor 1/12/2024 5:49 PM    PTA Med List   Medication Sig Last Dose    FLUoxetine (PROZAC) 40 MG capsule Take 80 mg by mouth daily 1/12/2024 at AM    hydrOXYzine HCl (ATARAX) 10 MG tablet Take 10 mg by mouth daily as needed for anxiety 1/11/2024    lamoTRIgine (LAMICTAL) 200 MG tablet Take 200 mg by mouth every morning along with two 25 mg tablets 1/12/2024 at AM    lamoTRIgine (LAMICTAL) 25 MG tablet Take 50 mg by mouth every morning along with 200 mg tablet 1/12/2024 at AM    lurasidone (LATUDA) 120 MG TABS tablet Take 120 mg by mouth daily with food 1/11/2024 at PM

## 2024-01-13 ENCOUNTER — TELEPHONE (OUTPATIENT)
Dept: BEHAVIORAL HEALTH | Facility: CLINIC | Age: 29
End: 2024-01-13
Payer: COMMERCIAL

## 2024-01-13 PROCEDURE — 99255 IP/OBS CONSLTJ NEW/EST HI 80: CPT | Performed by: NURSE PRACTITIONER

## 2024-01-13 PROCEDURE — 99418 PROLNG IP/OBS E/M EA 15 MIN: CPT | Performed by: NURSE PRACTITIONER

## 2024-01-13 PROCEDURE — 250N000013 HC RX MED GY IP 250 OP 250 PS 637: Performed by: PSYCHIATRY & NEUROLOGY

## 2024-01-13 RX ADMIN — LURASIDONE HYDROCHLORIDE 120 MG: 80 TABLET, COATED ORAL at 18:16

## 2024-01-13 RX ADMIN — FLUOXETINE HYDROCHLORIDE 80 MG: 20 CAPSULE ORAL at 08:24

## 2024-01-13 RX ADMIN — LAMOTRIGINE 200 MG: 200 TABLET ORAL at 08:24

## 2024-01-13 RX ADMIN — LAMOTRIGINE 50 MG: 25 TABLET ORAL at 08:24

## 2024-01-13 RX ADMIN — HYDROXYZINE HYDROCHLORIDE 10 MG: 10 TABLET ORAL at 18:52

## 2024-01-13 ASSESSMENT — ACTIVITIES OF DAILY LIVING (ADL)
ADLS_ACUITY_SCORE: 37

## 2024-01-13 NOTE — TELEPHONE ENCOUNTER
Bed search ([facility] only) @ 1:53AM:    [facility] @ cap    Pt remains on work list until appropriate placement is available

## 2024-01-13 NOTE — PROGRESS NOTES
PT came from ED to [facility]. Pleasant, calm, and cooperative. Denied SI/HI, pain, and A/V hallucinations. PT said she would not get access to harm herself here. PT has no concerns at this time.

## 2024-01-13 NOTE — PLAN OF CARE
Corinne N Palm  January 12, 2024  Plan of Care Hand-off Note     Patient Care Path: inpatient mental health    Plan for Care:   It is the recommendation of this clinician that pt admit to  MH for safety and stabilization. Pt displays the following risk factors that support IP admission: pt presents with SI with a plan to overdose on Tylenol. Pt reports that she bought a large bottle with intentions to end her life today, but ended up telling her therapist about these thoughts during session today. Pt reports she has looked up what dose she would need to take for it to be lethal. She had initially reported to attending MD that she was not suicidal and felt safe to go home, but later told [facility] that she felt she needed to be honest and reported that she still was suicidal and didn't feel she could keep herself safe. Pt is unable to engage in safety planning to mitigate risk level in a non-secure setting. Lower levels of care would not be sufficient in managing the level of risk pt is presenting with. Due to this IP is the least restrictive option of care for pt. Pt should remain in IP until deemed safe to return to the community and engage in [facility] supports. Pt will be able to resume work with established providers upon discharge.    Identified Goals and Safety Issues: stabilization and reduction of symptoms    Overview:  Robin Lawrence, spouse, [phone]     Kay Lilly, mother, [phone]          Legal Status: Legal Status at Admission: Voluntary/Patient has signed consent for treatment    Psychiatry Consult: No       Updated MD regarding plan of care.           MALENA Cramer

## 2024-01-13 NOTE — CONSULTS
"  Corinne N Palm MRN# [id number]   Age: 28 year old YOB: 1995   Date of Admission to ED: 1/12/2024    In person visit Details:     Patient was assessed and interviewed face-to-face in person with this writer   Assessment methods included conducting a formal interview with patient, review of medical records, collaboration with medical staff, and obtaining relevant collateral information from family and community providers when available.      KITTY Velez CNP            Contacts:   Attending Physician: No att. providers found     Current Outpatient Psychiatrist:    Primary Care Provider: Clinic, [facility] [place] therapist:  Coni dietician:   [facility] Therapist: Deandra           Impression:   This patient is a 28 year old year old  female with a past psychiatric history of OCD, Anorexia Nervosa, MDD, borderline personality disorder, panic disorder PRECIOUS and \"medical trauma\", who presented on 1/12/2024 for SI with a plan to overdose. Prior to presenting to the ED, Corinne N Palm reports her mental health has been steadily declining for the last year.  She started have \"strong SI\" when an acquaintance from an eating disorder completed suicide.     Significant symptoms include SI, SIB, irritable, depressed, neurovegetative symptoms, sleep issues, poor frustration tolerance, and disordered eating.    There is genetic loading for mood, anxiety, psychosis, and CD.  Medical history does appear to be significant for 12 cleft palate surgeries that have caused medical trauma.  Substance use does not appear to be playing a contributing role in the patient's presentation.  Patient appears to cope with stress/frustration/emotion by SIB, withdrawing, and acting out to self.  Stressors include romantic issues, trauma, and chronic mental health issues.  Patient's support system includes family and outpatient team.Protective factors: family and engaged in treatment Risk " factors: SI, maladaptive coping, trauma, and past behaviors. Patient Strengths: intelligent, help seeking.    Risk for harm is elevated.    Based on interview with patient and patient's guardian/parent, record review, conversations ED staff, [facility] staff and ED attending, the Corinne N Palm meets criteria for MDD with SI.  Current medications are listed below.  Recommended medications adjustments are listed below.  Acute inpatient stabilization is needed as indicated by patient continues to report SI with a plan will not or can not safety plan.        Brief Therapeutic Intervention(s):   Provided rapport building, active listening, unconditional positive regard, and validation.    Legal Status: Voluntary    Medications: risks/benefits discussed with patient    Decrease Prozac to 60 mg to begin tapering off. - patient reports it is not working.     Continue other medications as PTA    Patient reports she and her  have been discussing having children. She reports if she does live another few years, she would like to be taking medications that would be safe during pregnancy.     Current Facility-Administered Medications   Medication    FLUoxetine (PROzac) capsule 80 mg    hydrOXYzine HCl (ATARAX) tablet 10 mg    lamoTRIgine (LaMICtal) tablet 200 mg    lamoTRIgine (LaMICtal) tablet 50 mg    lurasidone (LATUDA) tablet 120 mg     Current Outpatient Medications   Medication Sig    FLUoxetine (PROZAC) 40 MG capsule Take 80 mg by mouth daily    hydrOXYzine HCl (ATARAX) 10 MG tablet Take 10 mg by mouth daily as needed for anxiety    lamoTRIgine (LAMICTAL) 200 MG tablet Take 200 mg by mouth every morning along with two 25 mg tablets    lamoTRIgine (LAMICTAL) 25 MG tablet Take 50 mg by mouth every morning along with 200 mg tablet    lurasidone (LATUDA) 120 MG TABS tablet Take 120 mg by mouth daily with food            Laboratory/Imaging:    Recent Results (from the past 336 hour(s))   HCG qualitative urine    Collection  Time: 01/12/24  2:47 PM   Result Value Ref Range    hCG Urine Qualitative Negative Negative   Urine Drug Screen Panel    Collection Time: 01/12/24  2:47 PM   Result Value Ref Range    Amphetamines Urine Screen Negative Screen Negative    Barbituates Urine Screen Negative Screen Negative    Benzodiazepine Urine Screen Negative Screen Negative    Cannabinoids Urine Screen Negative Screen Negative    Cocaine Urine Screen Negative Screen Negative    Fentanyl Qual Urine Screen Negative Screen Negative    Opiates Urine Screen Negative Screen Negative    PCP Urine Screen Negative Screen Negative            Diagnoses:   Principal Diagnosis: MDD recurrent, without psychosis    Secondary psychiatric diagnoses of concern this admission:  Anorexia Nervosa  Borderline personality disorder by hx   PRECIOUS  SIB    Current medical diagnosis being treated:            Recommendations:     Discussed the case and writer's recommendations with Dr Yvonne Gan MD,  ED provider    Recommend acute inpatient stabilization based on patient continues to have SI with a plan to overdose. She is not able to plan for safety outside of the hospital and needs stabilization.   2.   Decrease Prozac to 60 mg with a plan to taper off since no longer working - patient wants to take medications that will be safe during pregnancy - in case she and her  decide to have a baby.    3.   Continue:   Lamotrigine 200 mg +50 mg daily in the AM   Latuda 120 mg daily with dinner - must be administered with a minimum of 350 calories.    Hydroxyzine 10 mg daily prn for anxiety  4.   Continue to consult psychiatry as needed        Please call [facility]/DEC at [phone] if you have follow-up questions or wish to place another consult.           Reason for Consult:   We have been asked to evaluate this patient today at the request of [facility] staff to make recommendations for medications adjustments and recommendation for admission or discharge with services.        History  "is obtained from the patient, electronic health record, and staff                 History of Present Illness:   Patient presented to the ED on 2024 for SI with a plan to overdose, in the context of an acquaintance from a treatment program recently  by suicide.  Lida reports she completed treatment at [facility] for eating disorder 2 years ago.  She reports she was feeling really good when she left and did well for about a year.  After a year, she reports she started a steady decline for her mental health.  When the acquaintance  by suicide, she took it hard and started having SI. She reports she tried to kill herself yesterday.  Her plan was to take pills repeatedly all day long.  She was at work when she took the first one.  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  She sees her therapist twice a week.  She will do her appointments from work, she will go to her car and do virtual appointments. She saw her therapist on Wednesday virtually.  On Thursday her therapist called her and  told her she was worried about her and asked her to do an in-person appointment on Friday.  While she was at her appointment on Friday, her therapist put her on a hold and had her transported to the ED.     Lida rates her depression 9/10, anxiety 6/10, anger 2/10 and SI 7-8/10 with 10 being the worst.  She has been feeling numb and depressed.  She has been oversleeping.  She reports she is sleeping from 7:30 PM until 6 AM.  She has started restricting her eating again and reports she has been over-exercising. However, she denies she has lost any weight. She reports she has an \"I don't care\" attitude.     Major stressors are chronic mental health issues.  Current symptoms include SI, SIB, irritable, depressed, neurovegetative symptoms, sleep issues, poor frustration tolerance, and disordered eating.  Substance use is not relevant.     Family psychiatric/mental health history includes: " "  Per H&P by Floyd Salamanca MD on 7/6/2021:  Uncle: chronic paranoid schizophrenia  Cousin: chemical addiction and psychosis  Grandmother: anxiety and agoraphobia    Past Psychiatric Diagnosis:  OCD  PRECIOUS  Anorexia Nervosa  Panic  Borderline Personality Disorder  Depression  Medical trauma (12 surgeries on her cleft palate)      Past Medication Trials:   Per H&P by Floyd Salamanca MD on 7/6/2021:  Zoloft  Luvox  Prozac  Effexor  Cymbalta  Risperidone  Seroquel  Latuda - current  Abilify  Lamotrigine      Legal: none known    Substance Use: none known    Social Hx:  Living situation:  Lives with her .  Work/School:     Severity is currently moderate-high.    - Collateral information from the famly/friend: none               Collateral information from chart review:     Reviewed DEC assessment and ED notes.       Per DEC assessment:   \" Pt presents to [facility] ED via EMS sent by her trauma therapist for a mental health evaluation due to suicidal thoughts with a plan to overdose on Tylenol. Pt reports that her spouse keeps her medication locked up at home, but she bought a large bottle of Tylenol yesterday and kept it in her car, which he did not know about. She had thoughts to act on this overdose yesterday, but was able to stop herself. During session with her therapist today, she informed her of this. Pt had driven herself to the appointment in her car and did not feel able to engage in safety planning so it was decided she needed to be transported by EMS to the ED for safety. Pt does have a hx of one prior suicide attempt via overdose in 6/2021. Pt had initially reported to attending MD that she was no longer suicidal and felt she could be safe at home. However, during assessment, pt relunctantly reported that she was still suicidal with this plan and did not feel she could keep herself safe. Pt reported she really did not want to share this info as she did not want to return to [facility], but " "felt it was best to be honest as she reports she has not been doing well. She reports she is feeling down about herself as she was able to stay out of the hospital since 2021 and keep herself safe, and feels bad that she is now struggling again. She reports she is not sure what was working well for her during this period of time in between. Reports she had reached a point where she didn't know why she had ever even considered NSSI or SI, but then reports the thoughts slowly started creeping back and now has reached a point where she doesn't feel she cares about anything and is not scared of dying. She reports usually her friends, family, spouse, work, and looking forward to having a baby someday used to be protective factors for her, but at this point, she no longer feels she cares about anything that was once important to her. Endorses NSSI in locations where her  is less likely to see. Reports she last cut her foot superficially earlier this week. Reports she bought a pair of scissors to self-harm and was also hiding those in her car. She does report a friend from [place], where she is currently still in [facility], recently passed away, which was a trigger for her. She does acknowledge though that at this point, any small thing could have triggered her. She reports she has been sleeping much more. Endorses ongoing symptoms of restricting food and over-exercising. Denies HI, AH, VH or substance use.\"          Psychiatric History:      As documented in HPI, Impression, and DEC assessment         Past Medical and Surgical History:       PAST MEDICAL HISTORY:   Past Medical History:   Diagnosis Date    OCD (obsessive compulsive disorder)        PAST SURGICAL HISTORY:   Past Surgical History:   Procedure Laterality Date    ENT SURGERY               Substance Use History:     As documented in HPI, Impression, and DEC assessment          Family History:   As documented in HPI, Impression, and DEC assessment.            " Allergies:     Allergies   Allergen Reactions    Morphine Other (See Comments)     Twitching    Amoxicillin Rash    Sulfa Antibiotics Rash                Review of Systems:     /77 (BP Location: Left arm, Patient Position: Sitting, Cuff Size: Adult Regular)   Pulse 81   Temp 98.1  F (36.7  C) (Oral)   Resp 18   SpO2 98%   Weight is 0 lbs 0 oz Data Unavailable There is no height or weight on file to calculate BMI.    The 10 point Review of Systems is negative other than noted in the HPI    History of 12 surgeries for cleft lip and palate.        Mental Status Exam:   Appearance:  awake, alert and casually dressed, long brown curly hair worn in 2 low pony tails, scar on left upper lip.   Attitude:  cooperative  Eye Contact:  good  Mood:  anxious and depressed  Affect:  mood congruent  Speech:  clear, coherent and normal prosody  Psychomotor Behavior:  no evidence of tardive dyskinesia, dystonia, or tics and intact station, gait and muscle tone  Thought Process:  logical and linear  Associations:  no loose associations  Thought Content:  no evidence of psychotic thought and active suicidal ideation present  Insight:  fair  Judgment:  fair  Oriented to:  time, person, and place  Attention Span and Concentration:  intact  Recent and Remote Memory:  intact  Fund of Knowledge: appropriate  Muscle Strength and Tone: normal  Gait and Station: Normal         Physical Exam:       I have reviewed the physical done by Dr Mir Baez MD, on 1/12/2024, there are no medication or medical status changes, and I agree with their original findings.         Attestation       Attestation:  Patient time: 45 minutes  Family - Friend time: 0  Team time:20 minutes  Chart review: 35 minutes  Documentation: 40 minutes  Total time: 140 minutes spent on the date of the encounter.    I have provided critical care services at the bedside in the [facility] behavioral emergency center, evaluating the patient, reviewing notes and  laboratory values and directing care. I have discussed recommendation regarding whether or not hospitalization is needed and recommendations for medications and laboratory testing with the attending emergency department provider. Shy Duval, DNP, APRN, CNP January 13, 2024.    Disclaimer: This note consists of symbols derived from keyboarding, dictation, and/or voice recognition software. As a result, there may be errors in the script that have gone undetected.  Please consider this when interpreting information found in the chart.

## 2024-01-13 NOTE — ED NOTES
23:27 pm I received report on this patient she is sitting up awake alert x 4 and can voice her needs soft voice good eye contact.1.16 am patient turning in bed sleeping eyes closed.3:51 am patient resting with eyes closed chest rise and fall,is observed.6:41 patient has gotten up couple to use to use restroom at night observed with even gait and balance with soft voice. She can voice her needs. At this time she is awake alert x 4 and quite sitting up in bed has no needs at present time when asked.

## 2024-01-13 NOTE — PROGRESS NOTES
"  Triage & Transition Services, Extended Care     Therapy Progress Note    Patient: Lida goes by \"Cori,\" uses she/her pronouns  Date of Service: January 13, 2024  Site of Service: [facility] EMERGENCY DEPARTMENT                             [id number]  Patient was seen yes  Mode of Assessment: In person    Presentation Summary: The patient was lying in bed watching tv. She got up and agreed to meet with the therapist. The patient was able to identify the initial reasons for her being referred to the ED by her therapist and her presentation at the time of referral. She reports that she feels the same as yesterday, and that nothing has changed since she is in the ED. She currently endorses suicidal ideation and a plan to overdose on pills, but clarifies that she cannot overdose while in the hospital. Patient admits to self-harming today with a plastic fork. She denies any bleeding or need for medical treatment. She currently endorses depression, feeling numb, \"as if nothing matters\", and that she is not scared if she dies. She prefers to be at home and exercise. She endorses sleeping difficulties, such as difficulty falling asleep and frequent waking-up.She feels the recent death of an acquintance at the [employer] was triggering, and \"was the last straw\" causing her to experience suicidal ideation. She reports that while she does not want to be in the ED, she denies a plan to leave the hospital. She also does not want to upset her , as she does not want to put more stress on him. She feels he might stop her from harming herself. She states that current medications are not improving her symptoms. She was unable to engage in safety planning.    Therapeutic Intervention(s) Provided: Engaged in cognitive restructuring/ reframing, looked at common cognitive distortions and challenged negative thoughts., Engaged in exposure therapy, having patient look at fears/fear hierarchy and utilize coping skills to " face exposures., Taught the link between thoughts, feelings, and behaviors., Reviewed healthy living that supports positive mental health, including looking at sleep hygiene, regular movement, nutrition, and regular socialization., Provided positive reinforcement for progress towards goals, gains in knowledge, and application of skills previously taught., Explored motivation for behavioral change.    Current Symptoms: anxious apathy, withdrawl/isolation, negativistic, helplessness, hoplessness, sadness, thoughts of death/suicide anxious impulsive loss of appetite    Mental Status Exam   Affect: Flat  Appearance: Appropriate  Attention Span/Concentration: Attentive  Eye Contact: Variable    Fund of Knowledge: Appropriate   Language /Speech Content: Fluent  Language /Speech Volume: Soft  Language /Speech Rate/Productions: Normal  Recent Memory: Intact  Remote Memory: Intact  Mood: Depressed, Sad  Orientation to Person: Yes   Orientation to Place: Yes  Orientation to Time of Day: Yes  Orientation to Date: Yes     Situation (Do they understand why they are here?): Yes  Psychomotor Behavior: Normal  Thought Content: Suicidal  Thought Form: Intact    Treatment Objective(s) Addressed: rapport building, identifying and practicing coping strategies, processing feelings, assessing safety, identifying treatment goals, building skills, building self-esteem    Patient Response to Interventions: acceptance expressed    Progress Towards Goals: Patient Reports Symptoms Are: ongoing  Patient Progress Toward Goals: is not making progress  Comment: The patient reports that their symptoms are the same as yesterday. The endorse suicidal ideation with a plan, and engaged in noon-suicidal self-injurious behavior while in the ED. Patient states the current medications are not improving their symptoms, and does not believe that inpatient is needed.  Next Step to Work Toward Discharge: symptom stabilization  Symptom Stabilization Comment:  "Psych consult was completed today. Patient states some medications are being decreased/adjusted.    Case Management:      Plan: inpatient mental health  yes provider Dr Tineo  yes (The patient prefers to be going home, but states that she will remain in the ED)    Clinical Substantiation: The patient continues to be medically appropriate for inpatient mental health treatment, stabilization and safety. She continues to endorse suicidal ideation with a plan to overdose on medications. She clarified that she is aware that she cannot overdose while in the ED, while admitting to the thoughts and plan. She has been engaging in self-harm with a plastic fork, and continues to endorse non-suicidal self-harm urges, depression, sleeping difficulties and unable to complete a safety plan. She denies that medications are improving her symptoms. She identifies her  as her support system, but also views him as \"someone who might be in her way and stopping her from doing what she wants to do\". She reports that DBT is not helpful, and that she wants to do what she wants to do\". Patient presents with limited insight in her symptoms. She was unable to complete a safety plan to reduce the risk of her harming herself. Patient was referred to the ED from an outpatient provider due to worsening of symptoms; patient's symptoms have not improved to the level that she can continue safely with outpatient treatment. Current recommendation is to admit to Inpatient Mental health.    Legal Status: Legal Status at Admission: Voluntary/Patient has signed consent for treatment    Session Status: Time session started: 1615  Time session ended: 1647  Session Duration (minutes): 32 minutes  Session Number: 1    Time Spent: 32 minutes    CPT Code: CPT Codes: 55429 - Psychotherapy (with patient) - 30 (16-37*) min    Diagnosis:   Patient Active Problem List   Diagnosis Code    Suicidal ideation R45.851    MDD (major depressive disorder), recurrent " severe, 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    Major depressive disorder, recurrent episode, moderate (H) F33.1    History of OCD (obsessive compulsive disorder) Z86.59    Eating disorder, unspecified type F50.9    Suicide attempt by drug overdose (H) T50.902A    Borderline personality disorder (H) F60.3    Severe episode of recurrent major depressive disorder, without psychotic features (H) F33.2       Primary Problem This Admission: Active Hospital Problems    Borderline personality disorder (H)      F60.3      Eating disorder, unspecified type       F50.9      *MDD (major depressive disorder), recurrent severe, without psychosis (H)   F33.2        Kathleen Kasper, GARY, [facility]   Licensed Mental Health Professional (LMHP), [facility]  [phone]

## 2024-01-13 NOTE — ED NOTES
Pt signed out to me by Dr. Carolina at 3:30pm      Situation: Patient is a 28-year-old female awaiting an inpatient mental health admission.    Plan: Patient is planning to wait in the behavioral health unit until a bed can be available.    Shift Report:  Was seen by DEC for reassessment. Pt still having thoughts of self-harm. She does not feel better since yesterday. She has been having continued thoughts for self-harm.  Unable to be safely discharged.   Unable to engage in safety planning.     DEC recommends continued search for inpatient bed.    Signed:  Yvonne Gan MD  January 13, 2024 at 5:32 PM       Yvonne Gan MD  01/13/24 [id number]

## 2024-01-13 NOTE — CONSULTS
Diagnostic Evaluation Consultation  Crisis Assessment    Patient Name: Corinne N Palm  Age:  28 year old  Legal Sex: female  Gender Identity: female  Pronouns:   Race: White  Ethnicity: Not  or   Language: English      Patient was assessed: In person      Patient location: [facility] EMERGENCY DEPARTMENT                             [id number]    Referral Data and Chief Complaint  Corinne N Palm presents to the ED via EMS. Patient is presenting to the ED for the following concerns: Suicidal ideation, Depression.   Factors that make the mental health crisis life threatening or complex are:  Pt presents to [facility] ED via EMS sent by her trauma therapist for a mental health evaluation due to suicidal thoughts with a plan to overdose on Tylenol. Pt reports that her spouse keeps her medication locked up at home, but she bought a large bottle of Tylenol yesterday and kept it in her car, which he did not know about. She had thoughts to act on this overdose yesterday, but was able to stop herself. During session with her therapist today, she informed her of this. Pt had driven herself to the appointment in her car and did not feel able to engage in safety planning so it was decided she needed to be transported by EMS to the ED for safety. Pt does have a hx of one prior suicide attempt via overdose in 6/2021. Pt had initially reported to attending MD that she was no longer suicidal and felt she could be safe at home. However, during assessment, pt relunctantly reported that she was still suicidal with this plan and did not feel she could keep herself safe. Pt reported she really did not want to share this info as she did not want to return to [facility], but felt it was best to be honest as she reports she has not been doing well. She reports she is feeling down about herself as she was able to stay out of the hospital since 2021 and keep herself safe, and feels bad that she is now struggling again. She reports  she is not sure what was working well for her during this period of time in between. Reports she had reached a point where she didn't know why she had ever even considered NSSI or SI, but then reports the thoughts slowly started creeping back and now has reached a point where she doesn't feel she cares about anything and is not scared of dying. She reports usually her friends, family, spouse, work, and looking forward to having a baby someday used to be protective factors for her, but at this point, she no longer feels she cares about anything that was once important to her. Endorses NSSI in locations where her  is less likely to see. Reports she last cut her foot superficially earlier this week. Reports she bought a pair of scissors to self-harm and was also hiding those in her car. She does report a friend from [place], where she is currently still in [facility], recently passed away, which was a trigger for her. She does acknowledge though that at this point, any small thing could have triggered her. She reports she has been sleeping much more. Endorses ongoing symptoms of restricting food and over-exercising. Denies HI, AH, VH or substance use.    Informed Consent and Assessment Methods  Explained the crisis assessment process, including applicable information disclosures and limits to confidentiality, assessed understanding of the process, and obtained consent to proceed with the assessment.  Assessment methods included conducting a formal interview with patient, review of medical records, collaboration with medical staff, and obtaining relevant collateral information from family and community providers when available.  : done     Patient response to interventions: acceptance expressed, verbalizes understanding  Coping skills were attempted to reduce the crisis:  Pt met with her therapist and expressed SI with plan, decided best to have pt transported to the ED as the Tylenol pt bought was in her car that she  drove to the appointment in and pt could not engage in safety planning.     History of the Crisis   Pt has a hx of anorexia, depression, OCD, generalized anxiety disorder, borderline personality, panic disorder, and SI. Most recent IP MH was at [facility] from 7/5/2021 - 7/20/2021 for SI, with another inpatient stay at [facility] shortly before this from 6/8/2021 - 6/28/2021 due to suicide attempt via overdose. Pt reports she currently sees a trauma therapist 2x per week, attends DBT group 3x per week, medication management and IOP through [facility]. No hx of commitment. Reports she has been compliant with her medication.    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)  Source of Income:  salary/wages  Financial Environmental Concerns:  none  Current Hobbies:  music, television/movies/videos  Barriers in Personal Life:  mental health concerns, emotional concerns    Significant Clinical History  Current Anxiety Symptoms:  anxious  Current Depression/Trauma:  thoughts of death/suicide, sadness, hopelessness, helplessness, impaired decision making, apathy, negativistic  Current Somatic Symptoms:  anxious  Current Psychosis/Thought Disturbance:  impulsive  Current Eating Symptoms:  loss of appetite (restricting)  Chemical Use History:  Alcohol: None  Benzodiazepines: None  Opiates: None  Cocaine: None  Marijuana: None  Other Use: None  Withdrawal Symptoms:  (none reported)  Addictions:  (none reported)   Past diagnosis:  Eating Disorder, Personality Disorder, Suicide attempt(s), Depression, Anxiety Disorder  Family history:  No known history of mental health or chemical health concerns  Past treatment:  Individual therapy, Primary Care, Inpatient Hospitalization, Day Treatment, Partial Hospitalization, Psychiatric Medication Management  Details of most recent treatment:  Pt has a  hx of anorexia, depression, OCD, generalized anxiety disorder, borderline personality, panic disorder, and SI. Most recent IP MH was at [facility] from 7/5/2021 - 7/20/2021 for SI, with another inpatient stay at [facility] shortly before this from 6/8/2021 - 6/28/2021 due to suicide attempt via overdose. Pt reports she currently sees a trauma therapist 2x per week, attends DBT group 3x per week through [facility], medication management and IOP through [facility]. No hx of commitment. Reports she has been compliant with her medication.  Other relevant history:  NA       Collateral Information  Is there collateral information: Yes     Collateral information name, relationship, phone number:  Robin Lawrence, spouse, [phone]    What happened today: Things have been kind of spotty for the past month. Last week things really went downhill. Already having hard time, having to lock up meds again. Which she told me today she has been getting around this which is not ideal. Then finding out about her friend from programming passing away today made it hard for her brain. Doing a good job at least of saying she is having hard time, because sometimes she tries to hide that. Told me having some intent yesterday, which has been scary. Had a plan she didn't really follow through on, but didn't share plan. Therapist didn't think would be safe going to her car and Lida agreed. She has not expressed a clear plan to me. She did tell me about the bottle of tylenol in her car, did not know about the scissors. She has her car keys there, would need to get them to move her car and get items out. All meds I was aware of are locked up, except for the ones she recently bought. She has never taken any of my meds, but they are locked up as well just as a precaution. Lot of medical trauma. Not sure about IP MH. If we make a very clear safety plan we maybe could consider outpatient, but would have to be watching her like a hawk. The flip side of it is that she is so  terrified of disrupting her routine and work. If we can make a really really clear safety plan with structure and check-ins, I'm willing to try for her sake. I would hate to do inpatient, I know it would be devastating for her and I worry about her being truthful in the future if she is scared she'll be admitted for being honest. [facility] called Robin back after checking in with pt again. Updated him that pt was now agreeable to IP MH. He will still plan to get the keys to bring car home from therapist's office and remove unsafe items. He is aware plan could change while awaiting IP MH as she will continue to be reassessed.     What is different about patient's functioning: Increased SI, some intent yesterday. Has not been being honest about getting meds - he thought everything was locked up but didn't know she had gone out to buy some. Has been struggling more, but has been being honest that she is struggling which is a good sign.     Has patient made comments about wanting to kill themselves/others: yes    If d/c is recommended, can they take part in safety/aftercare planning:  yes     Risk Assessment  [place] Suicide Severity Rating Scale Full Clinical Version:  Suicidal Ideation  Q1 Wish to be Dead (Lifetime): Yes  Q2 Non-Specific Active Suicidal Thoughts (Lifetime): Yes  3. Active Suicidal Ideation with any Methods (Not Plan) Without Intent to Act (Lifetime): Yes  Q4 Active Suicidal Ideation with Some Intent to Act, Without Specific Plan (Lifetime): Yes  Q5 Active Suicidal Ideation with Specific Plan and Intent (Lifetime): Yes  Q6 Suicide Behavior (Lifetime): yes     Suicidal Behavior (Lifetime)  Actual Attempt (Lifetime): Yes  Total Number of Actual Attempts (Lifetime): 1  Actual Attempt Description (Lifetime): 1 prior overdose attempt 6/2021  Has subject engaged in non-suicidal self-injurious behavior? (Lifetime): Yes  Interrupted Attempts (Lifetime): No  Aborted or Self-Interrupted Attempt (Lifetime):  No  Preparatory Acts or Behavior (Lifetime): Yes  Total Number of Preparatory Acts (Lifetime): 1    [place] Suicide Severity Rating Scale Recent:   Suicidal Ideation (Recent)  Q1 Wished to be Dead (Past Month): yes  Q2 Suicidal Thoughts (Past Month): yes  Q3 Suicidal Thought Method: yes  Q4 Suicidal Intent without Specific Plan: no  Q5 Suicide Intent with Specific Plan: yes  Level of Risk per Screen: high risk  Intensity of Ideation (Recent)  Most Severe Ideation Rating (Past 1 Month): 4  Frequency (Past 1 Month): Many times each day  Duration (Past 1 Month): More than 8 hours/persistent or continuous  Controllability (Past 1 Month): Unable to control thoughts  Deterrents (Past 1 Month): Deterrents most likely did not stop you  Reasons for Ideation (Past 1 Month): Completely to end or stop the pain (You couldn't go on living with the pain or how you were feeling)  Suicidal Behavior (Recent)  Actual Attempt (Past 3 Months): No  Total Number of Actual Attempts (Past 3 Months): 0  Has subject engaged in non-suicidal self-injurious behavior? (Past 3 Months): Yes  Interrupted Attempts (Past 3 Months): No  Total Number of Interrupted Attempts (Past 3 Months): 0  Aborted or Self-Interrupted Attempt (Past 3 Months): No  Total Number of Aborted or Self-Interrupted Attempts (Past 3 Months): 0  Preparatory Acts or Behavior (Past 3 Months): Yes  Total Number of Preparatory Acts (Past 3 Months): 1  Preparatory Acts or Behavior Description (Past 3 Months): Bought Tylenol and kept in car, did not tell     Environmental or Psychosocial Events: loss of a loved one, helplessness/hopelessness, impulsivity/recklessness  Protective Factors: Protective Factors: strong bond to family unit, community support, or employment, intact marriage or domestic partnership, lives in a responsibly safe and stable environment, good treatment engagement, supportive ongoing medical and mental health care relationships, help seeking    Does the  patient have thoughts of harming others? Feels Like Hurting Others: no  Previous Attempt to Hurt Others: no  Current presentation:  (calm and cooperative)  Is the patient engaging in sexually inappropriate behavior?: no    Is the patient engaging in sexually inappropriate behavior?  no        Mental Status Exam   Affect: Flat  Appearance: Appropriate  Attention Span/Concentration: Attentive  Eye Contact: Variable    Fund of Knowledge: Appropriate   Language /Speech Content: Fluent  Language /Speech Volume: Soft  Language /Speech Rate/Productions: Normal  Recent Memory: Intact  Remote Memory: Intact  Mood: Depressed, Sad  Orientation to Person: Yes   Orientation to Place: Yes  Orientation to Time of Day: Yes  Orientation to Date: Yes     Situation (Do they understand why they are here?): Yes  Psychomotor Behavior: Normal  Thought Content: Suicidal  Thought Form: Intact    Medication  Psychotropic medications:   Medication Orders - Psychiatric (From admission, onward)      Start     Dose/Rate Route Frequency Ordered Stop    01/13/24 0800  FLUoxetine (PROzac) capsule 80 mg         80 mg Oral DAILY 01/12/24 1827      01/12/24 1845  lurasidone (LATUDA) tablet 120 mg         120 mg Oral DAILY 01/12/24 1827      01/12/24 1827  hydrOXYzine HCl (ATARAX) tablet 10 mg         10 mg Oral DAILY PRN 01/12/24 1827            Current Facility-Administered Medications   Medication    [START ON 1/13/2024] FLUoxetine (PROzac) capsule 80 mg    hydrOXYzine HCl (ATARAX) tablet 10 mg    [START ON 1/13/2024] lamoTRIgine (LaMICtal) tablet 200 mg    [START ON 1/13/2024] lamoTRIgine (LaMICtal) tablet 50 mg    lurasidone (LATUDA) tablet 120 mg     Current Outpatient Medications   Medication    FLUoxetine (PROZAC) 40 MG capsule    hydrOXYzine HCl (ATARAX) 10 MG tablet    lamoTRIgine (LAMICTAL) 200 MG tablet    lamoTRIgine (LAMICTAL) 25 MG tablet    lurasidone (LATUDA) 120 MG TABS tablet      Current Care Team  Patient Care Team:  Estefanía Myles  Nicollet [place] as PCP - General  DBT programming - MHS services  [facility] [place]    Diagnosis  Patient Active Problem List   Diagnosis Code    Suicidal ideation R45.851    MDD (major depressive disorder), recurrent severe, 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    Major depressive disorder, recurrent episode, moderate (H) F33.1    History of OCD (obsessive compulsive disorder) Z86.59    Eating disorder, unspecified type F50.9    Suicide attempt by drug overdose (H) T50.902A    Borderline personality disorder (H) F60.3    Severe episode of recurrent major depressive disorder, without psychotic features (H) F33.2       Primary Problem This Admission  Active Hospital Problems    Borderline personality disorder (H)      Eating disorder, unspecified type      *MDD (major depressive disorder), recurrent severe, without psychosis (H)        Clinical Summary and Substantiation of Recommendations   It is the recommendation of this clinician that pt admit to  MH for safety and stabilization. Pt displays the following risk factors that support IP admission: pt presents with SI with a plan to overdose on Tylenol. Pt reports that she bought a large bottle with intentions to end her life today, but ended up telling her therapist about these thoughts during session today. Pt reports she has looked up what dose she would need to take for it to be lethal. She had initially reported to attending MD that she was not suicidal and felt safe to go home, but later told [facility] that she felt she needed to be honest and reported that she still was suicidal and didn't feel she could keep herself safe. Pt is unable to engage in safety planning to mitigate risk level in a non-secure setting. Lower levels of care would not be sufficient in managing the level of risk pt is presenting with. Due to this IP is the least restrictive option of care for pt. Pt should remain in IP until deemed safe to return to the community and engage in [facility] supports. Pt  will be able to resume work with established providers upon discharge.       Imminent risk of harm: Suicidal Behavior  Severe psychiatric, behavioral or other comorbid conditions are appropriate for management at inpatient mental health as indicated by at least one of the following: Symptoms of impact to function  Severe dysfunction in daily living is present as indicated by at least one of the following: Other evidence of severe dysfunction  Situation and expectations are appropriate for inpatient care: Patient management/treatment at lower level of care is not feasible or is inappropriate  Inpatient mental health services are necessary to meet patient needs and at least one of the following: Specific condition related to admission diagnosis is present and judged likely to deteriorate in absence of treatment at proposed level of care      Patient coping skills attempted to reduce the crisis:  Pt met with her therapist and expressed SI with plan, decided best to have pt transported to the ED as the Tylenol pt bought was in her car that she drove to the appointment in and pt could not engage in safety planning.    Disposition  Recommended disposition: Inpatient Mental Health        Reviewed case and recommendations with attending provider. Attending Name: Dr. Mir Baez       Attending concurs with disposition: yes       Patient and/or validated legal guardian concurs with disposition:   yes       Final disposition:  inpatient mental health    Legal status on admission: Voluntary/Patient has signed consent for treatment    Assessment Details   Total duration spent with the patient: 40 min     CPT code(s) utilized: 85152 - Psychotherapy for Crisis - 60 (30-74*) min    MALENA Cramer, Psychotherapist  [facility] - Triage & Transition Services  Callback: [phone]

## 2024-01-13 NOTE — TELEPHONE ENCOUNTER
S: [facility] Yissel , DEC  Jo  calling at 7:00PM about a 28 year old/Female presenting with Suicidal thoughts with plan to overdose and intent.      B: Pt arrived via EMS. Presenting problem, stressors:  presenting with Suicidal thoughts with plan to overdose and intent. Pt reports chronic thoughts but aalso an aquaintence from an eating do program recently passed away.        Pt affect in ED: Flat  Pt Dx: Major Depressive Disorder, Generalized Anxiety Disorder, Borderline Personality Disorder, OCD, and anorexia  Previous IPMH hx? Yes: July 2021  Pt endorses SI with a plan to over dose    Hx of suicide attempt? No  Pt endorses SIB via cuts, most recent episode earlier this week  Pt denies HI   Pt denies hallucinations .   Pt RARS Score: 3    Hx of aggression/violence, sexual offenses, legal concerns, Epic care plan? describe: None  Current concerns for aggression this visit? No  Does pt have a history of Civil Commitment? No  Is Pt their own guardian? Yes    Pt is prescribed medication. Is patient medication compliant? Yes  Pt endorses OP services: Psychiatrist, Therapist, and [facility] and [facility] DO programming  CD concerns: None  Acute or chronic medical concerns: none  Does Pt present with specific needs, assistive devices, or exclusionary criteria? None      Pt is ambulatory  Pt is able to perform ADLs independently      A: Pt to be reviewed for [facility] admission. Pt is Voluntary  Preferred placement: [facility] ONLY    COVID Symptoms: No  If yes, COVID test required   Utox: Negative   CMP: N/A  CBC: N/A  HCG: Negative    R: Patient cleared and ready for behavioral bed placement: Yes  Pt placed on IP worklist? Yes    Does Patient need a Transfer Center request created? No, Pt is located within [facility] ED, [facility] ED, or [facility]      No beds within [facility].  Pt will remain on worklist pending bed availability

## 2024-01-13 NOTE — ED NOTES
[facility] ED Mental Health Handoff Note:       Brief HPI:  This is a 28 year old female signed out to me by Dr. Chan.  See initial ED Provider note for full details of the presentation.     Home meds reviewed and ordered/administered: Yes    Medically stable for inpatient mental health admission: Yes.    Evaluated by mental health: Yes. The recommendation is for inpatient mental health treatment. Bed search in process    Safety concerns: At the time I received sign out, there were no safety concerns.    Hold Status:  Active Orders   Legal    Health Officer Authority to Detain (DONALD)     Frequency: Effective Now     Start Date/Time: 01/12/24 1437      Number of Occurrences: Until Specified     Order Comments: Patient arrived on a health or  authority to detain.              Exam:   Patient Vitals for the past 24 hrs:   BP Temp Temp src Pulse Resp SpO2   01/13/24 0757 123/77 98.1  F (36.7  C) Oral 81 18 98 %   01/12/24 1801 124/83 98.8  F (37.1  C) Oral 71 18 99 %   01/12/24 1448 121/79 99.2  F (37.3  C) Oral 79 16 96 %           ED Course:    Medications   FLUoxetine (PROzac) capsule 80 mg (80 mg Oral $Given 1/13/24 0824)   hydrOXYzine HCl (ATARAX) tablet 10 mg (has no administration in time range)   lamoTRIgine (LaMICtal) tablet 200 mg (200 mg Oral $Given 1/13/24 0824)   lamoTRIgine (LaMICtal) tablet 50 mg (50 mg Oral $Given 1/13/24 0824)   lurasidone (LATUDA) tablet 120 mg (120 mg Oral $Given 1/12/24 2017)            There were no significant events during my shift.    Patient was signed out to the oncoming provider, Dr. Gan      Impression:    ICD-10-CM    1. Depression with suicidal ideation  F32.A     R45.851       2. Eating disorder, unspecified type  F50.9       3. Borderline personality disorder (H)  F60.3           Plan:    Awaiting inpatient mental health admission/transfer.      RESULTS:   Results for orders placed or performed during the hospital encounter of 01/12/24 (from the past  24 hour(s))   [facility] ([facility] Assessment Consult Order:     Status: None ()    Collection Time: 01/12/24  2:45 PM    Narrative    Cornelia SorianoMALENA     1/12/2024 11:17 PM  Diagnostic Evaluation Consultation  Crisis Assessment    Patient Name: Corinne N Palm  Age:  28 year old  Legal Sex: female  Gender Identity: female  Pronouns:   Race: White  Ethnicity: Not  or   Language: English      Patient was assessed: In person      Patient location: [facility] EMERGENCY DEPARTMENT                             [id number]    Referral Data and Chief Complaint  Corinne N Palm presents to the ED via EMS. Patient is presenting   to the ED for the following concerns: Suicidal ideation,   Depression.   Factors that make the mental health crisis life   threatening or complex are:  Pt presents to [facility] ED via EMS sent   by her trauma therapist for a mental health evaluation due to   suicidal thoughts with a plan to overdose on Tylenol. Pt reports   that her spouse keeps her medication locked up at home, but she   bought a large bottle of Tylenol yesterday and kept it in her   car, which he did not know about. She had thoughts to act on this   overdose yesterday, but was able to stop herself. During session   with her therapist today, she informed her of this. Pt had driven   herself to the appointment in her car and did not feel able to   engage in safety planning so it was decided she needed to be   transported by EMS to the ED for safety. Pt does have a hx of one   prior suicide attempt via overdose in 6/2021. Pt had initially   reported to attending MD that she was no longer suicidal and felt   she could be safe at home. However, during assessment, pt   relunctantly reported that she was still suicidal with this plan   and did not feel she could keep herself safe. Pt reported she   really did not want to share this info as she did not want to   return to [facility], but felt it was best to be  honest as she reports   she has not been doing well. She reports she is feeling down   about herself as she was able to stay out of the hospital since   2021 and keep herself safe, and feels bad that she is now   struggling again. She reports she is not sure what was working   well for her during this period of time in between. Reports she   had reached a point where she didn't know why she had ever even   considered NSSI or SI, but then reports the thoughts slowly   started creeping back and now has reached a point where she   doesn't feel she cares about anything and is not scared of dying.   She reports usually her friends, family, spouse, work, and   looking forward to having a baby someday used to be protective   factors for her, but at this point, she no longer feels she cares   about anything that was once important to her. Endorses NSSI in   locations where her  is less likely to see. Reports she   last cut her foot superficially earlier this week. Reports she   bought a pair of scissors to self-harm and was also hiding those   in her car. She does report a friend from [place], where she is   currently still in [facility], recently passed away, which was a trigger   for her. She does acknowledge though that at this point, any   small thing could have triggered her. She reports she has been   sleeping much more. Endorses ongoing symptoms of restricting food   and over-exercising. Denies HI, AH, VH or substance use.    Informed Consent and Assessment Methods  Explained the crisis assessment process, including applicable   information disclosures and limits to confidentiality, assessed   understanding of the process, and obtained consent to proceed   with the assessment.  Assessment methods included conducting a   formal interview with patient, review of medical records,   collaboration with medical staff, and obtaining relevant   collateral information from family and community providers when   available.  :  done     Patient response to interventions: acceptance expressed,   verbalizes understanding  Coping skills were attempted to reduce the crisis:  Pt met with   her therapist and expressed SI with plan, decided best to have pt   transported to the ED as the Tylenol pt bought was in her car   that she drove to the appointment in and pt could not engage in   safety planning.     History of the Crisis   Pt has a hx of anorexia, depression, OCD, generalized anxiety   disorder, borderline personality, panic disorder, and SI. Most   recent IP MH was at [facility] from 7/5/2021 - 7/20/2021 for SI, with   another inpatient stay at [facility] shortly before this from 6/8/2021   - 6/28/2021 due to suicide attempt via overdose. Pt reports she   currently sees a trauma therapist 2x per week, attends DBT group   3x per week, medication management and IOP through [facility]. No hx   of commitment. Reports she has been compliant with her   medication.    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)  Source of Income:  salary/wages  Financial Environmental Concerns:  none  Current Hobbies:  music, television/movies/videos  Barriers in Personal Life:  mental health concerns, emotional   concerns    Significant Clinical History  Current Anxiety Symptoms:  anxious  Current Depression/Trauma:  thoughts of death/suicide, sadness,   hopelessness, helplessness, impaired decision making, apathy,   negativistic  Current Somatic Symptoms:  anxious  Current Psychosis/Thought Disturbance:  impulsive  Current Eating Symptoms:  loss of appetite (restricting)  Chemical Use History:  Alcohol: None  Benzodiazepines: None  Opiates: None  Cocaine: None  Marijuana: None  Other Use: None  Withdrawal Symptoms:  (none reported)  Addictions:  (none reported)   Past diagnosis:  Eating Disorder, Personality Disorder,  Suicide   attempt(s), Depression, Anxiety Disorder  Family history:  No known history of mental health or chemical   health concerns  Past treatment:  Individual therapy, Primary Care, Inpatient   Hospitalization, Day Treatment, Partial Hospitalization,   Psychiatric Medication Management  Details of most recent treatment:  Pt has a hx of anorexia,   depression, OCD, generalized anxiety disorder, borderline   personality, panic disorder, and SI. Most recent IP MH was at   [facility] from 7/5/2021 - 7/20/2021 for SI, with another inpatient   stay at [facility] shortly before this from 6/8/2021 - 6/28/2021 due to   suicide attempt via overdose. Pt reports she currently sees a   trauma therapist 2x per week, attends DBT group 3x per week   through [facility], medication management and IOP through [place]. No hx   of commitment. Reports she has been compliant with her   medication.  Other relevant history:  NA       Collateral Information  Is there collateral information: Yes     Collateral information name, relationship, phone number:  Robin Lawrence, spouse, [phone]    What happened today: Things have been kind of spotty for the past   month. Last week things really went downhill. Already having hard   time, having to lock up meds again. Which she told me today she   has been getting around this which is not ideal. Then finding out   about her friend from programming passing away today made it hard   for her brain. Doing a good job at least of saying she is having   hard time, because sometimes she tries to hide that. Told me   having some intent yesterday, which has been scary. Had a plan   she didn't really follow through on, but didn't share plan.   Therapist didn't think would be safe going to her car and Lida   agreed. She has not expressed a clear plan to me. She did tell me   about the bottle of tylenol in her car, did not know about the   scissors. She has her car keys there, would need to get them to   move her car and get  items out. All meds I was aware of are   locked up, except for the ones she recently bought. She has never   taken any of my meds, but they are locked up as well just as a   precaution. Lot of medical trauma. Not sure about IP MH. If we   make a very clear safety plan we maybe could consider outpatient,   but would have to be watching her like a hawk. The flip side of   it is that she is so terrified of disrupting her routine and   work. If we can make a really really clear safety plan with   structure and check-ins, I'm willing to try for her sake. I would   hate to do inpatient, I know it would be devastating for her and   I worry about her being truthful in the future if she is scared   she'll be admitted for being honest. [facility] called Robin back after   checking in with pt again. Updated him that pt was now agreeable   to IP MH. He will still plan to get the keys to bring car home   from therapist's office and remove unsafe items. He is aware plan   could change while awaiting IP MH as she will continue to be   reassessed.     What is different about patient's functioning: Increased SI, some   intent yesterday. Has not been being honest about getting meds -   he thought everything was locked up but didn't know she had gone   out to buy some. Has been struggling more, but has been being   honest that she is struggling which is a good sign.     Has patient made comments about wanting to kill   themselves/others: yes    If d/c is recommended, can they take part in safety/aftercare   planning:  yes     Risk Assessment  [place] Suicide Severity Rating Scale Full Clinical Version:  Suicidal Ideation  Q1 Wish to be Dead (Lifetime): Yes  Q2 Non-Specific Active Suicidal Thoughts (Lifetime): Yes  3. Active Suicidal Ideation with any Methods (Not Plan) Without   Intent to Act (Lifetime): Yes  Q4 Active Suicidal Ideation with Some Intent to Act, Without   Specific Plan (Lifetime): Yes  Q5 Active Suicidal Ideation with  Specific Plan and Intent   (Lifetime): Yes  Q6 Suicide Behavior (Lifetime): yes     Suicidal Behavior (Lifetime)  Actual Attempt (Lifetime): Yes  Total Number of Actual Attempts (Lifetime): 1  Actual Attempt Description (Lifetime): 1 prior overdose attempt   6/2021  Has subject engaged in non-suicidal self-injurious behavior?   (Lifetime): Yes  Interrupted Attempts (Lifetime): No  Aborted or Self-Interrupted Attempt (Lifetime): No  Preparatory Acts or Behavior (Lifetime): Yes  Total Number of Preparatory Acts (Lifetime): 1    [place] Suicide Severity Rating Scale Recent:   Suicidal Ideation (Recent)  Q1 Wished to be Dead (Past Month): yes  Q2 Suicidal Thoughts (Past Month): yes  Q3 Suicidal Thought Method: yes  Q4 Suicidal Intent without Specific Plan: no  Q5 Suicide Intent with Specific Plan: yes  Level of Risk per Screen: high risk  Intensity of Ideation (Recent)  Most Severe Ideation Rating (Past 1 Month): 4  Frequency (Past 1 Month): Many times each day  Duration (Past 1 Month): More than 8 hours/persistent or   continuous  Controllability (Past 1 Month): Unable to control thoughts  Deterrents (Past 1 Month): Deterrents most likely did not stop   you  Reasons for Ideation (Past 1 Month): Completely to end or stop   the pain (You couldn't go on living with the pain or how you were   feeling)  Suicidal Behavior (Recent)  Actual Attempt (Past 3 Months): No  Total Number of Actual Attempts (Past 3 Months): 0  Has subject engaged in non-suicidal self-injurious behavior?   (Past 3 Months): Yes  Interrupted Attempts (Past 3 Months): No  Total Number of Interrupted Attempts (Past 3 Months): 0  Aborted or Self-Interrupted Attempt (Past 3 Months): No  Total Number of Aborted or Self-Interrupted Attempts (Past 3   Months): 0  Preparatory Acts or Behavior (Past 3 Months): Yes  Total Number of Preparatory Acts (Past 3 Months): 1  Preparatory Acts or Behavior Description (Past 3 Months): Bought   Tylenol and kept in car, did  not tell     Environmental or Psychosocial Events: loss of a loved one,   helplessness/hopelessness, impulsivity/recklessness  Protective Factors: Protective Factors: strong bond to family   unit, community support, or employment, intact marriage or   domestic partnership, lives in a responsibly safe and stable   environment, good treatment engagement, supportive ongoing   medical and mental health care relationships, help seeking    Does the patient have thoughts of harming others? Feels Like   Hurting Others: no  Previous Attempt to Hurt Others: no  Current presentation:  (calm and cooperative)  Is the patient engaging in sexually inappropriate behavior?: no    Is the patient engaging in sexually inappropriate behavior?  no          Mental Status Exam   Affect: Flat  Appearance: Appropriate  Attention Span/Concentration: Attentive  Eye Contact: Variable    Fund of Knowledge: Appropriate   Language /Speech Content: Fluent  Language /Speech Volume: Soft  Language /Speech Rate/Productions: Normal  Recent Memory: Intact  Remote Memory: Intact  Mood: Depressed, Sad  Orientation to Person: Yes   Orientation to Place: Yes  Orientation to Time of Day: Yes  Orientation to Date: Yes     Situation (Do they understand why they are here?): Yes  Psychomotor Behavior: Normal  Thought Content: Suicidal  Thought Form: Intact    Medication  Psychotropic medications:   Medication Orders - Psychiatric (From admission, onward)      Start     Dose/Rate Route Frequency Ordered Stop    01/13/24 0800  FLUoxetine (PROzac) capsule 80 mg         80 mg Oral DAILY 01/12/24 1827 01/12/24 1845  lurasidone (LATUDA) tablet 120 mg         120 mg Oral DAILY 01/12/24 1827 01/12/24 1827  hydrOXYzine HCl (ATARAX) tablet 10 mg         10 mg Oral DAILY PRN 01/12/24 1827            Current Facility-Administered Medications   Medication    [START ON 1/13/2024] FLUoxetine (PROzac) capsule 80 mg    hydrOXYzine HCl (ATARAX) tablet 10 mg     [START ON 1/13/2024] lamoTRIgine (LaMICtal) tablet 200 mg    [START ON 1/13/2024] lamoTRIgine (LaMICtal) tablet 50 mg    lurasidone (LATUDA) tablet 120 mg     Current Outpatient Medications   Medication    FLUoxetine (PROZAC) 40 MG capsule    hydrOXYzine HCl (ATARAX) 10 MG tablet    lamoTRIgine (LAMICTAL) 200 MG tablet    lamoTRIgine (LAMICTAL) 25 MG tablet    lurasidone (LATUDA) 120 MG TABS tablet      Current Care Team  Patient Care Team:  Clinic, [facility] as PCP - General  DBT programming - MHS services  IOP at [facility]    Diagnosis  Patient Active Problem List   Diagnosis Code    Suicidal ideation R45.851    MDD (major depressive disorder), recurrent severe, without   psychosis (H) F33.2    Major depressive disorder, recurrent episode, moderate (H) F33.1      History of OCD (obsessive compulsive disorder) Z86.59    Eating disorder, unspecified type F50.9    Suicide attempt by drug overdose (H) T50.902A    Borderline personality disorder (H) F60.3    Severe episode of recurrent major depressive disorder, without   psychotic features (H) F33.2       Primary Problem This Admission  Active Hospital Problems    Borderline personality disorder (H)      Eating disorder, unspecified type      *MDD (major depressive disorder), recurrent severe, without   psychosis (H)        Clinical Summary and Substantiation of Recommendations   It is the recommendation of this clinician that pt admit to IP MH   for safety and stabilization. Pt displays the following risk   factors that support IP admission: pt presents with SI with a   plan to overdose on Tylenol. Pt reports that she bought a large   bottle with intentions to end her life today, but ended up   telling her therapist about these thoughts during session today.   Pt reports she has looked up what dose she would need to take for   it to be lethal. She had initially reported to attending MD that   she was not suicidal and felt safe to go home, but later told    HP that she felt she needed to be honest and reported that she   still was suicidal and didn't feel she could keep herself safe.   Pt is unable to engage in safety planning to mitigate risk level   in a non-secure setting. Lower levels of care would not be   sufficient in managing the level of risk pt is presenting with.   Due to this IP is the least restrictive option of care for pt. Pt   should remain in IP until deemed safe to return to the community   and engage in [facility] supports. Pt will be able to resume work with   established providers upon discharge.       Imminent risk of harm: Suicidal Behavior  Severe psychiatric, behavioral or other comorbid conditions are   appropriate for management at inpatient mental health as   indicated by at least one of the following: Symptoms of impact to   function  Severe dysfunction in daily living is present as indicated by at   least one of the following: Other evidence of severe dysfunction  Situation and expectations are appropriate for inpatient care:   Patient management/treatment at lower level of care is not   feasible or is inappropriate  Inpatient mental health services are necessary to meet patient   needs and at least one of the following: Specific condition   related to admission diagnosis is present and judged likely to   deteriorate in absence of treatment at proposed level of care      Patient coping skills attempted to reduce the crisis:  Pt met   with her therapist and expressed SI with plan, decided best to   have pt transported to the ED as the Tylenol pt bought was in her   car that she drove to the appointment in and pt could not engage   in safety planning.    Disposition  Recommended disposition: Inpatient Mental Health        Reviewed case and recommendations with attending provider.   Attending Name: Dr. Mir Baez       Attending concurs with disposition: yes       Patient and/or validated legal guardian concurs with disposition:     yes        Final disposition:  inpatient mental health    Legal status on admission: Voluntary/Patient has signed consent   for treatment    Assessment Details   Total duration spent with the patient: 40 min     CPT code(s) utilized: 13365 - Psychotherapy for Crisis - 60   (30-74*) min    MALENA Cramer, Psychotherapist  DEC - Triage & Transition Services  Callback: [phone]          HCG qualitative urine     Status: Normal    Collection Time: 01/12/24  2:47 PM   Result Value Ref Range    hCG Urine Qualitative Negative Negative   Urine Drug Screen     Status: Normal    Collection Time: 01/12/24  2:47 PM    Narrative    The following orders were created for panel order Urine Drug Screen.  Procedure                               Abnormality         Status                     ---------                               -----------         ------                     Urine Drug Screen Panel[[id number]]      Normal              Final result                 Please view results for these tests on the individual orders.   Urine Drug Screen Panel     Status: Normal    Collection Time: 01/12/24  2:47 PM   Result Value Ref Range    Amphetamines Urine Screen Negative Screen Negative    Barbituates Urine Screen Negative Screen Negative    Benzodiazepine Urine Screen Negative Screen Negative    Cannabinoids Urine Screen Negative Screen Negative    Cocaine Urine Screen Negative Screen Negative    Fentanyl Qual Urine Screen Negative Screen Negative    Opiates Urine Screen Negative Screen Negative    PCP Urine Screen Negative Screen Negative             MD Ge Willett Alda L, MD  01/13/24 5716

## 2024-01-13 NOTE — TELEPHONE ENCOUNTER
[facility] ONLY     R: [place]  Access Inpatient Bed Call Log 1/13/24 @ 4:00 PM     Intake has called facilities that have not updated the bed status within the last 12 hours.                             [facility] is at capacity             Pt remains on the work list pending appropriate bed availability.

## 2024-01-13 NOTE — TELEPHONE ENCOUNTER
R: 11:55 PM Bed Search Update [facility] Only    No appropriate beds available within [facility].  Pt remains on PPS worklist awaiting appropriate placement.

## 2024-01-13 NOTE — ED NOTES
Alert , awake , sitting up in bed on early rounds. Flat , sad . Met w/ Demi , Coordinator in conference room. 154. States she is suicidal but has no plan while here in the hospital. Admits to self- harm urges but denies any plan at this time . States she will contract for safety. More frequent safety checks and hands observed in safe positions. Staff informed . 1900 hydroxyzine 10 mg po for anxiety. (Just received from pharmacy.)

## 2024-01-13 NOTE — ED NOTES
DEC  attempted to do a therapeutic check-in with the patient. There is no private space available.    will start the interview once an interview room becomes available.     GARY Dsouza, [facility] Center[facility] Psychotherapist

## 2024-01-13 NOTE — ED NOTES
Pt awake, calm, visible on milieu. She awakens for breakfast and medications and spends time watching TV. Pt seen by NP a few minutes ago. Per NP, pt admitted to having plastic fork in her pocket with the intention of self-harming. Pt. States that being in the hospital setting is triggering to her. This RN and Charge RN spoke with pt., who contracted for safety. Pt to utilize safe trays for meals - dietary office updated.

## 2024-01-14 ENCOUNTER — TELEPHONE (OUTPATIENT)
Dept: BEHAVIORAL HEALTH | Facility: CLINIC | Age: 29
End: 2024-01-14
Payer: COMMERCIAL

## 2024-01-14 PROCEDURE — 250N000013 HC RX MED GY IP 250 OP 250 PS 637: Performed by: PSYCHIATRY & NEUROLOGY

## 2024-01-14 PROCEDURE — 250N000013 HC RX MED GY IP 250 OP 250 PS 637: Performed by: NURSE PRACTITIONER

## 2024-01-14 PROCEDURE — 99233 SBSQ HOSP IP/OBS HIGH 50: CPT | Performed by: NURSE PRACTITIONER

## 2024-01-14 RX ADMIN — LURASIDONE HYDROCHLORIDE 120 MG: 80 TABLET, COATED ORAL at 18:00

## 2024-01-14 RX ADMIN — LAMOTRIGINE 200 MG: 200 TABLET ORAL at 09:20

## 2024-01-14 RX ADMIN — LAMOTRIGINE 50 MG: 25 TABLET ORAL at 09:20

## 2024-01-14 RX ADMIN — FLUOXETINE HYDROCHLORIDE 60 MG: 20 CAPSULE ORAL at 09:20

## 2024-01-14 RX ADMIN — HYDROXYZINE HYDROCHLORIDE 10 MG: 10 TABLET ORAL at 14:27

## 2024-01-14 ASSESSMENT — ACTIVITIES OF DAILY LIVING (ADL)
ADLS_ACUITY_SCORE: 37

## 2024-01-14 NOTE — TELEPHONE ENCOUNTER
R:  Pt is vol. And prefers placemnt ONLY within [facility].    No Beds within [facility] -    Pt to remain on worklist pending appropriate bed availability.

## 2024-01-14 NOTE — CONSULTS
"Adult Psychiatry Consultation     Corinne N Palm MRN# [id number]   Age: 28 year old YOB: 1995   Date of Admission to ED: 1/12/2024    In person visit Details:     Patient was assessed and interviewed face-to-face in person with this writer   Assessment methods included conducting a formal interview with patient, review of medical records, collaboration with medical staff, and obtaining relevant collateral information from family and community providers when available.      Shy Duval, KITTY CNP            Contacts:   Attending Physician: No att. providers found    Current Outpatient Psychiatrist:    Primary Care Provider: Clinic, [facility] [place] therapist:  Coni dietician:   [facility] Therapist: Deandra  : Robin Lawrence [phone]  Mother: [phone]       Subjective:     Patient reports she slept better last night. She rates her depression as 7/10, anxiety 7/10, anger 3/10, and SI, at first she said it is the same as yesterday and then said 7/10, with 10 as the worst. The patient reported she is in a bad mood because she doesn't want to be here.  She clarified that she doesn't want to be in the hospital or in the world. She SI reports she does still have a plan but declined to discuss her plan.  She reports she has not been engaging in any self harm because the nurse told her she would have to go back to the ED if she did.  She reports she had ordered a tea bag because she knew she could get a staple out of it, but decided she would not do it because she doesn't want to go back to the ED.  She endorsed that she is \"eating some\", emphasizing \"some\". Denies problems with elimination.      She asked about where she will be going.  Writer explained that due to her SI and inability to be safe she is on the inpatient list.  Writer also explained should she start feeling like she can return home and be safe, the treatment team would need to talk to her  for " "safety planning before she could be released.  She said, \"okay.\"  She denied having and other question or concerns.          Objective:     Alpa was playing scrabble with some peers in the milieu when this writer approached to meet. She was agreeable to meet and joined writer to go to the conference room. She was initially a little snarky at first but warmed after a few minutes.     Patient does not want to be admitted to [facility] or [facility] because she has worked at both locations and knows the staff.     Laboratory/Imaging:    Recent Results (from the past 336 hour(s))   HCG qualitative urine    Collection Time: 01/12/24  2:47 PM   Result Value Ref Range    hCG Urine Qualitative Negative Negative   Urine Drug Screen Panel    Collection Time: 01/12/24  2:47 PM   Result Value Ref Range    Amphetamines Urine Screen Negative Screen Negative    Barbituates Urine Screen Negative Screen Negative    Benzodiazepine Urine Screen Negative Screen Negative    Cannabinoids Urine Screen Negative Screen Negative    Cocaine Urine Screen Negative Screen Negative    Fentanyl Qual Urine Screen Negative Screen Negative    Opiates Urine Screen Negative Screen Negative    PCP Urine Screen Negative Screen Negative              Collateral information from chart review and phone calls:     Reviewed DEC assessment, Initial ED consult, and ED notes.      Per Saskia Nettles RN progress note 1/14/2024:  \"Pt observed w/ safety checks q 15 minutes or more often. Hands and face observed. Up to bathroom x1 then back to bed . Slept most of the night.\"    Mental Status Exam:   Appearance:  awake, alert, adequately groomed, and casually dressed  Attitude:  cooperative  Eye Contact:  good  Mood:  anxious  Affect:  mood congruent and intensity is blunted  Speech:  clear, coherent and normal prosody  Psychomotor Behavior:  no evidence of tardive dyskinesia, dystonia, or tics and intact station, gait and muscle tone  Thought Process:  logical and " "linear  Associations:  no loose associations  Thought Content:  no evidence of psychotic thought and active suicidal ideation present  Insight:  fair  Judgment:  limited  Oriented to:  time, person, and place  Attention Span and Concentration:  intact  Recent and Remote Memory:  fair  Fund of Knowledge: appropriate  Muscle Strength and Tone: normal  Gait and Station: Normal         Physical Exam:     /81 (BP Location: Right arm, Patient Position: Sitting, Cuff Size: Adult Regular)   Pulse 91   Temp 98.3  F (36.8  C) (Oral)   Resp 18   SpO2 96%   Weight is 0 lbs 0 oz Data Unavailable There is no height or weight on file to calculate BMI.    ROS: 10 point ROS neg other than the symptoms noted above in the subjective.          Impression:     This patient is a 28 year old year old  female with a past psychiatric history of OCD, Anorexia Nervosa, MDD, borderline personality disorder, panic disorder PRECIOUS and \"medical trauma\", who presented on 1/12/2024 for SI with a plan to overdose. Prior to presenting to the ED, Corinne N Palm reports her mental health has been steadily declining for the last year.  She started have \"strong SI\" when an acquaintance from an eating disorder completed suicide.     Alpa continues to endorse SI with a plan.  She was unwilling to discuss her plan today.  She does not think she can be safe outside the hospital. She is aware she is on the inpatient list.  Today was the first day of the reduced dose of Prozac.     Risk for harm is moderate-high.    Based on interview with patient and patient's guardian/parent, record review, conversations ED staff, [facility] staff and ED attending, the Corinne N Palm meets criteria for MDD with SI.  Current medications are listed below.  Recommended medications adjustments are listed below.  Acute inpatient stabilization is needed as indicated by patient continues to report SI with a plan will not or can not safety plan.       Brief Therapeutic " Intervention(s):   Provided rapport building, active listening, unconditional positive regard, and validation.    Legal Status: Voluntary    Medications:     1/14/2024  Prozac decreased to 60 mg to begin taper off due to ineffective    Current Facility-Administered Medications   Medication    FLUoxetine (PROzac) capsule 60 mg    hydrOXYzine HCl (ATARAX) tablet 10 mg    lamoTRIgine (LaMICtal) tablet 200 mg    lamoTRIgine (LaMICtal) tablet 50 mg    lurasidone (LATUDA) tablet 120 mg     Current Outpatient Medications   Medication Sig    FLUoxetine (PROZAC) 40 MG capsule Take 80 mg by mouth daily    hydrOXYzine HCl (ATARAX) 10 MG tablet Take 10 mg by mouth daily as needed for anxiety    lamoTRIgine (LAMICTAL) 200 MG tablet Take 200 mg by mouth every morning along with two 25 mg tablets    lamoTRIgine (LAMICTAL) 25 MG tablet Take 50 mg by mouth every morning along with 200 mg tablet    lurasidone (LATUDA) 120 MG TABS tablet Take 120 mg by mouth daily with food              Diagnoses:     Principal Diagnosis: MDD recurrent, without psychosis     Secondary psychiatric diagnoses of concern this admission:  Anorexia Nervosa  Borderline personality disorder by hx   PRECIOUS  SIB    Current medical diagnosis being treated:   none         Recommendations:     Discussed the case and writer's recommendations with Dr Howie Kate MD , ED provider.     Recommend acute inpatient stabilization based on patient continues to have SI with a plan to overdose. She is not able to plan for safety outside of the hospital and needs stabilization.   2.    Continue:   Prozac to 60 mg with a plan to taper off since no longer working - patient wants to take medications that will be safe during pregnancy - in case she and her  decide to have a baby.    Lamotrigine 200 mg +50 mg daily in the AM  Latuda 120 mg daily with dinner - must be administered with a minimum of 350 calories.             Hydroxyzine 10 mg daily prn for anxiety  4. Continue to  consult psychiatry as needed    Attestation:  Patient time: 10 minutes  Parent/Guardian time: 0  Team/BHP/ED/EC staff time:15 minutes  Chart review: 10 minutes  Documentation: 25 minutes  Total time:   60 minutes spent on the date of the encounter.     I have provided critical care services at the bedside in the [facility] behavioral emergency [place], evaluating the patient, reviewing notes and laboratory values and directing care. I have discussed recommendation regarding whether or not hospitalization is needed and recommendations for medications and laboratory testing with the attending emergency department provider. Shy Duval, DNP, APRN, CNP January 14, 2024.    Disclaimer: This note consists of symbols derived from keyboarding, dictation, and/or voice recognition software. As a result, there may be errors in the script that have gone undetected.  Please consider this when interpreting information found in the chart.    Please call [facility]/DEC at [phone] if you have follow-up questions or wish to place another consult.

## 2024-01-14 NOTE — ED NOTES
Uneventful shift. Pt was pleasant, cooperative with care. Pt was engaged with peers conversing and watched TV. Vital signs within normal limit. Endorsed SI with no plan. Denied SIB urge. Denied HI, Hallucinations/delusions, and depression. Mild headache 2/10 claimed from coffee. PRN Vistaril given for mild anxiety, and coffee as requested. Pt on safety meal tray. Ate 90% of breakfast and lunch. Med compliant and contracted for safety. Will continue to evaluate.

## 2024-01-14 NOTE — TELEPHONE ENCOUNTER
R: [place]  Access Inpatient Bed Call Log 1/14/24 @ 3:42 PM     Intake has called facilities that have not updated the bed status within the last 12 hours.                             [facility] is at capacity             Pt remains on the work list pending appropriate bed availability.

## 2024-01-14 NOTE — ED NOTES
Pt signed out to me by Dr. Carolina at 3:30pm      Situation:   Borderline personality; awaiting admission.     Plan: Awaiting inpatient admission.     Shift Report:  Pt seen [facility] mental health NP. Plan to admit to  admit. Will taper off prozac.     Signed:  Yvonne Gan MD  January 13, 2024 at 7:27 PM       Yvonne Gan MD  01/13/24 1927

## 2024-01-14 NOTE — TELEPHONE ENCOUNTER
Bed search ([facility] only) @ 11:46PM:     [facility] @ cap     Pt remains on work list until appropriate placement is available

## 2024-01-14 NOTE — ED NOTES
Pt observed w/ safety checks q 15 minutes or more often. Hands and face observed. Up to bathroom x1 then back to bed . Slept most of the night.

## 2024-01-15 ENCOUNTER — TELEPHONE (OUTPATIENT)
Dept: BEHAVIORAL HEALTH | Facility: CLINIC | Age: 29
End: 2024-01-15
Payer: COMMERCIAL

## 2024-01-15 PROCEDURE — 99233 SBSQ HOSP IP/OBS HIGH 50: CPT | Performed by: NURSE PRACTITIONER

## 2024-01-15 PROCEDURE — 250N000013 HC RX MED GY IP 250 OP 250 PS 637: Performed by: PSYCHIATRY & NEUROLOGY

## 2024-01-15 PROCEDURE — 250N000013 HC RX MED GY IP 250 OP 250 PS 637: Performed by: NURSE PRACTITIONER

## 2024-01-15 RX ADMIN — LAMOTRIGINE 200 MG: 200 TABLET ORAL at 07:57

## 2024-01-15 RX ADMIN — FLUOXETINE HYDROCHLORIDE 60 MG: 20 CAPSULE ORAL at 07:57

## 2024-01-15 RX ADMIN — LURASIDONE HYDROCHLORIDE 120 MG: 80 TABLET, COATED ORAL at 18:05

## 2024-01-15 RX ADMIN — LAMOTRIGINE 50 MG: 25 TABLET ORAL at 07:57

## 2024-01-15 RX ADMIN — HYDROXYZINE HYDROCHLORIDE 10 MG: 10 TABLET ORAL at 14:19

## 2024-01-15 ASSESSMENT — ACTIVITIES OF DAILY LIVING (ADL)
ADLS_ACUITY_SCORE: 37

## 2024-01-15 NOTE — ED NOTES
I received report on this patient and will accept care at 23:44 pm.Patient resting at this time with eyes closed chest rise and fall observed.3:58 am patient was up to use restroom and went back to sleep eyes closed.

## 2024-01-15 NOTE — PROGRESS NOTES
Triage & Transition Services, Extended Care     Client Name: Corinne N Palm    Date: January 15, 2024    Service Type: (P) attended group session  Session Start Time:  (P) 1100    Session End Time: (P) 1120  Session Length: (P) 20  Site Location: [facility] EMERGENCY DEPARTMENT                             [id number]  Total Number ofAttendees: (P) 4  Topic: (P) coping skills/lifestyle management   Response: (P) offered helpful suggestions to peers, able to recall/repeat info presented, listened actively     ROCIO Álvarez   Licensed Mental Health Professional (LMHP), Extended Care  [phone]

## 2024-01-15 NOTE — TELEPHONE ENCOUNTER
R:  Pt awaiting [facility]    Pt is vol and prefers [facility] Placement only.    Currentl;y no beds available for [facility].  Pt to remain on worklist pending bed availability

## 2024-01-15 NOTE — ED PROVIDER NOTES
[facility] ED Mental Health Handoff Note:       Brief HPI:  This is a 28 year old female signed out to me by Dr. Carolina.  See initial ED Provider note for full details of the presentation. Interval history is pertinent for no reported events or changes.  The patient is on the inpatient list due to depression, anxiety, borderline personality disorder and suicidal thoughts.  Patient has been unable to safety plan and so been placed on the inpatient list and has been waiting for bed placement..    Home meds reviewed and ordered/administered: Yes    Medically stable for inpatient mental health admission: Yes.    Evaluated by mental health: Yes. The recommendation is for inpatient mental health treatment. Bed search in process    Safety concerns: At the time I received sign out, there were no safety concerns.    Hold Status:  Active Orders   Legal    Health Officer Authority to Detain (DONALD)     Frequency: Effective Now     Start Date/Time: 01/12/24 1437      Number of Occurrences: Until Specified     Order Comments: Patient arrived on a health or  authority to detain.              Exam:   Patient Vitals for the past 24 hrs:   BP Temp Temp src Pulse Resp SpO2   01/15/24 0753 117/79 98  F (36.7  C) Oral 65 16 98 %           ED Course:    Medications   hydrOXYzine HCl (ATARAX) tablet 10 mg (10 mg Oral $Given 1/14/24 1427)   lamoTRIgine (LaMICtal) tablet 200 mg (200 mg Oral $Given 1/15/24 0757)   lamoTRIgine (LaMICtal) tablet 50 mg (50 mg Oral $Given 1/15/24 0757)   lurasidone (LATUDA) tablet 120 mg (120 mg Oral $Given 1/14/24 1800)   FLUoxetine (PROzac) capsule 60 mg (60 mg Oral $Given 1/15/24 0757)            There were no significant events during my shift.    Patient was signed out to the oncoming provider      Impression:    ICD-10-CM    1. Depression with suicidal ideation  F32.A     R45.851       2. Eating disorder, unspecified type  F50.9       3. Borderline personality disorder (H)  F60.3            Plan:    Awaiting mental health evaluation/recommendations.      RESULTS:   Results for orders placed or performed during the hospital encounter of 01/12/24 (from the past 24 hour(s))   Psychiatry IP Consult: reccomendations; Consultant may enter orders: Yes; Requesting provider? Attending physician; Name: Laura Souza     Status: None ()    Collection Time: 01/14/24 12:25 PM    Shy Mercado APRN CNP     1/14/2024  6:20 PM  Adult Psychiatry Consultation     Corinne N Palm MRN# [id number]   Age: 28 year old YOB: 1995   Date of Admission to ED: 1/12/2024    In person visit Details:     Patient was assessed and interviewed face-to-face in person with   this writer   Assessment methods included conducting a formal interview with   patient, review of medical records, collaboration with medical   staff, and obtaining relevant collateral information from family   and community providers when available.      KITTY Velez CNP            Contacts:   Attending Physician: No att. providers found    Current Outpatient Psychiatrist:    Primary Care Provider: [facility], [facility] [place] therapist:  Coni dietician:   [facility] Therapist: Deandra  : Robin Lawrence [phone]  Mother: [phone]       Subjective:     Patient reports she slept better last night. She rates her   depression as 7/10, anxiety 7/10, anger 3/10, and SI, at first   she said it is the same as yesterday and then said 7/10, with 10   as the worst. The patient reported she is in a bad mood because   she doesn't want to be here.  She clarified that she doesn't want   to be in the hospital or in the world. She SI reports she does   still have a plan but declined to discuss her plan.  She reports   she has not been engaging in any self harm because the nurse told   her she would have to go back to the ED if she did.  She reports   she had ordered a tea bag because she knew she could  "get a staple   out of it, but decided she would not do it because she doesn't   want to go back to the ED.  She endorsed that she is \"eating   some\", emphasizing \"some\". Denies problems with elimination.      She asked about where she will be going.  Writer explained that   due to her SI and inability to be safe she is on the inpatient   list.  Writer also explained should she start feeling like she   can return home and be safe, the treatment team would need to   talk to her  for safety planning before she could be   released.  She said, \"okay.\"  She denied having and other   question or concerns.          Objective:     Alpa was playing scrabble with some peers in the milieu when   this writer approached to meet. She was agreeable to meet and   joined writer to go to the conference room. She was initially a   little snarky at first but warmed after a few minutes.     Patient does not want to be admitted to [facility] or [facility] because   she has worked at both locations and knows the staff.     Laboratory/Imaging:    Recent Results (from the past 336 hour(s))   HCG qualitative urine    Collection Time: 01/12/24  2:47 PM   Result Value Ref Range    hCG Urine Qualitative Negative Negative   Urine Drug Screen Panel    Collection Time: 01/12/24  2:47 PM   Result Value Ref Range    Amphetamines Urine Screen Negative Screen Negative    Barbituates Urine Screen Negative Screen Negative    Benzodiazepine Urine Screen Negative Screen Negative    Cannabinoids Urine Screen Negative Screen Negative    Cocaine Urine Screen Negative Screen Negative    Fentanyl Qual Urine Screen Negative Screen Negative    Opiates Urine Screen Negative Screen Negative    PCP Urine Screen Negative Screen Negative              Collateral information from chart review and phone calls:     Reviewed DEC assessment, Initial ED consult, and ED notes.      Per Saskia Nettles RN progress note 1/14/2024:  \"Pt observed w/ safety checks q 15 minutes or " "more often. Hands   and face observed. Up to bathroom x1 then back to bed . Slept   most of the night.\"    Mental Status Exam:   Appearance:  awake, alert, adequately groomed, and casually   dressed  Attitude:  cooperative  Eye Contact:  good  Mood:  anxious  Affect:  mood congruent and intensity is blunted  Speech:  clear, coherent and normal prosody  Psychomotor Behavior:  no evidence of tardive dyskinesia,   dystonia, or tics and intact station, gait and muscle tone  Thought Process:  logical and linear  Associations:  no loose associations  Thought Content:  no evidence of psychotic thought and active   suicidal ideation present  Insight:  fair  Judgment:  limited  Oriented to:  time, person, and place  Attention Span and Concentration:  intact  Recent and Remote Memory:  fair  Fund of Knowledge: appropriate  Muscle Strength and Tone: normal  Gait and Station: Normal         Physical Exam:     /81 (BP Location: Right arm, Patient Position: Sitting,   Cuff Size: Adult Regular)   Pulse 91   Temp 98.3  F (36.8  C)   (Oral)   Resp 18   SpO2 96%   Weight is 0 lbs 0 oz Data Unavailable There is no height or   weight on file to calculate BMI.    ROS: 10 point ROS neg other than the symptoms noted above in the   subjective.          Impression:     This patient is a 28 year old year old  female with a   past psychiatric history of OCD, Anorexia Nervosa, MDD,   borderline personality disorder, panic disorder PRECIOUS and \"medical   trauma\", who presented on 1/12/2024 for SI with a plan to   overdose. Prior to presenting to the ED, Corinne N Palm reports   her mental health has been steadily declining for the last year.    She started have \"strong SI\" when an acquaintance from an eating   disorder completed suicide.     Alpa continues to endorse SI with a plan.  She was unwilling   to discuss her plan today.  She does not think she can be safe   outside the hospital. She is aware she is on the inpatient " list.    Today was the first day of the reduced dose of Prozac.     Risk for harm is moderate-high.    Based on interview with patient and patient's guardian/parent,   record review, conversations ED staff, P staff and ED   attending, the Corinne N Palm meets criteria for MDD with SI.    Current medications are listed below.  Recommended medications   adjustments are listed below.  Acute inpatient stabilization is   needed as indicated by patient continues to report SI with a plan   will not or can not safety plan.       Brief Therapeutic Intervention(s):   Provided rapport building, active listening, unconditional   positive regard, and validation.    Legal Status: Voluntary    Medications:     1/14/2024  Prozac decreased to 60 mg to begin taper off due to   ineffective    Current Facility-Administered Medications   Medication    FLUoxetine (PROzac) capsule 60 mg    hydrOXYzine HCl (ATARAX) tablet 10 mg    lamoTRIgine (LaMICtal) tablet 200 mg    lamoTRIgine (LaMICtal) tablet 50 mg    lurasidone (LATUDA) tablet 120 mg     Current Outpatient Medications   Medication Sig    FLUoxetine (PROZAC) 40 MG capsule Take 80 mg by mouth daily    hydrOXYzine HCl (ATARAX) 10 MG tablet Take 10 mg by mouth daily   as needed for anxiety    lamoTRIgine (LAMICTAL) 200 MG tablet Take 200 mg by mouth every   morning along with two 25 mg tablets    lamoTRIgine (LAMICTAL) 25 MG tablet Take 50 mg by mouth every   morning along with 200 mg tablet    lurasidone (LATUDA) 120 MG TABS tablet Take 120 mg by mouth   daily with food              Diagnoses:     Principal Diagnosis: MDD recurrent, without psychosis     Secondary psychiatric diagnoses of concern this admission:  Anorexia Nervosa  Borderline personality disorder by hx   PRECIOUS  SIB    Current medical diagnosis being treated:   none         Recommendations:     Discussed the case and writer's recommendations with Dr Howie Kate MD , ED provider.     Recommend acute inpatient  stabilization based on patient   continues to have SI with a plan to overdose. She is not able to   plan for safety outside of the hospital and needs stabilization.   2.    Continue:   Prozac to 60 mg with a plan to taper off since no longer working   - patient wants to take medications that will be safe during   pregnancy - in case she and her  decide to have a baby.    Lamotrigine 200 mg +50 mg daily in the AM  Latuda 120 mg daily with dinner - must be administered with a   minimum of 350 calories.             Hydroxyzine 10 mg daily prn for anxiety  4. Continue to consult psychiatry as needed    Attestation:  Patient time: 10 minutes  Parent/Guardian time: 0  Team/BHP/ED/EC staff time:15 minutes  Chart review: 10 minutes  Documentation: 25 minutes  Total time:   60 minutes spent on the date of the encounter.     I have provided critical care services at the bedside in the [facility] behavioral emergency [place], evaluating the patient,   reviewing notes and laboratory values and directing care. I have   discussed recommendation regarding whether or not hospitalization   is needed and recommendations for medications and laboratory   testing with the attending emergency department provider. Shy Duval, DNP, APRN, CNP January 14, 2024.    Disclaimer: This note consists of symbols derived from   keyboarding, dictation, and/or voice recognition software. As a   result, there may be errors in the script that have gone   undetected.  Please consider this when interpreting information   found in the chart.    Please call [facility]/DEC at [phone] if you have follow-up   questions or wish to place another consult.             MD Suzanna Gorman David, MD  01/15/24 4857

## 2024-01-15 NOTE — ED NOTES
Pt reported anxiety. Wondering when she is going inpatient. Requested/administered PRN hydroxyzine.

## 2024-01-15 NOTE — ED NOTES
Another RN reported pt turned in a folk reporting she was having the urge to self harm. Called dietary to deliver safety try. Staff will monitor pt closely.

## 2024-01-15 NOTE — TELEPHONE ENCOUNTER
Bed search ([facility] only) @ 12:55AM:     [facility] @ cap     Pt remains on work list until appropriate placement is available

## 2024-01-15 NOTE — ED NOTES
Pt was calm, pleasant, cooperative with care. Endorsed SI with no plan. Denied SIB urge. Denied HI, Hallucinations/delusions, anxiety and depression.  Pt on safety meal tray. Ate 100% of dinner with no safety concern. Med compliant and contracted for safety. Will continue to evaluate.

## 2024-01-15 NOTE — PROGRESS NOTES
"Triage & Transition Services, Extended Care     Therapy Progress Note    Patient: Lida goes by \"Cori,\" uses she/her pronouns  Date of Service: January 15, 2024  Site of Service: [facility] EMERGENCY DEPARTMENT [id number]  Patient was seen yes  Mode of Assessment: In person    Presentation Summary: Pt presents today as engaged, talking, normal/somewhat flat affect. She reports feeling restless, bored and wanting to go home, does not know what an [facility] stay will do for her. At the same time, she continues to report SI with 'more intent behind it' than is typical, stating somewhat nonchalanty that she would likely overdose if she went home. She reports that she has a plan to go buy pills from [employer] on her lunch break from work and gradually take pills (mentions Tylenol and PRN anxiety meds). She reports that she is open to medication changes here but wants to stay on medications that are ok to take if she should decide to become pregnant. Note that pt displays this ambivalent attitude throughout assessment with statements about her intent to end her life followed by future-oriented plans. She states that she does develop these future plans, but then will feel discouraged and hopeless when her depression/SI worsens and she starts to think that she will never get better and does not want to live if her life is going to be like this. Spent some time talking about acceptance and thinking about her MH the same she would as a chronic medical illness, there will be periods of more stability and periods where symptoms change/worsen and that just means being more vigilant about MH care and making adjustments when needed. She expressed understanding.    Therapeutic Intervention(s) Provided: Engaged in cognitive restructuring/ reframing, looked at common cognitive distortions and challenged negative thoughts., Engaged in exposure therapy, having patient look at fears/fear hierarchy and utilize coping skills to face " exposures., Taught the link between thoughts, feelings, and behaviors., Provided positive reinforcement for progress towards goals, gains in knowledge, and application of skills previously taught., Explored motivation for behavioral change.    Current Symptoms: anxious apathy, negativistic, helplessness, hoplessness, thoughts of death/suicide anxious impulsive loss of appetite    Mental Status Exam   Affect: Flat  Appearance: Appropriate  Attention Span/Concentration: Attentive  Eye Contact: Engaged    Fund of Knowledge: Appropriate   Language /Speech Content: Fluent  Language /Speech Volume: Normal  Language /Speech Rate/Productions: Normal  Recent Memory: Intact  Remote Memory: Intact  Mood: Apathetic, Depressed  Orientation to Person: Yes   Orientation to Place: Yes  Orientation to Time of Day: Yes  Orientation to Date: Yes     Situation (Do they understand why they are here?): Yes  Psychomotor Behavior: Normal  Thought Content: Suicidal  Thought Form: Intact    Treatment Objective(s) Addressed: rapport building, identifying and practicing coping strategies, processing feelings, assessing safety, identifying treatment goals, building skills, building self-esteem    Patient Response to Interventions: acceptance expressed, verbalizes understanding    Progress Towards Goals: Patient Reports Symptoms Are: ongoing  Patient Progress Toward Goals: is not making progress  Comment: The patient reports that their symptoms are the same as yesterday. Patient states the current medications are not improving their symptoms, and does not believe that inpatient is needed.  Next Step to Work Toward Discharge: symptom stabilization  Symptom Stabilization Comment: Psych consult was completed yesterday 1/14, medications are beng adjusted.    Plan: inpatient mental health    Clinical Substantiation: Continue to recommend [facility] for safety and stabilization due to pt's ongoing SI with plan, inability to commit to safety in the  Critical access hospital.    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    Major depressive disorder, recurrent episode, moderate (H) F33.1    History of OCD (obsessive compulsive disorder) Z86.59    Eating disorder, unspecified type F50.9    Suicide attempt by drug overdose (H) T50.902A    Borderline personality disorder (H) F60.3    Severe episode of recurrent major depressive disorder, without psychotic features (H) F33.2       Primary Problem This Admission: Active Hospital Problems    Borderline personality disorder (H)      Eating disorder, unspecified type      *MDD (major depressive disorder), recurrent severe, without psychosis (H)      Gale Conway, [facility]   Licensed Mental Health Professional (LMHP), [facility]  [phone]

## 2024-01-15 NOTE — PROGRESS NOTES
"Triage & Transition Services, Extended Care       Patient: Lida goes by \"Cori,\" uses she/her pronouns  Date of Service: January 15, 2024  Site of Service: [facility] EMERGENCY DEPARTMENT                             [id number]  Patient was seen yes In person  Mode of Assessment: In person    Case Management included: Case Management Included: skills work  Details on skills work: social skills  Summary of Interaction: 11:49am-12:22pm: Writer invited patient to interact with her peers in playing a card game and learning a new game. Patient was engaged and pleasant.    Recommendations: Final Disposition / Recommended Care Path: Recommended by [facility] for inpatient mental health    GARY Callahan  Extended Care Coordinator  [phone]   "

## 2024-01-15 NOTE — ED NOTES
Pt continues to endorse SI SIB. She contracted for safety. Reports some anxiety due to hospitalization. She is noted to be social and engaged with peers. Denies pain, VSS. No behavior concerns. Staff will continue to monitor.

## 2024-01-15 NOTE — ED NOTES
IP MH Referral Acuity Rating Score (RARS)    LMHP complete at referral to IP MH, with DEC; and, daily while awaiting IP MH placement. Call score to PPS.  CRITERIA SCORING   New 72 HH and Involuntary for IP MH (not adolescent) 0/1   Boarding over 24 hours 1/1   Vulnerable adult at least 55+ with multiple co morbidities; or, Patient age 11 or under 0/1   Suicide ideation without relief of precipitating factors 1/1   Current plan for suicide 1/1   Current plan for homicide 0/1   Imminent risk or actual attempt to seriously harm another without relief of factors precipitating the attempt 0/1   Severe dysfunction in daily living (ex: complete neglect for self care, extreme disruption in vegetative function, extreme deterioration in social interactions) 0/1   Recent (last 2 weeks) or current physical aggression in the ED 0/1   Restraints or seclusion episode in ED 0/1   Verbal aggression, agitation, yelling, etc., while in the ED 0/1   Active psychosis with psychomotor agitation or catatonia 0/1   Need for constant or near constant redirection (from leaving, from others, etc).  0/1   Intrusive or disruptive behaviors 0/1   TOTAL Acuity Total Score: 3

## 2024-01-16 ENCOUNTER — TELEPHONE (OUTPATIENT)
Dept: BEHAVIORAL HEALTH | Facility: CLINIC | Age: 29
End: 2024-01-16
Payer: COMMERCIAL

## 2024-01-16 PROCEDURE — 250N000013 HC RX MED GY IP 250 OP 250 PS 637: Performed by: PSYCHIATRY & NEUROLOGY

## 2024-01-16 PROCEDURE — 250N000013 HC RX MED GY IP 250 OP 250 PS 637: Performed by: CLINICAL NURSE SPECIALIST

## 2024-01-16 PROCEDURE — 250N000013 HC RX MED GY IP 250 OP 250 PS 637: Performed by: NURSE PRACTITIONER

## 2024-01-16 PROCEDURE — 99233 SBSQ HOSP IP/OBS HIGH 50: CPT | Performed by: CLINICAL NURSE SPECIALIST

## 2024-01-16 PROCEDURE — 99418 PROLNG IP/OBS E/M EA 15 MIN: CPT | Performed by: CLINICAL NURSE SPECIALIST

## 2024-01-16 RX ORDER — FOLIC ACID 1 MG/1
1 TABLET ORAL DAILY
Status: DISCONTINUED | OUTPATIENT
Start: 2024-01-16 | End: 2024-01-19 | Stop reason: HOSPADM

## 2024-01-16 RX ADMIN — LAMOTRIGINE 50 MG: 25 TABLET ORAL at 08:14

## 2024-01-16 RX ADMIN — HYDROXYZINE HYDROCHLORIDE 10 MG: 10 TABLET ORAL at 20:16

## 2024-01-16 RX ADMIN — LAMOTRIGINE 200 MG: 200 TABLET ORAL at 08:12

## 2024-01-16 RX ADMIN — FLUOXETINE HYDROCHLORIDE 60 MG: 20 CAPSULE ORAL at 08:13

## 2024-01-16 RX ADMIN — LURASIDONE HYDROCHLORIDE 120 MG: 80 TABLET, COATED ORAL at 21:03

## 2024-01-16 RX ADMIN — FOLIC ACID 1 MG: 1 TABLET ORAL at 23:11

## 2024-01-16 ASSESSMENT — ACTIVITIES OF DAILY LIVING (ADL)
ADLS_ACUITY_SCORE: 37

## 2024-01-16 NOTE — ED NOTES
Pt was noted to be social and engaging with peers. Took a shower. Denies SI SIB. Voices no concerns at this time.

## 2024-01-16 NOTE — ED NOTES
IP MH Referral Acuity Rating Score (RARS)    LMHP complete at referral to IP MH, with DEC; and, daily while awaiting IP MH placement. Call score to PPS.  CRITERIA SCORING   New 72 HH and Involuntary for IP MH (not adolescent) 0/1   Boarding over 24 hours 1/1   Vulnerable adult at least 55+ with multiple co morbidities; or, Patient age 11 or under 0/1   Suicide ideation without relief of precipitating factors 1/1   Current plan for suicide 1/1   Current plan for homicide 0/1   Imminent risk or actual attempt to seriously harm another without relief of factors precipitating the attempt 0/1   Severe dysfunction in daily living (ex: complete neglect for self care, extreme disruption in vegetative function, extreme deterioration in social interactions) 1/1   Recent (last 2 weeks) or current physical aggression in the ED 0/1   Restraints or seclusion episode in ED 0/1   Verbal aggression, agitation, yelling, etc., while in the ED 1/1   Active psychosis with psychomotor agitation or catatonia 0/1   Need for constant or near constant redirection (from leaving, from others, etc).  0/1   Intrusive or disruptive behaviors 0/1   TOTAL Acuity Total Score: 4

## 2024-01-16 NOTE — ED NOTES
Patient is calm and cooperative, she denies feelings of HI but fleeting thoughts of SI but reports she will not do anything. Denies hallucinations. Will continue to monitor.

## 2024-01-16 NOTE — ED NOTES
Patient requested to speak with me and made a request of having no fork on her tray as it triggers her to hurt herself. Only a spoon was given to her for lunch. Patient did not eat any of her breakfast or lunch. She requested sugar free sprite.

## 2024-01-16 NOTE — CONSULTS
"Adult Psychiatry Consultation     Corinne N Palm MRN# [id number]   Age: 28 year old YOB: 1995   Date of Admission to ED: 1/12/2024    In person visit Details:     Patient was assessed and interviewed face-to-face in person with this writer   Assessment methods included conducting a formal interview with patient, review of medical records, collaboration with medical staff, and obtaining relevant collateral information from family and community providers when available.      Shy Duval, APRN CNP            Contacts:   Attending Physician: No att. providers found    Current Outpatient Psychiatrist:    Primary Care Provider: Clinic, [facility] [place] therapist:  Coni dietician:   [facility] Therapist: Deandra  : Robin Lawrence [phone]  Mother: [phone]       Subjective:   Lida rates her depression 7/10, anxiety 7/10, anger 3/10 (for being in the hospital).  When asked about SI, she quickly responded \"everything is fine.\"  She reports she has been in contact with her , everything is fine with him now.  She reports he was upset with her and told her they need couples counseling.  He told her he had found something in her car.  He found a scissors she had bought to use for SIB.  He was upset because he thought she had told him everything.  She reports she thought she had too, but she had forgot about the scissors.     She reports she is eating.  Today she reported she ate rice and drank a diet coke and ate vanilla ice cream.  Then she told this writer her plan is to not eat then die from not eating. She endorses SIB urges but is not engaging in SIB because she does not want to go back to the ED.         Objective:     Alpa was visible in the milieu.  She continues to endorse SI and SIB urges. She continues to restrict what she eats.     Laboratory/Imaging:    Recent Results (from the past 336 hour(s))   HCG qualitative urine    Collection Time: 01/12/24  " "2:47 PM   Result Value Ref Range    hCG Urine Qualitative Negative Negative   Urine Drug Screen Panel    Collection Time: 01/12/24  2:47 PM   Result Value Ref Range    Amphetamines Urine Screen Negative Screen Negative    Barbituates Urine Screen Negative Screen Negative    Benzodiazepine Urine Screen Negative Screen Negative    Cannabinoids Urine Screen Negative Screen Negative    Cocaine Urine Screen Negative Screen Negative    Fentanyl Qual Urine Screen Negative Screen Negative    Opiates Urine Screen Negative Screen Negative    PCP Urine Screen Negative Screen Negative              Collateral information from chart review and phone calls:     Reviewed DEC assessment, Initial ED consult, and ED notes.      Per Saskia Nettles RN progress note 1/14/2024:  \"Pt observed w/ safety checks q 15 minutes or more often. Hands and face observed. Up to bathroom x1 then back to bed . Slept most of the night.\"    Mental Status Exam:   Appearance:  awake, alert, adequately groomed, and casually dressed  Attitude:  cooperative, somewhat snarky and gamey  Eye Contact:  good  Mood:  anxious and depressed  Affect:  mood congruent  Speech:  clear, coherent and normal prosody  Psychomotor Behavior:  no evidence of tardive dyskinesia, dystonia, or tics and intact station, gait and muscle tone  Thought Process:  linear  Associations:  no loose associations  Thought Content:  no evidence of psychotic thought and passive suicidal ideation present  Insight:  limited  Judgment:  limited  Oriented to:  time, person, and place  Attention Span and Concentration:  intact  Recent and Remote Memory:  intact  Fund of Knowledge: appropriate  Muscle Strength and Tone: normal  Gait and Station: Normal           Physical Exam:     /79   Pulse 65   Temp 98  F (36.7  C) (Oral)   Resp 16   SpO2 98%   Weight is 0 lbs 0 oz Data Unavailable There is no height or weight on file to calculate BMI.    ROS: 10 point ROS neg other than the symptoms noted " "above in the subjective.          Impression:     This patient is a 28 year old year old  female with a past psychiatric history of OCD, Anorexia Nervosa, MDD, borderline personality disorder, panic disorder PRECIOUS and \"medical trauma\", who presented on 1/12/2024 for SI with a plan to overdose. Prior to presenting to the ED, Corinne N Palm reports her mental health has been steadily declining for the last year.  She started have \"strong SI\" when an acquaintance from an eating disorder completed suicide.     Alpa continues to reports anxiety, depression, SI and SIB urges.  She is tolerating the decrease in Prozac without SE. She tends to be a little snarky and gamey.  She is calculated re informing staff about her restricting her food intake and sneaking objects to engage in SIB.      Risk for harm is moderate-high.    Based on interview with patient and patient's guardian/parent, record review, conversations ED staff, [facility] staff and ED attending, the Corinne N Palm meets criteria for MDD with SI.  Current medications are listed below.  Recommended medications adjustments are listed below.  Acute inpatient stabilization is needed as indicated by patient continues to report SI with a plan.       Brief Therapeutic Intervention(s):   Provided rapport building, active listening, unconditional positive regard, and validation.    Legal Status: Voluntary    Medications:     1/14/2024  Prozac decreased to 60 mg to begin taper off due to ineffective    Current Facility-Administered Medications   Medication    FLUoxetine (PROzac) capsule 60 mg    hydrOXYzine HCl (ATARAX) tablet 10 mg    lamoTRIgine (LaMICtal) tablet 200 mg    lamoTRIgine (LaMICtal) tablet 50 mg    lurasidone (LATUDA) tablet 120 mg     Current Outpatient Medications   Medication Sig    FLUoxetine (PROZAC) 40 MG capsule Take 80 mg by mouth daily    hydrOXYzine HCl (ATARAX) 10 MG tablet Take 10 mg by mouth daily as needed for anxiety    lamoTRIgine " (LAMICTAL) 200 MG tablet Take 200 mg by mouth every morning along with two 25 mg tablets    lamoTRIgine (LAMICTAL) 25 MG tablet Take 50 mg by mouth every morning along with 200 mg tablet    lurasidone (LATUDA) 120 MG TABS tablet Take 120 mg by mouth daily with food              Diagnoses:     Principal Diagnosis: MDD recurrent, without psychosis     Secondary psychiatric diagnoses of concern this admission:  Anorexia Nervosa  Borderline personality disorder by hx   PRECIOUS  SIB    Current medical diagnosis being treated:   none         Recommendations:     Discussed the case and writer's recommendations with Dr Howie Kate MD , ED provider.     Recommend acute inpatient stabilization based on patient continues to have SI with a plan to overdose. She is not able to plan for safety outside of the hospital and needs stabilization.   2.    Continue:   Prozac to 60 mg with a plan to taper off since no longer working - patient wants to take medications that will be safe during pregnancy - in case she and her  decide to have a baby.    Lamotrigine 200 mg +50 mg daily in the AM  Latuda 120 mg daily with dinner - must be administered with a minimum of 350 calories.             Hydroxyzine 10 mg daily prn for anxiety  4. Continue to consult psychiatry as needed    Attestation:  Patient time: 10 minutes  Parent/Guardian time: 0  Team/BHP/ED/EC staff time:15 minutes  Chart review: 10 minutes  Documentation: 25 minutes  Total time:   60 minutes spent on the date of the encounter.     I have provided critical care services at the bedside in the [facility] behavioral emergency center, evaluating the patient, reviewing notes and laboratory values and directing care. I have discussed recommendation regarding whether or not hospitalization is needed and recommendations for medications and laboratory testing with the attending emergency department provider. Shy Duval, DNP, APRN, CNP January 14, 2024.    Disclaimer:  This note consists of symbols derived from keyboarding, dictation, and/or voice recognition software. As a result, there may be errors in the script that have gone undetected.  Please consider this when interpreting information found in the chart.    Please call [facility]/DEC at [phone] if you have follow-up questions or wish to place another consult.

## 2024-01-16 NOTE — ED NOTES
Patient has been social with peers and staff. She did shower this shift. Her Mom is here to visit. She denies feelings of SI, HI and no hallucinations. She is pleasant and cooperative.

## 2024-01-16 NOTE — ED PROVIDER NOTES
[facility] ED Mental Health Handoff Note:       Brief HPI:  This is a 28 year old female signed out to me.  See initial ED Provider note for full details of the presentation. Interval history is pertinent for ongoing ED boarding. No acute concerns today. Patient continues to feel suicidal.    Home meds reviewed and ordered/administered: Yes    Medically stable for inpatient mental health admission: Yes.    Evaluated by mental health: Yes. The recommendation is for inpatient mental health treatment. Bed search in process    Safety concerns: At the time I received sign out, there were no safety concerns.    Hold Status:  Active Orders   Legal    Health Officer Authority to Detain (DONALD)     Frequency: Effective Now     Start Date/Time: 01/12/24 1437      Number of Occurrences: Until Specified     Order Comments: Patient arrived on a health or  authority to detain.         Exam:   Patient Vitals for the past 24 hrs:   BP Temp Temp src Resp SpO2   01/16/24 0800 117/80 98.3  F (36.8  C) Oral 17 97 %       ED Course:    Medications   hydrOXYzine HCl (ATARAX) tablet 10 mg (10 mg Oral $Given 1/15/24 1419)   lamoTRIgine (LaMICtal) tablet 200 mg (200 mg Oral $Given 1/16/24 0812)   lamoTRIgine (LaMICtal) tablet 50 mg (50 mg Oral $Given 1/16/24 0814)   lurasidone (LATUDA) tablet 120 mg (120 mg Oral $Given 1/15/24 1805)   FLUoxetine (PROzac) capsule 60 mg (60 mg Oral $Given 1/16/24 0813)            There were no significant events during my shift.      Impression:    ICD-10-CM    1. Depression with suicidal ideation  F32.A     R45.851       2. Eating disorder, unspecified type  F50.9       3. Borderline personality disorder (H)  F60.3           Plan:    Awaiting mental health evaluation/recommendations.      RESULTS:   No results found for this visit on 01/12/24 (from the past 24 hour(s)).          MD Nestor Barajas, Mir Jensen MD  01/16/24 [id number]

## 2024-01-16 NOTE — TELEPHONE ENCOUNTER
R:  [place]  Access Inpatient Bed Call Log 1/16/2024 8:01:27 AM     Intake has called facilities that have not updated their bed status within the last 12 hours.     ADULTS:   [facility] is posting 0 beds.                  Pt remains on work list pending appropriate bed availability.

## 2024-01-16 NOTE — ED NOTES
I received report on this patient and will take over cafe at 23:27 pm. Patient resting with eyes closed chest rise and fall observed.2:18 am patient resting eyes closed chest rise and fall observed.3:16 am patient awake to use restroom and observed with even gait and balance and soft voice she can voice her needs.6:20 am patient resting with eyes closed chest rise and fall observed.

## 2024-01-16 NOTE — CONSULTS
"[facility]- Psychiatric Consultation  Emergency Department    Corinne N Palm MRN: [id number]   Age: 28 year old YOB: 1995     History     Chief Complaint   Patient presents with    Suicidal     BIBA from counseling session where pt endorsed SI with thoughts of overdosing. Pt presents on transport hold. Hx chronic SI.     HPI  Corinne N Palm is a 28 year old female with a past psychiatric history of OCD, anorexia nervosa, MDD, borderline personality disorder, panic disorder, PRECIOUS and \"medical trauma\", who presented on 1/12/2024 for suicidal ideation with a plan to overdose. Prior to presenting to the ED, Corinne N Palm reports her mental health has been steadily declining for the pas several months, although she did enjoy a period of stability for almost 2 years. This is why this backsliding is particularly difficult for her as she has been through so many episodes of decompensation and improvement, and was hoping that she would just continue to experience stability. The recent death of a friend from an eating disorder program through suicide has also been particularly difficult for her, and she feels hopeless and has been thinking about various ways to end her suffering.     She continues to have suicidal ideation as well as thoughts of self-injury. She struggles with shame at the same time, as her life is good on the surface, and yet she struggles with this hopelessness and desire to just end things. She states that she just finds that she just does not care anymore. 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 She feels hopeless that things will ever get better. She is not certain that inpatient treatment will work as she has been in the  hospital and various treatment facilities " (including for eating disorders) and she thinks that they can make her worse as she has time to think about ways to self-harm. She has been limiting her food intake here as a way to slowly suicide.     She was stable on her current medication regimen although her psychiatrist recently talked with her about eventually tapering off in the context of her diagnosis of BPD, as medications are not really effective. She has tried Buspar for her anxiety for a period of about 6 months and did not really find it helpful. She had 13 surgeries for her cleft lip and palate from birth to age 16 years, and these have been traumatic experiences.      She started a new DBT program a month ago at [facility], and this is 3 hours a day 3 days a week. She has done DBT previously and does not really like it, but finds the 2 hour processing part (after spending an hour learning a skill) to be very helpful and she would like to continue going. She is also seeing a trauma therapist twice a week, and they are using [employer] which she relates to and finds helpful. We spent some time talking about her parts (Shan, Anabelle, Lynn, Silvio) who are all in their own way trying to help her but some of their methods might not be the best. Encouraged her to look at being in the hospital as having support to help her keep herself safe while she works on developing a resourced self that can keep all of her parts safe.        Past Medical History  Past Medical History:   Diagnosis Date    OCD (obsessive compulsive disorder)      Past Surgical History:   Procedure Laterality Date    ENT SURGERY       FLUoxetine (PROZAC) 40 MG capsule  hydrOXYzine HCl (ATARAX) 10 MG tablet  lamoTRIgine (LAMICTAL) 200 MG tablet  lamoTRIgine (LAMICTAL) 25 MG tablet  lurasidone (LATUDA) 120 MG TABS tablet      Allergies   Allergen Reactions    Morphine Other (See Comments)     Twitching    Amoxicillin Rash    Sulfa Antibiotics Rash     Family History  History reviewed.  No pertinent family history.  Social History   Social History     Tobacco Use    Smoking status: Never   Substance Use Topics    Alcohol use: No    Drug use: No      Past medical history, past surgical history, medications, allergies, family history, and social history were reviewed with the patient. No additional pertinent items.      She denies any substance use.     Review of Systems  A medically appropriate review of systems was performed with pertinent positives and negatives noted in the HPI, and all other systems negative.          Physical Examination   BP: 121/79  Pulse: 79  Temp: 99.2  F (37.3  C)  Resp: 16  SpO2: 96 %    Physical Exam  General: Appears stated age.   Neuro: Alert and fully oriented. Extremities appear to demonstrate normal strength on visual inspection.   Integumentary/Skin: no rash visualized, normal color    Psychiatric Examination   Appearance: awake, alert, adequately groomed, and casually dressed  Attitude:  cooperative  Eye Contact:  good  Mood:  anxious and depressed  Affect:  appropriate and in normal range  Speech:  clear, coherent  Psychomotor Behavior:  no evidence of tardive dyskinesia, dystonia, or tics and intact station, gait and muscle tone  Thought Process:  linear and goal oriented  Associations:  no loose associations  Thought Content:  passive suicidal ideation present and thoughts of self-harm, which are remained the same  Insight:  partial  Judgement:  limited  Oriented to:  time, person, and place  Attention Span and Concentration:  intact  Recent and Remote Memory:  intact  Language: able to name/identify objects without impairment  Fund of Knowledge: intact with awareness of current and past events    ED Course        Labs Ordered and Resulted from Time of ED Arrival to Time of ED Departure   HCG QUALITATIVE URINE - Normal       Result Value    hCG Urine Qualitative Negative     URINE DRUG SCREEN PANEL - Normal    Amphetamines Urine Screen Negative      Barbituates  Urine Screen Negative      Benzodiazepine Urine Screen Negative      Cannabinoids Urine Screen Negative      Cocaine Urine Screen Negative      Fentanyl Qual Urine Screen Negative      Opiates Urine Screen Negative      PCP Urine Screen Negative         Assessments & Plan (with Medical Decision Making)   Patient presenting with worsening depression and suicidal ideation with several plans. She has had several episodes of decompensation for depression, anxiety, and anorexia, and had enjoyed a relatively lengthy period of stability and quiescence. However, without any identifiable trigger, she started experiencing an exacerbation in symptoms in the past few months, and this has led to feelings of hopelessness and a desire to give up. She does not want to go through life with the repeated improvements that lead her to think everything will be fine, followed by worsening in symptoms that do not even make sense to her in the context of her life, at least on superficial examination. However, there is a part of her that wants to get better, and she needs help with getting this part (her most resourced self part) stronger and better able to validate the little parts, some of which have misguided ways of trying to help. She would benefit from a review of her medications with a view to getting her on a regimen that would be safe should she decide to get pregnant, while helping her strengthen the parts that are hopeful.     Nursing notes reviewed noting no acute issues.     I have reviewed the assessment completed by the [facility].     Discussed the recommendations, including medication changes or adjustments, with the patient/guardian, and they are in agreement. Discussed risks and benefits of proposed medications. Answered their questions regarding the plan and reasonable expectations.     Preliminary diagnosis:    ICD-10-CM    1. Depression with suicidal ideation  F32.A     R45.851       2. Eating disorder, unspecified type   F50.9       3. Complex PTSD             Recommendations:  Discussed with Dr. Mir Baez, attending provider.    Recommend inpatient admission for safety and stabilization.    Recommend starting folic acid 1 mg to help prevent neural tube defects in case she does get pregnant while taking Lamictal. This dose should be increased to 4 mg as soon as possible when pregnancy occurs.    Continue the following PTA medications:   - Prozac to 60 mg with a plan to taper off since no longer working - patient wants to take medications that will be safe during pregnancy - in case she and her  decide to have a baby.    - Lamotrigine 200 mg +50 mg daily in the AM  - Latuda 120 mg daily with dinner - must be administered with a minimum of 350 calories.   - Hydroxyzine 10 mg daily prn for anxiety    4.   Try to work with her using [employer] model.    Attestation:  Patient time: 45 minutes  Family - Friend time: 0 minutes  Team time:5 minutes  Chart review: 30 minutes  Documentation: 30 minutes  Total time: 110 minutes  Over 50% of times was spent counseling and coordination of care.     I have provided critical care services at the bedside in the [facility] behavioral emergency center, evaluating the patient, reviewing notes and laboratory values and directing care. I have discussed recommendation regarding whether or not hospitalization is needed and recommendations for medications and laboratory testing with the attending emergency department provider.       Disclaimer: This note consists of symbols derived from keyboarding, dictation, and/or voice recognition software. As a result, there may be errors in the script that have gone undetected.  Please consider this when interpreting information found in the chart.    --  KITTY Telles [facility] EMERGENCY DEPARTMENT  January 16, 2024

## 2024-01-16 NOTE — TELEPHONE ENCOUNTER
R: [place]  Access Inpatient Bed Call Log  1/16/2024 12:50 AM  Intake has called facilities that have not updated their bed status within the last 12 hours.??      ADULTS:     *[facility]: @ cap per website.     Pt remains on waitlist pending appropriate placement availability.

## 2024-01-16 NOTE — PROGRESS NOTES
"Triage & Transition Services, Extended Care     Therapy Progress Note    Patient: Lida goes by \"Cori,\" uses she/her pronouns  Date of Service: January 16, 2024  Site of Service: [facility] EMERGENCY DEPARTMENT                             [id number]  Patient was seen yes  Mode of Assessment: Virtual: iPad    Presentation Summary: Met with patient via telehealth using an Ipad.  Patient states she is feeling anxious and trapped today.  She states it is not the best feeling.  She reports being restless and would like to be able to walk/exercise more.  Patient continues to endorse suicidal ideation.  She references taking a bunch of pills.  She states she is trying to kill herself by not eating or drinking.  She did not eat any breakfast or lunch.  She reports drinking some coffee today and it would speed things up if she did not drink.  Patient repeats that she does not want to be here, does not want to be alive, that she is done, and does not see anything getting better.  Patient states her  would be upset if she did not stay.  She states that she may just decide to leave, she can lie and say that she is fine.  Patient states she can make herself sound like she is okay.  Patient identifies that if she left that she would not do so well despite having a lot of outpatient supports.  Patient reports she has been fighting the urge to engage in SIB and has been handing over items that she would use to harm herself so that she does not get sent back to the ED.  At this time patient is willing to stay voluntarily.    Therapeutic Intervention(s) Provided: Engaged in cognitive restructuring/ reframing, looked at common cognitive distortions and challenged negative thoughts., Taught the link between thoughts, feelings, and behaviors., Provided positive reinforcement for progress towards goals, gains in knowledge, and application of skills previously taught., Explored motivation for behavioral change.    Current " Symptoms: anxious apathy, negativistic, helplessness, hoplessness, thoughts of death/suicide anxious impulsive loss of appetite    Mental Status Exam   Affect: Flat  Appearance: Appropriate  Attention Span/Concentration: Attentive  Eye Contact: Engaged    Fund of Knowledge: Appropriate   Language /Speech Content: Fluent  Language /Speech Volume: Normal  Language /Speech Rate/Productions: Normal  Recent Memory: Intact  Remote Memory: Intact  Mood: Apathetic, Depressed  Orientation to Person: Yes   Orientation to Place: Yes  Orientation to Time of Day: Yes  Orientation to Date: Yes     Situation (Do they understand why they are here?): Yes  Psychomotor Behavior: Normal  Thought Content: Suicidal  Thought Form: Intact    Treatment Objective(s) Addressed: rapport building, identifying and practicing coping strategies, processing feelings, assessing safety, identifying treatment goals, building skills, building self-esteem    Patient Response to Interventions: acceptance expressed, verbalizes understanding, needs reinforcement    Progress Towards Goals: Patient Reports Symptoms Are: ongoing  Patient Progress Toward Goals: is not making progress  Comment: no significant changes  Next Step to Work Toward Discharge: symptom stabilization  Symptom Stabilization Comment: Psych consult was completed yesterday 1/14, medications are beng adjusted.    Case Management: Case Management Included: skills work  Details on skills work: social skills  Summary of Interaction: 11:49am-12:22pm: Writer invited patient to interact with her peers in playing a card game and learning a new game. Patient was engaged and pleasant.    Plan: inpatient mental health  yes (Dr Baez) provider Dr Baez  yes    Clinical Substantiation: Plan continues for inpatient mental health for safety and stabilization.  Patient continues to endorse SI with plan and urges to self injure.  Patient is not eating and has limited fluid intake.    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    Major depressive disorder, recurrent episode, moderate (H) F33.1    History of OCD (obsessive compulsive disorder) Z86.59    Eating disorder, unspecified type F50.9    Suicide attempt by drug overdose (H) T50.902A    Borderline personality disorder (H) F60.3    Severe episode of recurrent major depressive disorder, without psychotic features (H) F33.2       Primary Problem This Admission: Active Hospital Problems    Borderline personality disorder (H)      Eating disorder, unspecified type      *MDD (major depressive disorder), recurrent severe, without psychosis (H)        Jovita Greenfield [facility]   Licensed Mental Health Professional (LMHP), [facility]  [phone]

## 2024-01-17 PROBLEM — F32.A DEPRESSION WITH SUICIDAL IDEATION: Status: ACTIVE | Noted: 2024-01-17

## 2024-01-17 PROBLEM — R45.851 DEPRESSION WITH SUICIDAL IDEATION: Status: ACTIVE | Noted: 2024-01-17

## 2024-01-17 LAB
ALBUMIN SERPL BCG-MCNC: 4.5 G/DL (ref 3.5–5.2)
ALP SERPL-CCNC: 58 U/L (ref 40–150)
ALT SERPL W P-5'-P-CCNC: 20 U/L (ref 0–50)
ANION GAP SERPL CALCULATED.3IONS-SCNC: 14 MMOL/L (ref 7–15)
AST SERPL W P-5'-P-CCNC: 27 U/L (ref 0–45)
BILIRUB SERPL-MCNC: 0.6 MG/DL
BUN SERPL-MCNC: 8.9 MG/DL (ref 6–20)
CALCIUM SERPL-MCNC: 9.6 MG/DL (ref 8.6–10)
CHLORIDE SERPL-SCNC: 100 MMOL/L (ref 98–107)
CHOLEST SERPL-MCNC: 180 MG/DL
CREAT SERPL-MCNC: 0.86 MG/DL (ref 0.51–0.95)
DEPRECATED HCO3 PLAS-SCNC: 23 MMOL/L (ref 22–29)
EGFRCR SERPLBLD CKD-EPI 2021: >90 ML/MIN/1.73M2
FOLATE SERPL-MCNC: 37.8 NG/ML (ref 4.6–34.8)
GLUCOSE SERPL-MCNC: 78 MG/DL (ref 70–99)
HBA1C MFR BLD: 4.8 %
HDLC SERPL-MCNC: 62 MG/DL
LDLC SERPL CALC-MCNC: 108 MG/DL
NONHDLC SERPL-MCNC: 118 MG/DL
POTASSIUM SERPL-SCNC: 4.3 MMOL/L (ref 3.4–5.3)
PROT SERPL-MCNC: 7.5 G/DL (ref 6.4–8.3)
SODIUM SERPL-SCNC: 137 MMOL/L (ref 135–145)
TRIGL SERPL-MCNC: 52 MG/DL
TSH SERPL DL<=0.005 MIU/L-ACNC: 1.28 UIU/ML (ref 0.3–4.2)
VIT B12 SERPL-MCNC: 453 PG/ML (ref 232–1245)

## 2024-01-17 PROCEDURE — 93010 ELECTROCARDIOGRAM REPORT: CPT | Performed by: INTERNAL MEDICINE

## 2024-01-17 PROCEDURE — H2032 ACTIVITY THERAPY, PER 15 MIN: HCPCS

## 2024-01-17 PROCEDURE — 250N000013 HC RX MED GY IP 250 OP 250 PS 637

## 2024-01-17 PROCEDURE — 84443 ASSAY THYROID STIM HORMONE: CPT

## 2024-01-17 PROCEDURE — 99223 1ST HOSP IP/OBS HIGH 75: CPT | Mod: AI | Performed by: PSYCHIATRY & NEUROLOGY

## 2024-01-17 PROCEDURE — 36415 COLL VENOUS BLD VENIPUNCTURE: CPT

## 2024-01-17 PROCEDURE — 93005 ELECTROCARDIOGRAM TRACING: CPT

## 2024-01-17 PROCEDURE — 82306 VITAMIN D 25 HYDROXY: CPT

## 2024-01-17 PROCEDURE — 124N000002 HC R&B MH UMMC

## 2024-01-17 PROCEDURE — 82607 VITAMIN B-12: CPT

## 2024-01-17 PROCEDURE — 80053 COMPREHEN METABOLIC PANEL: CPT

## 2024-01-17 PROCEDURE — 80061 LIPID PANEL: CPT

## 2024-01-17 PROCEDURE — 83036 HEMOGLOBIN GLYCOSYLATED A1C: CPT

## 2024-01-17 PROCEDURE — 82746 ASSAY OF FOLIC ACID SERUM: CPT

## 2024-01-17 RX ORDER — OLANZAPINE 10 MG/2ML
10 INJECTION, POWDER, FOR SOLUTION INTRAMUSCULAR 3 TIMES DAILY PRN
Status: DISCONTINUED | OUTPATIENT
Start: 2024-01-17 | End: 2024-01-19 | Stop reason: HOSPADM

## 2024-01-17 RX ORDER — OLANZAPINE 10 MG/1
10 TABLET ORAL 3 TIMES DAILY PRN
Status: DISCONTINUED | OUTPATIENT
Start: 2024-01-17 | End: 2024-01-19 | Stop reason: HOSPADM

## 2024-01-17 RX ORDER — LANOLIN ALCOHOL/MO/W.PET/CERES
3 CREAM (GRAM) TOPICAL
Status: DISCONTINUED | OUTPATIENT
Start: 2024-01-17 | End: 2024-01-19 | Stop reason: HOSPADM

## 2024-01-17 RX ORDER — POLYETHYLENE GLYCOL 3350 17 G/17G
17 POWDER, FOR SOLUTION ORAL DAILY PRN
Status: DISCONTINUED | OUTPATIENT
Start: 2024-01-17 | End: 2024-01-19 | Stop reason: HOSPADM

## 2024-01-17 RX ORDER — MAGNESIUM HYDROXIDE/ALUMINUM HYDROXICE/SIMETHICONE 120; 1200; 1200 MG/30ML; MG/30ML; MG/30ML
30 SUSPENSION ORAL EVERY 4 HOURS PRN
Status: DISCONTINUED | OUTPATIENT
Start: 2024-01-17 | End: 2024-01-19 | Stop reason: HOSPADM

## 2024-01-17 RX ORDER — ACETAMINOPHEN 325 MG/1
650 TABLET ORAL EVERY 4 HOURS PRN
Status: DISCONTINUED | OUTPATIENT
Start: 2024-01-17 | End: 2024-01-19 | Stop reason: HOSPADM

## 2024-01-17 RX ORDER — HYDROXYZINE HYDROCHLORIDE 25 MG/1
25 TABLET, FILM COATED ORAL EVERY 4 HOURS PRN
Status: DISCONTINUED | OUTPATIENT
Start: 2024-01-17 | End: 2024-01-18

## 2024-01-17 RX ADMIN — OLANZAPINE 10 MG: 10 TABLET, FILM COATED ORAL at 15:50

## 2024-01-17 RX ADMIN — FOLIC ACID 1 MG: 1 TABLET ORAL at 09:00

## 2024-01-17 RX ADMIN — HYDROXYZINE HYDROCHLORIDE 25 MG: 25 TABLET, FILM COATED ORAL at 15:50

## 2024-01-17 RX ADMIN — LAMOTRIGINE 200 MG: 200 TABLET ORAL at 09:00

## 2024-01-17 RX ADMIN — LURASIDONE HYDROCHLORIDE 120 MG: 80 TABLET, COATED ORAL at 19:59

## 2024-01-17 RX ADMIN — FLUOXETINE HYDROCHLORIDE 60 MG: 20 CAPSULE ORAL at 09:00

## 2024-01-17 RX ADMIN — LAMOTRIGINE 50 MG: 25 TABLET ORAL at 09:01

## 2024-01-17 ASSESSMENT — ACTIVITIES OF DAILY LIVING (ADL)
ADLS_ACUITY_SCORE: 40
ADLS_ACUITY_SCORE: 40
HYGIENE/GROOMING: INDEPENDENT
ADLS_ACUITY_SCORE: 40
LAUNDRY: WITH SUPERVISION
LAUNDRY: WITH SUPERVISION
ADLS_ACUITY_SCORE: 37
ORAL_HYGIENE: INDEPENDENT
ADLS_ACUITY_SCORE: 40
LAUNDRY: WITH SUPERVISION
HYGIENE/GROOMING: INDEPENDENT
HYGIENE/GROOMING: INDEPENDENT
ADLS_ACUITY_SCORE: 40
ORAL_HYGIENE: INDEPENDENT
ADLS_ACUITY_SCORE: 40
ADLS_ACUITY_SCORE: 57
ORAL_HYGIENE: INDEPENDENT
ADLS_ACUITY_SCORE: 40
DRESS: SCRUBS (BEHAVIORAL HEALTH);INDEPENDENT
DRESS: INDEPENDENT
ADLS_ACUITY_SCORE: 40
DRESS: INDEPENDENT

## 2024-01-17 NOTE — H&P
"  ----------------------------------------------------------------------------------------------------------  [facility]   Psychiatry History and Physical    Name: Corinne \"Cori\" KENJI Lawrence   MRN#: [id number]  Age: 28 year old YOB: 1995    Date of Admission: 1/12/2024  Attending Physician: Dr. Gavin Stanley      Contacts:     Primary Outpatient Psychiatrist: M.D. @ [place]   Primary Physician: Clinic, [facility] [place]  Therapist: [facility] Therapist: Deandra Barr : N/A  Probation/: N/A  Family Members: Robin Lawrence, spouse, [phone]     Chief Concern:     \"Suicidal thoughts with a plan to overdose on Tylenol\"     History of Present Illness:     Corinne \"Cori\" KENJI Lawrence is a 28 year old female with a h/o SA (2021) with knownpsychiatric diagnoses of OCD, Anorexia Nervosa, MDD, Borderline Personality Disorder, PRECIOUS + Panic Disorder and otherwise unspecified \"medical trauma\" who was admitted to the Unit from the ED on 01/17/2024 2/2 concern for SI with plan, in the context of  recent trauma (friend committed suicide in her IOP program) .     [facility]/DEC Assessment:  Pt presents to [facility] ED via EMS sent by her trauma therapist for a mental health evaluation due to suicidal thoughts with a plan to overdose on Tylenol. Pt reports that her spouse keeps her medication locked up at home, but she bought a large bottle of Tylenol yesterday (01/15) and kept it in her car, which he did not know about. She had thoughts to act on her plan to overdose yesterday, but was able to stop herself.    During session with her therapist today (01/16), she informed her of this. Pt had driven herself to the appointment in her car and did not feel able to engage in safety planning so it was decided she needed transport to ED via EMS for safety.    Notably, Pt does have a hx of one prior suicide attempt via overdose in 6/2021. Pt had initially " "reported to attending MD that she was no longer suicidal and felt she could be safe at home. However, during assessment, pt relunctantly reported that she was still suicidal with this plan and did not feel she could keep herself safe. Pt reported she really did not want to share this info as she did not want to return to [facility], but felt it was best to be honest as she reports she has not been doing well.     She reports she is feeling down about herself as she was able to stay out of the hospital since 2021 and keep herself safe, and feels bad that she is now struggling again. She reports she is not sure what was working well for her during this period of time in between. Reports she had reached a point where she didn't know why she had ever even considered NSSI or SI, but then reports the thoughts slowly started creeping back and now has reached a point where she doesn't feel she cares about anything and is not scared of dying.     She reports usually her friends, family, spouse, work, and looking forward to having a baby someday used to be protective factors for her, but at this point, she no longer feels she cares about anything that was once important to her.    Endorses NSSI in locations where her  is less likely to see. Reports she last cut her foot superficially earlier this week. Reports she bought a pair of scissors to self-harm and was also hiding those in her car. She does report a friend from [place], where she is currently still in [facility], recently passed away, which was a trigger for her. She does acknowledge though that at this point, any small thing could have triggered her. She reports she has been sleeping much more. Endorses ongoing symptoms of restricting food and over-exercising.    Denies HI, AH, VH or substance use.\"    ED/Hospital Course:  Corinne N Palm was medically cleared for admission to inpatient psychiatric unit. In the ED, urine toxicology and HCG were negative. She was continued " "on her PTA medications.     Patient interview:  Upon initial evaluation today, Lida says it is \"boring\" in the hospital and reports she generally dislikes staying in hospitals. She feels \"burnt out\" from amount of treatment received thus far and does not believe her current stay will be able to improve her symptoms. She repeatedly stated that she's \"fine\", despite acknowledging her affect is currently \"flat & blunted\".    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 Since her transition to a new job, she endorses feeling better at work, and able to \"not have a flat affect\" but struggles with the decrease in pay.    She was able to attend music therapy here yesterday (01/16) and plays the piano and guitar (left handed) at home. She feels that when she is at home she has her , which helps with safety. She looks forward to being able to do more activities at home like working and watching 'Survivor'.    Prior to admission, she said that she was able to \"act normal\" at work, but during lunch breaks would go into her car and look up medications in the context of contemplating overdose. She denies any specific trigger, but feels she has lost progress made following IP treatment for 5 months prompted by a prior suicide attempt in 2021. She reports this previous treatment after SI in 2021 led to significant functional improvement, but is unsure what made that stay successful. She did note that it required five months of treatment and was unwilling to \"give up that much time\" again for a similar stint.    She is here on a voluntary basis but reports she is considering leaving, and has not taken the hospital stay \"off the table\".     She notes that her and her  had been considering starting a family, but she no longer " "wants this because she believes she will just \"fuck it up\". She worries that if she has a child, her hospital stays for her mental health will be more disruptive and given her ongoing eating disorder, she may not provide a stable environment. She frequently reiterated that she would \"not be a good mother\".     Given her difficulty in the hospital she requested wearing her street clothes, obtaining diet coke, and access to a TV to watch Survivor DVDs brought by her .    She does endorse SI currently but states that she feels safe. Did not provide details on plan.      Psychiatric Review of Systems:     Depressive:   Reports suicidal ideation, self-destructive thoughts, depressed mood, anhedonia, low energy, appetite changes, feeling worthless, and feeling hopeless    Denies excessive crying   Dysregulation:    Reports suicidal ideation and SIB (scratching herself with a straw)    Denies violent ideation and aggressive   Psychosis:    Reports negative symptoms (avolition, affective flattening, anhedonia, alogia, apathy,  )   Denies delusions, auditory hallucinations, visual hallucinations, and disorganized behavior  Clarisa:    Reports none   Denies increased energy, decreased sleep need, grandiosity, distractibility , and pressured speech  Anxiety:    Reports worries, ruminations, and social fears   Denies none  PTSD:    Reports none   Denies none  ADHD:    Reports none   Denies none  Disordered Eating:   Reports restricts, intense fear of weight gain and distorted body image  Denies none, none  Cluster B:   Reports none  Denies none     Medical Review of Systems:     The Review of Systems is negative other than what is noted in the HPI.     Psychiatric History:     Prior diagnoses: Previous psychiatric diagnoses include OCD, Anorexia Nervosa, MDD, borderline personality disorder, panic disorder, PRECIOUS and Persistent Stress 2/2 \"medical trauma\".     Hospitalizations: Multiple at [facility] and [facility] for Eat " disordering as well as for depression, self harm, and SI. Most recent hospitalization was at [facility] in 07/2021 for suicidal ideations.     Court Commitments: None per Chart Review.     Suicide attempts:  Chart Review. 06/2021 medication overdose      Self-injurious behavior: Endorses urge to self cutting one week prior to admission Chart Review..     Violence towards others: None per Chart Review.    ECT/TMS: None per Chart Review..    Past medications:   Per Chart Review.:     Per H&P by Floyd Salamanca MD on 7/6/2021:  Zoloft  Luvox  Prozac - current  Effexor  Cymbalta  Risperidone  Seroquel  Latuda - current  Abilify  Lamotrigine - current     Substance Use History:     Alcohol: Denies       Nicotine: Denies     Illicit Substances: Denies  current or past addiction. No h/o substance use/abuse    Chemical Dependency Treatment: Denies history of chemical dependency treatment      Social History:     Upbringing: Grew up in [place], [place].     Family/Relationships:     Living Situation: Home    Education: Highest level of education obtained is: Bachelor's    Occupation: Works as a music therapist     Legal: Denies history of legal issues.     Guns: no    Abuse/Trauma: Endorses history of trauma: medical trauma associated with multiple cleft palate surgeries     Service: None     Spirituality: N/A     Hobbies/Interests: music and tv      Past Medical/Surgical History:     I have reviewed this patient's past medical history    Past Medical History:   Diagnosis Date    OCD (obsessive compulsive disorder)        I have reviewed this patient's past surgical history  Past Surgical History:   Procedure Laterality Date    ENT SURGERY          Family History:     Psychiatric Family Hx: Schizophrenia: uncle  Chemical dependency: cousin  Suicide (attempt): uncle  History reviewed. No pertinent family history.     Allergies:      Allergies   Allergen Reactions    Morphine Other (See Comments)      "Twitching    Amoxicillin Rash    Sulfa Antibiotics Rash        Medications:     Medications Prior to Admission   Medication Sig Dispense Refill Last Dose    FLUoxetine (PROZAC) 40 MG capsule Take 80 mg by mouth daily   1/12/2024 at AM    hydrOXYzine HCl (ATARAX) 10 MG tablet Take 10 mg by mouth daily as needed for anxiety   1/11/2024    lamoTRIgine (LAMICTAL) 200 MG tablet Take 200 mg by mouth every morning along with two 25 mg tablets   1/12/2024 at AM    lamoTRIgine (LAMICTAL) 25 MG tablet Take 50 mg by mouth every morning along with 200 mg tablet   1/12/2024 at AM    lurasidone (LATUDA) 120 MG TABS tablet Take 120 mg by mouth daily with food   1/11/2024 at PM       See current inpatient medications below.     Vitals and Physical Exam:     /80 (BP Location: Left arm)   Pulse 85   Temp 98  F (36.7  C) (Temporal)   Resp 15   Ht 1.6 m (5' 3\")   Wt 60.8 kg (134 lb 1.6 oz)   SpO2 97%   BMI 23.75 kg/m      See ED assessment note by ED physician on 01/12/2024.     Labs and Imaging:     Recent Results (from the past 72 hour(s))   Comprehensive metabolic panel    Collection Time: 01/17/24  8:00 AM   Result Value Ref Range    Sodium 137 135 - 145 mmol/L    Potassium 4.3 3.4 - 5.3 mmol/L    Carbon Dioxide (CO2) 23 22 - 29 mmol/L    Anion Gap 14 7 - 15 mmol/L    Urea Nitrogen 8.9 6.0 - 20.0 mg/dL    Creatinine 0.86 0.51 - 0.95 mg/dL    GFR Estimate >90 >60 mL/min/1.73m2    Calcium 9.6 8.6 - 10.0 mg/dL    Chloride 100 98 - 107 mmol/L    Glucose 78 70 - 99 mg/dL    Alkaline Phosphatase 58 40 - 150 U/L    AST 27 0 - 45 U/L    ALT 20 0 - 50 U/L    Protein Total 7.5 6.4 - 8.3 g/dL    Albumin 4.5 3.5 - 5.2 g/dL    Bilirubin Total 0.6 <=1.2 mg/dL   Hemoglobin A1c    Collection Time: 01/17/24  8:00 AM   Result Value Ref Range    Hemoglobin A1C 4.8 <5.7 %   Lipid panel    Collection Time: 01/17/24  8:00 AM   Result Value Ref Range    Cholesterol 180 <200 mg/dL    Triglycerides 52 <150 mg/dL    Direct Measure HDL 62 >=50 " "mg/dL    LDL Cholesterol Calculated 108 (H) <=100 mg/dL    Non HDL Cholesterol 118 <130 mg/dL   TSH with free T4 reflex and/or T3 as indicated    Collection Time: 01/17/24  8:00 AM   Result Value Ref Range    TSH 1.28 0.30 - 4.20 uIU/mL        Mental Status Examination:     Oriented to:  Grossly Oriented  General:  Awake and Alert  Appearance:  appears stated age, Posture is slouched and leaning on table, and Grooming is adequate  Behavior/Attitude:  Calm, Cooperative, and Engaged  Eye Contact: Appropriate  Psychomotor: No evidence of tics, dystonia, or tardive dyskinesia  no catatonia present  Speech:  appropriate volume/tone and with good articulation  Language: Fluent in English with appropriate syntax and vocabulary.  Mood:  \"fine\"  Affect:  flat and blunted  Thought Process:  linear, coherent, and goal directed  Thought Content:   suicidal ideation (passive), No HI, No VH, and No AH; No apparent delusions  Associations:   No loose associations  Insight:  good   Judgment:  fair   Impulse control: partial  Attention Span:  grossly intact and adequate for conversation  Concentration:  grossly intact  Recent and Remote Memory:  not formally assessed  Fund of Knowledge: above average  Muscle Strength and Tone: normal  Gait and Station: Normal     Psychiatric Assessment:     Corinne N Palm is a 28 year old female previously diagnosed with OCD, Anorexia Nervosa, MDD, borderline personality disorder, panic disorder, PRECIOUS and \"medical trauma\" who presented voluntarily/by with her  after encouragement from her trauma therapist with SI and plan to overdose on Tylenol in the context of recent trauma (suicide of friend in IOP program). Most recent psychiatric hospitalization was 07/2021 for SI. She currently sees a trauma therapist 2x per week, attends DBT group 3x per week through [facility], medication management and IOP through [facility]. Significant symptoms on admission include SI, SIB, depressed mood, anhedonia, low " energy, feeling worthless, and disordered eating. The MSE on admission was pertinent for flat affect, logical thought process without psychotic thought, and active SI.    Biological contributions to mental health presentation include previous diagnosis of MDD, prior suicide attempt, borderline personality disorder, anorexia nervosa, PRECIOUS, and SIB. Psychological contributions to mental health presentation include maladaptive coping. Social factors contributing to mental health presentation include suicide of friend from IOP program, romantic relationship issues, and medical trauma. Protective factors include supportive partner/family, engagement in treatment, intelligence, and potential family planning.     In summary, the patient's reported symptoms of SI, SIB, depressed mood, anhedonia, low energy, appetite changes, and feeling of hopeless and worthless with otherwise normal workup in the context of recent trauma are consistent with prior diagnosis of Major Depressive Disorder, recurrent without psychotic features. She will likely benefit from medication management and connection to resources this admission.    Given that she currently has SI and SIB, with a h/o of SA (most recently in 2021) patient warrants inpatient psychiatric hospitalization to maintain her safety.      Psychiatric Plan by Diagnosis      # Major Depressive Disorder recurrent without psychosis  1. Medications:  - Continue PTA Prozac* (was on 80 mg, decreased to 60 mg since medication not improving symptoms; will taper off)   - Continue Lamotrigine 200 mg + 50 mg daily in the AM  - Continue Latuda 120 mg daily with dinner  - Zyprexa 10 mg 3x/day PRN for agitation     2. Pertinent Labs/Monitoring:   - None     3. Additional Plans:  - Patient will be treated in therapeutic milieu with appropriate individual and group therapies as described  -  Folic acid 1 mg daily to help prevent neural tube defects in case she does get pregnant while taking  Lamictal. This dose should be increased to 4 mg as soon as possible when pregnancy occurs.     # PRECIOUS  - Continue PTA Hydroxyzine 25 mg PRN for anxiety  - Continue PTA Prozac 60 mg*     Psychiatric Hospital Course:    Corinne N Palm was admitted to Station 22 as a voluntary patient.   Medications:  PTA Prozac 80 mg, Lamotrigine 200 mg AM, 50 mg PM, Latuda 120 mg and Hydroxyzine 25 mg PRN were continued.   01/13 Prozac decreased to 60 mg as patient did not feel it had been improving her sx.      The risks, benefits, alternatives, and side effects were discussed and understood by the patient and other caregivers.     Medical Assessment and Plan     Medical diagnoses to be addressed this admission:    N/A    Medical course: Patient was physically examined by the ED prior to being transferred to the unit and was found to be medically stable and appropriate for admission.     Consults:  none     Checklist     Legal Status: Orders Placed This Encounter      Voluntary      Safety Assessment:   Behavioral Orders   Procedures    Code 1 - Restrict to Unit    Discontinue 1:1 attendant for suicide risk     Order Specific Question:   I have performed an in person assessment of the patient     Answer:   Based on this assessment the patient no longer requires a one on one attendant at this point in time.     Order Specific Question:   Rationale     Answer:   Modifications to care environment made to mitigate safety risk     Order Specific Question:   Rationale     Answer:   Routine observations are sufficient to monitor safety.     Order Specific Question:   Rationale     Answer:   Patient States able to remain safe in hospital    Routine Programming     As clinically indicated    Self Injury Precaution    Status 15     Every 15 minutes.    Suicide precautions     Patients on Suicide Precautions should have a Combination Diet ordered that includes a Diet selection(s) AND a Behavioral Tray selection for Safe Tray - with utensils, or  Safe Tray - NO utensils         Risk Assessment:  Risk for harm is moderate.  Risk factors: SI, trauma, family history, and past behaviors  Protective factors: family and engaged in treatment     SIO: None    Dispo: TBD. Disposition pending clinical stabilization, medication optimization and development of an appropriate discharge plan.     Attestations:     Tone Mckinley and Shital Elizalde, MS3  [employer] [facility] [employer]      Resident/Fellow Attestation   I, Clifford Rivas MD, was present with the medical/ROBERT student who participated in the service and in the documentation of the note.  I have verified the history and personally performed the physical exam and medical decision making.  I agree with the assessment and plan of care as documented in the note.        Clifford Rivas MD  PGY1  Date of Service (when I saw the patient): 01/17/24    I, Gavin Stanley , have personally performed an examination of this patient and I have reviewed the resident's documentation.  I have edited the note to reflect all relevant changes.  I have discussed this patient with the house staff on 1/17/2024.  I agree with resident findings and plan in today's note and yesterdays resident H&P.  I have reviewed all vitals and laboratory findings.      Gavin Koroma MD

## 2024-01-17 NOTE — ED NOTES
Pt transferred to station 22 at 1251. Pt agreed to be transferred, and to have room mate. Report given to station 22 Nurse for continuation of care.

## 2024-01-17 NOTE — CARE PLAN
01/17/24 0148   Patient Belongings   Did you bring any home meds/supplements to the hospital?  Yes   Disposition of meds  Sent to security/pharmacy per site process   Patient Belongings locker;sent to security per site process;remains on inpatient care area   Patient Belongings Put in Hospital Secure Location (Security or Locker, etc.) cash/credit card;cell phone/electronics;clothing;jewelry;keys;medication(s);prosthesis;purse/wallet;ring;shoes;watch   Belongings Search Yes   Clothing Search Yes   Second Staff Ilene SHELLEY R.N. and Iram DSOUZA   Comment plastic bags c shoes, .keys.wallet. jacket,wristwatch, clothing, 2 wristbands and 6 rings, toiletries, cell phone all in loocker/ 3 visa cards amastercard and target card to the safe/ medications per site policy     A               1/17/2023: Patient's spouse took mail key home from key sets    Admission:  I am responsible for any personal items that are not sent to the safe or pharmacy.  [facility] is not responsible for loss, theft or damage of any property in my possession.    Signature:  _________________________________ Date: _______  Time: _____                                              Staff Signature:  ____________________________ Date: ________  Time: _____      2nd Staff person, if patient is unable/unwilling to sign:    Signature: ________________________________ Date: ________  Time: _____     Discharge:  [facility] has returned all of my personal belongings:    Signature: _________________________________ Date: ________  Time: _____                                          Staff Signature:  ____________________________ Date: ________  Time: _____

## 2024-01-17 NOTE — PLAN OF CARE
" INITIAL PSYCHOSOCIAL ASSESSMENT AND NOTE    Information for assessment was obtained from:       [x]Patient     []Parent     [x]Community provider    [x]Hospital records   []Other     []Guardian     Presenting Problem:  Patient is a 28 year old female who uses she/her. Patient was admitted to [facility] on 2024 [address] voluntarily.    Presenting issues and presentation for admit:     Per ED provider note: \"Corinne N Palm is a 28 year old female with a long history of anorexia, depression, OCD, generalized anxiety, Borderline personality disorder as well as panic disorder, and a history of suicidality, as well as history of obsessive compulsive disorder who presents with suicidal ideation with thoughts of overdosing. Patient reports being sent here after she saw her trauma therapist. Patient reports feeling stressed about her losses. She reports an acquaintance through her eating disorder program had . She has been ruminating about her loss and felt triggered. She denies feeling actively nor acutely suicidal and was upset that she got brought here. She wants to go home. She lives with her .    Patient has a long history of depression. She had been hospitalized previously. She admits to chronic SI. She has been taking her meds. She denies drug use. She denies any medical concerns\".    Per chart review, Patient was ambivalent about remaining in the hospital but decided to stay due to inability to contract for safety. Pt has been stable in the community for 2 years prior to this.     The following areas have been assessed:    History of Mental Health and Chemical Dependency:  Mental Health History:  Patient has a historical diagnosis of long history of anorexia, depression, OCD, generalized anxiety, Borderline personality disorder, panic disorder, history of suicidality, as well as history of obsessive compulsive disorder . The patient has a history of " "suicide attempts, including overdose in 2021.     Patient  has a history of engaged in non-suicidal self-injury.  Per Ed note, \"She reports she has not been engaging in any self harm because the nurse told her she would have to go back to the ED if she did.  She reports she had ordered a tea bag because she knew she could get a staple out of it, but decided she would not do it because she doesn't want to go back to the ED\".      Previous psychiatric hospitalizations and treatments (including outpatient, residential, and inpatient care:   in the past  Hospitalizations:  11/13/2020 - 11/18/2020 [facility] SI thoughts to OD  6/8/2021 - 6/28/2021 [facility] - suicide attempt 10- 300mg Gabapentin + other  7/5/2021 - 7/20/2021 [facility] - Urges to cut, high depression/anxiety    Substance Use History  Denies history. Per chart review, 1-2 drinks or less per month. Substance use does not appear to be an issue.     Patient's current relationship status is   .  Patient reported having zero child(ashley). She has been tapering off certain meds as she wants to become pregnant at some point in her future.     Family Description (Constellation, significant information and events, Family Psychiatric History):  Per Epic, the patient has anxiety in paternal grandmother; schizophrenia in a maternal uncle.     Significant Medical issues, Life events or Trauma history:   Per chart, medical trauma due to 12 surgeries for cleft palata as a child.   Recent Death of a friend by suicide.     Living Situation:  Patient's current living/housing situation is with  in [facility]. Housing is stable and she can return.      Educational Background:    Patient's highest education level was college graduate. Patient reports they are  able to understand written materials.     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  She does report work being helpful for her. Patient reports  income is obtained through employment.  Patient does not identify finances as a current stressor. They are insured under [employer] and [facility]. Restrictions (No/Yes): No.    Occupational History: Worked as a music therapist at [employer] [facility].     Legal Concerns (current or past history):     None    Legal Status:  Pt is voluntary.  Commitment History: No     Service History: Denies    Ethnic/Cultural/Spiritual considerations:   The patient describes their cultural background as White/, heterosexual, female.  Contextual influences on patient's health include safety concerns.   Patient identified their preferred language to be English. Patient reported they do not need the assistance of an .  Spiritual considerations include: Anglican at one point.     Social Functioning (organizations, interests, support system):   In their free time, patient reports they like to listen to music and watch tv.     Patient identified partner as part of their support system.  Patient identified the quality of these relationships as good. Her  helps her at home and is supportive.     Current Treatment Providers are:  Primary Care Provider:  Nupur Gan  [facility]   [address] [place] [id number]   [phone]     Medication Management/Psychiatry:  Name/Clinic: Dr. Giulia Vigil   [address]   Number: [phone]     Therapist:  Name/Clinic: [facility]  Number: [phone]       Coni therapist  Coni dietician    Per Psych consult on 1/16/24 by KITTY Mosher CNP She started a new DBT program a month ago at [facility], and this is 3 hours a day 3 days a week. She has done DBT previously and does not really like it, but finds the 2 hour processing part (after " spending an hour learning a skill) to be very helpful and she would like to continue going.     Other contact information (family, friends, SO) and NED status:   : Robin Lawrence [phone]  Mother: [phone]    Strengths and Assets:  The patient uses these coping skills to help with stress and hard times: listening to music and watching tv 'Survivor', diet coke.      Patient will have psychiatric assessment and medication management by the psychiatrist. Medications will be reviewed and adjusted per DO/MD/APRN CNP as indicated. The treatment team will continue to assess and stabilize the patient's mental health symptoms with the use of medications and therapeutic programming. Hospital staff will provide a safe environment and a therapeutic milieu. Staff will continue to assess patient as needed. Patient will participate in unit groups and activities. Patient will receive individual and group support on the unit.      CTC will do individual inpatient treatment planning and after care planning. CTC will discuss options for increasing community supports with the patient. CTC will coordinate with outpatient providers and will place referrals to ensure appropriate follow up care is in place.

## 2024-01-17 NOTE — ED NOTES
Patient did not eat any of her dinner. She had a diet sprite. She was encouraged to eat something but she declined. Information was passed on to next shift.

## 2024-01-17 NOTE — PROVIDER NOTIFICATION
01/17/24 1545   Individualization/Patient Specific Goals   Patient Personal Strengths ability to maintain sobriety;community support;expressive of emotions;expressive of needs;family/social support;independent living skills;intellectual cognitive skills;interests/hobbies;motivated for recovery;medication/treatment adherence;motivated for treatment;positive attitude;positive educational history;positive vocational history;relationship stability;resilient;resourceful;stable living environment   Patient Vulnerabilities history of unsuccessful treatment   Interprofessional Rounds   Participants CTC;psychiatrist;pharmacy;nursing   Behavioral Team Discussion   Progress initial team meeting   Anticipated length of stay TBD   Continued Stay Criteria/Rationale admitted due to suicidal ideation   Medical/Physical medically cleared for admission - see psychiatry physician progress note   Precautions see below   Plan Psychiatric assessment/Medication management. Therapeutic Milieu. Individual care planning and after care planning. Patient to participate in unit groups and activities. Individual and group support on unit.   Rationale for change in precautions or plan per assessment.   Anticipated Discharge Disposition home with family           PRECAUTIONS AND SAFETY    Behavioral Orders   Procedures    Code 1 - Restrict to Unit    Discontinue 1:1 attendant for suicide risk     Order Specific Question:   I have performed an in person assessment of the patient     Answer:   Based on this assessment the patient no longer requires a one on one attendant at this point in time.     Order Specific Question:   Rationale     Answer:   Modifications to care environment made to mitigate safety risk     Order Specific Question:   Rationale     Answer:   Routine observations are sufficient to monitor safety.     Order Specific Question:   Rationale     Answer:   Patient States able to remain safe in hospital    Routine Programming     As  clinically indicated    Self Injury Precaution    Status 15     Every 15 minutes.    Suicide precautions     Patients on Suicide Precautions should have a Combination Diet ordered that includes a Diet selection(s) AND a Behavioral Tray selection for Safe Tray - with utensils, or Safe Tray - NO utensils         Safety  Safety WDL: .WDL except (Unable to comit for safety on the unit)  Patient Location: patient room, own  Observed Behavior: sleeping  Safety Measures: clinical history reviewed, safety plan reviewed, safety rounds completed, suicide assessment completed  Suicidality: Status 15, Behavioral scrubs (pajamas) (SIB Precautions in effect)

## 2024-01-17 NOTE — CARE PLAN
01/17/24 0148   Patient Belongings   Did you bring any home meds/supplements to the hospital?  Yes   Disposition of meds  Sent to security/pharmacy per site process   Patient Belongings locker;sent to security per site process;remains on inpatient care area   Patient Belongings Put in Hospital Secure Location (Security or Locker, etc.) cash/credit card;cell phone/electronics;clothing;jewelry;keys;medication(s);prosthesis;purse/wallet;ring;shoes;watch   Belongings Search Yes   Clothing Search Yes   Second Staff Ilene SHELLEY R.N. and Iram DSOUZA   Comment plastic bags c shoes, .keys.wallet. jacket,wristwatch, clothing, 2 wristbands and 6 rings, toiletries, cell phone all in loocker/ 3 visa cards amastercard and target card to the safe/ medications per site policy     bel

## 2024-01-17 NOTE — TELEPHONE ENCOUNTER
R: Patient cleared and ready for behavioral bed placement: Yes    8:30 PM Intake paged resident Medrano.     8:59 PM Intake received a call from resident Medrano. The resident is requesting more information on if the pt is willing to sign in voluntarily.     9:00 PM Intake called [facility] and spoke with the pt's nurse. Pt is willing to sign in voluntarily and have a roommate.     9:03 PM Intake paged the resident.     9:08 PM Intake called [facility] and provided disposition to the CRN. Nurse report: Charge will call the ED.     9:14 PM Intake called [facility]. Pt's nurse is busy and will call back.     9:59 PM Intake received a call from the pt's nurseEmily. Intake provided placement information.

## 2024-01-17 NOTE — ED NOTES
Patient still endorses fleeting thoughts of SI but reports she is able to tell staff and will not hurt herself. No HI and no hallucinations.

## 2024-01-17 NOTE — PROGRESS NOTES
01/17/24 1400    Group Therapy Session   Time Session Began 1025   Time Session Ended 1425   Total Time (minutes) 90   Total # Attendees 6   Group Type expressive therapy   Group Topic Covered balanced lifestyle;emotions/expression   Group Session Detail Music Bingo/Song Reflections   Patient Response/Contribution cooperative with task   Patient Participation Detail 1) Participated in Music Therapy intervention of Music Bingo today.  Goals of session were focusing, memory recall, positive distraction, emotional containment and social cohesion.  Pt response was compliant.      2) Cooperatively engaged in Afternoon Music Reflection group to decrease anxiety and promote insight.  Pt was guarded, but pleasant with peers, brightening slightly upon humorous interactions with others.

## 2024-01-17 NOTE — PLAN OF CARE
"  Problem: Adult Behavioral Health Plan of Care  Goal: Adheres to Safety Considerations for Self and Others  Outcome: Progressing     Problem: Suicidal Behavior  Goal: Suicidal Behavior is Absent or Managed  Outcome: Progressing     Problem: Depressive Signs/Symptoms  Goal: Optimized Energy Level (Depressive Signs/Symptoms)  Outcome: Not Progressing     Problem: Depressive Signs/Symptoms  Goal: Improved Sleep (Depressive Signs/Symptoms)  Intervention: Promote Healthy Sleep Hygiene  Recent Flowsheet Documentation  Taken 1/17/2024 0100 by Ilene Mancia RN  Sleep Hygiene Promotion:   regular sleep pattern promoted   relaxation techniques promoted   Goal Outcome Evaluation:Ongoing    Plan of Care Reviewed With: patient      Admitted a 29 y/o female w/ long hx of Chronic Suicidality, MDD, PRECIOUS, Anorexia, (currently in in-pt. Tx for ED at [place] ) BPD, Panic D/O, and OCD.  Presented to [facility] ED after seeing her trauma therapist on last Friday when she reported being stressed out because an acquaintance at her Eating D/O Program had committed suicide. Endorses being triggered by this. Pt. Has no children and lives w/ her .  Endorses good support system, , parents and friends. 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 Presents w/ a hx of self harm, cutting, overdosing and states that she used to head bang, last several yrs ago. States that when this happens she \"freezes, uses self harm, puts blanket over her head.  Hx of multiple MH admits, last her at [place] 2 yrs ago after overdosing. Hx. Of ongoing DBT tx.;  Has been in several Partial programs. States feeling suicidal on admission and would overdose given the opportunity. Endorses having cut her R ankle superficially in the ED on Saturday utilizing a fork from  her tray. Unable to commit " for her safety on the unit.  Placed on Finger foods.  Blind weights initiated.. Denies any ETOH or illicit drug hx. Non-smoker .  Denies any abuse hx.  Cooperative w/ admission.  HS meds administered in ED.  Denied any pain on admission. Oriented to staff, environment.  Easily off to sleep s/p admission at approx 0215 w/ regular non-labored respirations. Observed to have slept for approx 4.25 hrs s/p admission. Safe, supportive therapeutic environment maintained.

## 2024-01-17 NOTE — CARE PLAN
01/17/24 0148   Patient Belongings   Did you bring any home meds/supplements to the hospital?  Yes   Disposition of meds  Sent to security/pharmacy per site process   Patient Belongings locker;sent to security per site process;remains on inpatient care area   Patient Belongings Put in Hospital Secure Location (Security or Locker, etc.) cash/credit card;cell phone/electronics;clothing;jewelry;keys;medication(s);prosthesis;purse/wallet;ring;shoes;watch   Belongings Search Yes   Clothing Search Yes   Second Staff Ilene SHELLEY R.N. and Iram DSOUZA   Comment plastic bags c shoes, .keys.wallet. jacket,wristwatch, clothing, 2 wristbands and 6 rings, toiletries, cell phone all in loocker/ 3 visa cards amastercard and target card to the safe/ medications per site policy     1/17/2023 DVD box set survivor placed in locker   Subjective     Ramona Ferrer is a 60 year old female who presents to clinic today for the following health issues:    HPI     Hypertension Follow-up      Do you check your blood pressure regularly outside of the clinic? Yes , pt was off of lisinopril for few months but restarted when her home BP reading was 160/90    Are you following a low salt diet? Yes    Are your blood pressures ever more than 140 on the top number (systolic) OR more   than 90 on the bottom number (diastolic), for example 140/90? No    Hypothyroidism Follow-up      Since last visit, patient describes the following symptoms: Weight stable, no hair loss, no skin changes, no constipation, no loose stools      Prediabetes ; last A1c 6.1 in 06/19       Morbid Obesity ; pt has been seeing wt loss clinic and on phentermine and also recently started on Metformin         How many servings of fruits and vegetables do you eat daily?  2-3    On average, how many sweetened beverages do you drink each day (soda, juice, sweet tea, etc)?   1    How many days per week do you miss taking your medication? 0         Patient Active Problem List   Diagnosis     Varicose veins of legs     Low back pain     CARDIOVASCULAR SCREENING; LDL GOAL LESS THAN 160     Hypothyroidism     DDD (degenerative disc disease), lumbar     Spinal stenosis, lumbar     LALO (obstructive sleep apnea)     Obesity (BMI 35.0-39.9) with comorbidity (H)     Essential hypertension     Anatomical narrow angle - Both Eyes     Exotropia of right eye     Allergic conjunctivitis of both eyes     Stress incontinence     Morbid obesity with BMI of 40.0-44.9, adult (H)     Prediabetes     Past Surgical History:   Procedure Laterality Date     C TOTAL ABDOM HYSTERECTOMY  2003    MARYA BSO for stage 4 endometriosis     COLONOSCOPY       COLONOSCOPY  2/20/2012    Procedure:COLONOSCOPY; COLONOSCOPY ; Surgeon:ARUN KITCHEN; Location: GI     COLONOSCOPY N/A 2/15/2019    Procedure: COMBINED COLONOSCOPY,  SINGLE OR MULTIPLE BIOPSY/POLYPECTOMY BY BIOPSY;  Surgeon: Connor Sanderson MD;  Location:  GI     HC CYSTOURETHROSCOPY W/ URETEROSCOPY &/OR PYELOSCOPY; W/ LITHOTRIPSY       HC TRABECULOPLASTY BY LASER SURGERY       HYSTERECTOMY, PAP NO LONGER INDICATED       LASER YAG IRIDOTOMY  2012    Procedure: LASER YAG IRIDOTOMY;  LEFT EYE YAG LASER PUPIL IRIDOTOMY ;  Surgeon: Dakotah Humphreys MD;  Location:  EC     LIGATN/STRIP LONG OR SHORT SAPHEN      x's2     PELVIS LAPAROSCOPY,DX       STRABISMUS SURGERY         Social History     Tobacco Use     Smoking status: Never Smoker     Smokeless tobacco: Never Used   Substance Use Topics     Alcohol use: No     Family History   Problem Relation Age of Onset     Diabetes Mother      Thyroid Disease Mother      Hypertension Mother      Cancer Father          of stomach CA     Heart Disease Father         MI at age 58     Glaucoma No family hx of      Macular Degeneration No family hx of          Current Outpatient Medications   Medication Sig Dispense Refill     acetaminophen (TYLENOL) 325 MG tablet Take  by mouth every 4 hours as needed for pain. 100 tablet 0     Artificial Tear Solution (SOOTHE XP) SOLN Apply 1 drop to eye 3 times daily 1 Bottle 11     aspirin-acetaminophen-caffeine (EXCEDRIN MIGRAINE) 250-250-65 MG per tablet Take 1 tablet by mouth every 6 hours as needed for pain. 30 tablet 0     doxycycline monohydrate (MONODOX) 50 MG capsule 2 x daily for 1 month then once daily for 2 months. 60 capsule 6     Fexofenadine HCl (ALLEGRA PO) Take  by mouth. 1 tablet daily         fluticasone (FLONASE) 50 MCG/ACT nasal spray Spray 2 sprays into both nostrils daily. 1 Package 10     levothyroxine (SYNTHROID/LEVOTHROID) 100 MCG tablet Take 1 tablet (100 mcg) by mouth daily 90 tablet 1     lisinopril (PRINIVIL/ZESTRIL) 10 MG tablet Take 1 tablet (10 mg) by mouth daily 90 tablet 1     metFORMIN (GLUCOPHAGE-XR) 500 MG 24 hr tablet 1 tablet with dinner daily for 2 weeks then 2  "tablets with dinner 90 tablet 1     Multiple Vitamins-Minerals (MULTIVITAMIN ADULT PO)        olopatadine (PATANOL) 0.1 % ophthalmic solution One drop, both eyes during the spring 5 mL 6     Omega-3 Fatty Acids (FISH OIL) 500 MG CAPS Take by mouth daily       ORDER FOR DME Provent nasal EPAP  Use over nostrils nightly.   30 each 0     phentermine (ADIPEX-P) 37.5 MG tablet Take 1/2 tablet every morning with breakfast. Increase to 1 tablet after 1 week if needed. 90 tablet 0     VITAMIN D, CHOLECALCIFEROL, PO Take by mouth daily           Reviewed and updated as needed this visit by Provider         Review of Systems   ROS COMP: CONSTITUTIONAL: NEGATIVE for fever, chills, change in weight  EYES: NEGATIVE for vision changes or irritation  ENT/MOUTH: NEGATIVE for ear, mouth and throat problems  RESP: NEGATIVE for significant cough or SOB  CV: NEGATIVE for chest pain, palpitations or peripheral edema  MUSCULOSKELETAL: NEGATIVE for significant arthralgias or myalgia  NEURO: NEGATIVE for weakness, dizziness or paresthesias  ENDOCRINE: NEGATIVE for temperature intolerance, skin/hair changes        Objective    /86   Pulse 90   Temp 98.4  F (36.9  C) (Oral)   Resp 17   Ht 1.6 m (5' 3\")   Wt 93.5 kg (206 lb 3.2 oz)   SpO2 97%   BMI 36.53 kg/m    Body mass index is 36.53 kg/m .  Physical Exam   GENERAL: healthy, alert and no distress  EYES: Eyes grossly normal to inspection, PERRL and conjunctivae and sclerae normal  HENT: ear canals and TM's normal, nose and mouth without ulcers or lesions  NECK: no adenopathy, no asymmetry, masses, or scars and thyroid normal to palpation  RESP: lungs clear to auscultation - no rales, rhonchi or wheezes  CV: regular rate and rhythm,    MS: no gross musculoskeletal defects noted, no edema  NEURO: Normal strength and tone, mentation intact and speech normal          Assessment & Plan     (I10) Essential hypertension  Plan: BP well controlled, continue lisinopril 10 mg daily " ".Advised to follow low salt diet and exercise and f/u in 6 mths.       (E03.9) Hypothyroidism, unspecified type  Plan: on levoxyl 100 mcg daily. Check TSH with free T4 reflex            (R73.03) Prediabetes  Plan: Hemoglobin A1c, Glucose.pt was told I will contact her after results and proceed accordingly.            (Z12.31) Encounter for screening mammogram for breast cancer   Plan: MA Screen Bilateral w/Alex, MA Screening         Digital Bilateral, FLU VAC, QUADRIVALENT (RIV4)        RECOMBINANT DNA, IM           BMI:   Estimated body mass index is 36.53 kg/m  as calculated from the following:    Height as of this encounter: 1.6 m (5' 3\").    Weight as of this encounter: 93.5 kg (206 lb 3.2 oz).   Weight management plan: Discussed healthy diet and exercise guidelines       Eugene Michelle MD  [facility]        "

## 2024-01-17 NOTE — PLAN OF CARE
Problem: Adult Behavioral Health Plan of Care  Goal: Plan of Care Review  Recent Flowsheet Documentation  Taken 1/17/2024 1347 by Giulia Gomez, RN  Patient Agreement with Plan of Care: agrees     Problem: Depressive Signs/Symptoms  Goal: Improved Mood Symptoms (Depressive Signs/Symptoms)  Outcome: Not Progressing  Note: Patient is isolative and withdrawn to her room. No interaction with peers. This morning she turned in a coffee stirrer straw to staff. States that she had been scratching with it. Agreeable to trying the weighted shoulder pad. Contracted for safety. At 1430 patient reported that she had been scratching on her leg with the same type of straw. Patient turned it in to staff. All straws and plasticware removed from open areas. States that she wants to go home and that she feels trapped here. States that  would be angry if she were to leave. Accepting of talking with team again tomorrow about discharge. At 1525 patient told staff that she was feeling like head banging. Sitting on the floor of her room. Declines meds stating they are not helpful. MD notified. Med compliant. Giulia Gomez RN     Goal Outcome Evaluation:    Plan of Care Reviewed With: patient

## 2024-01-18 LAB
ALBUMIN UR-MCNC: NEGATIVE MG/DL
APPEARANCE UR: ABNORMAL
ATRIAL RATE - MUSE: 80 BPM
BACTERIA #/AREA URNS HPF: ABNORMAL /HPF
BILIRUB UR QL STRIP: NEGATIVE
COLOR UR AUTO: ABNORMAL
DIASTOLIC BLOOD PRESSURE - MUSE: NORMAL MMHG
GLUCOSE UR STRIP-MCNC: NEGATIVE MG/DL
HGB UR QL STRIP: NEGATIVE
INTERPRETATION ECG - MUSE: NORMAL
KETONES UR STRIP-MCNC: NEGATIVE MG/DL
LEUKOCYTE ESTERASE UR QL STRIP: NEGATIVE
NITRATE UR QL: NEGATIVE
P AXIS - MUSE: 64 DEGREES
PH UR STRIP: 6 [PH] (ref 5–7)
PR INTERVAL - MUSE: 138 MS
QRS DURATION - MUSE: 76 MS
QT - MUSE: 404 MS
QTC - MUSE: 465 MS
R AXIS - MUSE: 32 DEGREES
RBC URINE: 14 /HPF
SP GR UR STRIP: 1 (ref 1–1.03)
SQUAMOUS EPITHELIAL: 4 /HPF
SYSTOLIC BLOOD PRESSURE - MUSE: NORMAL MMHG
T AXIS - MUSE: 7 DEGREES
TRANSITIONAL EPI: <1 /HPF
UROBILINOGEN UR STRIP-MCNC: NORMAL MG/DL
VENTRICULAR RATE- MUSE: 80 BPM
VIT D+METAB SERPL-MCNC: 21 NG/ML (ref 20–50)
WBC URINE: 20 /HPF

## 2024-01-18 PROCEDURE — 250N000013 HC RX MED GY IP 250 OP 250 PS 637

## 2024-01-18 PROCEDURE — 99239 HOSP IP/OBS DSCHRG MGMT >30: CPT | Mod: GC | Performed by: PSYCHIATRY & NEUROLOGY

## 2024-01-18 PROCEDURE — 81003 URINALYSIS AUTO W/O SCOPE: CPT

## 2024-01-18 PROCEDURE — G0177 OPPS/PHP; TRAIN & EDUC SERV: HCPCS

## 2024-01-18 PROCEDURE — 124N000002 HC R&B MH UMMC

## 2024-01-18 RX ORDER — HYDROXYZINE HYDROCHLORIDE 50 MG/1
50 TABLET, FILM COATED ORAL EVERY 4 HOURS PRN
Status: DISCONTINUED | OUTPATIENT
Start: 2024-01-18 | End: 2024-01-19 | Stop reason: HOSPADM

## 2024-01-18 RX ORDER — SERTRALINE HYDROCHLORIDE 25 MG/1
25 TABLET, FILM COATED ORAL DAILY
Status: DISCONTINUED | OUTPATIENT
Start: 2024-01-18 | End: 2024-01-19 | Stop reason: HOSPADM

## 2024-01-18 RX ADMIN — SERTRALINE HYDROCHLORIDE 25 MG: 25 TABLET ORAL at 14:29

## 2024-01-18 RX ADMIN — FLUOXETINE HYDROCHLORIDE 60 MG: 20 CAPSULE ORAL at 08:52

## 2024-01-18 RX ADMIN — LAMOTRIGINE 50 MG: 25 TABLET ORAL at 08:53

## 2024-01-18 RX ADMIN — HYDROXYZINE HYDROCHLORIDE 50 MG: 50 TABLET, FILM COATED ORAL at 18:04

## 2024-01-18 RX ADMIN — LURASIDONE HYDROCHLORIDE 120 MG: 80 TABLET, COATED ORAL at 18:04

## 2024-01-18 RX ADMIN — LAMOTRIGINE 200 MG: 200 TABLET ORAL at 08:53

## 2024-01-18 RX ADMIN — FOLIC ACID 1 MG: 1 TABLET ORAL at 08:53

## 2024-01-18 RX ADMIN — HYDROXYZINE HYDROCHLORIDE 50 MG: 50 TABLET, FILM COATED ORAL at 11:34

## 2024-01-18 ASSESSMENT — ACTIVITIES OF DAILY LIVING (ADL)
LAUNDRY: WITH SUPERVISION
ADLS_ACUITY_SCORE: 40
ADLS_ACUITY_SCORE: 40
HYGIENE/GROOMING: INDEPENDENT
ADLS_ACUITY_SCORE: 40
HYGIENE/GROOMING: INDEPENDENT;SHOWER
ADLS_ACUITY_SCORE: 40
ORAL_HYGIENE: INDEPENDENT
ADLS_ACUITY_SCORE: 40
DRESS: SCRUBS (BEHAVIORAL HEALTH);STREET CLOTHES
ORAL_HYGIENE: INDEPENDENT
DRESS: INDEPENDENT
ADLS_ACUITY_SCORE: 40

## 2024-01-18 NOTE — PLAN OF CARE
BEH IP Unit Acuity Rating Score (UARS)  Patient is given one point for every criteria they meet.    CRITERIA SCORING   On a 72 hour hold, court hold, committed, stay of commitment, or revocation 0    Patient LOS on BEH unit exceeds 20 days 0  LOS: 1   Patient under guardianship, 55+, otherwise medically complex, or under age 11 0   Suicide ideation without relief of precipitating factors 1   Current plan for suicide 0   Current plan for homicide 0   Imminent risk or actual attempt to seriously harm another without relief of factors precipitating the attempt 0   Severe dysfunction in daily living (ex: complete neglect for self care, extreme disruption in vegetative function, extreme deterioration in social interactions) 0   Recent (last 7 days) or current physical aggression in the ED or on unit 0   Restraints or seclusion episode in past 72 hours 0   Recent (last 7 days) or current verbal aggression, agitation, yelling, etc., while in the ED or unit 0   Active psychosis 0   Need for constant or near constant redirection (from leaving, from others, etc).  0   Intrusive or disruptive behaviors 0   TOTAL 1

## 2024-01-18 NOTE — CARE PLAN
Occupational Therapy Group Note:     01/18/24 1000   Group Therapy Session   Group Attendance attended group session   Time Session Began 1025   Time Session Ended 1215   Total Time (minutes) 60   Total # Attendees 6-7   Group Type expressive therapy   Group Topic Covered coping skills/lifestyle management   Group Session Detail OT clinic   Patient Response/Contribution cooperative with task;organized   Patient Participation Detail Pt actively participated in occupational therapy clinic to facilitate coping skill exploration, creative expression within personally meaningful activities, and clinical observation of social, cognitive, and kinesthetic performance skills. Pt response: Independent to initiate, gather materials, sequence, and adjust to workspace demands as needed. Demonstrated good focus, planning, problem solving, and attention to detail for selected creative expression task. Organized in her task approach. Able to ask for assistance with supplies as needed. Minimal interactions with peers observed, though pleasant in interactions when conversation was initiated with her. 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 Affect appeared to brighten when sharing about getting to experience multiple theme mendez during her honeymoon in [place]. Calm, pleasant, and engaged.

## 2024-01-18 NOTE — PLAN OF CARE
"\"Cori\" has been active on the unit, attending groups and social with peers and staff.   She presented with a blunted affect.   She is calm pleasant and cooperative with care. Medication compliant.   Plan to taper Prozac, started Zoloft.   She endorses anxiety and depression but unable to rate. Denies active SI/HI. No psychotic sx noted.     1000-Pt asked to meet with writer individually. She became very tearful and stated that she has had several difficult phone conversations with her  this morning that are causing her elevated anxiety and stress. Per pt-her  has been scolding her for calling him at work and for \"asking for welsh things like diet coke.\" Pt feels unsupported and feels  is being harsh. She also acknowledges that he is likely feeling burnout. She felt and increased urge for self harm and reported is having thoughts about sticking a tin foil juice top into the electrical outlet or banging her head. She does not want to or intend to act on these thoughts. We discussed her remaining in the milieu and engaging in groups. PRN Hydroxyzine given. She agreed to continue to seek staff when feeling unsafe.   1445-Pt returned a plastic spoon and surrendered her toothpaste tube to staff, citing that she does not trust herself at the moment after a phone call with her mother. She again agreed to remain in the milieu and utilize cold packs/other coping skills to ground herself.     Pt showered this morning and is well kempt.   She declined breakfast-drank only coffee and diet coke.     Urine specimen collected and sent to lab for UA. See results.     VSS. BP (!) 148/70 (BP Location: Left arm)   Pulse 86   Temp 97.6  F (36.4  C) (Temporal)   Resp 14   Ht 1.6 m (5' 3\")   Wt 60.8 kg (134 lb 1.6 oz)   SpO2 96%   BMI 23.75 kg/m        PRNs:   1134-Hydroxyzine    Per treatment team-tentative discharge tomorrow.     Problem: Adult Behavioral Health Plan of Care  Goal: Plan of Care " Review  Outcome: Progressing  Flowsheets (Taken 1/18/2024 0900)  Patient Agreement with Plan of Care: agrees     Problem: Anxiety Signs/Symptoms  Goal: Improved Mood Symptoms (Anxiety Signs/Symptoms)  Outcome: Progressing   Goal Outcome Evaluation:    Plan of Care Reviewed With: patient

## 2024-01-18 NOTE — PLAN OF CARE
Initial meeting note:    Therapist introduced self to patient and discussed psychotherapy service available to patient.     Pt response: Pt expressed interest in meeting with therapist    Plan: Made plan to meet with pt again; began identifying goals

## 2024-01-18 NOTE — PLAN OF CARE
"Patient appeared to be agitated/anxious earlier during shift change. Staff reported that patient endorsed that she was going to start head banging, endorsed that she wanted to go go home. Patient was redirected and talked to.  Patient was seen sitting on the floor next to bed by the window. Patient endorsed anxiety 10, and stated she did not know when asked to rate depression. Patient was asked about SI/BIB, stated: \"I don't know\". Patient asked for PRN to help her calm down, PRN Zyprexa/hydroxyzine administered at that time around 1550, were effective. Emotional support provided, ice offered. Patient denied pain. Patient contracted for safety and was seen laying in bed. Patient was reassessed and endorsed feeling better. Patient's spouse visited this shift, reported visit went well, spouse asked for mail key to take home, which was given to spouse. Patient attended group this shift. After visitation, patient went back to bed, endorsed that she was feeling tired. Patient is eating and drinking adequately, was compliant with medication. No other behavioral issues was observed or reported rest of shift.    Problem: Depressive Signs/Symptoms  Goal: Optimized Energy Level (Depressive Signs/Symptoms)       Outcome: Not Progressing     Problem: Anxiety Signs/Symptoms  Goal: Optimized Energy Level (Anxiety Signs/Symptoms)  Outcome: Not Progressing    Plan of Care Reviewed With: Patient                   "

## 2024-01-18 NOTE — PLAN OF CARE
Team Note Due:  Weds    Assessment/Intervention/Current Symtoms and Care Coordination:  - chart review  - team meeting - discussed pt progress, symptomology, and response to treatment.  Discussed the discharge plan and any potential impediments to discharge.  - post team rounds team meeting / discussion    Discharge Plan or Goal:  Pending stabilization & development of a safe discharge plan.  Considerations include: Return home with outpatient providers       Barriers to Discharge:  Patient requires further stabilization        Referral Status:   No new     Legal Status:  Voluntary    Contacts:  : Robin Lawrence [phone]       Upcoming Meetings and Dates/Important Information and next steps:

## 2024-01-18 NOTE — DISCHARGE SUMMARY
"                                                                                                                 ----------------------------------------------------------------------------------------------------------  [facility]   Psychiatric Discharge Summary      Corinne N Palm MRN# [id number]   Age: 28 year old YOB: 1995     Date of Admission:  1/12/2024  Date of Discharge:  1/19/2024  Admitting Physician:  Gavin Koroma MD  Discharge Physician:  Gavin Koroma MD    This document serves as a transfer of care to Corinne N Palm's outpatient providers.     Events Leading to Hospitalization:     Corinne \"Jaleni\" KENJI Lawrence is a 28 year old female with a h/o SA (2021) with knownpsychiatric diagnoses of OCD, Anorexia Nervosa, MDD, Borderline Personality Disorder, PRECIOUS + Panic Disorder and otherwise unspecified \"medical trauma\" who was admitted to the Unit from the ED on 01/17/2024 2/2 concern for SI with plan, in the context of  recent trauma (friend committed suicide in her IOP program) .     See H&P by Gavin Koroma MD on 01/17 for additional details.      Diagnoses:   Primary Psychiatric Diagnosis  Major Depressive Disorder recurrent without psychosis     Secondary Psychiatric Diagnoses  Borderline Personality Disorder   Panic Disorder   PRECIOUS   OCD  Anorexia Nervosa     Psychiatric Assessment:   Corinne N Palm is a 28 year old female previously diagnosed with OCD, Anorexia Nervosa, MDD, borderline personality disorder, panic disorder, PRECIOUS and \"medical trauma\" who presented voluntarily/by with her  after encouragement from her trauma therapist with SI and plan to overdose on Tylenol in the context of recent trauma (suicide of friend in IOP program). Most recent psychiatric hospitalization was 07/2021 for SI. She currently sees a trauma therapist 2x per week, attends DBT group 3x per week through [facility], medication " management and IOP through [place]. Significant symptoms on admission included SI, SIB, depressed mood, anhedonia, low energy, feeling worthless, and disordered eating. The MSE on admission was pertinent for flat affect, logical thought process without psychotic thought, and active SI.     Biological contributions to mental health presentation include previous diagnosis of MDD, prior suicide attempt, borderline personality disorder, anorexia nervosa, PRECIOUS, and SIB. Psychological contributions to mental health presentation include maladaptive coping. Social factors contributing to mental health presentation include suicide of friend from IOP program, romantic relationship issues, and medical trauma. Protective factors include supportive partner/family, engagement in treatment, intelligence, and potential family planning.      In summary, the patient's reported symptoms of SI, SIB, depressed mood, anhedonia, low energy, appetite changes, and feeling of hopeless and worthless with otherwise normal workup in the context of recent trauma are consistent with prior diagnosis of Major Depressive Disorder, recurrent without psychotic features.     Outpatient Psychiatrist Future Considerations   -Consider engaging the patient in OT   -Consider decreasing Latuda, given the current high dose and reported lack of symptom control after dose increase  -Continue to titrate down on Prozac and increase Sertraline   -Note that patient is most concerned about her lunch break at work when it is unstructured time without her . She is looking for a plan of how to cope during this time when she has historically been tempted to find medications or self-harm.   -She would also like coping skills to plan for the impulsivity of her SI, which can often come on suddenly and is overwhelming.      Psychiatric Hospital Course:     Corinne N Palm was admitted to [facility] as a voluntary patient.   Medications:  PTA Prozac 80 mg, Lamotrigine  "200 mg AM, 50 mg PM, Latuda 120 mg and Hydroxyzine 25 mg PRN were continued.   01/13 Prozac decreased to 60 mg as patient did not feel it had been improving her sx.    New medications started at the time of admission include Folic acid given potential for pregnancy in the setting of Lamictal use.  01/18 PRN Hydroxyzine increased to 50 mg; Prozac was decreased to 40 mg; Sertraline initiated at 25 mg   Level of medication adherence: Compliant  Behaviors: The patient was safe and appropriate and did not require chemical/physical restraints during admission. She was cooperative with cares, had good group attendance, and was visible in the milieu.  Change in psychiatric symptoms: Over the course of this hospitalization the patient's symptoms of SI improved. She was more future oriented, improved affect, and engaged in her cares. She continued to have passive SI, but was able to find coping mechanisms to stop before any self-harm occurred.   Collateral information was obtained from , Robin and notable for concern of objects in her car for self harm.   Corinne was released to home. At the time of discharge she was determined to not be a danger to herself or others.     Risk Assessment:      Today Corinne N Palm denies SI, but states that she has ongoing suicidal thoughts that she is able to manage. She states that she is most anxious about the fact that they feel \"impulsive\" and she cannot predict when they come.She feels that they are worse when she has a break at work. She was encouraged to discuss coping plans with her therapist to stay occupied during this time and continue to utilize coping skills like she has in the hospital. She states that she was motivated to avoid self harm in the hospital because she wants to leave and this could be a motivator at home. Patient has notable risk factors for self-harm, including anxiety, recent loss of friend, comorbid medical condition of anorexia, OCD, and previous " "suicide attempts. However, risk is mitigated by commitment to family, ability to volunteer a safety plan, and currently in IOP program. She feels supported and safe at home. Prior to discharge she shared with her  about any objects that were hidden in her car that were used for self harm. Therefore, based on all available evidence including the factors cited above, she does not appear to be at imminent risk for self-harm, does not meet criteria for a 72-hr hold, and therefore remains appropriate for ongoing outpatient level of care. Additional steps taken to minimize risk include: medication optimization, close psychiatric follow up and provision of crisis resources. She will return to her IOP program at [place].      Psychiatric Examination:     Mental Status Exam:  Oriented to:  Grossly Oriented  General:  Awake and Alert  Appearance:  appears stated age  Behavior/Attitude:  Calm, Cooperative, and Engaged  Eye Contact: Downcast  Psychomotor: Normal no catatonia present  Speech:  appropriate volume/tone  Language: Fluent in English with appropriate syntax and vocabulary.  Mood:  \"pretty good\"  Affect:  appropriate and anxious  Thought Process:  linear  Thought Content:   suicidal ideation (passive), No HI, No VH, and No AH; No apparent delusions  Associations:  intact  Insight:  good   Judgment:  good   Impulse control: good  Attention Span:  grossly intact  Concentration:  grossly intact  Recent and Remote Memory:  not formally assessed  Fund of Knowledge: average  Muscle Strength and Tone: normal  Gait and Station: Normal     Medical Hospital Course:   Corinne N Palm was medically cleared by the ED prior to admission to the unit. PTA medications were continued on admission.     Medical Diagnoses addressed:  N/A    Consults: None    Labs were notable for the following:  - CBC not collected  - CMP unremarkable  - TSH normal  - UDS negative  - Vit D normal  - Hgb A1c normal  - Lipids unremarkable  - Vit B12 " normal  - Folate 37.8  - Urinalysis: RBC 14 (H)  - EKG normal sinus rhythm, QTc 465     Discharge Medications:     Current Discharge Medication List        CONTINUE these medications which have NOT CHANGED    Details   FLUoxetine (PROZAC) 40 MG capsule Take 80 mg by mouth daily      hydrOXYzine HCl (ATARAX) 10 MG tablet Take 10 mg by mouth daily as needed for anxiety      !! lamoTRIgine (LAMICTAL) 200 MG tablet Take 200 mg by mouth every morning along with two 25 mg tablets      !! lamoTRIgine (LAMICTAL) 25 MG tablet Take 50 mg by mouth every morning along with 200 mg tablet      lurasidone (LATUDA) 120 MG TABS tablet Take 120 mg by mouth daily with food       !! - Potential duplicate medications found. Please discuss with provider.           Discharge Plan:   Medications as above  Psychiatric Appointments: F/U scheduled Tuesday  [facility]    Referrals: F/U with PCP in one week for repeat UA      Attestations:   Shital , MS3  [employer]       I was present with the medical student who participated in the service and in the documentation of the note.  I have verified the history and personally performed the physical exam and medical decision making. I agree with the assessment and plan of care as documented in the note.    This patient was seen and discussed with my attending physician.  Clifford Rivas MD  PGY-1  Psychiatry Resident Physician      The patient has been seen and evaluated by me, Gavin Stanley . I have examined the patient today and reviewed the discharge plan with the resident. I agree with the final assessment and plan, as noted in the discharge summary. I have reviewed today's vital signs, medications, labs and imaging.    Total time discharge plannin minutes      Gavin Koroma MD

## 2024-01-18 NOTE — DISCHARGE INSTRUCTIONS
Behavioral Discharge Planning and Instructions    Summary: You were admitted on 1/12/2024  due to suicidal ideation.  You were treated by Dr. Stanley and discharged on 1/19/2024 from [facility] to Home    Main Diagnosis: Major Depressive Disorder recurrent without psychosis      Secondary Psychiatric Diagnoses  Borderline Personality Disorder   Panic Disorder   PRECIOUS   OCD  Anorexia Nervosa         Health Care Follow-up:     Psychiatry  Thursday February 8, 2024 - 1:30 pm - Piper Bejarano MD - virtual [facility] [id number] , [place] [address] [facility]   Ph:  [phone] Fax: [phone]     Continue with your out patient therapy providers / IOP with [facility] as prior to admission.     Therapist: Dr. Deandra Banda [address]  [place] [address] ph: [phone] Fax: [phone]     Continue with your DBT programming on Mon/Tues/Thur weekly as prior to admission.       Occupational Therapy - for mental health -   [facility]  [address] [place] [address] ph: [phone]  [facility] - you can call or do an online self referral if interested in services.     Attend all scheduled appointments with your outpatient providers. Call at least 24 hours in advance if you need to reschedule an appointment to ensure continued access to your outpatient providers.     Major Treatments, Procedures and Findings:  You were provided with: a psychiatric assessment, assessed for medical stability, medication evaluation and/or management, group therapy, and milieu management    Symptoms to Report: losing more sleep, mood getting worse, or thoughts of suicide    Early warning signs can include: increased depression or anxiety sleep disturbances increased thoughts or behaviors of suicide or self-harm     Safety and  "Wellness:  Take all medicines as directed.  Make no changes unless your doctor suggests them.      Follow treatment recommendations.  Refrain from alcohol and non-prescribed drugs.     Resources:   Crisis Intervention: 118.469.8703 or 198-850-3640 (TTY: 729.956.2226).  Call anytime for help.  Suicide Awareness Voices of Education (SAVE) (www.save.org): 723-028-SGOF (6319)  National Suicide Prevention Line (www.mentalhealthmn.org): 384-444-RBYG (0840)  Mental Health Association of [place] (www.mentalhealth.org): 150.199.7644 or 924-350-4073  [facility] Crisis Response 967-910-3694  Text 4 Life: txt \"LIFE\" to 04841 for immediate support and crisis intervention  Crisis text line: Text \"[place]\" to 204031. Free, confidential, 24/7.  Crisis Intervention: 713.843.9372 or 370-913-4814. Call anytime for help.     General Medication Instructions:   See your medication sheet(s) for instructions.   Take all medicines as directed.  Make no changes unless your doctor suggests them.   Go to all your doctor visits.  Be sure to have all your required lab tests. This way, your medicines can be refilled on time.  Do not use any drugs not prescribed by your doctor.  Avoid alcohol.    Advance Directives:   Scanned document on file with [facility]? No scanned doc  Is document scanned? Pt states no documents  Honoring Choices Your Rights Handout: Informed and given  Was more information offered? Pt declined    The Treatment team has appreciated the opportunity to work with you. If you have any questions or concerns about your recent admission, you can contact the unit which can receive your call 24 hours a day, 7 days a week. They will be able to get in touch with a Provider if needed. The unit number is [phone] .  "

## 2024-01-18 NOTE — PROGRESS NOTES
01/17/24 1900   Group Therapy Session   Group Attendance attended group session   Time Session Began 1700   Time Session Ended 1755   Total Time (minutes) 50   Total # Attendees 4   Group Type recreation   Group Topic Covered leisure exploration/use of leisure time   Group Session Detail TR leisure group   Patient Response/Contribution cooperative with task   Patient Participation Detail Pt attended the structured Therapeutic Recreation group, participating in a group activity. Pt participated in group discussion, leisure participation, and social engagement to gain self-esteem, manage behaviors, improve social skills, decrease isolation, and reduce anxiety/depression.   Pt remained focused and engaged throughout group activity.  Pt was sociable and was appropriate with interactions with the others in group. Pt was an active participant, contributing to the clues and descriptions throughout the activity.

## 2024-01-18 NOTE — PLAN OF CARE
"  Problem: Adult Behavioral Health Plan of Care  Goal: Adheres to Safety Considerations for Self and Others  Outcome: Not Progressing  Intervention: Develop and Maintain Individualized Safety Plan  Recent Flowsheet Documentation  Taken 1/18/2024 0243 by Ilene Mancia RN  Safety Measures:   safety plan mutually developed   safety plan reviewed     Problem: Suicidal Behavior  Goal: Suicidal Behavior is Absent or Managed  Outcome: Progressing     Problem: Sleep Disturbance  Goal: Adequate Sleep/Rest  Outcome: Progressing     Problem: Anxiety Signs/Symptoms  Goal: Optimized Energy Level (Anxiety Signs/Symptoms)  Outcome: Not Progressing   Goal Outcome Evaluation: Ongoing    Observed to have slept well for approx 6.75 hrs on all Q 15\" rounds overnight w regular non-labored respirations and no reported or observed distress. Up to BR for voiding at approx 0030 but did not leave a specimen as she had been requested to do.  Will continue to try and collect.  Readily back to sleep w/o difficulty.  Safe, therapeutic environment maintained.                         "

## 2024-01-18 NOTE — PROGRESS NOTES
"  ----------------------------------------------------------------------------------------------------------  [facility]  Psychiatry Progress Note  Hospital Day #1     Interim History:     The patient's care was discussed with the treatment team and chart notes were reviewed.    Vitals: VSS  Sleep: 6.75 hours (01/18/24 0600)  Scheduled medications: Took all scheduled medications as prescribed  Psychiatric PRN medications: Zyprexa 10 mg, Hydroxyzine 25 mg    Staff Report:   Per nursing, yesterday the patient started using a coffee stirring straw to intentionally scratch herself, but turned it into staff. Later in the day endorsed the urge to head bang, but was able to contract for safety and used [employer], like ice pack. She attended group/music therapy and was engaged during the sessions. She was able to eat yesterday, but declined breakfast this morning.     Patient was tearful with staff this morning following a phone call with her partner. She reports that she does not feel supported as he told he not to contact him at work anymore with \"welsh things\", like requesting diet coke. Following the discussion she endorsed SI with plan to take the foil lid of juice and put it in the outlet. She was given Hydroxyzine 25 mg, but does not feel that it helps.     No acute events or safety concerns overnight. Please see staff notes for details.      Subjective:     Patient Interview:  Corinne N Palm was seen in the conference room.     She expresses that right now she is in a \"better place\". She states that she had a disagreement with her  earlier because she was calling during work hours and he was frustrated. Following the conversation, she had what she felt was a panic attack with increased anxiety, which improved with PRN medications. She was able to connect with him again and he apologized for the conversation. She felt relief after the call and shared that she " "thinks he is just worried about her right now.     She had an additional panic attack last night which she believes was brought on by the stress of being in the hospital. She described the episode as feeling like she \"could not move\" and wanted to bang her head against the wall. She states that she knew that was not logical and resolved after the episode improved.     She was able to attend music therapy yesterday and OT this morning, which she found helpful. She enjoyed making the bracelet in group. She reports that she has tried OT at [facility] in the past and would be interested in trying this as an outpatient. Her previous experiences were eating disorder focused, cooking and grocery shopping, but she liked the active part of OT.     She is opposed to attending [facility] therapy again, despite its previous success due to the duration of treatment. She is also worried about it affecting work and her . Despite being reassured about FMLA options, she reiterated she did not want to miss work as they are short staffed right now. When told \"sometimes work is therapeutic\", she agreed then joked, \"sometimes it's not\" and laughed.    Discussed her medication history and she does not feel that anything has been helpful in the past. She had a period on her current regimen that she felt stable, but is unsure what changed. She reports that she abruptly stopped Buspar in the past as it was not effective and would not be amenable to taking medications that could cause weight gain. She is unsure why her Latuda dose is at 120 mg, reporting that \"they just kept increasing it\", but denies any changes on higher dose. She was receptive to medication changes, but wanted to ensure they would be safe in pregnancy if this was a consideration for her future. She states that she has tried Sertraline in the past without noticeable improvement, but would be willing to try again. She is also agreeable to tapering down Prozac to 40 mg and " "increasing her PRN hydroxyzine dose as well as starting Zoloft at 25 mg daily.    She inquired about the team contacting her  and expressed worry that he may be upset about the length of her hospital stay. She explains that he is stressed out and she does not want to exacerbate this. The pt said she would  \"like to go home as soon as possible\", but wanted to make sure her  was reassured.     She is excited about being able to drink Diet Coke; she adds that she hasn't been eating but has been drinking.       On discussion with her , Robin, he was relieved to hear that the team felt she was improving and safe to return home. He expressed concern that she may have been inflating her recovery to him and wanted an unbiased perspective. He inquired about how to ensure safety on her return, such as objects she has been hiding in her car. He was encouraged to contact her to learn of any hidden objects that could be used for self harm prior to discharge to have a safe environment. He reported that he is aware how resistant she is to returning to [place] for treatment and shared that he would also like to avoid this scenario, but is more willing to take this step for the sake of her recovery. He did not feel that was necessary now, but wanted the team to know that he was open to it if anything worsened. He states that he is familiar with the system from previous admissions and will be available to pick her up tomorrow (after he leaves work at 1500) if she continues to be safe for discharge in the morning.      ROS:  Patient has no bothersome physical symptoms  Patient denies acute concerns     Objective:     Vitals:  BP (!) 148/70 (BP Location: Left arm)   Pulse 86   Temp 97.6  F (36.4  C) (Temporal)   Resp 14   Ht 1.6 m (5' 3\")   Wt 60.8 kg (134 lb 1.6 oz)   SpO2 96%   BMI 23.75 kg/m      Allergies:  Allergies   Allergen Reactions    Morphine Other (See Comments)     Twitching    Amoxicillin Rash " "   Sulfa Antibiotics Rash       Current Medications:  Scheduled:  Current Facility-Administered Medications   Medication Dose Route Frequency    FLUoxetine  60 mg Oral Daily    folic acid  1 mg Oral Daily    lamoTRIgine  200 mg Oral QAM    lamoTRIgine  50 mg Oral QAM    lurasidone  120 mg Oral Daily       PRN:  Current Facility-Administered Medications   Medication Dose Route Frequency    acetaminophen  650 mg Oral Q4H PRN    alum & mag hydroxide-simethicone  30 mL Oral Q4H PRN    hydrOXYzine HCl  50 mg Oral Q4H PRN    melatonin  3 mg Oral At Bedtime PRN    OLANZapine  10 mg Oral TID PRN    Or    OLANZapine  10 mg Intramuscular TID PRN    polyethylene glycol  17 g Oral Daily PRN       Labs and Imaging:  New results:   No results found for this or any previous visit (from the past 24 hour(s)).      Data this admission:  - CBC not collected  - CMP unremarkable  - TSH normal  - UDS negative  - Vit D normal  - Hgb A1c normal  - Lipids unremarkable  - Vit B12 normal  - Folate 37.8  - Urinalysis not collected  - EKG normal sinus rhythm, QTc 465     Mental Status Exam:     Oriented to:  Grossly Oriented  General:  Awake and Alert  Appearance:  Grooming is adequate and Dressed in sweatshirt  Behavior/Attitude:  Calm, Cooperative, and Engaged  Eye Contact: Downcast  Psychomotor: Normal no catatonia present  Speech:  appropriate volume/tone  Language: Fluent in English with appropriate syntax and vocabulary.  Mood:  \"okay\"  Affect:  appropriate and flat  Thought Process:  linear, coherent, and goal directed  Thought Content:   suicidal ideation (passive); No apparent delusions  Associations:  intact  Insight:  good   Judgment:  good   Impulse control: good  Attention Span:  grossly intact  Concentration:  grossly intact  Recent and Remote Memory:  not formally assessed  Fund of Knowledge: average  Muscle Strength and Tone: normal  Gait and Station: Normal     Psychiatric Assessment   Corinne N Palm is a 28 year old female " "previously diagnosed with OCD, Anorexia Nervosa, MDD, borderline personality disorder, panic disorder, PRECIOUS and \"medical trauma\" who presented voluntarily/by with her  after encouragement from her trauma therapist with SI and plan to overdose on Tylenol in the context of recent trauma (suicide of friend in IOP program). Most recent psychiatric hospitalization was 07/2021 for SI. She currently sees a trauma therapist 2x per week, attends DBT group 3x per week through [facility], medication management and IOP through [facility]. Significant symptoms on admission included SI, SIB, depressed mood, anhedonia, low energy, feeling worthless, and disordered eating. The MSE on admission was pertinent for flat affect, logical thought process without psychotic thought, and active SI.     Biological contributions to mental health presentation include previous diagnosis of MDD, prior suicide attempt, borderline personality disorder, anorexia nervosa, PRECIOUS, and SIB. Psychological contributions to mental health presentation include maladaptive coping. Social factors contributing to mental health presentation include suicide of friend from IOP program, romantic relationship issues, and medical trauma. Protective factors include supportive partner/family, engagement in treatment, intelligence, and potential family planning.      In summary, the patient's reported symptoms of SI, SIB, depressed mood, anhedonia, low energy, appetite changes, and feeling of hopeless and worthless with otherwise normal workup in the context of recent trauma are consistent with prior diagnosis of Major Depressive Disorder, recurrent without psychotic features. She will likely benefit from medication management and connection to resources this admission.     Given that she currently has SI and SIB, with a h/o of SA (most recently in 2021) patient warrants inpatient psychiatric hospitalization to maintain her safety.      Psychiatric Plan by Diagnosis    "   Today's changes:  - Increase PRN Hydroxyzine to 50 mg   - Initiate Sertraline 25 mg   - Decreased Prozac to 40 mg      # Major Depressive Disorder recurrent severe without psychosis  1. Medications:  - Continue PTA Prozac* (was on 80 mg, decreased to 60 mg since medication not improving symptoms; will taper off)   - Continue Lamotrigine 200 mg + 50 mg daily in the AM  - Continue Latuda 120 mg daily with dinner  - Zyprexa 10 mg 3x/day PRN for agitation    # PRECIOUS  - Continue PTA Hydroxyzine 25 mg PRN for anxiety  - Continue PTA Prozac 60 mg*     2. Pertinent Labs/Monitoring:   - All WNL. Folate elevated in the setting of folate supplementation.      3. Additional Plans:  - Patient will be treated in therapeutic milieu with appropriate individual and group therapies as described  -  Folic acid 1 mg daily to help prevent neural tube defects in case she does get pregnant while taking Lamictal. This dose should be increased to 4 mg as soon as possible when pregnancy occurs.       Psychiatric Hospital Course:    Corinne N Palm was admitted to [facility] as a voluntary patient.  Medications:  PTA Prozac 80 mg, Lamotrigine 200 mg AM, 50 mg PM, Latuda 120 mg and Hydroxyzine 25 mg PRN were continued.   01/13 Prozac decreased to 60 mg as patient did not feel it had been improving her sx.    New medications started at the time of admission include Folic acid given potential for pregnancy in the setting of Lamictal use.  01/18 PRN Hydroxyzine increased to 50 mg; Prozac was decreased to 40 mg; Sertraline initiated at 25 mg     The risks, benefits, alternatives, and side effects were discussed and understood by the patient and other caregivers.     Medical Assessment and Plan     Medical diagnoses to be addressed this admission:    N/A    Medical course: Patient was physically examined by the ED prior to being transferred to the unit and was found to be medically stable and appropriate for admission.     Consults: None      Checklist     Legal Status: Voluntary     Safety Assessment:   Behavioral Orders   Procedures    Code 1 - Restrict to Unit    Discontinue 1:1 attendant for suicide risk     Order Specific Question:   I have performed an in person assessment of the patient     Answer:   Based on this assessment the patient no longer requires a one on one attendant at this point in time.     Order Specific Question:   Rationale     Answer:   Modifications to care environment made to mitigate safety risk     Order Specific Question:   Rationale     Answer:   Routine observations are sufficient to monitor safety.     Order Specific Question:   Rationale     Answer:   Patient States able to remain safe in hospital    Routine Programming     As clinically indicated    Self Injury Precaution    Status 15     Every 15 minutes.    Suicide precautions     Patients on Suicide Precautions should have a Combination Diet ordered that includes a Diet selection(s) AND a Behavioral Tray selection for Safe Tray - with utensils, or Safe Tray - NO utensils         Risk Assessment:  Risk for harm is moderate.  Risk factors: SI, trauma, family history, and past behaviors  Protective factors: family and engaged in treatment     SIO: none    Disposition: TBD. Disposition pending clinical stabilization, medication optimization and development of an appropriate discharge plan.      Attestations   Shital Elizalde, MS3  [employer]    I was present with the medical student who participated in the service and in the documentation of the note.  I have verified the history and personally performed the physical exam and medical decision making. I agree with the assessment and plan of care as documented in the note.    This patient was seen and discussed with my attending physician.  Clifford Rivas MD  PGY-1   Psychiatry Resident Physician     This patient has been seen and evaluated by me, Gavin Stanley.  I have discussed this  patient with the psychiatry resident and I agree with the findings and plan in this note.    I have reviewed today's vital signs, medications, labs and imaging.     Gavin Koroma MD

## 2024-01-19 VITALS
TEMPERATURE: 98 F | OXYGEN SATURATION: 98 % | WEIGHT: 134.1 LBS | HEART RATE: 91 BPM | DIASTOLIC BLOOD PRESSURE: 76 MMHG | RESPIRATION RATE: 14 BRPM | BODY MASS INDEX: 23.76 KG/M2 | SYSTOLIC BLOOD PRESSURE: 121 MMHG | HEIGHT: 63 IN

## 2024-01-19 PROCEDURE — 250N000013 HC RX MED GY IP 250 OP 250 PS 637

## 2024-01-19 RX ORDER — FLUOXETINE 40 MG/1
40 CAPSULE ORAL DAILY
Qty: 30 CAPSULE | Refills: 0 | Status: SHIPPED | OUTPATIENT
Start: 2024-01-20 | End: 2024-02-19

## 2024-01-19 RX ORDER — SERTRALINE HYDROCHLORIDE 25 MG/1
25 TABLET, FILM COATED ORAL DAILY
Qty: 30 TABLET | Refills: 0 | Status: SHIPPED | OUTPATIENT
Start: 2024-01-20

## 2024-01-19 RX ORDER — LAMOTRIGINE 25 MG/1
50 TABLET ORAL EVERY MORNING
Qty: 60 TABLET | Refills: 0 | Status: SHIPPED | OUTPATIENT
Start: 2024-01-20 | End: 2024-01-19

## 2024-01-19 RX ORDER — LAMOTRIGINE 200 MG/1
200 TABLET ORAL EVERY MORNING
Qty: 30 TABLET | Refills: 0 | Status: SHIPPED | OUTPATIENT
Start: 2024-01-20 | End: 2024-01-19

## 2024-01-19 RX ADMIN — FOLIC ACID 1 MG: 1 TABLET ORAL at 08:29

## 2024-01-19 RX ADMIN — LAMOTRIGINE 50 MG: 25 TABLET ORAL at 08:29

## 2024-01-19 RX ADMIN — FLUOXETINE HYDROCHLORIDE 40 MG: 20 CAPSULE ORAL at 08:29

## 2024-01-19 RX ADMIN — LAMOTRIGINE 200 MG: 200 TABLET ORAL at 08:29

## 2024-01-19 RX ADMIN — SERTRALINE HYDROCHLORIDE 25 MG: 25 TABLET ORAL at 08:29

## 2024-01-19 RX ADMIN — HYDROXYZINE HYDROCHLORIDE 50 MG: 50 TABLET, FILM COATED ORAL at 12:47

## 2024-01-19 ASSESSMENT — ACTIVITIES OF DAILY LIVING (ADL)
ADLS_ACUITY_SCORE: 40
HYGIENE/GROOMING: INDEPENDENT
ADLS_ACUITY_SCORE: 40
ADLS_ACUITY_SCORE: 40
DRESS: INDEPENDENT
LAUNDRY: WITH SUPERVISION
ADLS_ACUITY_SCORE: 40
ORAL_HYGIENE: INDEPENDENT
ADLS_ACUITY_SCORE: 40
ADLS_ACUITY_SCORE: 40

## 2024-01-19 NOTE — PLAN OF CARE
Team Note Due:  Weds    Assessment/Intervention/Current Symtoms and Care Coordination:  - chart review  - team meeting - discussed pt progress, symptomology, and response to treatment.  Discussed the discharge plan and any potential impediments to discharge.  - post team rounds team meeting / discussion  - call to schedule patient with psychiatrist with [facility] - per scheduling she has not seen a Psychiatrist there since 2022 - however can schedule a new appointment. Soonest available appointment is via video for next week Tuesday 3:00 pm - appointment is scheduled.     Discharge Plan or Goal:  Discharge home to follow up with providers - see discharge instructions        Barriers to Discharge:  None discharge today       Referral Status:  See above and see discharge instructions      Legal Status:  Voluntary    Contacts:  : Robin Lawrence [phone]       Upcoming Meetings and Dates/Important Information and next steps:  Discharge today

## 2024-01-19 NOTE — PLAN OF CARE
Problem: Sleep Disturbance  Goal: Adequate Sleep/Rest  Outcome: Progressing  Intervention: Promote Sleep/Rest  Flowsheets (Taken 1/19/2024 0203)  Sleep/Rest Enhancement:   awakenings minimized   regular sleep/rest pattern promoted   consistent schedule promoted   Goal Outcome Evaluation: ongoing       Pt asleep at the start of shift. Slept a total of 6.75 hours per q15 safety checks. No s/s pain or discomfort noted.Even respirations , non labored breathing. No behavorial concerns.

## 2024-01-19 NOTE — PLAN OF CARE
"Adult Inpatient Safety Plan:     [facility]           [facility] [address]                                Corinne N Palm        SAFETY PLAN:  Step 1: Warning signs / cues (Thoughts, images, mood, situation, behavior) that a crisis may be developing:  Thoughts: \"I don't matter\", \"People would be better off without me\", \"I'm a burden\", \"I can't do this anymore\", \"I just want this to end\", and \"Nothing makes it better\"  Images: obsessive thoughts of death or dying: death preoccupation  Thinking Processes: ruminations (can't stop thinking about my problems): excessive worry  Mood: worsening depression, hopelessness, helplessness, intense worry, agitation, and mood swings  Behaviors: impulsive, reckless behaviors (acting without thinking): risks, not taking care of my responsibilities, sleeping too much, and increasing frequency and duration of dissociation  Situations: loss: loss, changes in symptoms: depression, trauma , and relapse     Step 2: Coping strategies - Things I can do to take my mind off of my problems without contacting another person (relaxation technique, physical activity):  Distress Tolerance Strategies:  arts and crafts: coloring, play with my pet , and watch a funny movie: yes  Physical Activities: go for a walk, exercise: Exercising, yoga, and stretching   Focus on helpful thoughts:  \"This is temporary\", \"I will get through this\", \"It always passes\", \"Ride the wave\", think about happy memories: good ones, remind myself of what is important to me: family, animals, and self-compassion statements: Say good things about myself    Step 3: Remind myself of people and things that are important to me and worth living for:    Fami;y  Pets    Step 4: When I am in crisis, I can ask these people to help me use my safety plan:   Name: - Robin Phone: [phone]   Name: MotherMilton Villanueva Phone: [phone]   Name: DadMilton Sánchez  Phone: [phone]    Step 5: Making the " "environment safe:   I will remove alcohol and drugs, secure my medications, dispose of old medications, remove access to firearms, remove things I could use to hurt myself, and identify supportive people  Other ways to make my environment safe: Not having pills in my car    Step 6: Professionals or agencies I can contact during a crisis:  Crisis Intervention: 677.850.7427 or 053-085-2093 (TTY: [phone]).  Call anytime for help.  National [place] on Mental Illness (www.[place].tanja.org): 536.605.7849 or 832-074-7533.  Alcoholics Anonymous (www.alcoholics-anonymous.org): Check your phone book for your local chapter.  Suicide Awareness Voices of Education (SAVE) (www.save.org): 309-861-CMZR (1957)  National Suicide Prevention Line (www.mentalhealthmn.org): 602-880-KQKB (9558)  Mental Health Consumer/Survivor Network of [place] (www.mhcsn.net): 116.306.8215 or 064-451-7214  Mental Health Association of [place] (www.mentalhealth.org): 461.975.4922 or 124-639-1617  Self- Management and Recovery Training., CellVir-- Toll free: 673.922.1754  www.[facility].[place]  [facility] Crisis Response [phone]  [facility] Crisis Response [phone]  [facility] Crisis Response [phone]  [facility] Crisis (COPE) Response - Adult [phone]  [facility] Crisis Response - Adult [phone]  Text 4 Life: txt \"LIFE\" to 92780 for immediate support and crisis intervention  Crisis text line: Text \"[place]\" to 751094. Free, confidential, 24/7.  Crisis Intervention: 868.464.8313 or 341-634-4397. Call anytime for help.   [facility] Mental Health Crisis Team - Child: [phone]  [facility] Mental Health Crisis Response Team - Child: [phone]  [facility] Mental Health Crisis: 9-059-907-4014   [facility] Mental Health Crisis Team:  [phone]  [facility] Mobile Crisis Response Team (CRT):  [phone] or 328-391-0183 "   [facility] Rapid Response: [phone]      If unable to maintain safety despite working your plan, call 911 or go to my nearest emergency department.         Patient helped develop this safety plan and agreed to use it when needed.  Pt has been given a copy of this plan.          Today s date:  January 19, 2024  Completed by Clinician Name/ Credentials:    Tian Ortega, Ph.D., [facility]        Adapted from Safety Plan Template 2008 Maryan Hernandez and Ramiro Arteaga is reprinted with the express permission of the authors.  No portion of the Safety Plan Template may be reproduced without the express, written permission.  You can contact the authors at [email or web address] or [email or web address].

## 2024-01-19 NOTE — PLAN OF CARE
Patient was seen walking in the hallway earlier this shift. Patient did not attend group, was socially withdrawn, reported to writer around 1740 that she had a broken plastic fork and foil from juice container, which she handed to writer, endorsed that she had the urge to hurt herself but she was not going to that and was trying to stay away from things like that because she wants to go home tomorrow, had no plan. Patient contracted for safety, was seen sitting in the quiet space area. Patient was encouraged to use coping skills, ice and lavender offered but declined. PRN hydroxyzine offered/administered to patient, endorsed that medication was effective. Patient was offered weighted blanket, which she said was helpful. Patient later came around 1945 and asked to speak to writer. Patient stated that she was having thoughts/urges to hurt herself such as head banging. Patient was offered PRN Zyprexa, which she agreed to take at first, then declined and endorsed to writer that Zyprexa made her feel tired last evening. Patient endorsed that she was going to go for a walk instead, writer offered to walk with her. 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 Patient thanked writer for support and endorsed that she was going to bed, contracts for safety again. Patient has been in bed since. No other behavioral issues observed or reported. Patient did not eat much for dinner, only ate about 15%. Patient denied pain. Patient endorsed high anxiety. Patient denied AVH/HI. Patient was compliant with medication. Patient's affect was flat/blunted, appeared sad/depressed. PRN melatonin was also offered but patient declined. Patient was pleasant/calm. Will continue to monitor and offer support.    Problem: Adult Behavioral Health Plan of Care  Goal: Plan of Care Review  Outcome: Not Progressing    Problem: Depressive  Signs/Symptoms  Goal: Optimized Energy Level (Depressive Signs/Symptoms)  Outcome: Not Progressing     Problem: Anxiety Signs/Symptoms  Goal: Optimized Energy Level (Anxiety Signs/Symptoms)  Outcome: Not Progressing    Plan of Care Reviewed With: Patient

## 2024-01-19 NOTE — PLAN OF CARE
Pt anticipated to discharged at 1530. Pt's belongings were returned. Writer reviewed AVS with pt and pt verbalized understanding. Pt denies SI/HI, agrees to contract for safety out in the community.

## 2024-01-19 NOTE — PLAN OF CARE
Patient discharged at 1540 and was picked up by her  from the [facility].  Patient took all her belongings with her and discharge meds. No behavioral concerns.

## 2024-01-19 NOTE — PLAN OF CARE
"Problem: Adult Behavioral Health Plan of Care  Goal: Adheres to Safety Considerations for Self and Others  Outcome: Progressing    Pt spends most of the shift out in the milieu, interacting with select peers. Her affect is blunted but brightens upon approach. Pt is pleasant upon interaction. Pt endorses having SIB thoughts but contracts for safety, stating \"it's usually impulsive. But I had the thoughts earlier and didn't do anything\". She endorses passive SI without a plan but states \"I don't think that will ever go away\". Pt contracts for safety both in the hospital and in the community. She denies HI/AH/VH. She expresses excitement towards possible discharge today. Pt states \"I don't like hospitals so I'm excited to get home\". Pt spoke extensively about coping strategies and how she \"has a lot of outpatient support\". Pt requested & received PRN Hydroxyzine 50 mg @ 1247 for anxiety. She reported this as effective.  "

## 2024-03-29 LAB
HEPATITIS B SURFACE ANTIGEN (EXTERNAL): NEGATIVE
HEPATITIS C ANTIBODY (EXTERNAL): NEGATIVE
HIV1+2 AB SERPL QL IA: NEGATIVE
RUBELLA ANTIBODY IGG (EXTERNAL): NORMAL

## 2024-07-07 ENCOUNTER — HEALTH MAINTENANCE LETTER (OUTPATIENT)
Age: 29
End: 2024-07-07

## 2024-09-03 ENCOUNTER — HOSPITAL ENCOUNTER (EMERGENCY)
Facility: CLINIC | Age: 29
Discharge: HOME | End: 2024-09-03
Attending: EMERGENCY MEDICINE | Admitting: EMERGENCY MEDICINE
Payer: COMMERCIAL

## 2024-09-03 VITALS
SYSTOLIC BLOOD PRESSURE: 108 MMHG | DIASTOLIC BLOOD PRESSURE: 74 MMHG | HEIGHT: 64 IN | RESPIRATION RATE: 20 BRPM | WEIGHT: 166.67 LBS | HEART RATE: 89 BPM | OXYGEN SATURATION: 98 % | TEMPERATURE: 98.4 F | BODY MASS INDEX: 28.45 KG/M2

## 2024-09-03 DIAGNOSIS — O26.893 SHORTNESS OF BREATH DUE TO PREGNANCY IN THIRD TRIMESTER: ICD-10-CM

## 2024-09-03 DIAGNOSIS — R06.02 SHORTNESS OF BREATH DUE TO PREGNANCY IN THIRD TRIMESTER: ICD-10-CM

## 2024-09-03 LAB
ATRIAL RATE - MUSE: 83 BPM
DIASTOLIC BLOOD PRESSURE - MUSE: NORMAL MMHG
INTERPRETATION ECG - MUSE: NORMAL
P AXIS - MUSE: 58 DEGREES
PR INTERVAL - MUSE: 130 MS
QRS DURATION - MUSE: 74 MS
QT - MUSE: 426 MS
QTC - MUSE: 500 MS
R AXIS - MUSE: 72 DEGREES
SYSTOLIC BLOOD PRESSURE - MUSE: NORMAL MMHG
T AXIS - MUSE: 25 DEGREES
VENTRICULAR RATE- MUSE: 83 BPM

## 2024-09-03 PROCEDURE — 93005 ELECTROCARDIOGRAM TRACING: CPT

## 2024-09-03 PROCEDURE — 99283 EMERGENCY DEPT VISIT LOW MDM: CPT

## 2024-09-03 ASSESSMENT — COLUMBIA-SUICIDE SEVERITY RATING SCALE - C-SSRS
6. HAVE YOU EVER DONE ANYTHING, STARTED TO DO ANYTHING, OR PREPARED TO DO ANYTHING TO END YOUR LIFE?: YES
1. IN THE PAST MONTH, HAVE YOU WISHED YOU WERE DEAD OR WISHED YOU COULD GO TO SLEEP AND NOT WAKE UP?: NO
2. HAVE YOU ACTUALLY HAD ANY THOUGHTS OF KILLING YOURSELF IN THE PAST MONTH?: NO

## 2024-09-03 ASSESSMENT — ACTIVITIES OF DAILY LIVING (ADL): ADLS_ACUITY_SCORE: 35

## 2024-09-03 NOTE — ED TRIAGE NOTES
Pt approx 31 weeks preg. BA 11/5/24. Annette in OB/L&D aware of pt. Pt with SOB past 24 hrs with exertion and when sitting. Denies recent cough/fever. No CP. No vag bleeding or discharge. Baby moving at baseline. ABC intact. A&Ox4.

## 2024-09-03 NOTE — Clinical Note
Writer contacted L&D; spoke with Annette NORRIS; pt complaint provided with basic patient identifying information and chief compliant. L&D Advised ED evaluate pt in ED; L&D will come to ED during pt visit to evaluate mother and fetus
DISPLAY PLAN FREE TEXT

## 2024-09-04 ENCOUNTER — HOSPITAL ENCOUNTER (EMERGENCY)
Facility: CLINIC | Age: 29
Discharge: HOME | End: 2024-09-04
Attending: EMERGENCY MEDICINE | Admitting: EMERGENCY MEDICINE
Payer: COMMERCIAL

## 2024-09-04 VITALS
BODY MASS INDEX: 29.25 KG/M2 | DIASTOLIC BLOOD PRESSURE: 78 MMHG | SYSTOLIC BLOOD PRESSURE: 120 MMHG | HEART RATE: 83 BPM | TEMPERATURE: 98.5 F | OXYGEN SATURATION: 100 % | WEIGHT: 167.77 LBS | RESPIRATION RATE: 18 BRPM

## 2024-09-04 DIAGNOSIS — R06.02 SHORTNESS OF BREATH DUE TO PREGNANCY: ICD-10-CM

## 2024-09-04 DIAGNOSIS — R94.31 PROLONGED Q-T INTERVAL ON ECG: ICD-10-CM

## 2024-09-04 DIAGNOSIS — O26.899 SHORTNESS OF BREATH DUE TO PREGNANCY: ICD-10-CM

## 2024-09-04 LAB
ALBUMIN SERPL BCG-MCNC: 3.4 G/DL (ref 3.5–5.2)
ALP SERPL-CCNC: 89 U/L (ref 40–150)
ALT SERPL W P-5'-P-CCNC: 16 U/L (ref 0–50)
ANION GAP SERPL CALCULATED.3IONS-SCNC: 14 MMOL/L (ref 7–15)
AST SERPL W P-5'-P-CCNC: 19 U/L (ref 0–45)
BASOPHILS # BLD AUTO: 0.1 10E3/UL (ref 0–0.2)
BASOPHILS NFR BLD AUTO: 0 %
BILIRUB SERPL-MCNC: 0.2 MG/DL
BUN SERPL-MCNC: 5.9 MG/DL (ref 6–20)
CALCIUM SERPL-MCNC: 9 MG/DL (ref 8.8–10.4)
CHLORIDE SERPL-SCNC: 103 MMOL/L (ref 98–107)
CREAT SERPL-MCNC: 0.56 MG/DL (ref 0.51–0.95)
EGFRCR SERPLBLD CKD-EPI 2021: >90 ML/MIN/1.73M2
EOSINOPHIL # BLD AUTO: 0.2 10E3/UL (ref 0–0.7)
EOSINOPHIL NFR BLD AUTO: 1 %
ERYTHROCYTE [DISTWIDTH] IN BLOOD BY AUTOMATED COUNT: 12.9 % (ref 10–15)
FLUAV RNA SPEC QL NAA+PROBE: NEGATIVE
FLUBV RNA RESP QL NAA+PROBE: NEGATIVE
GLUCOSE SERPL-MCNC: 78 MG/DL (ref 70–99)
HCO3 SERPL-SCNC: 18 MMOL/L (ref 22–29)
HCT VFR BLD AUTO: 31.2 % (ref 35–47)
HGB BLD-MCNC: 10.6 G/DL (ref 11.7–15.7)
IMM GRANULOCYTES # BLD: 0.1 10E3/UL
IMM GRANULOCYTES NFR BLD: 1 %
LYMPHOCYTES # BLD AUTO: 2.4 10E3/UL (ref 0.8–5.3)
LYMPHOCYTES NFR BLD AUTO: 16 %
MAGNESIUM SERPL-MCNC: 1.6 MG/DL (ref 1.7–2.3)
MCH RBC QN AUTO: 30.1 PG (ref 26.5–33)
MCHC RBC AUTO-ENTMCNC: 34 G/DL (ref 31.5–36.5)
MCV RBC AUTO: 89 FL (ref 78–100)
MONOCYTES # BLD AUTO: 1 10E3/UL (ref 0–1.3)
MONOCYTES NFR BLD AUTO: 6 %
NEUTROPHILS # BLD AUTO: 11.7 10E3/UL (ref 1.6–8.3)
NEUTROPHILS NFR BLD AUTO: 76 %
NRBC # BLD AUTO: 0 10E3/UL
NRBC BLD AUTO-RTO: 0 /100
PLATELET # BLD AUTO: 288 10E3/UL (ref 150–450)
POTASSIUM SERPL-SCNC: 3.5 MMOL/L (ref 3.4–5.3)
PROT SERPL-MCNC: 6.3 G/DL (ref 6.4–8.3)
RBC # BLD AUTO: 3.52 10E6/UL (ref 3.8–5.2)
RSV RNA SPEC NAA+PROBE: NEGATIVE
SARS-COV-2 RNA RESP QL NAA+PROBE: NEGATIVE
SODIUM SERPL-SCNC: 135 MMOL/L (ref 135–145)
WBC # BLD AUTO: 15.4 10E3/UL (ref 4–11)

## 2024-09-04 PROCEDURE — 85025 COMPLETE CBC W/AUTO DIFF WBC: CPT | Performed by: EMERGENCY MEDICINE

## 2024-09-04 PROCEDURE — 82040 ASSAY OF SERUM ALBUMIN: CPT | Performed by: EMERGENCY MEDICINE

## 2024-09-04 PROCEDURE — 99283 EMERGENCY DEPT VISIT LOW MDM: CPT

## 2024-09-04 PROCEDURE — 36415 COLL VENOUS BLD VENIPUNCTURE: CPT | Performed by: EMERGENCY MEDICINE

## 2024-09-04 PROCEDURE — 83735 ASSAY OF MAGNESIUM: CPT | Performed by: EMERGENCY MEDICINE

## 2024-09-04 PROCEDURE — 87637 SARSCOV2&INF A&B&RSV AMP PRB: CPT | Performed by: EMERGENCY MEDICINE

## 2024-09-04 ASSESSMENT — COLUMBIA-SUICIDE SEVERITY RATING SCALE - C-SSRS
6. HAVE YOU EVER DONE ANYTHING, STARTED TO DO ANYTHING, OR PREPARED TO DO ANYTHING TO END YOUR LIFE?: NO
1. IN THE PAST MONTH, HAVE YOU WISHED YOU WERE DEAD OR WISHED YOU COULD GO TO SLEEP AND NOT WAKE UP?: NO
2. HAVE YOU ACTUALLY HAD ANY THOUGHTS OF KILLING YOURSELF IN THE PAST MONTH?: NO

## 2024-09-04 ASSESSMENT — ACTIVITIES OF DAILY LIVING (ADL)
ADLS_ACUITY_SCORE: 35
ADLS_ACUITY_SCORE: 35

## 2024-09-04 NOTE — ED PROVIDER NOTES
Emergency Department Note      History of Present Illness     Chief Complaint   Shortness of Breath      HPI   Corinne N Palm is a 29 year old, 31w1d,  female with a history as noted below who presents to the ED today for evaluation of fatigue. The patient reports she has felt increasingly more fatigued for the past four days, and her heart rate has been in the 120s at rest. She also reports randomly getting very hot and having some heartburn, which hasn't been present with the rest of her pregnancy. She called the nurse line yesterday and came in to the ER for her symptoms where she got an EKG. She later found out that she had a prolonged QT and a possible left atrial enlargement result from her EKG that she wasn't told about and she expresses concern for. She mentions that her hemoglobin was 9.9 three weeks ago and believes her fatigue could be due to this. She denies any cough. Also denies any leg swelling, calf pain, or history of blood clots. She took an at home Covid-19 test that was negative. She is otherwise healthy.     Independent Historian   None    Review of External Notes   Telephone note from today reviewed, recommended patient go to the ED for further evaluation.    Past Medical History     Medical History and Problem List   OCD  PRECIOUS  High risk pregnancy, antepartum  Anorexia nervosa  Major depressive disorder  Borderline personality disorder  Depression with suicidal ideation   Suicide attempt by drug overdose    Medications   Prozac  Hydroxyzine   Lamotrigine   Lurasidone   Sertraline   Buspirone  Unisom   Ferrous sulfate  Meclizine   Pyridoxine    Surgical History   ENT surgery      Physical Exam     Patient Vitals for the past 24 hrs:   BP Temp Temp src Pulse Resp SpO2 Weight   24 1938 120/78 -- -- 83 18 100 % --   24 1737 121/81 98.5  F (36.9  C) Temporal 89 20 99 % 76.1 kg (167 lb 12.3 oz)     Physical Exam  General: Sitting on the ED chair  HEENT: Normocephalic,  atraumatic  Cardiac: Warm and well perfused, regular rate and rhythm  Pulm: Breathing comfortably, no accessory muscle usage, no conversational dyspnea, and lungs clear bilaterally  MSK: No bony deformities edema BLE, no calf tenderness BLE  Skin: Warm and dry  Neuro: Moves all extremities  Psych: Pleasant mood and affect      Diagnostics     Lab Results   Labs Ordered and Resulted from Time of ED Arrival to Time of ED Departure   COMPREHENSIVE METABOLIC PANEL - Abnormal       Result Value    Sodium 135      Potassium 3.5      Carbon Dioxide (CO2) 18 (*)     Anion Gap 14      Urea Nitrogen 5.9 (*)     Creatinine 0.56      GFR Estimate >90      Calcium 9.0      Chloride 103      Glucose 78      Alkaline Phosphatase 89      AST 19      ALT 16      Protein Total 6.3 (*)     Albumin 3.4 (*)     Bilirubin Total 0.2     MAGNESIUM - Abnormal    Magnesium 1.6 (*)    CBC WITH PLATELETS AND DIFFERENTIAL - Abnormal    WBC Count 15.4 (*)     RBC Count 3.52 (*)     Hemoglobin 10.6 (*)     Hematocrit 31.2 (*)     MCV 89      MCH 30.1      MCHC 34.0      RDW 12.9      Platelet Count 288      % Neutrophils 76      % Lymphocytes 16      % Monocytes 6      % Eosinophils 1      % Basophils 0      % Immature Granulocytes 1      NRBCs per 100 WBC 0      Absolute Neutrophils 11.7 (*)     Absolute Lymphocytes 2.4      Absolute Monocytes 1.0      Absolute Eosinophils 0.2      Absolute Basophils 0.1      Absolute Immature Granulocytes 0.1      Absolute NRBCs 0.0     INFLUENZA A/B, RSV, & SARS-COV2 PCR - Normal    Influenza A PCR Negative      Influenza B PCR Negative      RSV PCR Negative      SARS CoV2 PCR Negative       Independent Interpretation   None    ED Course      Medications Administered   Medications - No data to display    Discussion of Management   None    ED Course   ED Course as of 09/05/24 0039   Wed Sep 04, 2024   1759 I obtained history and examined the patient as noted above.    1930 I rechecked and updated the patient.         Additional Documentation  None    Medical Decision Making / Diagnosis     CMS Diagnoses: None    MIPS       None    MDM   Corinne N Palm is a 29 year old female who presents with shortness of breath as above.  Also reports some fatigue, tachycardia, hot sensation, overall consistent with a possible viral syndrome.  She is afebrile here, stable vital signs.  She also is concerned about the automated reading on yesterday's EKG.  I reviewed the EKG and it appears the machine read possible left atrial enlargement, also prolonged QTc.  Lab work was obtained today and quite reassuring.  Leukocytosis I suspect is from the pregnancy, likewise for the anemia.  Viral multiplex is negative.  I discussed the above results with the patient as well as the slightly prolonged QTc.  I do think that the above workup was reassuring and agrees.  Plan is discharge home with recommendation for PCP and OB follow-up.    Disposition   The patient was discharged.     Diagnosis     ICD-10-CM    1. Shortness of breath due to pregnancy  O26.899     R06.02       2. Prolonged Q-T interval on ECG  R94.31                  Scribe Disclosure:  I, Shagufta Julien, am serving as a scribe at 6:29 PM on 9/4/2024 to document services personally performed by Nick Rosenthal MD based on my observations and the provider's statements to me.        Nick Rosenthal MD  09/05/24 0554

## 2024-09-04 NOTE — ED TRIAGE NOTES
Patient c/o increased SOB and high heart rate, as well as increased lethargy. States she was here yesterday for the same issue but feels worse than yesterday. Called the nurse line and they instructed her to come in again. Patient is also currently 31 weeks pregnant with first child, uncomplicated pregnancy thus far. ABCs intact. VSS.

## 2024-09-15 NOTE — PROGRESS NOTES
Pt woke up several times during the night. Requested some saltines; sat in the lounge to eat, then returned back to her room. No distress or self injurious behavior observed. Staff will continue to monitor for safety.    None

## 2024-10-08 LAB — GROUP B STREPTOCOCCUS (EXTERNAL): NEGATIVE

## 2024-11-03 ENCOUNTER — HOSPITAL ENCOUNTER (OUTPATIENT)
Facility: CLINIC | Age: 29
Discharge: HOME | End: 2024-11-04
Attending: STUDENT IN AN ORGANIZED HEALTH CARE EDUCATION/TRAINING PROGRAM | Admitting: STUDENT IN AN ORGANIZED HEALTH CARE EDUCATION/TRAINING PROGRAM
Payer: COMMERCIAL

## 2024-11-03 VITALS — RESPIRATION RATE: 16 BRPM | TEMPERATURE: 98.2 F | DIASTOLIC BLOOD PRESSURE: 82 MMHG | SYSTOLIC BLOOD PRESSURE: 131 MMHG

## 2024-11-03 PROBLEM — Z36.89 ENCOUNTER FOR TRIAGE IN PREGNANT PATIENT: Status: ACTIVE | Noted: 2024-11-03

## 2024-11-03 PROCEDURE — G0463 HOSPITAL OUTPT CLINIC VISIT: HCPCS

## 2024-11-03 PROCEDURE — 87210 SMEAR WET MOUNT SALINE/INK: CPT | Performed by: STUDENT IN AN ORGANIZED HEALTH CARE EDUCATION/TRAINING PROGRAM

## 2024-11-03 RX ORDER — FLUOXETINE 10 MG/1
70 CAPSULE ORAL DAILY
Status: ON HOLD | COMMUNITY
End: 2024-11-04

## 2024-11-03 RX ORDER — FERROUS SULFATE 325(65) MG
325 TABLET ORAL EVERY OTHER DAY
COMMUNITY

## 2024-11-03 RX ORDER — VITAMIN A, VITAMIN C, VITAMIN D-3, VITAMIN E, VITAMIN B-1, VITAMIN B-2, NIACIN, VITAMIN B-6, CALCIUM, IRON, ZINC, COPPER 4000; 120; 400; 22; 1.84; 3; 20; 10; 1; 12; 200; 27; 25; 2 [IU]/1; MG/1; [IU]/1; MG/1; MG/1; MG/1; MG/1; MG/1; MG/1; UG/1; MG/1; MG/1; MG/1; MG/1
1 TABLET ORAL DAILY
COMMUNITY

## 2024-11-03 RX ORDER — LAMOTRIGINE 200 MG/1
200 TABLET ORAL DAILY
Status: ON HOLD | COMMUNITY
End: 2024-11-04

## 2024-11-03 RX ORDER — LIDOCAINE 40 MG/G
CREAM TOPICAL
Status: DISCONTINUED | OUTPATIENT
Start: 2024-11-03 | End: 2024-11-04 | Stop reason: HOSPADM

## 2024-11-03 RX ORDER — LURASIDONE HYDROCHLORIDE 80 MG/1
80 TABLET, FILM COATED ORAL
COMMUNITY

## 2024-11-03 ASSESSMENT — ACTIVITIES OF DAILY LIVING (ADL)
ADLS_ACUITY_SCORE: 0
ADLS_ACUITY_SCORE: 0

## 2024-11-04 ENCOUNTER — ANESTHESIA (OUTPATIENT)
Dept: SURGERY | Facility: CLINIC | Age: 29
End: 2024-11-04
Payer: COMMERCIAL

## 2024-11-04 ENCOUNTER — ANESTHESIA EVENT (OUTPATIENT)
Dept: SURGERY | Facility: CLINIC | Age: 29
End: 2024-11-04
Payer: COMMERCIAL

## 2024-11-04 ENCOUNTER — HOSPITAL ENCOUNTER (INPATIENT)
Facility: CLINIC | Age: 29
LOS: 2 days | Discharge: HOME | End: 2024-11-06
Attending: STUDENT IN AN ORGANIZED HEALTH CARE EDUCATION/TRAINING PROGRAM | Admitting: OBSTETRICS & GYNECOLOGY
Payer: COMMERCIAL

## 2024-11-04 DIAGNOSIS — Z98.891 S/P PRIMARY LOW TRANSVERSE C-SECTION: Primary | ICD-10-CM

## 2024-11-04 LAB
ABO/RH(D): NORMAL
ANTIBODY SCREEN: NEGATIVE
CLUE CELLS: NORMAL
CRYSTALS AMN MICRO: NORMAL
HGB BLD-MCNC: 12.3 G/DL (ref 11.7–15.7)
SPECIMEN EXPIRATION DATE: NORMAL
T PALLIDUM AB SER QL: NONREACTIVE
TRICHOMONAS, WET PREP: NORMAL
WBC'S/HIGH POWER FIELD, WET PREP: NORMAL
YEAST, WET PREP: NORMAL

## 2024-11-04 PROCEDURE — 250N000009 HC RX 250: Performed by: NURSE ANESTHETIST, CERTIFIED REGISTERED

## 2024-11-04 PROCEDURE — 250N000013 HC RX MED GY IP 250 OP 250 PS 637: Performed by: OBSTETRICS & GYNECOLOGY

## 2024-11-04 PROCEDURE — 999N000080 HC STATISTIC IP LACTATION SERVICES 16-30 MIN

## 2024-11-04 PROCEDURE — 272N000001 HC OR GENERAL SUPPLY STERILE: Performed by: OBSTETRICS & GYNECOLOGY

## 2024-11-04 PROCEDURE — 250N000011 HC RX IP 250 OP 636: Performed by: NURSE ANESTHETIST, CERTIFIED REGISTERED

## 2024-11-04 PROCEDURE — 710N000009 HC RECOVERY PHASE 1, LEVEL 1, PER MIN: Performed by: OBSTETRICS & GYNECOLOGY

## 2024-11-04 PROCEDURE — 250N000011 HC RX IP 250 OP 636: Performed by: STUDENT IN AN ORGANIZED HEALTH CARE EDUCATION/TRAINING PROGRAM

## 2024-11-04 PROCEDURE — 258N000003 HC RX IP 258 OP 636: Performed by: STUDENT IN AN ORGANIZED HEALTH CARE EDUCATION/TRAINING PROGRAM

## 2024-11-04 PROCEDURE — 250N000011 HC RX IP 250 OP 636: Performed by: OBSTETRICS & GYNECOLOGY

## 2024-11-04 PROCEDURE — G0463 HOSPITAL OUTPT CLINIC VISIT: HCPCS

## 2024-11-04 PROCEDURE — 85018 HEMOGLOBIN: CPT | Performed by: STUDENT IN AN ORGANIZED HEALTH CARE EDUCATION/TRAINING PROGRAM

## 2024-11-04 PROCEDURE — 120N000001 HC R&B MED SURG/OB

## 2024-11-04 PROCEDURE — 86900 BLOOD TYPING SEROLOGIC ABO: CPT | Performed by: STUDENT IN AN ORGANIZED HEALTH CARE EDUCATION/TRAINING PROGRAM

## 2024-11-04 PROCEDURE — 258N000003 HC RX IP 258 OP 636: Performed by: OBSTETRICS & GYNECOLOGY

## 2024-11-04 PROCEDURE — 360N000076 HC SURGERY LEVEL 3, PER MIN: Performed by: OBSTETRICS & GYNECOLOGY

## 2024-11-04 PROCEDURE — 370N000017 HC ANESTHESIA TECHNICAL FEE, PER MIN: Performed by: OBSTETRICS & GYNECOLOGY

## 2024-11-04 PROCEDURE — 86780 TREPONEMA PALLIDUM: CPT | Performed by: STUDENT IN AN ORGANIZED HEALTH CARE EDUCATION/TRAINING PROGRAM

## 2024-11-04 PROCEDURE — 250N000013 HC RX MED GY IP 250 OP 250 PS 637: Performed by: STUDENT IN AN ORGANIZED HEALTH CARE EDUCATION/TRAINING PROGRAM

## 2024-11-04 RX ORDER — AZITHROMYCIN 500 MG/5ML
INJECTION, POWDER, LYOPHILIZED, FOR SOLUTION INTRAVENOUS
Status: DISCONTINUED
Start: 2024-11-04 | End: 2024-11-04 | Stop reason: HOSPADM

## 2024-11-04 RX ORDER — MISOPROSTOL 200 UG/1
400 TABLET ORAL
Status: DISCONTINUED | OUTPATIENT
Start: 2024-11-04 | End: 2024-11-06 | Stop reason: HOSPADM

## 2024-11-04 RX ORDER — FLUOXETINE 10 MG/1
CAPSULE ORAL
COMMUNITY

## 2024-11-04 RX ORDER — METHYLERGONOVINE MALEATE 0.2 MG/ML
200 INJECTION INTRAVENOUS
Status: DISCONTINUED | OUTPATIENT
Start: 2024-11-04 | End: 2024-11-04

## 2024-11-04 RX ORDER — NALOXONE HYDROCHLORIDE 0.4 MG/ML
0.4 INJECTION, SOLUTION INTRAMUSCULAR; INTRAVENOUS; SUBCUTANEOUS
Status: DISCONTINUED | OUTPATIENT
Start: 2024-11-04 | End: 2024-11-04

## 2024-11-04 RX ORDER — FENTANYL CITRATE 50 UG/ML
INJECTION, SOLUTION INTRAMUSCULAR; INTRAVENOUS PRN
Status: DISCONTINUED | OUTPATIENT
Start: 2024-11-04 | End: 2024-11-04

## 2024-11-04 RX ORDER — CARBOPROST TROMETHAMINE 250 UG/ML
250 INJECTION, SOLUTION INTRAMUSCULAR
Status: DISCONTINUED | OUTPATIENT
Start: 2024-11-04 | End: 2024-11-04 | Stop reason: HOSPADM

## 2024-11-04 RX ORDER — BISACODYL 10 MG
10 SUPPOSITORY, RECTAL RECTAL DAILY PRN
Status: DISCONTINUED | OUTPATIENT
Start: 2024-11-06 | End: 2024-11-06 | Stop reason: HOSPADM

## 2024-11-04 RX ORDER — NALBUPHINE HYDROCHLORIDE 10 MG/ML
2.5-5 INJECTION INTRAMUSCULAR; INTRAVENOUS; SUBCUTANEOUS EVERY 6 HOURS PRN
Status: DISCONTINUED | OUTPATIENT
Start: 2024-11-04 | End: 2024-11-04

## 2024-11-04 RX ORDER — TRANEXAMIC ACID 10 MG/ML
1 INJECTION, SOLUTION INTRAVENOUS EVERY 30 MIN PRN
Status: DISCONTINUED | OUTPATIENT
Start: 2024-11-04 | End: 2024-11-04

## 2024-11-04 RX ORDER — CEFAZOLIN SODIUM/WATER 2 G/20 ML
2 SYRINGE (ML) INTRAVENOUS SEE ADMIN INSTRUCTIONS
Status: DISCONTINUED | OUTPATIENT
Start: 2024-11-04 | End: 2024-11-04 | Stop reason: HOSPADM

## 2024-11-04 RX ORDER — IBUPROFEN 800 MG/1
800 TABLET, FILM COATED ORAL
Status: DISCONTINUED | OUTPATIENT
Start: 2024-11-04 | End: 2024-11-04

## 2024-11-04 RX ORDER — NALOXONE HYDROCHLORIDE 0.4 MG/ML
0.2 INJECTION, SOLUTION INTRAMUSCULAR; INTRAVENOUS; SUBCUTANEOUS
Status: DISCONTINUED | OUTPATIENT
Start: 2024-11-04 | End: 2024-11-04

## 2024-11-04 RX ORDER — ONDANSETRON 2 MG/ML
INJECTION INTRAMUSCULAR; INTRAVENOUS PRN
Status: DISCONTINUED | OUTPATIENT
Start: 2024-11-04 | End: 2024-11-04

## 2024-11-04 RX ORDER — LAMOTRIGINE 200 MG/1
400 TABLET ORAL EVERY MORNING
COMMUNITY

## 2024-11-04 RX ORDER — SIMETHICONE 80 MG
80 TABLET,CHEWABLE ORAL 4 TIMES DAILY PRN
Status: DISCONTINUED | OUTPATIENT
Start: 2024-11-04 | End: 2024-11-06 | Stop reason: HOSPADM

## 2024-11-04 RX ORDER — ONDANSETRON 2 MG/ML
4 INJECTION INTRAMUSCULAR; INTRAVENOUS EVERY 6 HOURS PRN
Status: DISCONTINUED | OUTPATIENT
Start: 2024-11-04 | End: 2024-11-06 | Stop reason: HOSPADM

## 2024-11-04 RX ORDER — METOCLOPRAMIDE 10 MG/1
10 TABLET ORAL EVERY 6 HOURS PRN
Status: DISCONTINUED | OUTPATIENT
Start: 2024-11-04 | End: 2024-11-04

## 2024-11-04 RX ORDER — LOPERAMIDE HYDROCHLORIDE 2 MG/1
2 CAPSULE ORAL
Status: DISCONTINUED | OUTPATIENT
Start: 2024-11-04 | End: 2024-11-06 | Stop reason: HOSPADM

## 2024-11-04 RX ORDER — LAMOTRIGINE 100 MG/1
200 TABLET, EXTENDED RELEASE ORAL DAILY
Status: DISCONTINUED | OUTPATIENT
Start: 2024-11-04 | End: 2024-11-06 | Stop reason: HOSPADM

## 2024-11-04 RX ORDER — OXYTOCIN 10 [USP'U]/ML
10 INJECTION, SOLUTION INTRAMUSCULAR; INTRAVENOUS
Status: DISCONTINUED | OUTPATIENT
Start: 2024-11-04 | End: 2024-11-04

## 2024-11-04 RX ORDER — MISOPROSTOL 200 UG/1
800 TABLET ORAL
Status: DISCONTINUED | OUTPATIENT
Start: 2024-11-04 | End: 2024-11-06 | Stop reason: HOSPADM

## 2024-11-04 RX ORDER — METHYLERGONOVINE MALEATE 0.2 MG/ML
200 INJECTION INTRAVENOUS
Status: DISCONTINUED | OUTPATIENT
Start: 2024-11-04 | End: 2024-11-06 | Stop reason: HOSPADM

## 2024-11-04 RX ORDER — LIDOCAINE 40 MG/G
CREAM TOPICAL
Status: DISCONTINUED | OUTPATIENT
Start: 2024-11-04 | End: 2024-11-04

## 2024-11-04 RX ORDER — OXYTOCIN/0.9 % SODIUM CHLORIDE 30/500 ML
100-340 PLASTIC BAG, INJECTION (ML) INTRAVENOUS CONTINUOUS PRN
Status: DISCONTINUED | OUTPATIENT
Start: 2024-11-04 | End: 2024-11-04

## 2024-11-04 RX ORDER — METOCLOPRAMIDE 10 MG/1
10 TABLET ORAL EVERY 6 HOURS PRN
Status: DISCONTINUED | OUTPATIENT
Start: 2024-11-04 | End: 2024-11-06 | Stop reason: HOSPADM

## 2024-11-04 RX ORDER — SODIUM PHOSPHATE,MONO-DIBASIC 19G-7G/118
1 ENEMA (ML) RECTAL DAILY PRN
Status: DISCONTINUED | OUTPATIENT
Start: 2024-11-06 | End: 2024-11-06 | Stop reason: HOSPADM

## 2024-11-04 RX ORDER — MISOPROSTOL 200 UG/1
400 TABLET ORAL
Status: DISCONTINUED | OUTPATIENT
Start: 2024-11-04 | End: 2024-11-04

## 2024-11-04 RX ORDER — PHENYLEPHRINE HCL IN 0.9% NACL 50MG/250ML
PLASTIC BAG, INJECTION (ML) INTRAVENOUS CONTINUOUS PRN
Status: DISCONTINUED | OUTPATIENT
Start: 2024-11-04 | End: 2024-11-04

## 2024-11-04 RX ORDER — CITRIC ACID/SODIUM CITRATE 334-500MG
30 SOLUTION, ORAL ORAL
Status: DISCONTINUED | OUTPATIENT
Start: 2024-11-04 | End: 2024-11-04 | Stop reason: HOSPADM

## 2024-11-04 RX ORDER — ONDANSETRON 4 MG/1
4 TABLET, ORALLY DISINTEGRATING ORAL EVERY 6 HOURS PRN
Status: DISCONTINUED | OUTPATIENT
Start: 2024-11-04 | End: 2024-11-06 | Stop reason: HOSPADM

## 2024-11-04 RX ORDER — OXYTOCIN/0.9 % SODIUM CHLORIDE 30/500 ML
340 PLASTIC BAG, INJECTION (ML) INTRAVENOUS CONTINUOUS PRN
Status: DISCONTINUED | OUTPATIENT
Start: 2024-11-04 | End: 2024-11-04 | Stop reason: HOSPADM

## 2024-11-04 RX ORDER — HYDROCORTISONE 25 MG/G
CREAM TOPICAL 3 TIMES DAILY PRN
Status: DISCONTINUED | OUTPATIENT
Start: 2024-11-04 | End: 2024-11-06 | Stop reason: HOSPADM

## 2024-11-04 RX ORDER — ONDANSETRON 2 MG/ML
4 INJECTION INTRAMUSCULAR; INTRAVENOUS EVERY 6 HOURS PRN
Status: DISCONTINUED | OUTPATIENT
Start: 2024-11-04 | End: 2024-11-04

## 2024-11-04 RX ORDER — LOPERAMIDE HYDROCHLORIDE 2 MG/1
4 CAPSULE ORAL
Status: DISCONTINUED | OUTPATIENT
Start: 2024-11-04 | End: 2024-11-06 | Stop reason: HOSPADM

## 2024-11-04 RX ORDER — LURASIDONE HYDROCHLORIDE 40 MG/1
80 TABLET, FILM COATED ORAL EVERY EVENING
Status: DISCONTINUED | OUTPATIENT
Start: 2024-11-04 | End: 2024-11-06 | Stop reason: HOSPADM

## 2024-11-04 RX ORDER — LOPERAMIDE HYDROCHLORIDE 2 MG/1
2 CAPSULE ORAL
Status: DISCONTINUED | OUTPATIENT
Start: 2024-11-04 | End: 2024-11-04 | Stop reason: HOSPADM

## 2024-11-04 RX ORDER — CITRIC ACID/SODIUM CITRATE 334-500MG
30 SOLUTION, ORAL ORAL
Status: COMPLETED | OUTPATIENT
Start: 2024-11-04 | End: 2024-11-04

## 2024-11-04 RX ORDER — OXYTOCIN/0.9 % SODIUM CHLORIDE 30/500 ML
340 PLASTIC BAG, INJECTION (ML) INTRAVENOUS CONTINUOUS PRN
Status: DISCONTINUED | OUTPATIENT
Start: 2024-11-04 | End: 2024-11-06 | Stop reason: HOSPADM

## 2024-11-04 RX ORDER — KETOROLAC TROMETHAMINE 30 MG/ML
30 INJECTION, SOLUTION INTRAMUSCULAR; INTRAVENOUS
Status: DISCONTINUED | OUTPATIENT
Start: 2024-11-04 | End: 2024-11-04

## 2024-11-04 RX ORDER — MAGNESIUM OXIDE 400 MG/1
400 TABLET ORAL DAILY
COMMUNITY

## 2024-11-04 RX ORDER — DEXTROSE, SODIUM CHLORIDE, SODIUM LACTATE, POTASSIUM CHLORIDE, AND CALCIUM CHLORIDE 5; .6; .31; .03; .02 G/100ML; G/100ML; G/100ML; G/100ML; G/100ML
INJECTION, SOLUTION INTRAVENOUS CONTINUOUS
Status: DISCONTINUED | OUTPATIENT
Start: 2024-11-04 | End: 2024-11-05

## 2024-11-04 RX ORDER — LOPERAMIDE HYDROCHLORIDE 2 MG/1
4 CAPSULE ORAL
Status: DISCONTINUED | OUTPATIENT
Start: 2024-11-04 | End: 2024-11-04

## 2024-11-04 RX ORDER — DEXAMETHASONE SODIUM PHOSPHATE 4 MG/ML
INJECTION, SOLUTION INTRA-ARTICULAR; INTRALESIONAL; INTRAMUSCULAR; INTRAVENOUS; SOFT TISSUE PRN
Status: DISCONTINUED | OUTPATIENT
Start: 2024-11-04 | End: 2024-11-04

## 2024-11-04 RX ORDER — LIDOCAINE 40 MG/G
CREAM TOPICAL
Status: DISCONTINUED | OUTPATIENT
Start: 2024-11-04 | End: 2024-11-06 | Stop reason: HOSPADM

## 2024-11-04 RX ORDER — AMOXICILLIN 250 MG
2 CAPSULE ORAL 2 TIMES DAILY
Status: DISCONTINUED | OUTPATIENT
Start: 2024-11-04 | End: 2024-11-06 | Stop reason: HOSPADM

## 2024-11-04 RX ORDER — FENTANYL/BUPIVACAINE/NS/PF 2-1250MCG
PLASTIC BAG, INJECTION (ML) INJECTION
Status: COMPLETED
Start: 2024-11-04 | End: 2024-11-04

## 2024-11-04 RX ORDER — FENTANYL CITRATE 50 UG/ML
100 INJECTION, SOLUTION INTRAMUSCULAR; INTRAVENOUS
Status: DISCONTINUED | OUTPATIENT
Start: 2024-11-04 | End: 2024-11-04

## 2024-11-04 RX ORDER — TRANEXAMIC ACID 10 MG/ML
1 INJECTION, SOLUTION INTRAVENOUS EVERY 30 MIN PRN
Status: DISCONTINUED | OUTPATIENT
Start: 2024-11-04 | End: 2024-11-04 | Stop reason: HOSPADM

## 2024-11-04 RX ORDER — LIDOCAINE 40 MG/G
CREAM TOPICAL
Status: DISCONTINUED | OUTPATIENT
Start: 2024-11-04 | End: 2024-11-04 | Stop reason: HOSPADM

## 2024-11-04 RX ORDER — NALOXONE HYDROCHLORIDE 0.4 MG/ML
0.4 INJECTION, SOLUTION INTRAMUSCULAR; INTRAVENOUS; SUBCUTANEOUS
Status: DISCONTINUED | OUTPATIENT
Start: 2024-11-04 | End: 2024-11-06 | Stop reason: HOSPADM

## 2024-11-04 RX ORDER — OXYTOCIN/0.9 % SODIUM CHLORIDE 30/500 ML
340 PLASTIC BAG, INJECTION (ML) INTRAVENOUS CONTINUOUS PRN
Status: DISCONTINUED | OUTPATIENT
Start: 2024-11-04 | End: 2024-11-04

## 2024-11-04 RX ORDER — CARBOPROST TROMETHAMINE 250 UG/ML
250 INJECTION, SOLUTION INTRAMUSCULAR
Status: DISCONTINUED | OUTPATIENT
Start: 2024-11-04 | End: 2024-11-04

## 2024-11-04 RX ORDER — FENTANYL CITRATE-0.9 % NACL/PF 10 MCG/ML
100 PLASTIC BAG, INJECTION (ML) INTRAVENOUS EVERY 5 MIN PRN
Status: DISCONTINUED | OUTPATIENT
Start: 2024-11-04 | End: 2024-11-04

## 2024-11-04 RX ORDER — ACETAMINOPHEN 325 MG/1
975 TABLET ORAL ONCE
Status: COMPLETED | OUTPATIENT
Start: 2024-11-04 | End: 2024-11-04

## 2024-11-04 RX ORDER — METOCLOPRAMIDE HYDROCHLORIDE 5 MG/ML
10 INJECTION INTRAMUSCULAR; INTRAVENOUS EVERY 6 HOURS PRN
Status: DISCONTINUED | OUTPATIENT
Start: 2024-11-04 | End: 2024-11-06 | Stop reason: HOSPADM

## 2024-11-04 RX ORDER — NALBUPHINE HYDROCHLORIDE 10 MG/ML
2.5-5 INJECTION INTRAMUSCULAR; INTRAVENOUS; SUBCUTANEOUS EVERY 6 HOURS PRN
Status: DISCONTINUED | OUTPATIENT
Start: 2024-11-04 | End: 2024-11-06 | Stop reason: HOSPADM

## 2024-11-04 RX ORDER — LOPERAMIDE HYDROCHLORIDE 2 MG/1
4 CAPSULE ORAL
Status: DISCONTINUED | OUTPATIENT
Start: 2024-11-04 | End: 2024-11-04 | Stop reason: HOSPADM

## 2024-11-04 RX ORDER — METHYLERGONOVINE MALEATE 0.2 MG/ML
200 INJECTION INTRAVENOUS
Status: DISCONTINUED | OUTPATIENT
Start: 2024-11-04 | End: 2024-11-04 | Stop reason: HOSPADM

## 2024-11-04 RX ORDER — KETOROLAC TROMETHAMINE 30 MG/ML
INJECTION, SOLUTION INTRAMUSCULAR; INTRAVENOUS PRN
Status: DISCONTINUED | OUTPATIENT
Start: 2024-11-04 | End: 2024-11-04

## 2024-11-04 RX ORDER — MISOPROSTOL 200 UG/1
800 TABLET ORAL
Status: DISCONTINUED | OUTPATIENT
Start: 2024-11-04 | End: 2024-11-04

## 2024-11-04 RX ORDER — MISOPROSTOL 200 UG/1
400 TABLET ORAL
Status: DISCONTINUED | OUTPATIENT
Start: 2024-11-04 | End: 2024-11-04 | Stop reason: HOSPADM

## 2024-11-04 RX ORDER — PROCHLORPERAZINE MALEATE 10 MG
10 TABLET ORAL EVERY 6 HOURS PRN
Status: DISCONTINUED | OUTPATIENT
Start: 2024-11-04 | End: 2024-11-04

## 2024-11-04 RX ORDER — ACETAMINOPHEN 325 MG/1
975 TABLET ORAL EVERY 6 HOURS
Status: DISCONTINUED | OUTPATIENT
Start: 2024-11-04 | End: 2024-11-06 | Stop reason: HOSPADM

## 2024-11-04 RX ORDER — SODIUM CHLORIDE, SODIUM LACTATE, POTASSIUM CHLORIDE, CALCIUM CHLORIDE 600; 310; 30; 20 MG/100ML; MG/100ML; MG/100ML; MG/100ML
INJECTION, SOLUTION INTRAVENOUS CONTINUOUS
Status: DISCONTINUED | OUTPATIENT
Start: 2024-11-04 | End: 2024-11-04 | Stop reason: HOSPADM

## 2024-11-04 RX ORDER — MODIFIED LANOLIN
OINTMENT (GRAM) TOPICAL
Status: DISCONTINUED | OUTPATIENT
Start: 2024-11-04 | End: 2024-11-06 | Stop reason: HOSPADM

## 2024-11-04 RX ORDER — METOCLOPRAMIDE HYDROCHLORIDE 5 MG/ML
10 INJECTION INTRAMUSCULAR; INTRAVENOUS EVERY 6 HOURS PRN
Status: DISCONTINUED | OUTPATIENT
Start: 2024-11-04 | End: 2024-11-04

## 2024-11-04 RX ORDER — OXYTOCIN 10 [USP'U]/ML
10 INJECTION, SOLUTION INTRAMUSCULAR; INTRAVENOUS
Status: DISCONTINUED | OUTPATIENT
Start: 2024-11-04 | End: 2024-11-06 | Stop reason: HOSPADM

## 2024-11-04 RX ORDER — TRANEXAMIC ACID 10 MG/ML
1 INJECTION, SOLUTION INTRAVENOUS EVERY 30 MIN PRN
Status: DISCONTINUED | OUTPATIENT
Start: 2024-11-04 | End: 2024-11-06 | Stop reason: HOSPADM

## 2024-11-04 RX ORDER — MISOPROSTOL 200 UG/1
800 TABLET ORAL
Status: DISCONTINUED | OUTPATIENT
Start: 2024-11-04 | End: 2024-11-04 | Stop reason: HOSPADM

## 2024-11-04 RX ORDER — OXYTOCIN 10 [USP'U]/ML
10 INJECTION, SOLUTION INTRAMUSCULAR; INTRAVENOUS
Status: DISCONTINUED | OUTPATIENT
Start: 2024-11-04 | End: 2024-11-04 | Stop reason: HOSPADM

## 2024-11-04 RX ORDER — KETOROLAC TROMETHAMINE 30 MG/ML
30 INJECTION, SOLUTION INTRAMUSCULAR; INTRAVENOUS EVERY 6 HOURS
Status: COMPLETED | OUTPATIENT
Start: 2024-11-04 | End: 2024-11-05

## 2024-11-04 RX ORDER — IBUPROFEN 800 MG/1
800 TABLET, FILM COATED ORAL EVERY 6 HOURS
Status: DISCONTINUED | OUTPATIENT
Start: 2024-11-05 | End: 2024-11-06 | Stop reason: HOSPADM

## 2024-11-04 RX ORDER — OXYTOCIN/0.9 % SODIUM CHLORIDE 30/500 ML
PLASTIC BAG, INJECTION (ML) INTRAVENOUS PRN
Status: DISCONTINUED | OUTPATIENT
Start: 2024-11-04 | End: 2024-11-04

## 2024-11-04 RX ORDER — NALOXONE HYDROCHLORIDE 0.4 MG/ML
0.2 INJECTION, SOLUTION INTRAMUSCULAR; INTRAVENOUS; SUBCUTANEOUS
Status: DISCONTINUED | OUTPATIENT
Start: 2024-11-04 | End: 2024-11-06 | Stop reason: HOSPADM

## 2024-11-04 RX ORDER — CARBOPROST TROMETHAMINE 250 UG/ML
250 INJECTION, SOLUTION INTRAMUSCULAR
Status: DISCONTINUED | OUTPATIENT
Start: 2024-11-04 | End: 2024-11-06 | Stop reason: HOSPADM

## 2024-11-04 RX ORDER — PROCHLORPERAZINE MALEATE 10 MG
10 TABLET ORAL EVERY 6 HOURS PRN
Status: DISCONTINUED | OUTPATIENT
Start: 2024-11-04 | End: 2024-11-06 | Stop reason: HOSPADM

## 2024-11-04 RX ORDER — ONDANSETRON 4 MG/1
4 TABLET, ORALLY DISINTEGRATING ORAL EVERY 6 HOURS PRN
Status: DISCONTINUED | OUTPATIENT
Start: 2024-11-04 | End: 2024-11-04

## 2024-11-04 RX ORDER — AZITHROMYCIN 500 MG/5ML
500 INJECTION, POWDER, LYOPHILIZED, FOR SOLUTION INTRAVENOUS
Status: COMPLETED | OUTPATIENT
Start: 2024-11-04 | End: 2024-11-04

## 2024-11-04 RX ORDER — CEFAZOLIN SODIUM/WATER 2 G/20 ML
2 SYRINGE (ML) INTRAVENOUS
Status: COMPLETED | OUTPATIENT
Start: 2024-11-04 | End: 2024-11-04

## 2024-11-04 RX ORDER — FLUOXETINE 40 MG/1
CAPSULE ORAL
COMMUNITY

## 2024-11-04 RX ORDER — AMOXICILLIN 250 MG
1 CAPSULE ORAL 2 TIMES DAILY
Status: DISCONTINUED | OUTPATIENT
Start: 2024-11-04 | End: 2024-11-06 | Stop reason: HOSPADM

## 2024-11-04 RX ORDER — LOPERAMIDE HYDROCHLORIDE 2 MG/1
2 CAPSULE ORAL
Status: DISCONTINUED | OUTPATIENT
Start: 2024-11-04 | End: 2024-11-04

## 2024-11-04 RX ORDER — OXYCODONE HYDROCHLORIDE 5 MG/1
5 TABLET ORAL EVERY 4 HOURS PRN
Status: DISCONTINUED | OUTPATIENT
Start: 2024-11-04 | End: 2024-11-06 | Stop reason: HOSPADM

## 2024-11-04 RX ORDER — SODIUM CHLORIDE, SODIUM LACTATE, POTASSIUM CHLORIDE, CALCIUM CHLORIDE 600; 310; 30; 20 MG/100ML; MG/100ML; MG/100ML; MG/100ML
INJECTION, SOLUTION INTRAVENOUS CONTINUOUS
Status: DISCONTINUED | OUTPATIENT
Start: 2024-11-04 | End: 2024-11-04

## 2024-11-04 RX ADMIN — ONDANSETRON 4 MG: 2 INJECTION INTRAMUSCULAR; INTRAVENOUS at 06:23

## 2024-11-04 RX ADMIN — SODIUM CITRATE AND CITRIC ACID MONOHYDRATE 30 ML: 500; 334 SOLUTION ORAL at 09:48

## 2024-11-04 RX ADMIN — Medication 500 ML: at 10:37

## 2024-11-04 RX ADMIN — ACETAMINOPHEN 975 MG: 325 TABLET, FILM COATED ORAL at 16:19

## 2024-11-04 RX ADMIN — KETOROLAC TROMETHAMINE 30 MG: 30 INJECTION, SOLUTION INTRAMUSCULAR at 17:19

## 2024-11-04 RX ADMIN — ACETAMINOPHEN 975 MG: 325 TABLET, FILM COATED ORAL at 21:48

## 2024-11-04 RX ADMIN — FENTANYL CITRATE 100 MCG: 50 INJECTION INTRAMUSCULAR; INTRAVENOUS at 11:07

## 2024-11-04 RX ADMIN — Medication 0.2 MCG/KG/MIN: at 10:05

## 2024-11-04 RX ADMIN — LIDOCAINE HYDROCHLORIDE 200 MG: 20 INJECTION INTRAVENOUS at 10:09

## 2024-11-04 RX ADMIN — ONDANSETRON 4 MG: 2 INJECTION INTRAMUSCULAR; INTRAVENOUS at 10:20

## 2024-11-04 RX ADMIN — ACETAMINOPHEN 975 MG: 325 TABLET, FILM COATED ORAL at 09:48

## 2024-11-04 RX ADMIN — KETOROLAC TROMETHAMINE 15 MG: 30 INJECTION, SOLUTION INTRAMUSCULAR at 11:04

## 2024-11-04 RX ADMIN — Medication 2 G: at 10:05

## 2024-11-04 RX ADMIN — Medication: at 09:19

## 2024-11-04 RX ADMIN — SODIUM CHLORIDE, POTASSIUM CHLORIDE, SODIUM LACTATE AND CALCIUM CHLORIDE: 600; 310; 30; 20 INJECTION, SOLUTION INTRAVENOUS at 06:24

## 2024-11-04 RX ADMIN — KETOROLAC TROMETHAMINE 30 MG: 30 INJECTION, SOLUTION INTRAMUSCULAR at 23:46

## 2024-11-04 RX ADMIN — DEXAMETHASONE SODIUM PHOSPHATE 4 MG: 4 INJECTION, SOLUTION INTRA-ARTICULAR; INTRALESIONAL; INTRAMUSCULAR; INTRAVENOUS; SOFT TISSUE at 10:20

## 2024-11-04 RX ADMIN — AZITHROMYCIN MONOHYDRATE 500 MG: 500 INJECTION, POWDER, LYOPHILIZED, FOR SOLUTION INTRAVENOUS at 09:50

## 2024-11-04 RX ADMIN — SENNOSIDES AND DOCUSATE SODIUM 1 TABLET: 8.6; 5 TABLET ORAL at 20:44

## 2024-11-04 RX ADMIN — LURASIDONE HYDROCHLORIDE 80 MG: 40 TABLET, COATED ORAL at 21:00

## 2024-11-04 RX ADMIN — SODIUM CHLORIDE, POTASSIUM CHLORIDE, SODIUM LACTATE AND CALCIUM CHLORIDE: 600; 310; 30; 20 INJECTION, SOLUTION INTRAVENOUS at 10:17

## 2024-11-04 RX ADMIN — SODIUM CHLORIDE, POTASSIUM CHLORIDE, SODIUM LACTATE AND CALCIUM CHLORIDE 1000 ML: 600; 310; 30; 20 INJECTION, SOLUTION INTRAVENOUS at 21:42

## 2024-11-04 RX ADMIN — AZITHROMYCIN 500 MG: 500 INJECTION, POWDER, LYOPHILIZED, FOR SOLUTION INTRAVENOUS at 09:50

## 2024-11-04 ASSESSMENT — ACTIVITIES OF DAILY LIVING (ADL)
ADLS_ACUITY_SCORE: 0

## 2024-11-04 NOTE — PROVIDER NOTIFICATION
11/04/24 0856   Provider Notification   Provider Name/Title Dr. Quick   Method of Notification At Bedside   Comments   Nursing Comments Epidurl procedure explained including risks. Patient agrees to proceed with epidural. Time out completed.

## 2024-11-04 NOTE — L&D DELIVERY NOTE
"Pt was admitted in early labor, noting no cervical change and non reassuring fetal heart tones consistent with recurrent late decelerations for which PCD was recommended and pt was amenable.     Please see op note for further details     Dr. Limon   [phone]      Delivery Summary    Corinne N Palm MRN# [id number]   Age: 29 year old YOB: 1995          Cande Lawrence-Corinne [[id number]]      Rupture date/time: 24 0720   Rupture type: Spontaneous Rupture of Membranes  Fluid color: Clear  Fluid odor: Normal       Delivery/Placenta Date and Time      Delivery Date: 24 Delivery Time: 10:36 AM   Delivering clinician: Bia Crane MD              Apgars    Living status: Living   1 Minute 5 Minute 10 Minute 15 Minute 20 Minute   Skin color:        Heart rate:        Reflex irritability:        Muscle tone:        Respiratory effort:        Total:               Cord      Vessels: 3 Vessels    Cord Complications: None               Cord Blood Disposition: Discard    Gases Sent?: No    Delayed cord clamping?: Yes    Cord Clamping Delay (seconds): 31-60 seconds    Stem cell collection?: No           [place] Resuscitation    Methods: None  Output in Delivery Room: Stool       [place] Measurements      Weight: 8 lb 0.4 oz Length: 1' 8\"     Head circumference: 35.6 cm    Output in delivery room: Stool       Labor Events and Shoulder Dystocia    Fetal Tracing Prior to Delivery: Category 2       Delivery (Maternal) (Provider to Complete) ([id number])           Blood Loss  Mother: Lida Lawrence #[id number]     Start of Mother's Information      Delivery Blood Loss   Intrapartum & Postpartum: 24 1028 - 24 1117    Delivery Admission: 24 0517 - 24 1117         Intrapartum & Postpartum Delivery Admission    Total Surgical QBL Blood Loss (mL) Hospital Encounter 798 mL 798 mL    Total  798 mL 798 mL               End of Mother's Information  Mother: Lida Lawrence N #[id number]      "           Delivery - Provider to Complete ([id number])    Delivering clinician: Bia Crane MD  Delivery Type (Choose the 1 that will go to the Birth History): , Low Transverse                          Priority: Urgent      Specifics: Primary nulliparous     Indications for : Fetal intolerance                       Placenta    Removal: Manual Removal  Disposition: Hospital disposal                    Bia Ventura MD

## 2024-11-04 NOTE — PLAN OF CARE
Data: Patient assessed in the [facility] for leaking vaginal fluid, pelvic pain.  Cervical exam posterior, fingertip dilated, effaced 50-70%, and firm.  Membranes intact.  Contractions/uterine assessment contractions regular at every 2-5 minutes and palpate moderate. FHR moderate variability, +accels and one variable.  Action:  Presumed adequate fetal oxygenation documented (see flow record). Discharge instructions reviewed.  Patient instructed to report change in fetal movement, vaginal leaking of fluid or bleeding, abdominal pain, or any concerns related to the pregnancy to her nurse/physician.    Response: Orders to discharge home per Aleah armendariz.  Patient verbalized understanding of education and verbalized agreement with plan. Discharged to home at 0020.

## 2024-11-04 NOTE — ANESTHESIA PREPROCEDURE EVALUATION
Anesthesia Pre-Procedure Evaluation    Patient: Corinne N Palm   MRN: [id number] : 1995        Procedure :           Past Medical History:   Diagnosis Date    Anorexia nervosa (H)     PRECIOUS (generalized anxiety disorder)     MDD (major depressive disorder)     OCD (obsessive compulsive disorder)       Past Surgical History:   Procedure Laterality Date    ENT SURGERY      cleft lip repair      Allergies   Allergen Reactions    Morphine Other (See Comments)     Twitching    Amoxicillin Rash    Sulfa Antibiotics Rash      Social History     Tobacco Use    Smoking status: Never    Smokeless tobacco: Not on file   Substance Use Topics    Alcohol use: No      Wt Readings from Last 1 Encounters:   24 80.3 kg (177 lb)        Anesthesia Evaluation   Pt has not had prior anesthetic         ROS/MED HX  ENT/Pulmonary:  - neg pulmonary ROS     Neurologic:  - neg neurologic ROS     Cardiovascular:  - neg cardiovascular ROS     METS/Exercise Tolerance:     Hematologic:  - neg hematologic  ROS     Musculoskeletal:       GI/Hepatic:  - neg GI/hepatic ROS     Renal/Genitourinary:       Endo:  - neg endo ROS     Psychiatric/Substance Use:  - neg psychiatric ROS     Infectious Disease:       Malignancy:       Other:            Physical Exam    Airway        Mallampati: II   TM distance: > 3 FB   Neck ROM: full   Mouth opening: > 3 cm    Respiratory Devices and Support         Dental  no notable dental history         Cardiovascular   cardiovascular exam normal          Pulmonary   pulmonary exam normal                OUTSIDE LABS:  CBC:   Lab Results   Component Value Date    WBC 15.4 (H) 2024    WBC 8.1 2021    HGB 12.3 2024    HGB 10.6 (L) 2024    HCT 31.2 (L) 2024    HCT 41.5 2021     2024     2021     BMP:   Lab Results   Component Value Date     2024     2024    POTASSIUM 3.5 2024    POTASSIUM 4.3 2024    CHLORIDE 103  "09/04/2024    CHLORIDE 100 01/17/2024    CO2 18 (L) 09/04/2024    CO2 23 01/17/2024    BUN 5.9 (L) 09/04/2024    BUN 8.9 01/17/2024    CR 0.56 09/04/2024    CR 0.86 01/17/2024    GLC 78 09/04/2024    GLC 78 01/17/2024     COAGS: No results found for: \"PTT\", \"INR\", \"FIBR\"  POC:   Lab Results   Component Value Date    BGM 91 06/19/2021    HCG Negative 01/12/2024     HEPATIC:   Lab Results   Component Value Date    ALBUMIN 3.4 (L) 09/04/2024    PROTTOTAL 6.3 (L) 09/04/2024    ALT 16 09/04/2024    AST 19 09/04/2024    ALKPHOS 89 09/04/2024    BILITOTAL 0.2 09/04/2024     OTHER:   Lab Results   Component Value Date    A1C 4.8 01/17/2024    OSCAR 9.0 09/04/2024    PHOS 2.9 06/21/2021    MAG 1.6 (L) 09/04/2024    TSH 1.28 01/17/2024       Anesthesia Plan    ASA Status:  2       Anesthesia Type: Epidural.              Consents    Anesthesia Plan(s) and associated risks, benefits, and realistic alternatives discussed. Questions answered and patient/representative(s) expressed understanding.     - Discussed:     - Discussed with:  Patient            Postoperative Care            Comments:           neg OB ROS.      Tanvir Quick MD    I have reviewed the pertinent notes and labs in the chart from the past 30 days and (re)examined the patient.  Any updates or changes from those notes are reflected in this note.                           # Financial/Environmental Concerns:          "

## 2024-11-04 NOTE — PLAN OF CARE
Dr Limon reviewed  procedure including risks. Patient agrees to proceed with . Informed consent was electronically signed as well as consent for blood transfusion if needed. Prepped for surgery. To OR # 7 via bed accompanied by 2 RN's and .

## 2024-11-04 NOTE — PROVIDER NOTIFICATION
24 0910   Provider Notification   Provider Name/Title Dr Limon   Method of Notification At Bedside   Comments   Nursing Comments EFM tracing reviewed. Discussed late decelerations with patient and her spouse. Option offered for a . Patient is in agreement with plan.

## 2024-11-04 NOTE — PLAN OF CARE
Returned to room # 411 at 1117 following a primary . Incision is well approximated, open to air with liquid bandage noted. Denies any pain or nausea. Tolerating water and cierra crackers. Baby placed skin to skin and is breast feeding well.

## 2024-11-04 NOTE — PROVIDER NOTIFICATION
24 0012   Provider Notification   Provider Name/Title MD Bullard   Method of Notification Electronic Page   Notification Reason Patient Arrived;Labor Status;Membrane Status;Uterine Activity;Pain;SVE;Status Update;Lab/Diagnostic Study     C.P 39w5d. . GBS-. Pregnancy complicated with anemia and mental health history. Triaged for r/o rupture of membranes and pelvic pressure. Pt reports that she has been leaking throughout the day with some spotting. Pad is not wet upon arrival. Patient denies feeling contractions. Back pain and pelvic pressure is rated a 2/10 but at its worse she rates it a 6. and is currently intermittent. Bemiss is picking up ctx q 2-5 minutes, they do palpate moderate, but she is not aware of all the contractions. FHR 125bpm, moderate variability, +accels and 1 variable. During spec exam- I did see some fluid +discharge. Fern was sent and is negative. SVE -70%/-3 (unchanged from clinic visit on 10/28)    MD Tompkins paged to discharge patient and if wet prep results return positive- she will call with results.

## 2024-11-04 NOTE — ANESTHESIA CARE TRANSFER NOTE
Patient: Corinne KRISTA Lwarence    Procedure: Procedure(s):   section       Diagnosis: * No pre-op diagnosis entered *  Diagnosis Additional Information: No value filed.    Anesthesia Type:   Epidural     Note:    Oropharynx: oropharynx clear of all foreign objects and spontaneously breathing  Level of Consciousness: awake  Oxygen Supplementation: room air    Independent Airway: airway patency satisfactory and stable  Dentition: dentition unchanged  Vital Signs Stable: post-procedure vital signs reviewed and stable  Report to RN Given: handoff report given  Patient transferred to: Labor and Delivery    Handoff Report: Identifed the Patient, Identified the Reponsible Provider, Reviewed the pertinent medical history, Discussed the surgical course, Reviewed Intra-OP anesthesia mangement and issues during anesthesia, Set expectations for post-procedure period and Allowed opportunity for questions and acknowledgement of understanding      Vitals:  Vitals Value Taken Time   BP     Temp     Pulse     Resp     SpO2         Electronically Signed By: KITTY Quiroz CRNA  2024  11:19 AM

## 2024-11-04 NOTE — OP NOTE
Section Procedure Note    Procedure Date: 2024     Pre-operative Diagnosis:  1. IUP at 39w6d  2. Non reassuring fetal heart tones remote from delivery  Post-operative Diagnosis: same  Procedure: p-LTCS  Surgeon: Dr. Limon  Assistant:   Anesthesia: Epidural  MDA: Dr. Quick  Estimated Blood Loss: 798 ml  Total IV Fluids: 1200 ml  UOP: 100 ml    Findings:  - Liveborn male infant at 1036 hrs, light meconium stained fluid. APGAR scores at 1 and 5 min were 8 and 9 respectively.    - Placenta manually removed intact with 3VC. Normal uterus, bilateral fallopian tubes and ovaries.  - high pubic arch noted    Indication for : non reassuring fetal heart tones remote from deliery  Procedure: Final verification of patient and procedure was done. The patient was placed in the supine position with left lateral tilt and was prepped and draped in the usual sterile fashion. 2 g of Cefazolin were given preoperatively. A pfannenstiel skin incision was made with the scalpel and carried to the level of the fascia using blunt dissection. The rectus fascia was dissected off of the muscle and the peritoneal cavity was entered bluntly. A bladder flap was created and the Justice retractor was placed for protection of the bladder. A low transverse uterine incision was made and extended laterally bluntly.   Entry into the amniotic sac revealed light meconium fluid. Under controlled conditions, the fetal head was delivered from OA position. The nose and oropharynx were bulb suctioned. There was no nuchal cord. Both shoulders were delivered without complication.  Delayed cord clamping was done.Then the umbilical cord was doubly clamped and cut and the infant was handed to the awaiting pediatricians. Cord gases were not sent. The placenta was manually removed intact with 3-vessel cord and 20 units of pitocin were added to the existing IV bag. The placenta was not sent to pathology. The uterine cavity was cleared  with a sponge. The edges of the uterine incision were grasped with Allis clamps and the uterine incision was closed in two layers, first with a running, locking stitch of 0-vycril, the second an imbricating non-locking stitch of 0-monocryl. Hemostasis was achieved. The abdomen and the gutters were cleared of old blood and clots. Tubes and ovaries appeared normal bilaterally. The fascia was closed with a running suture of 0-vycril. The skin was closed with 4-0 monocryl.   The patient tolerated the procedure well. Needle, sponge and instrument counts were correct. A sterile dressing was placed over the incision. The patient was taken to the recovery room in stable condition.    Specimens: cord segment  Complications: none  Disposition: Recovery  Condition: stable    Dr. Limon  [phone]

## 2024-11-04 NOTE — PLAN OF CARE
Data: Patient presented to [facility]: 11/3/2024  9:46 PM.  Reason for maternal/fetal assessment is leaking vaginal fluid, pelvic pain. Patient reports she has been leaking fluid throughout the day and has had pelvic pressure the last two hours.  Patient is a .  Prenatal record reviewed. Pregnancy  has been complicated by anemia.  Gestational Age 39w5d. VSS. Fetal movement active. Patient denies nausea, vomiting, headache, visual disturbances, epigastric or URQ pain, significant edema. Support person is present.   Action: Verbal consent for EFM. Triage assessment completed. Bill of rights reviewed.  Response: Patient verbalized agreement with plan. Will contact Dr Aleah armendariz with update and for further orders.

## 2024-11-04 NOTE — PROVIDER NOTIFICATION
11/04/24 0855   Provider Notification   Provider Name/Title Dr Limon   Method of Notification At Bedside   Comments   Nursing Comments EFM tracing reviewed. Discussed concern regarding FHR and intermittent intolerance to labor. MDA is at the bedside to place epidural.

## 2024-11-04 NOTE — LACTATION NOTE
Lactation visit. First baby for Cori breastfeeding. Assisted with a feed. Baby sleepy, brought to the breast STS. Needing some stimulation and enticement to get baby to breast. Baby latched and nursed for approximately 5 minutes then pulled off sleeping. Baby placed STS. Normal course of lactation reviewed. Encouraged frequent attempts. Knows lactation to follow up. Patient wanting assistance setting up Spectra pump. Also has a wireless. Discussed difference between both.

## 2024-11-04 NOTE — PLAN OF CARE
Data: Corinne N Palm transferred to 442 via cart at 1330. Baby transferred via parent's arms.  Action: Receiving unit notified of transfer: Yes. Patient and family notified of room change. Report given to Beth NORRIS at 1330. Belongings sent to receiving unit. Accompanied by Registered Nurse. Oriented patient to surroundings. Call light within reach. ID bands double-checked with receiving RN.  Response: Patient tolerated transfer and is stable.

## 2024-11-04 NOTE — ANESTHESIA POSTPROCEDURE EVALUATION
Patient: Corinne N Palm    Procedure: Procedure(s):   section       Anesthesia Type:  Epidural    Note:  Disposition: Outpatient   Postop Pain Control: Uneventful            Sign Out: Well controlled pain   PONV: No   Neuro/Psych: Uneventful            Sign Out: Acceptable/Baseline neuro status   Airway/Respiratory: Uneventful            Sign Out: Acceptable/Baseline resp. status   CV/Hemodynamics: Uneventful            Sign Out: Acceptable CV status; No obvious hypovolemia; No obvious fluid overload   Other NRE: NONE   DID A NON-ROUTINE EVENT OCCUR? No           Last vitals:  Vitals Value Taken Time   /62 24 1143   Temp     Pulse     Resp     SpO2 99 % 24 1142   Vitals shown include unfiled device data.    Electronically Signed By: Tanvir Quick MD  2024  11:45 AM

## 2024-11-04 NOTE — ANESTHESIA PROCEDURE NOTES
Epidural catheter Procedure Note    Pre-Procedure   Staff -        Performed By: anesthesiologist  Procedure Documentation  Procedure: epidural catheter   Comments:  Pre-Procedure  Performed by Tanvir Quick MD  Location: OB.      PreAnesthestic Checklist: patient identified, IV checked, risks and benefits discussed, informed consent obtained, monitors and equipment checked, pre-op evaluation and at physician/surgeon's request.    Timeout   Correct Patient: Yes  Correct Procedure: Epidural catheter placement  Correct Site: Yes   Correct Position: Yes    Procedure Documentation  Procedure:   Epidural catheter block for Labor    Patient currently in labor and she and OBMD request a labor epidural to control her labor pains. Patient was interviewed and examined. Procedure and risks including but not limited to bleeding, infection, nerve injury, paralysis, PDPH, and inadequate block requiring intervention discussed with patient. Questions answered. This epidural is to be placed in anticipation of vaginal delivery.  She consents to the epidural procedure.  Time-out was performed.  I or my partners remain immediately available for management of any issues or complications and will monitor at appropriate intervals.  Procedure: Patient sitting. Betadine prep x 3. Sterile drape applied.  Lidocaine 1%  local infiltration at L 3-4.  17 G. Tuohy needle at L3-4 by loss of resistance into epidural space.  No CSF, paresthesia or blood. 1.5 % Lidocaine with 1:200,000 Epinephrine 5cc test dose. Then 0.25% bupivicaine 10 cc with NS 5 cc.  Epidural catheter inserted w/o resistance to 5 cm in epidural space.  Aspiration negative for blood and CSF.   Negative for neuro change, paresthesia or symptoms of intravascular injection or intrathecal injection.  Infusion orders written and infusion of 0.125% bupivicaine 15cc per hour started.    Tanvir Quick MD               FOR [facility] ([facility]) ONLY:   Pain Team Contact information:  "please page the Pain Team Via [facility]. Search \"Pain\". During daytime hours, please page the attending first. At night please page the resident first.      "

## 2024-11-04 NOTE — H&P
"  2024    Corinne N Palm  [id number]      OB Admit History & Physical      Ms. Lawrence  is here in early labor with non reassuring FHR tracing remote from delivery    She first came due to increased pelvic pressure yesterday night and ultimately sent home with a reactive strip and overall reassurance that she was not in labor. Her cervical check was 1/60-70/-3.  This morning she presented with CC feeling increased pelvic pain and concerns for LOF. She was found to have SROM around 747 hrs. At that point her strip was concerning for minimal variability and noting non recurrent late decelerations ( X 3) as well as some variables with contractions and some early decels. Upon RN's cervical exam re-evaluation, she was found to be unchanged.     Currently she is rating her pain during contractions 10/10 and getting an epidural.     States she is aware baby may not be doing very well and amenable to discuss CD.         No LMP recorded. Patient is pregnant.   Her Estimated Date of Delivery: 2024, making her 39w6d.      Estimated body mass index is 31.35 kg/m  as calculated from the following:    Height as of this encounter: 1.6 m (5' 3\").    Weight as of this encounter: 80.3 kg (177 lb).  Her prenatal course has been  complicated by   -ELO  -PRECIOUS, Anxiety, OCD  -hx of anorexia      Prenatal Care:  - OB labs reviewed: A, Rubella immune, Heb B Ag non-reactive, HIV negative, RPR negative  - Genetics: NIPS and AFP normal, early US normal   - Anatomy ultrasound: level 2 US normal   - Rh positive, Rhogam not indicated  - GCT failed, passed 2 hour at 28 weeks along with hgb 9.9, RPR   - S/p flu  - Tdap 8/15/24  - s/p Covid vaccine (covid booster 24)  -RSV: 24  -flu: 24  - GBS neg   - Feed: breast, has script  - Contraception: unsure yet what she wants to do   -peds: jimmy aponet          She is a 29 year old   Her OB history:   OB History    Para Term  AB Living   1 0 0 0 0 0 "   SAB IAB Ectopic Multiple Live Births   0 0 0 0 0      # Outcome Date GA Lbr Teo/2nd Weight Sex Type Anes PTL Lv   1 Current                     Past Medical History:   Diagnosis Date    Anorexia nervosa (H)     PRECIOUS (generalized anxiety disorder)     MDD (major depressive disorder)     OCD (obsessive compulsive disorder)           Past Surgical History:   Procedure Laterality Date    ENT SURGERY      cleft lip repair         No current outpatient medications on file.       Allergies: Morphine, Amoxicillin, and Sulfa antibiotics      REVIEW OF SYSTEMS:  NEUROLOGIC:  Negative  EYES:  Negative  ENT:  Negative  GI:  Negative  :  Negative  GYN:  Negative  CV:  Negative  PULMONARY:  Negative  MUSCULOSKELETAL:  Negative  PSYCH:  Negative      Social History     Socioeconomic History    Marital status:      Spouse name: Not on file    Number of children: Not on file    Years of education: Not on file    Highest education level: Not on file   Occupational History    Not on file   Tobacco Use    Smoking status: Never    Smokeless tobacco: Not on file   Substance and Sexual Activity    Alcohol use: No    Drug use: No    Sexual activity: Not Currently   Other Topics Concern    Not on file   Social History Narrative    Not on file     Social Drivers of Health     Financial Resource Strain: Not on file   Food Insecurity: Not on file   Transportation Needs: Not on file   Physical Activity: Not on file   Stress: Not on file   Social Connections: Not on file   Interpersonal Safety: Low Risk  (11/4/2024)    Interpersonal Safety     Do you feel physically and emotionally safe where you currently live?: Yes     Within the past 12 months, have you been hit, slapped, kicked or otherwise physically hurt by someone?: No     Within the past 12 months, have you been humiliated or emotionally abused in other ways by your partner or ex-partner?: No   Housing Stability: Not on file      History reviewed. No pertinent family  "history.      Exam:    Vitals:   Ht 1.6 m (5' 3\")   Wt 80.3 kg (177 lb)   Breastfeeding No   BMI 31.35 kg/m    [place]:  ctxs q 6-10 min  FHT: 120 bpm, minimal, a-, late decelerations present (recurrent), occasional variables and no further early decels as she was this morning.     Alert Awake in NAD  HEENT grossly normal  Neck: no lymphadenopathy or thryoidomegaly  Lungs CTAB  Back No CVAT  Heart RR  ABD gravid, NABS, soft, NT on exam with NT fundus palpable  Cervix is /-3 (unchanged) per RN  EXT:  no edema, no calf tenderness    IMAGING  -growth US  EFW 41 AC 67        Assessment/Plan: Corinne N Palm is a 29 year old  at 39w6d GA here with early labor and NRFHTs remote from delivery.     - admission orders in place  - epidural in place  - GBS neg  - Rh pos  - Rubella immune    FWB  - currently non reassuring strip, consistent with Cat 2 (currently minimal variability with now recurrent late decelerations) for which I am recommending to proceed with a PCD, given she is remote from delivery and pitocin, ripening in contraindicated. Pt verbalized understanding and amenable with procedure.    The risks, benefits, and alternatives of  section were discussed, including the risks of bleeding, infection, injury to surrounding organs, fetal injury. She consented to a blood transfusion in the event of a life threatening amount of bleeding. She had time to ask questions and agreed to proceed. Surgical consent was signed for primary C/S      #. Iron Deficiency Anemia:   - Hgb 11.1 (24)  - PO Ferrous    #. Anxiety, Depression, OCD:   - Currently managed with Fluoxetine, Lamotrigine, Lurasidone.   - Follows with psychiatry, therapist, DBT group.  - Hospitalized 2024 for passive suicidal ideation.  -HB involved     #. Hx Anorexia:   - Follows with [facility].       Bia Ventura MD  Dept of OB/GYN  2024     "

## 2024-11-04 NOTE — PROVIDER NOTIFICATION
11/04/24 0801   Provider Notification   Provider Name/Title Dr. Limon   Method of Notification Electronic Page   Request Evaluate - Remote   Notification Reason Status Update     Turner messaged MD to call unit for update from primary RN on pt status.

## 2024-11-04 NOTE — PROVIDER NOTIFICATION
11/04/24 1600   Provider Notification   Provider Name/Title Dr Limon   Method of Notification Electronic Page   Request Evaluate-Remote   Notification Reason Medication Request     Provider notified that patient needs home medications ordered. The medications are Fluoxetine, Lamotrigine, Lurasidone

## 2024-11-04 NOTE — PLAN OF CARE
Called to the room,  reports that patient's water broke. Patient is leaking clear vaginal fluid with small amount of brown flecks. PD noted. SVE 1.5/100/-1 station. IFSE placed and patient was repositioned on left side. Prior to SROM EFM tracing was minimal variability with late deceleration. Currently baseline is 110 with moderate variability with accelerations.

## 2024-11-04 NOTE — PLAN OF CARE
Data: Patient presented to [facility]: 2024  5:17 AM.  Reason for maternal/fetal assessment is uterine contractions. Patient reports Contractions started @ 0300 every 2-4 minutes apart, rating contraction pain at 7 out of 10. Denies any vaginal bleeding, LOF, headache, epigastric pain. Is c/o nausea with emesis x's 2.  Patient is a .  Prenatal record reviewed. Pregnancy  has been complicated by depression, anxiety, is on Latuda and Prozac.  Gestational Age 39w6d. VSS. Fetal movement active. Patient denies leaking of vaginal fluid/rupture of membranes, vaginal bleeding, headache, visual disturbances, epigastric or URQ pain. Support person is present.   Action: Verbal consent for EFM. Triage assessment completed. Bill of rights reviewed.  Response: Patient verbalized agreement with plan. Will contact Dr Aleah armendariz with update and for further orders.

## 2024-11-04 NOTE — PLAN OF CARE
Goal Outcome Evaluation:      Plan of Care Reviewed With: patient    Overall Patient Progress: improvingOverall Patient Progress: improving     Vitals stable. Postpartum checks within normal limits. Has ambulated one time in room this evening, ambulated well with SBA. Adequate oral intake with no reports of nausea. Adequate urine output in chavez catheter, chavez catheter remains in place for increased urine output before removing. Pain controlled with tylenol and toradol. Attempting to breastfeed every 2-3 hours, baby sleepy at breast, encouraging frequent STS. Spouse and family at bedside and attentive to mom and baby. Bonding well with baby, frequent holding observed.       Problem: Adult Inpatient Plan of Care  Goal: Plan of Care Review  Outcome: Progressing  Flowsheets (Taken 11/4/2024 1730)  Plan of Care Reviewed With: patient  Overall Patient Progress: improving  Goal: Patient-Specific Goal (Individualized)  Outcome: Progressing  Goal: Absence of Hospital-Acquired Illness or Injury  Outcome: Progressing  Intervention: Prevent Skin Injury  Recent Flowsheet Documentation  Taken 11/4/2024 1520 by Jaclyn Blas, RN  Body Position: position maintained  Intervention: Prevent and Manage VTE (Venous Thromboembolism) Risk  Recent Flowsheet Documentation  Taken 11/4/2024 1520 by Jaclyn Blas, RN  VTE Prevention/Management: SCDs on (sequential compression devices)  Intervention: Prevent Infection  Recent Flowsheet Documentation  Taken 11/4/2024 1520 by Jaclyn Blas, RN  Infection Prevention:   single patient room provided   rest/sleep promoted   hand hygiene promoted  Goal: Optimal Comfort and Wellbeing  Outcome: Progressing  Intervention: Monitor Pain and Promote Comfort  Recent Flowsheet Documentation  Taken 11/4/2024 1619 by Jaclyn Blas, RN  Pain Management Interventions: medication (see MAR)  Intervention: Provide Person-Centered Care  Recent Flowsheet Documentation  Taken 11/4/2024 1520 by  Jaclyn Blas, RN  Trust Relationship/Rapport:   care explained   choices provided   emotional support provided   empathic listening provided   questions encouraged   questions answered   reassurance provided   thoughts/feelings acknowledged  Goal: Readiness for Transition of Care  Outcome: Progressing     Problem: Labor  Goal: Hemostasis  Outcome: Progressing  Goal: Stable Fetal Wellbeing  Outcome: Progressing  Intervention: Promote and Monitor Fetal Wellbeing  Recent Flowsheet Documentation  Taken 11/4/2024 1520 by Jaclyn Blas, RN  Body Position: position maintained  Goal: Effective Progression to Delivery  Outcome: Progressing  Goal: Absence of Infection Signs and Symptoms  Outcome: Progressing  Intervention: Prevent or Manage Infection  Recent Flowsheet Documentation  Taken 11/4/2024 1520 by Jaclyn Blas, RN  Infection Prevention:   single patient room provided   rest/sleep promoted   hand hygiene promoted  Goal: Acceptable Pain Control  Outcome: Progressing  Goal: Normal Uterine Contraction Pattern  Outcome: Progressing

## 2024-11-04 NOTE — DISCHARGE SUMMARY
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)    Major depressive disorder, recurrent episode, moderate (H)    History of OCD (obsessive compulsive disorder)    Eating disorder, unspecified type    Suicide attempt by drug overdose (H)    Borderline personality disorder (H)    Severe episode of recurrent major depressive disorder, without psychotic features (H)    Depression with suicidal ideation    Encounter for triage in pregnant patient     History of Present Illness   Corinne N Palm is a 29 year old female now  who presented to L&D @ 39w6d GA admitted in early labor, with no cervical change during admission and with non reassuring fetal heart tones remote from delivery (recurrent late decelerations) for which PCD was recommended and pt was amenable for this option. Her pregnancy has been complicated by   -ELO  -PRECIOUS, Anxiety, OCD  -hx of anorexia    Please see her admit H&P for full details of her PMH, PSH, Meds, Allergies and exam on admit.    Hospital Course   The patient had a PCD @ 39w6d, please see her delivery summary for full details.      Section Procedure Note     Procedure Date: 2024      Pre-operative Diagnosis:  1. IUP at 39w6d  2. Non reassuring fetal heart tones remote from delivery  Post-operative Diagnosis: same  Procedure: p-LTCS  Surgeon: Dr. Limon  Assistant:   Anesthesia: Epidural  MDA: Dr. Quick  Estimated Blood Loss: 798 ml  Total IV Fluids: 1200 ml  UOP: 100 ml     Findings:  - Liveborn male infant at 1036 hrs, light meconium stained fluid. APGAR scores at 1 and 5 min were 8 and 9 respectively.    - Placenta manually removed intact with 3VC. Normal uterus, bilateral fallopian  tubes and ovaries.  - high pubic arch noted     Indication for : non reassuring fetal heart tones remote from deliery  Procedure: Final verification of patient and procedure was done. The patient was placed in the supine position with left lateral tilt and was prepped and draped in the usual sterile fashion. 2 g of Cefazolin were given preoperatively. A pfannenstiel skin incision was made with the scalpel and carried to the level of the fascia using blunt dissection. The rectus fascia was dissected off of the muscle and the peritoneal cavity was entered bluntly. A bladder flap was created and the Justice retractor was placed for protection of the bladder. A low transverse uterine incision was made and extended laterally bluntly.   Entry into the amniotic sac revealed light meconium fluid. Under controlled conditions, the fetal head was delivered from OA position. The nose and oropharynx were bulb suctioned. There was no nuchal cord. Both shoulders were delivered without complication. Delayed cord clamping was done.Then the umbilical cord was doubly clamped and cut and the infant was handed to the awaiting pediatricians. Cord gases were not sent. The placenta was manually removed intact with 3-vessel cord and 20 units of pitocin were added to the existing IV bag. The placenta was not sent to pathology. The uterine cavity was cleared with a sponge. The edges of the uterine incision were grasped with Allis clamps and the uterine incision was closed in two layers, first with a running, locking stitch of 0-vycril, the second an imbricating non-locking stitch of 0-monocryl. Hemostasis was achieved. The abdomen and the gutters were cleared of old blood and clots. Tubes and ovaries appeared normal bilaterally. The fascia was closed with a running suture of 0-vycril. The skin was closed with 4-0 monocryl.   The patient tolerated the procedure well. Needle, sponge and instrument counts were correct. A sterile dressing  was placed over the incision. The patient was taken to the recovery room in stable condition.     Specimens: cord segment  Complications: none  Disposition: Recovery  Condition: stable    Her postpartum course was uncomplicated. On postpartum day 2, she was meeting all of her postpartum goals and deemed stable for discharge. She was voiding without difficulty, tolerating a regular diet without nausea and vomiting, her pain was well controlled on oral pain medicines and her lochia was appropriate.    Acute Blood Loss Anemia:   - Admit Hgb 12.3, QBL 798mL, now 9.  - Asymptomatic, continue PO Iron.     Rh positive, and rhogam was not given    Contraception was discussed and will be addressed at her postpartum appointment.    Instructions:  1) Call for temperature greater than 100.4F, foul smelling vaginal discharge, bleeding more than 1 pad per hour for 2 hrs, pain not controlled by oral pain meds, severe constipation or severe nausea or vomiting.  2) She was instructed to follow-up with her primary OB in 6 weeks for a routine postpartum visit  3) She was instructed to continue her PNV on discharge if she wished to breast feed her infant.    Lauren MacNeill, MD [facility]  [phone]  11/06/2024 7:53 AM    Discharge Disposition   Discharged to home   Condition at discharge: Stable    Primary Care Physician   [facility]    Consultations This Hospital Stay   SOCIAL WORK IP CONSULT  ANESTHESIOLOGY IP CONSULT  LACTATION IP CONSULT    Discharge Orders      Reason for your hospital stay    Labor and delivery     Follow-up and recommended labs and tests     2 weeks for mood check  6 weeks for routine PP     Activity    Your activity upon discharge: activity as tolerated and no lifting, driving, or strenuous exercise for 4 weeks     Breast Pump DME    Breast Pump Documentation:   Manual/Electric Pump: To support adequate breast milk production and nutrition for infant.     I, the undersigned,  certify that the above prescribed supplies are medically necessary for this patient and is both reasonable and necessary in reference to accepted standards of medical and necessary in reference to accepted standards of medical practice in the treatment of this patient's condition and is not prescribed as a convenience.     Diet    Follow this diet upon discharge: Regular     Discharge Medications   Current Discharge Medication List        START taking these medications    Details   acetaminophen (TYLENOL) 325 MG tablet Take 3 tablets (975 mg) by mouth every 6 hours.    Associated Diagnoses: S/P primary low transverse       oxyCODONE (ROXICODONE) 5 MG tablet Take 1 tablet (5 mg) by mouth every 4 hours as needed for moderate to severe pain.  Qty: 5 tablet, Refills: 0    Associated Diagnoses: S/P primary low transverse            CONTINUE these medications which have NOT CHANGED    Details   ferrous sulfate (FEROSUL) 325 (65 Fe) MG tablet Take 325 mg by mouth every other day.      !! FLUoxetine (PROZAC) 10 MG capsule Take 3 capsules (30 mg) by mouth every morning with 40 mg capsule for total daily dose of 70 mg      !! FLUoxetine (PROZAC) 40 MG capsule Take 1 capsule (40 mg) by mouth every morning along with three 10 mg capsules for total daily dose of 70 mg      lamoTRIgine (LAMICTAL) 200 MG tablet Take 400 mg by mouth every morning.      lurasidone (LATUDA) 80 MG TABS tablet Take 80 mg by mouth daily with food.      magnesium oxide (MAG-OX) 400 MG tablet Take 400 mg by mouth daily.      Prenatal Vit-Fe Fumarate-FA (PRENATAL MULTIVITAMIN  PLUS IRON) 27-1 MG TABS Take 1 tablet by mouth daily.       !! - Potential duplicate medications found. Please discuss with provider.        Allergies   Allergies   Allergen Reactions    Morphine Other (See Comments)     Twitching    Amoxicillin Rash    Sulfa Antibiotics Rash

## 2024-11-05 LAB
GLUCOSE BLDC GLUCOMTR-MCNC: 78 MG/DL (ref 70–99)
HGB BLD-MCNC: 9.7 G/DL (ref 11.7–15.7)

## 2024-11-05 PROCEDURE — 250N000013 HC RX MED GY IP 250 OP 250 PS 637: Performed by: OBSTETRICS & GYNECOLOGY

## 2024-11-05 PROCEDURE — 120N000001 HC R&B MED SURG/OB

## 2024-11-05 PROCEDURE — 250N000011 HC RX IP 250 OP 636: Performed by: OBSTETRICS & GYNECOLOGY

## 2024-11-05 PROCEDURE — 85018 HEMOGLOBIN: CPT | Performed by: OBSTETRICS & GYNECOLOGY

## 2024-11-05 PROCEDURE — 36415 COLL VENOUS BLD VENIPUNCTURE: CPT | Performed by: OBSTETRICS & GYNECOLOGY

## 2024-11-05 RX ORDER — ACETAMINOPHEN 325 MG/1
975 TABLET ORAL EVERY 6 HOURS
COMMUNITY
Start: 2024-11-05

## 2024-11-05 RX ADMIN — OXYCODONE HYDROCHLORIDE 5 MG: 5 TABLET ORAL at 09:14

## 2024-11-05 RX ADMIN — ACETAMINOPHEN 975 MG: 325 TABLET, FILM COATED ORAL at 17:34

## 2024-11-05 RX ADMIN — LURASIDONE HYDROCHLORIDE 80 MG: 40 TABLET, COATED ORAL at 20:40

## 2024-11-05 RX ADMIN — FLUOXETINE 70 MG: 10 CAPSULE ORAL at 09:22

## 2024-11-05 RX ADMIN — ACETAMINOPHEN 975 MG: 325 TABLET, FILM COATED ORAL at 11:32

## 2024-11-05 RX ADMIN — Medication 1 TABLET: at 11:32

## 2024-11-05 RX ADMIN — SENNOSIDES AND DOCUSATE SODIUM 1 TABLET: 8.6; 5 TABLET ORAL at 20:40

## 2024-11-05 RX ADMIN — SENNOSIDES AND DOCUSATE SODIUM 1 TABLET: 8.6; 5 TABLET ORAL at 09:14

## 2024-11-05 RX ADMIN — ACETAMINOPHEN 975 MG: 325 TABLET, FILM COATED ORAL at 04:29

## 2024-11-05 RX ADMIN — IBUPROFEN 800 MG: 800 TABLET, FILM COATED ORAL at 18:43

## 2024-11-05 RX ADMIN — IBUPROFEN 800 MG: 800 TABLET, FILM COATED ORAL at 12:37

## 2024-11-05 RX ADMIN — KETOROLAC TROMETHAMINE 30 MG: 30 INJECTION, SOLUTION INTRAMUSCULAR at 06:54

## 2024-11-05 RX ADMIN — LAMOTRIGINE 200 MG: 100 TABLET, FILM COATED, EXTENDED RELEASE ORAL at 09:13

## 2024-11-05 NOTE — PROGRESS NOTES
Patient Name:  Corinne N Palm   MRN:  [id number]  Age:  29 year old    YOB: 1995      POSTPARTUM/POST-OPERATIVE PROGRESS NOTE    Pt is POD#1 s/p  unscheduled primary C/S.  She is doing well without complaints.  Pt is ambulating and tolerating a regular diet.  Vila is out and she is voiding without complication.  Pain is well controlled with PO medication and lochia is within normal limits.  She is breastfeeding.  Baby is doing well.    She denies light headedness, dizziness, palpitations, CP, or SOB.  Ambulating without issue.         Objective:    Temp:  [97.9  F (36.6  C)-99  F (37.2  C)] 97.9  F (36.6  C)  Pulse:  [66-89] 81  Resp:  [16-18] 16  BP: (100-142)/(55-87) 124/85  SpO2:  [95 %-99 %] 96 %  177 lbs 0 oz      General Appearance:  NAD, A&O x 3, comfortable  Lungs:  unlabored  Cardiovascular:  RRR  Abdomen:  soft, minimally distended; appropriately tender near incision; no rebound or guarding  Fundus:  firm, below the umbilicus  Incision:  clean, dry and intact  Lower extremities:  no significant edema      Lab Review:    ABO/RH(D)   Date Value Ref Range Status   2024 A POS  Final     Hemoglobin   Date Value Ref Range Status   2024 9.7 (L) 11.7 - 15.7 g/dL Final   2024 12.3 11.7 - 15.7 g/dL Final   2024 10.6 (L) 11.7 - 15.7 g/dL Final   2021 14.0 11.7 - 15.7 g/dL Final   2020 14.4 11.7 - 15.7 g/dL Final   2016 13.1 11.7 - 15.7 g/dL Final     Hematocrit   Date Value Ref Range Status   2024 31.2 (L) 35.0 - 47.0 % Final   2021 41.5 35.0 - 47.0 % Final   2020 42.3 35.0 - 47.0 % Final   2016 39.6 35.0 - 47.0 % Final           Assessment:  28yo  POD#1 s/p unscheduled primary  for cat II FHT, doing well.      Plan:  - Post-op: recovering well. Pain well controlled. Cont PO pain meds and regular diet. Encourage ambulation.  - ABLA: Asymptomatic.  PO iron started  - Anx, Dep, OCD; with h/o SI: Mood stable, no SI/HI.   Cont Fluoxetine, Lamotrigine, and Lurasidone.  Cont care with psych, therapy, and DBT group.  - Anorexia: stable, followed by Con[facility]  - Contraception: undecided  - Dispo: anticipate DC POD#2 or #3      Meredith Chan, MD [facility]  November 5, 2024

## 2024-11-05 NOTE — PLAN OF CARE
Goal Outcome Evaluation:      Plan of Care Reviewed With: patient    Overall Patient Progress: improvingOverall Patient Progress: improving    Outcome Evaluation: VSS, PAIN MANAGED, VOIDING WELL      Patient meeting expected goals. Is up independent in room, meeting all personal and infant needs. VSS. Pain is being managed with Tylenol, Ibuprofen and PRN Oxycodone. Voiding with out difficulty. Incision to low abdomen is MELISA, well approximated, with no drainage or signs on infection noted to surrounding tissue. Several breastfeeding attempts have been made but have been unsuccessful. Using nipple shield. Lactation RN was also working with couplet. Infant will at times get  into sucking pattern, but no swallows noted. Discussed hand expression and supplementation. Patient open to using donor milk; form issued to parents.

## 2024-11-05 NOTE — PHARMACY-ADMISSION MEDICATION HISTORY
Pharmacy Intern Admission Medication History    Admission medication history is complete. The information provided in this note is only as accurate as the sources available at the time of the update.    Information Source(s): Patient and CareEverywhere/SureScripts via in-person    Pertinent Information: None    Changes made to PTA medication list:  Added: Magnesium Oxide, Fluoxetine 40 mg  Deleted: None  Changed: Fluoxetine 10 mg capsules - 70 mg daily -> Fluoxetine 10 mg capsules- 3 capsules (30 mg) every AM with 40 mg capsule; Lamotrigine 200 mg tabs- 200 mg daily -> Lamotrigine 200 mg tabs- 400 mg every AM    Allergies reviewed with patient and updates made in EHR: yes    Medication History Completed By: Bridger Galicia 11/4/2024 6:10 PM    PTA Med List   Medication Sig Last Dose/Taking    ferrous sulfate (FEROSUL) 325 (65 Fe) MG tablet Take 325 mg by mouth every other day. 11/2/2024 Morning    FLUoxetine (PROZAC) 10 MG capsule Take 3 capsules (30 mg) by mouth every morning with 40 mg capsule for total daily dose of 70 mg 11/3/2024 Morning    FLUoxetine (PROZAC) 40 MG capsule Take 1 capsule (40 mg) by mouth every morning along with three 10 mg capsules for total daily dose of 70 mg 11/3/2024 Morning    lamoTRIgine (LAMICTAL) 200 MG tablet Take 400 mg by mouth every morning. 11/3/2024 Morning    lurasidone (LATUDA) 80 MG TABS tablet Take 80 mg by mouth daily with food. 11/2/2024 Evening    magnesium oxide (MAG-OX) 400 MG tablet Take 400 mg by mouth daily. 11/3/2024    Prenatal Vit-Fe Fumarate-FA (PRENATAL MULTIVITAMIN  PLUS IRON) 27-1 MG TABS Take 1 tablet by mouth daily. 11/3/2024 Morning

## 2024-11-05 NOTE — PLAN OF CARE
Goal Outcome Evaluation:      Plan of Care Reviewed With: patient, spouse    Overall Patient Progress: improvingOverall Patient Progress: improving    VSS. SBA to bathroom. Tolerating regular diet. Breastfeeding  about every 3 hours with a nipple shield & some assistance. C/s incision WDL, no apparent signs of infection present. Mild pain controlled with tylenol & toradol. Bonding well with .  supportive @ bedside. Denies preeclampsia symptoms. Fundus firm @ midline, lochia WDL. 1L LR bolus d/t low urine output, 1 adequate void documented after chavez removal.   Problem: Adult Inpatient Plan of Care  Goal: Plan of Care Review  Description: The Plan of Care Review/Shift note should be completed every shift.  The Outcome Evaluation is a brief statement about your assessment that the patient is improving, declining, or no change.  This information will be displayed automatically on your shift  note.  Outcome: Progressing  Flowsheets (Taken 2024 0516)  Plan of Care Reviewed With:   patient   spouse  Overall Patient Progress: improving

## 2024-11-05 NOTE — LACTATION NOTE
This note was copied from a baby's chart.  Lactation visit; primary RN reports difficulty latching. Writer in room to assist with breastfeed. Cori reports usually does football hold- using nipple shield for flat nipples. Infant eager to latch however is only latching to nipple of shield- re latched several times but unable to achieve deep latch- infant pulling away from nipple. Hand expression was done prior to latch.  Switched to cross cradle hold- infant does not open mouth wide and writer needed to assist with lightly pulling chin down to widen latch. Reinforced deep latch techniques. Infant active at breast- but does occasionally bite down- has some un-coordination with suck pattern. Infant had no audible swallows- no colostrum in shield noted after- infant active for 15 minutes. Encouraged pumping after breastfeeds and discussed starting supplementing d/t no audible swallows and some difficulties with latching.   Parents would like to wait till next feed to supplement but agrees to pumping- writer reinforced pump set up, settings, and care/cleaning of parts.     Update: Verbal consent given for DM- education provided. Evangelist sleepy at breast- writer finger fed 3 ml and then Evangelist awake. Brought back to breast- used SNS with shield- however Evangelist sleepy and refusing to suck even with DM under shield via SNS.  After couple more re attempts Evangelist still refusing to suck. Writer finger fed rest of DM- took 10 ml total.  Discussed can try SNS with next feed but if Evangelist not active then may need to bottle. Parents states they are open to trying SNS and or bottle. Corinne plans to continue pumping after breastfeeds/attempts.   Also discussed organizing suck pattern with gloved finger prior to feed.   All questions answered and Primary RN updated on visit. Encouraged to call for lactation support.

## 2024-11-05 NOTE — PROGRESS NOTES
"[facility]  Postpartum Progress Note    Corinne N Palm YOB: 1995   MRN [id number] Primary OB: [facility]     SUBJECTIVE   Corinne N Palm is a 29 year old  s/p pLTCS on  at 39w6d.     Corinne reports she is doing well this morning.   Pain is mild and well controlled with PO meds. Lochia is light.   Ambulating, voiding spontaneously, tolerating general diet without nausea/vomiting.   Baby is doing well. They are pumping/bottle feeding and it is going well!              OBJECTIVE   /84 (BP Location: Left arm, Patient Position: Sitting, Cuff Size: Adult Regular)   Pulse 88   Temp 98.3  F (36.8  C) (Oral)   Resp 16   Ht 1.6 m (5' 3\")   Wt 80.3 kg (177 lb)   SpO2 96%   Breastfeeding Unknown   BMI 31.35 kg/m       Physical Exam  General: Alert, in no acute distress, resting comfortably in bed.   Neuro: Grossly normal to observation.  Psych: Alert, oriented, affect appropriate.  Cardiovascular: Normal rate, wwp.   Respiratory: Nonlabored breathing, equal chest rise/fall bilaterally.   Abdomen: Appropriately tender to palpation, fundus firm below umbilicus.   Skin: Color, texture, turgor normal. No concerning rashes or lesions.  Incision: Covered with skin glue. Healing well, no surrounding erythema, no exudate.     Labs  Hgb 12.3 > QBL 798mL > 9.7        ASSESSMENT & PLAN   Corinne N Palm is a 29 year old  POD#2 from pLTCS on  at 39w6d.    #. Postpartum Care:   - Recovering well with no acute concerns.  - Rh positive, Rhogam not indicated.  - Vaccines: up to date  - Infant Feeding: breastfeeding, going well  - PP Depression Risk: elevated given history, discussed 2wk mood check  - Perineal Care: routine  - VTE Prophylaxis: ambulation, SCDs  - Contraception: unsure, discuss at PP visit    #. Postoperative Care:  - Tolerating general diet, passing flatus. Continue bowel regimen.   - Pain well controlled on PO medications.   - Vila removed, voiding " spontaneously.  - Ambulate TID.    #. Acute Blood Loss Anemia:   - Admit Hgb 12.3, QBL 798mL, now 9.  - Asymptomatic, continue PO Iron.     #. Anxeity, Depression, OCD:   - Continue Fluoxetine, Lamotrigine, Lurasidone. Continue therapy.   - Discussed 2wk mood check which can be a video visit    #. Disposition:   - Anticipated discharge on PPD#2  - Follow up in 2wks for mood check and 6wks for full postpartum visit.     Lauren MacNeill, MD [facility]  [phone]  11/06/2024 7:50 AM

## 2024-11-05 NOTE — PROVIDER NOTIFICATION
11/04/24 2116   Provider Notification   Provider Name/Title Dr. Limon   Method of Notification Electronic Page   Request Evaluate-Remote   Notification Reason Status Update     Provider notified of chavez catheter output of about 20ml/hr since 5pm & urine being very concentrated. LR is currently infusing & patient agreed to increase oral fluid intake. RN to place order for 1L bolus of LR to be infused over 1 hour now. Provider okay with leaving chavez in place until output is adequate.

## 2024-11-06 VITALS
RESPIRATION RATE: 18 BRPM | SYSTOLIC BLOOD PRESSURE: 134 MMHG | OXYGEN SATURATION: 96 % | DIASTOLIC BLOOD PRESSURE: 81 MMHG | WEIGHT: 177 LBS | HEART RATE: 84 BPM | TEMPERATURE: 98.5 F | BODY MASS INDEX: 31.36 KG/M2 | HEIGHT: 63 IN

## 2024-11-06 PROCEDURE — 250N000013 HC RX MED GY IP 250 OP 250 PS 637: Performed by: OBSTETRICS & GYNECOLOGY

## 2024-11-06 RX ORDER — OXYCODONE HYDROCHLORIDE 5 MG/1
5 TABLET ORAL EVERY 4 HOURS PRN
Qty: 5 TABLET | Refills: 0 | Status: SHIPPED | OUTPATIENT
Start: 2024-11-06

## 2024-11-06 RX ADMIN — LAMOTRIGINE 200 MG: 100 TABLET, FILM COATED, EXTENDED RELEASE ORAL at 09:01

## 2024-11-06 RX ADMIN — FLUOXETINE 70 MG: 10 CAPSULE ORAL at 08:55

## 2024-11-06 RX ADMIN — Medication 1 TABLET: at 08:56

## 2024-11-06 RX ADMIN — ACETAMINOPHEN 975 MG: 325 TABLET, FILM COATED ORAL at 00:21

## 2024-11-06 RX ADMIN — IBUPROFEN 800 MG: 800 TABLET, FILM COATED ORAL at 00:21

## 2024-11-06 RX ADMIN — ACETAMINOPHEN 975 MG: 325 TABLET, FILM COATED ORAL at 06:53

## 2024-11-06 RX ADMIN — SENNOSIDES AND DOCUSATE SODIUM 1 TABLET: 8.6; 5 TABLET ORAL at 08:56

## 2024-11-06 RX ADMIN — IBUPROFEN 800 MG: 800 TABLET, FILM COATED ORAL at 13:20

## 2024-11-06 RX ADMIN — IBUPROFEN 800 MG: 800 TABLET, FILM COATED ORAL at 06:53

## 2024-11-06 RX ADMIN — ACETAMINOPHEN 975 MG: 325 TABLET, FILM COATED ORAL at 13:20

## 2024-11-06 ASSESSMENT — ACTIVITIES OF DAILY LIVING (ADL)
ADLS_ACUITY_SCORE: 0

## 2024-11-06 NOTE — PLAN OF CARE
"Pt's VSS, discussed follow up in 2 week for mood check and in 6 weeks with OB provider, discharge completed, questions answered.  Problem: Adult Inpatient Plan of Care  Goal: Plan of Care Review  Description: The Plan of Care Review/Shift note should be completed every shift.  The Outcome Evaluation is a brief statement about your assessment that the patient is improving, declining, or no change.  This information will be displayed automatically on your shift  note.  Outcome: Met  Flowsheets (Taken 11/6/2024 1451)  Plan of Care Reviewed With:   patient   spouse  Goal: Patient-Specific Goal (Individualized)  Description: You can add care plan individualizations to a care plan. Examples of Individualization might be:  \"Parent requests to be called daily at 9am for status\", \"I have a hard time hearing out of my right ear\", or \"Do not touch me to wake me up as it startles  me\".  Outcome: Met  Goal: Absence of Hospital-Acquired Illness or Injury  Outcome: Met  Intervention: Prevent Skin Injury  Recent Flowsheet Documentation  Taken 11/6/2024 0833 by Meseret Goel RN  Body Position: position changed independently  Goal: Optimal Comfort and Wellbeing  Outcome: Met  Intervention: Monitor Pain and Promote Comfort  Recent Flowsheet Documentation  Taken 11/6/2024 1320 by Meseret Goel RN  Pain Management Interventions: medication (see MAR)  Taken 11/6/2024 0832 by Meseret Goel RN  Pain Management Interventions:   declines   emotional support   diversional activity provided   environmental changes  Intervention: Provide Person-Centered Care  Recent Flowsheet Documentation  Taken 11/6/2024 0833 by Meseret Goel RN  Trust Relationship/Rapport:   care explained   choices provided   emotional support provided   empathic listening provided   questions answered   questions encouraged   reassurance provided   thoughts/feelings acknowledged  Goal: Readiness for Transition of Care  Outcome: Met   "   Problem: Postpartum ( Delivery)  Goal: Successful Parent Role Transition  Outcome: Met  Intervention: Support Parent Role Transition  Recent Flowsheet Documentation  Taken 2024 0833 by Meseret Goel RN  Supportive Measures:   active listening utilized   decision-making supported  Parent-Child Attachment Promotion:   caring behavior modeled   cue recognition promoted  Goal: Hemostasis  Outcome: Met  Goal: Effective Bowel Elimination  Outcome: Met  Goal: Fluid and Electrolyte Balance  Outcome: Met  Goal: Absence of Infection Signs and Symptoms  Outcome: Met  Goal: Anesthesia/Sedation Recovery  Outcome: Met  Goal: Optimal Pain Control and Function  Outcome: Met  Intervention: Prevent or Manage Pain  Recent Flowsheet Documentation  Taken 2024 1320 by Meseret Goel RN  Pain Management Interventions: medication (see MAR)  Taken 2024 0832 by Meseret Goel RN  Pain Management Interventions:   declines   emotional support   diversional activity provided   environmental changes  Goal: Nausea and Vomiting Relief  Outcome: Met  Goal: Effective Urinary Elimination  Outcome: Met  Goal: Effective Oxygenation and Ventilation  Outcome: Met     Problem: Comorbidity Management  Goal: Maintenance of Behavioral Health Symptom Control  Outcome: Met  Goal: Blood Glucose Levels Within Targeted Range  Outcome: Met     Problem:  Depressive Symptoms  Goal: Decreased Depression Symptoms  Outcome: Met  Intervention: Monitor and Manage Depressive Symptoms  Recent Flowsheet Documentation  Taken 2024 0833 by Meseret Goel RN  Supportive Measures:   active listening utilized   decision-making supported  Family/Support System Care: support provided  Intervention: Promote Parent and Infant Attachment  Recent Flowsheet Documentation  Taken 2024 0833 by Meseret Goel RN  Parent-Child Attachment Promotion:   caring behavior modeled   cue recognition promoted      Problem: Anxiety Signs/Symptoms  Goal: Optimized Energy Level (Anxiety Signs/Symptoms)  Outcome: Met  Goal: Optimized Cognitive Function (Anxiety Signs/Symptoms)  Outcome: Met  Goal: Improved Mood Symptoms (Anxiety Signs/Symptoms)  Outcome: Met  Intervention: Optimize Emotion and Mood  Recent Flowsheet Documentation  Taken 11/6/2024 0833 by Meseret Goel RN  Supportive Measures:   active listening utilized   decision-making supported  Goal: Improved Sleep (Anxiety Signs/Symptoms)  Outcome: Met  Goal: Enhanced Social, Occupational or Functional Skills (Anxiety Signs/Symptoms)  Outcome: Met  Intervention: Promote Social, Occupational and Functional Ability  Recent Flowsheet Documentation  Taken 11/6/2024 0833 by Meseret Goel RN  Trust Relationship/Rapport:   care explained   choices provided   emotional support provided   empathic listening provided   questions answered   questions encouraged   reassurance provided   thoughts/feelings acknowledged  Goal: Improved Somatic Symptoms (Anxiety Signs/Symptoms)  Outcome: Met   Goal Outcome Evaluation:      Plan of Care Reviewed With: patient, spouse

## 2024-11-06 NOTE — PLAN OF CARE
Vital signs stable. Postpartum assessment WDL. Uterine fundus is firm and midline. Scant vaginal bleeding. Using Tylenol and Ibuprofen for pain with good relief. Incision assessment WDL and open to air. Up and ambulating; free of dizziness. Voiding w/o difficulty. Tolerating a regular diet. Pumping every 3 hours. EPDS score of 11, social work consult already in place. Will notify provider on AM rounds. Questions/concerns addressed.      Problem: Adult Inpatient Plan of Care  Goal: Plan of Care Review  Description: The Plan of Care Review/Shift note should be completed every shift.  The Outcome Evaluation is a brief statement about your assessment that the patient is improving, declining, or no change.  This information will be displayed automatically on your shift  note.  Outcome: Progressing  Flowsheets (Taken 2024)  Plan of Care Reviewed With: patient  Overall Patient Progress: improving  Goal: Absence of Hospital-Acquired Illness or Injury  Outcome: Progressing  Intervention: Prevent Skin Injury  Recent Flowsheet Documentation  Taken 2024 by Demi Gee RN  Body Position: position changed independently  Goal: Optimal Comfort and Wellbeing  Outcome: Progressing  Intervention: Provide Person-Centered Care  Recent Flowsheet Documentation  Taken 2024 by Demi Gee RN  Trust Relationship/Rapport:   care explained   choices provided   questions answered   questions encouraged   thoughts/feelings acknowledged  Goal: Readiness for Transition of Care  Outcome: Progressing     Problem: Postpartum ( Delivery)  Goal: Successful Parent Role Transition  Outcome: Progressing  Intervention: Support Parent Role Transition  Recent Flowsheet Documentation  Taken 2024 by Demi Gee RN  Supportive Measures:   active listening utilized   decision-making supported  Parent-Child Attachment Promotion:   caring behavior modeled   cue recognition promoted   rooming-in  promoted  Goal: Hemostasis  Outcome: Progressing  Goal: Effective Bowel Elimination  Outcome: Progressing  Goal: Fluid and Electrolyte Balance  Outcome: Progressing  Goal: Absence of Infection Signs and Symptoms  Outcome: Progressing  Goal: Anesthesia/Sedation Recovery  Outcome: Progressing  Goal: Optimal Pain Control and Function  Outcome: Progressing  Goal: Nausea and Vomiting Relief  Outcome: Progressing  Goal: Effective Urinary Elimination  Outcome: Progressing  Goal: Effective Oxygenation and Ventilation  Outcome: Progressing  Intervention: Optimize Oxygenation and Ventilation  Recent Flowsheet Documentation  Taken 2024 by Demi Gee RN  Head of Bed (HOB) Positioning: HOB at 30-45 degrees     Problem: Comorbidity Management  Goal: Maintenance of Behavioral Health Symptom Control  Outcome: Progressing  Goal: Blood Glucose Levels Within Targeted Range  Outcome: Progressing     Problem:  Depressive Symptoms  Goal: Decreased Depression Symptoms  Outcome: Progressing  Intervention: Monitor and Manage Depressive Symptoms  Recent Flowsheet Documentation  Taken 2024 by Demi Gee RN  Supportive Measures:   active listening utilized   decision-making supported  Family/Support System Care: support provided  Intervention: Promote Parent and Infant Attachment  Recent Flowsheet Documentation  Taken 2024 by Demi Gee RN  Parent-Child Attachment Promotion:   caring behavior modeled   cue recognition promoted   rooming-in promoted     Problem: Anxiety Signs/Symptoms  Goal: Optimized Energy Level (Anxiety Signs/Symptoms)  Outcome: Progressing  Goal: Optimized Cognitive Function (Anxiety Signs/Symptoms)  Outcome: Progressing  Goal: Improved Mood Symptoms (Anxiety Signs/Symptoms)  Outcome: Progressing  Intervention: Optimize Emotion and Mood  Recent Flowsheet Documentation  Taken 2024 by Demi Gee RN  Supportive Measures:   active listening utilized    decision-making supported  Goal: Improved Sleep (Anxiety Signs/Symptoms)  Outcome: Progressing  Goal: Enhanced Social, Occupational or Functional Skills (Anxiety Signs/Symptoms)  Outcome: Progressing  Intervention: Promote Social, Occupational and Functional Ability  Recent Flowsheet Documentation  Taken 11/6/2024 0019 by Demi Gee, RN  Trust Relationship/Rapport:   care explained   choices provided   questions answered   questions encouraged   thoughts/feelings acknowledged  Goal: Improved Somatic Symptoms (Anxiety Signs/Symptoms)  Outcome: Progressing   Goal Outcome Evaluation:      Plan of Care Reviewed With: patient    Overall Patient Progress: improvingOverall Patient Progress: improving

## 2024-11-06 NOTE — PROGRESS NOTES
Public health nurse to follow up tomorrow if possible. Patient currently busy in room with primary nurse.

## 2024-11-06 NOTE — CONSULTS
D) SWS responding to automatic referral   I) SWS met with Lida who is MOB with NICOLA Robin who is involved and supportive. They live together in [place] Evangelist is their first child together and they are prepared for him at home. SWS discussed baby blues/postpartum depression and gave information on this. SWS also gave Parent Resource Guide with SWS contact information.   A) Lida is A&O with good affect and eye contact. She is bonding well with baby. Good extended family support from both sides of the parents families. Patient's mom was in the room during SW visit. Patient was very open when discussing her mental health issues. She has prepared with her therapist and psychiatrist after care visits. Her mother reported that patient is very good about following up with her mental health appointments.  P) No further d/c needs at this time. SWS available upon request.    ROCIO Piña   Inpatient Care Coordination   Supervisor  [facility]  [phone]         ROCIO Kidd

## 2024-11-06 NOTE — LACTATION NOTE
This note was copied from a baby's chart.  Lactation visit; Lida reports wanting to switch to pump/bottle and stopped putting Evangelist to breast yesterday afternoon. States it was too stressful for her- support provided. Discussed hands on pumping techniques and hand expression to maximize milk. Reinforced importance of pumping at least q3 hours. Lida has spectra pump and wireless pump- reviewed differences in stimulation and encouraged to use spectra as primary pump.   Lida requesting writer to review spectra pump set up- assisted with setting up parts and also reviewed pump settings.   Discussed breast milk storage guidelines- reviewed resource.   All questions answered and reviewed outpatient lactation resources.

## 2024-11-06 NOTE — PLAN OF CARE
Goal Outcome Evaluation:      Plan of Care Reviewed With: patient    Overall Patient Progress: improvingOverall Patient Progress: improving     Vitals stable. Postpartum checks within normal limits. Ambulating independently. Voiding spontaneously. Pain controlled with tylenol and ibuprofen. Attempted to breastfeed this evening using a nipple shield and SNS system. Baby latched and sucked at breast for 5 min and took 5 mL of the donor milk through the SNS. Baby unlatched and mother requested to bottle feed the rest. Tolerated 8 more through the bottle and educated mother how to pace feed and how to burp baby after a feed. Finger fed 2 mL of moms pumped milk. Family at bedside and attentive to mom and baby. Bonding well with baby, frequent holding observed.       Problem: Adult Inpatient Plan of Care  Goal: Plan of Care Review  Outcome: Progressing  Flowsheets (Taken 11/5/2024 1801)  Plan of Care Reviewed With: patient  Overall Patient Progress: improving  Goal: Patient-Specific Goal (Individualized)  Outcome: Progressing  Goal: Absence of Hospital-Acquired Illness or Injury  Outcome: Progressing  Intervention: Prevent Skin Injury  Recent Flowsheet Documentation  Taken 11/5/2024 1600 by Jaclyn Blas, RN  Body Position: position changed independently  Intervention: Prevent Infection  Recent Flowsheet Documentation  Taken 11/5/2024 1600 by Jaclyn Blas, RN  Infection Prevention:   single patient room provided   rest/sleep promoted   hand hygiene promoted  Goal: Optimal Comfort and Wellbeing  Outcome: Progressing  Intervention: Provide Person-Centered Care  Recent Flowsheet Documentation  Taken 11/5/2024 1600 by Jaclyn Blas, RN  Trust Relationship/Rapport:   care explained   choices provided   emotional support provided   empathic listening provided   questions encouraged   questions answered   reassurance provided   thoughts/feelings acknowledged  Goal: Readiness for Transition of Care  Outcome:  Progressing     Problem: Postpartum ( Delivery)  Goal: Successful Parent Role Transition  Outcome: Progressing  Goal: Hemostasis  Outcome: Progressing  Goal: Effective Bowel Elimination  Outcome: Progressing  Goal: Fluid and Electrolyte Balance  Outcome: Progressing  Goal: Absence of Infection Signs and Symptoms  Outcome: Progressing  Goal: Anesthesia/Sedation Recovery  Outcome: Progressing  Goal: Optimal Pain Control and Function  Outcome: Progressing  Goal: Nausea and Vomiting Relief  Outcome: Progressing  Goal: Effective Urinary Elimination  Outcome: Progressing  Goal: Effective Oxygenation and Ventilation  Outcome: Progressing  Intervention: Optimize Oxygenation and Ventilation  Recent Flowsheet Documentation  Taken 2024 1600 by Jaclyn Blas, RN  Head of Bed (HOB) Positioning: HOB at 60-90 degrees     Problem: Comorbidity Management  Goal: Maintenance of Behavioral Health Symptom Control  Outcome: Progressing  Goal: Blood Glucose Levels Within Targeted Range  Outcome: Progressing     Problem:  Depressive Symptoms  Goal: Decreased Depression Symptoms  Outcome: Progressing  Intervention: Monitor and Manage Depressive Symptoms  Recent Flowsheet Documentation  Taken 2024 1600 by Jaclyn Blas, RN  Family/Support System Care:   support provided   self-care encouraged   presence promoted   involvement promoted     Problem: Anxiety Signs/Symptoms  Goal: Optimized Energy Level (Anxiety Signs/Symptoms)  Outcome: Progressing  Goal: Optimized Cognitive Function (Anxiety Signs/Symptoms)  Outcome: Progressing  Goal: Improved Mood Symptoms (Anxiety Signs/Symptoms)  Outcome: Progressing  Goal: Improved Sleep (Anxiety Signs/Symptoms)  Outcome: Progressing  Goal: Enhanced Social, Occupational or Functional Skills (Anxiety Signs/Symptoms)  Outcome: Progressing  Intervention: Promote Social, Occupational and Functional Ability  Recent Flowsheet Documentation  Taken 2024 1600 by Roopa  Jaclyn SIMS RN  Trust Relationship/Rapport:   care explained   choices provided   emotional support provided   empathic listening provided   questions encouraged   questions answered   reassurance provided   thoughts/feelings acknowledged  Goal: Improved Somatic Symptoms (Anxiety Signs/Symptoms)  Outcome: Progressing

## 2024-11-06 NOTE — DISCHARGE INSTRUCTIONS
Warning Signs after Having a Baby    Keep this paper on your fridge or somewhere else where you can see it.    Call your provider if you have any of these symptoms up to 12 weeks after having your baby.    Thoughts of hurting yourself or your baby  Pain in your chest or trouble breathing  Severe headache not helped by pain medicine  Eyesight concerns (blurry vision, seeing spots or flashes of light, other changes to eyesight)  Fainting, shaking or other signs of a seizure    Call 9-1-1 if you feel that it is an emergency.     The symptoms below can happen to anyone after giving birth. They can be very serious. Call your provider if you have any of these warning signs.    My provider s phone number: _______________________    Losing too much blood (hemorrhage)    Call your provider if you soak through a pad in less than an hour or pass blood clots bigger than a golf ball. These may be signs that you are bleeding too much.    Blood clots in the legs or lungs    After you give birth, your body naturally clots its blood to help prevent blood loss. Sometimes this increased clotting can happen in other areas of the body, like the legs or lungs. This can block your blood flow and be very dangerous.     Call your provider if you:  Have a red, swollen spot on the back of your leg that is warm or painful when you touch it.   Are coughing up blood.     Infection    Call your provider if you have any of these symptoms:  Fever of 100.4 F (38 C) or higher.  Pain or redness around your stitches if you had an incision.   Any yellow, white, or green fluid coming from places where you had stitches or surgery.    Mood Problems (postpartum depression)    Many people feel sad or have mood changes after having a baby. But for some people, these mood swings are worse.     Call your provider right away if you feel so anxious or nervous that you can't care for yourself or your baby.    Preeclampsia (high blood pressure)    Even if you  didn't have high blood pressure when you were pregnant, you are at risk for the high blood pressure disease called preeclampsia. This risk can last up to 12 weeks after giving birth.     Call your provider if you have:   Pain on your right side under your rib cage  Sudden swelling in the hands and face    Remember: You know your body. If something doesn't feel right, get medical help.     For informational purposes only. Not to replace the advice of your health care provider. Copyright 2020 [facility]. All rights reserved. Clinically reviewed by Paty Schultz, RNC-OB, MSN. [employer] [id number] - Rev .     Section: What to Expect at Home  Your Recovery     A  section, or , is surgery to deliver your baby through a cut that the doctor makes in your lower belly and uterus. The cut is called an incision.  You may have some pain in your lower belly and need pain medicine for 1 to 2 weeks. You can expect some vaginal bleeding for several weeks. You will probably need about 6 weeks to fully recover.  It's important to take it easy while the incision heals. Avoid heavy lifting, strenuous activities, and exercises that strain the belly muscles while you recover. Ask a family member or friend for help with housework, cooking, and shopping.  This care sheet gives you a general idea about how long it will take for you to recover. But each person recovers at a different pace. Follow the steps below to get better as quickly as possible.  How can you care for yourself at home?  Activity    Rest when you feel tired. Getting enough sleep will help you recover.     Try to walk each day. Start by walking a little more than you did the day before. Bit by bit, increase the amount you walk. Walking boosts blood flow and helps prevent pneumonia, constipation, and blood clots.     Avoid strenuous activities, such as bicycle riding, jogging, weightlifting, and aerobic exercise, for 6 weeks or until  your doctor says it is okay.     Until your doctor says it is okay, do not lift anything heavier than your baby.     Do not do sit-ups or other exercises that strain the belly muscles for 6 weeks or until your doctor says it is okay.     Hold a pillow over your incision when you cough or take deep breaths. This will support your belly and decrease your pain.     You may shower as usual. Pat the incision dry when you are done.     You will have some vaginal bleeding. Wear sanitary pads. Do not douche or use tampons until your doctor says it is okay.     Ask your doctor when you can drive again.     You will probably need to take at least 6 weeks off work. It depends on the type of work you do and how you feel.     Ask your doctor when it is okay for you to have sex.   Diet    You can eat your normal diet. If your stomach is upset, try bland, low-fat foods like plain rice, broiled chicken, toast, and yogurt.     Drink plenty of fluids (unless your doctor tells you not to).     You may notice that your bowel movements are not regular right after your surgery. This is common. Try to avoid constipation and straining with bowel movements. You may want to take a fiber supplement every day. If you have not had a bowel movement after a couple of days, ask your doctor about taking a mild laxative.     If you are breastfeeding, limit alcohol. Alcohol can cause a lack of energy and other health problems for the baby when a breastfeeding woman drinks heavily. It can also get in the way of a mom's ability to feed her baby or to care for the child in other ways. There isn't a lot of research about exactly how much alcohol can harm a baby. Having no alcohol is the safest choice for your baby. If you choose to have a drink now and then, have only one drink, and limit the number of occasions that you have a drink. Wait to breastfeed at least 2 hours after you have a drink to reduce the amount of alcohol the baby may get in the milk.    Medicines    Your doctor will tell you if and when you can restart your medicines. You will also get instructions about taking any new medicines.     If you stopped taking aspirin or some other blood thinner, your doctor will tell you when to start taking it again.     Take pain medicines exactly as directed.  If the doctor gave you a prescription medicine for pain, take it as prescribed.  If you are not taking a prescription pain medicine, ask your doctor if you can take an over-the-counter medicine.     If you think your pain medicine is making you sick to your stomach:  Take your medicine after meals (unless your doctor has told you not to).  Ask your doctor for a different pain medicine.     If your doctor prescribed antibiotics, take them as directed. Do not stop taking them just because you feel better. You need to take the full course of antibiotics.   Incision care    If you have strips of tape on the incision, leave the tape on for a week or until it falls off.     Wash the area daily with warm, soapy water, and pat it dry. Don't use hydrogen peroxide or alcohol, which can slow healing. You may cover the area with a gauze bandage if it weeps or rubs against clothing. Change the bandage every day.     Keep the area clean and dry.   Other instructions    If you breastfeed your baby, you may be more comfortable while you are healing if you don't rest your baby on your belly. Try tucking your baby under your arm, with your baby's body along the side you will be feeding on. Support your baby's upper body with your arm. With that hand you can control your baby's head to bring your baby's mouth to your breast. This is sometimes called the football hold.   Follow-up care is a key part of your treatment and safety. Be sure to make and go to all appointments, and call your doctor if you are having problems. It's also a good idea to know your test results and keep a list of the medicines you take.  When should you  call for help?  Share this information with your partner, family, or a friend. They can help you watch for warning signs.  Call 911  anytime you think you may need emergency care. For example, call if:    You feel you cannot stop from hurting yourself, your baby, or someone else.     You passed out (lost consciousness).     You have chest pain, are short of breath, or cough up blood.     You have a seizure.   Where to get help 24 hours a day, 7 days a week   If you or someone you know talks about suicide, self-harm, a mental health crisis, a substance use crisis, or any other kind of emotional distress, get help right away. You can:    Call the Suicide and Crisis Lifeline at 988.     Call 1-141-741-TALK (1-408.559.8057).     Text HOME to 983539 to access the Crisis Text Line.   Consider saving these numbers in your phone.  Go to [employer] for more information or to chat online.  Call your doctor or midwife now or seek immediate medical care if:    You have loose stitches, or your incision comes open.     You have signs of hemorrhage (too much bleeding), such as:  Heavy vaginal bleeding. This means that you are soaking through one or more pads in an hour. Or you pass blood clots bigger than an egg.  Feeling dizzy or lightheaded, or you feel like you may faint.  Feeling so tired or weak that you cannot do your usual activities.  A fast or irregular heartbeat.  New or worse belly pain.     You have symptoms of infection, such as:  Increased pain, swelling, warmth, or redness.  Red streaks leading from the incision.  Pus draining from the incision.  A fever.  Frequent or painful urination or blood in your urine.  Vaginal discharge that smells bad.  New or worse belly pain.     You have symptoms of a blood clot in your leg (called a deep vein thrombosis), such as:  Pain in the calf, back of the knee, thigh, or groin.  Swelling in the leg or groin.  A color change on the leg or groin. The skin may be reddish or  "purplish, depending on your usual skin color.     You have signs of preeclampsia, such as:  Sudden swelling of your face, hands, or feet.  New vision problems (such as dimness, blurring, or seeing spots).  A severe headache.     You have signs of heart failure, such as:  New or increased shortness of breath.  New or worse swelling in your legs, ankles, or feet.  Sudden weight gain, such as more than 2 to 3 pounds in a day or 5 pounds in a week.  Feeling so tired or weak that you cannot do your usual activities.     You had spinal or epidural pain relief and have:  New or worse back pain.  Increased pain, swelling, warmth, or redness at the injection site.  Tingling, weakness, or numbness in your legs or groin.   Watch closely for changes in your health, and be sure to contact your doctor or midwife if:    Your vaginal bleeding isn't decreasing.     You feel sad, anxious, or hopeless for more than a few days.     You are having problems with your breasts or breastfeeding.   Where can you learn more?  Go to https://www.[facility].net/patiented  Enter M806 in the search box to learn more about \" Section: What to Expect at Home.\"  Current as of: July 10, 2023  Content Version: 2024 [facility] [place].   Care instructions adapted under license by your healthcare professional. If you have questions about a medical condition or this instruction, always ask your healthcare professional. [employer] disclaims any warranty or liability for your use of this information.    "

## 2024-11-06 NOTE — PLAN OF CARE
Goal Outcome Evaluation:    VSS. Up independently. Tolerating regular diet. Bottle feeding  about 15ml of DM & EBM. C/s incision WDL, no apparent signs of infection present. Mild incisional pain controlled with tylenol & ibuprofen. Bonding well with .  & family supportive @ bedside. Denies preeclampsia symptoms. Fundus firm @ midline, lochia WDL. Voiding adequately.        Plan of Care Reviewed With: patient, spouse    Overall Patient Progress: improvingOverall Patient Progress: improving      Problem: Adult Inpatient Plan of Care  Goal: Plan of Care Review  Description: The Plan of Care Review/Shift note should be completed every shift.  The Outcome Evaluation is a brief statement about your assessment that the patient is improving, declining, or no change.  This information will be displayed automatically on your shift  note.  Outcome: Progressing  Flowsheets (Taken 2024)  Plan of Care Reviewed With:   patient   spouse  Overall Patient Progress: improving  Goal: Absence of Hospital-Acquired Illness or Injury  Intervention: Prevent Skin Injury  Recent Flowsheet Documentation  Taken 2024 by Sue Tobar RN  Body Position: position changed independently  Goal: Optimal Comfort and Wellbeing  Intervention: Provide Person-Centered Care  Recent Flowsheet Documentation  Taken 2024 by Sue Tobar RN  Trust Relationship/Rapport:   care explained   choices provided   emotional support provided   questions encouraged     Problem:  Depressive Symptoms  Goal: Decreased Depression Symptoms  Intervention: Monitor and Manage Depressive Symptoms  Recent Flowsheet Documentation  Taken 2024 by Sue Tobar RN  Family/Support System Care: caregiver stress acknowledged     Problem: Anxiety Signs/Symptoms  Goal: Enhanced Social, Occupational or Functional Skills (Anxiety Signs/Symptoms)  Intervention: Promote Social, Occupational and Functional  Ability  Recent Flowsheet Documentation  Taken 11/5/2024 2034 by Sue Tobar, RN  Trust Relationship/Rapport:   care explained   choices provided   emotional support provided   questions encouraged

## 2024-11-07 ENCOUNTER — PATIENT OUTREACH (OUTPATIENT)
Dept: CARE COORDINATION | Facility: CLINIC | Age: 29
End: 2024-11-07
Payer: COMMERCIAL

## 2024-11-07 NOTE — PROGRESS NOTES
Clinic Care Coordination Contact  Transitions of Care Outreach    Chief Complaint   Patient presents with    Clinic Care Coordination - Post Hospital       Most Recent Admission Date: 2024   Most Recent Admission Diagnosis:      Most Recent Discharge Date: 2024   Most Recent Discharge Diagnosis: S/P primary low transverse  - Z98.891     Transitions of Care Assessment    Discharge Assessment  How are you doing now that you are home?: feeling better  How are your symptoms? (Red Flag symptoms escalate to triage hotline per guidelines): Improved  Do you know how to contact your clinic care team if you have future questions or changes to your health status? : Yes  Does the patient have their discharge instructions? : Yes  Does the patient have questions regarding their discharge instructions? : No  Were you started on any new medications or were there changes to any of your previous medications? : Yes  Does the patient have all of their medications?: Yes  Do you have questions regarding any of your medications? : No  Do you have all of your needed medical supplies or equipment (DME)?  (i.e. oxygen tank, CPAP, cane, etc.): Yes    Post-op (HOLLY CTA Only)  If the patient had a surgery or procedure, do they have any questions for a nurse?: No           Follow up Plan     No future appointments.    Outpatient Plan as outlined on AVS reviewed with patient.      For any urgent concerns, please contact our 24 hour nurse triage line: [phone]     HOLLY García  [phone]  [facility]

## 2024-11-16 ENCOUNTER — HOSPITAL ENCOUNTER (EMERGENCY)
Facility: CLINIC | Age: 29
Discharge: HOME | End: 2024-11-16
Attending: EMERGENCY MEDICINE | Admitting: EMERGENCY MEDICINE
Payer: COMMERCIAL

## 2024-11-16 VITALS
OXYGEN SATURATION: 100 % | HEIGHT: 63 IN | HEART RATE: 87 BPM | RESPIRATION RATE: 16 BRPM | TEMPERATURE: 97.9 F | BODY MASS INDEX: 28.32 KG/M2 | DIASTOLIC BLOOD PRESSURE: 93 MMHG | SYSTOLIC BLOOD PRESSURE: 132 MMHG | WEIGHT: 159.83 LBS

## 2024-11-16 LAB
ALBUMIN MFR UR ELPH: 12.4 MG/DL
ALBUMIN SERPL BCG-MCNC: 4.1 G/DL (ref 3.5–5.2)
ALP SERPL-CCNC: 110 U/L (ref 40–150)
ALT SERPL W P-5'-P-CCNC: 38 U/L (ref 0–50)
ANION GAP SERPL CALCULATED.3IONS-SCNC: 15 MMOL/L (ref 7–15)
AST SERPL W P-5'-P-CCNC: 27 U/L (ref 0–45)
BASOPHILS # BLD AUTO: 0.1 10E3/UL (ref 0–0.2)
BASOPHILS NFR BLD AUTO: 1 %
BILIRUB SERPL-MCNC: 0.2 MG/DL
BUN SERPL-MCNC: 12.8 MG/DL (ref 6–20)
CALCIUM SERPL-MCNC: 9.2 MG/DL (ref 8.8–10.4)
CHLORIDE SERPL-SCNC: 101 MMOL/L (ref 98–107)
CREAT SERPL-MCNC: 0.69 MG/DL (ref 0.51–0.95)
CREAT UR-MCNC: 88.8 MG/DL
EGFRCR SERPLBLD CKD-EPI 2021: >90 ML/MIN/1.73M2
EOSINOPHIL # BLD AUTO: 0.3 10E3/UL (ref 0–0.7)
EOSINOPHIL NFR BLD AUTO: 4 %
ERYTHROCYTE [DISTWIDTH] IN BLOOD BY AUTOMATED COUNT: 13.3 % (ref 10–15)
GLUCOSE SERPL-MCNC: 91 MG/DL (ref 70–99)
HCO3 SERPL-SCNC: 20 MMOL/L (ref 22–29)
HCT VFR BLD AUTO: 38.6 % (ref 35–47)
HGB BLD-MCNC: 12.7 G/DL (ref 11.7–15.7)
HOLD SPECIMEN: NORMAL
HOLD SPECIMEN: NORMAL
IMM GRANULOCYTES # BLD: 0 10E3/UL
IMM GRANULOCYTES NFR BLD: 0 %
LYMPHOCYTES # BLD AUTO: 2.7 10E3/UL (ref 0.8–5.3)
LYMPHOCYTES NFR BLD AUTO: 31 %
MAGNESIUM SERPL-MCNC: 2 MG/DL (ref 1.7–2.3)
MCH RBC QN AUTO: 28.8 PG (ref 26.5–33)
MCHC RBC AUTO-ENTMCNC: 32.9 G/DL (ref 31.5–36.5)
MCV RBC AUTO: 88 FL (ref 78–100)
MONOCYTES # BLD AUTO: 0.5 10E3/UL (ref 0–1.3)
MONOCYTES NFR BLD AUTO: 6 %
NEUTROPHILS # BLD AUTO: 5.1 10E3/UL (ref 1.6–8.3)
NEUTROPHILS NFR BLD AUTO: 58 %
NRBC # BLD AUTO: 0 10E3/UL
NRBC BLD AUTO-RTO: 0 /100
PLATELET # BLD AUTO: 610 10E3/UL (ref 150–450)
POTASSIUM SERPL-SCNC: 4.2 MMOL/L (ref 3.4–5.3)
PROT SERPL-MCNC: 7.1 G/DL (ref 6.4–8.3)
PROT/CREAT 24H UR: 0.14 MG/MG CR (ref 0–0.2)
RBC # BLD AUTO: 4.41 10E6/UL (ref 3.8–5.2)
SODIUM SERPL-SCNC: 136 MMOL/L (ref 135–145)
WBC # BLD AUTO: 8.7 10E3/UL (ref 4–11)

## 2024-11-16 PROCEDURE — 82247 BILIRUBIN TOTAL: CPT | Performed by: EMERGENCY MEDICINE

## 2024-11-16 PROCEDURE — 93005 ELECTROCARDIOGRAM TRACING: CPT

## 2024-11-16 PROCEDURE — 83735 ASSAY OF MAGNESIUM: CPT | Performed by: EMERGENCY MEDICINE

## 2024-11-16 PROCEDURE — 85004 AUTOMATED DIFF WBC COUNT: CPT | Performed by: EMERGENCY MEDICINE

## 2024-11-16 PROCEDURE — 82310 ASSAY OF CALCIUM: CPT | Performed by: EMERGENCY MEDICINE

## 2024-11-16 PROCEDURE — 250N000013 HC RX MED GY IP 250 OP 250 PS 637: Performed by: EMERGENCY MEDICINE

## 2024-11-16 PROCEDURE — 80053 COMPREHEN METABOLIC PANEL: CPT | Performed by: EMERGENCY MEDICINE

## 2024-11-16 PROCEDURE — 85018 HEMOGLOBIN: CPT | Performed by: EMERGENCY MEDICINE

## 2024-11-16 PROCEDURE — 36415 COLL VENOUS BLD VENIPUNCTURE: CPT | Performed by: EMERGENCY MEDICINE

## 2024-11-16 PROCEDURE — 99284 EMERGENCY DEPT VISIT MOD MDM: CPT

## 2024-11-16 PROCEDURE — 84156 ASSAY OF PROTEIN URINE: CPT | Performed by: EMERGENCY MEDICINE

## 2024-11-16 RX ORDER — ACETAMINOPHEN 325 MG/1
975 TABLET ORAL ONCE
Status: COMPLETED | OUTPATIENT
Start: 2024-11-16 | End: 2024-11-16

## 2024-11-16 RX ORDER — LABETALOL HYDROCHLORIDE 5 MG/ML
20-80 INJECTION, SOLUTION INTRAVENOUS EVERY 10 MIN PRN
Status: DISCONTINUED | OUTPATIENT
Start: 2024-11-16 | End: 2024-11-16 | Stop reason: HOSPADM

## 2024-11-16 RX ORDER — LABETALOL 200 MG/1
200 TABLET, FILM COATED ORAL 2 TIMES DAILY
Qty: 30 TABLET | Refills: 0 | Status: SHIPPED | OUTPATIENT
Start: 2024-11-16 | End: 2024-11-16

## 2024-11-16 RX ORDER — HYDRALAZINE HYDROCHLORIDE 20 MG/ML
10 INJECTION INTRAMUSCULAR; INTRAVENOUS
Status: DISCONTINUED | OUTPATIENT
Start: 2024-11-16 | End: 2024-11-16 | Stop reason: HOSPADM

## 2024-11-16 RX ORDER — ONDANSETRON 2 MG/ML
4 INJECTION INTRAMUSCULAR; INTRAVENOUS
Status: DISCONTINUED | OUTPATIENT
Start: 2024-11-16 | End: 2024-11-16 | Stop reason: HOSPADM

## 2024-11-16 RX ADMIN — ACETAMINOPHEN 975 MG: 325 TABLET, FILM COATED ORAL at 03:52

## 2024-11-16 ASSESSMENT — COLUMBIA-SUICIDE SEVERITY RATING SCALE - C-SSRS
1. IN THE PAST MONTH, HAVE YOU WISHED YOU WERE DEAD OR WISHED YOU COULD GO TO SLEEP AND NOT WAKE UP?: NO
2. HAVE YOU ACTUALLY HAD ANY THOUGHTS OF KILLING YOURSELF IN THE PAST MONTH?: NO
6. HAVE YOU EVER DONE ANYTHING, STARTED TO DO ANYTHING, OR PREPARED TO DO ANYTHING TO END YOUR LIFE?: NO

## 2024-11-16 ASSESSMENT — ACTIVITIES OF DAILY LIVING (ADL)
ADLS_ACUITY_SCORE: 0

## 2024-11-16 NOTE — ED PROVIDER NOTES
Emergency Department Note      History of Present Illness     Chief Complaint   Hypertension and Headache      HPI   Corinne N Palm is a 29 year old female 10 days status post  section who presents to the ED with her  and son for evaluation of a headache and hypertension. The patient states she developed a headache earlier today that has been constant since. She routinely checked her blood pressure around headache onset and found it to be 147/101. She took ibuprofen with mild improvement of her headache. States she had an uncomplicated pregnancy followed by an unplanned  section due to a failure to dilate and a decreasing fetal heart rate during labor. She has since been doing well and is breastfeeding. No history preeclampsia or eclampsia. Denies abdominal pain or worsening lower extremity edema.     Independent Historian   None    Review of External Notes   I reviewed the ED note from 24 for a headache. She had an initial blood pressure of 145/95 that decreased to 130/84 without intervention.     Reviewed the discharge summary from 24 for a  section.     Past Medical History     Medical History and Problem List   Anxiety  OCD  Anorexia nervosa  Depression  BPD  Suicide attempt    Medications   Unisom  Hydroxyzine  Meclizine  Procardia  Fluoxetine  Latuda  Lamictal  Oxycodone  Prenatal vitamins     Surgical History    section  Cleft lip repair     Physical Exam     Patient Vitals for the past 24 hrs:   BP Temp Temp src Pulse Resp SpO2 Height Weight   24 0546 (!) 132/93 -- -- 87 -- 100 % -- --   24 0531 (!) 126/92 -- -- 90 -- 100 % -- --   24 0514 (!) 125/95 -- -- 85 -- 98 % -- --   24 0459 (!) 124/91 -- -- 85 -- 100 % -- --   24 0442 131/89 -- -- -- -- 98 % -- --   24 0429 (!) 133/93 -- -- 91 -- 98 % -- --   24 0415 (!) 140/92 -- -- 87 -- 100 % -- --   24 0345 128/88 -- -- 84 -- 100 % -- --   24 0333 -- -- -- --  "-- 100 % -- --   11/16/24 0332 (!) 142/101 -- -- 89 -- -- -- --   11/16/24 0303 (!) 153/109 97.9  F (36.6  C) Temporal 92 16 98 % 1.6 m (5' 3\") 72.5 kg (159 lb 13.3 oz)     Physical Exam  Constitutional: Alert, attentive, GCS 15  Eyes: EOM are normal, anicteric, conjugate gaze  CV: regular rate and rhythm   Chest: Effort normal and breath sounds clear without wheezing or rales, symmetric bilaterally   GI:  non tender. No distension. No guarding or rebound.    MSK: No LE edema, no tenderness to palpation of BLE.  Neurological: Alert, attentive, moving all extremities equally.   Skin: Skin is warm and dry.     Diagnostics     Lab Results   Labs Ordered and Resulted from Time of ED Arrival to Time of ED Departure   COMPREHENSIVE METABOLIC PANEL - Abnormal       Result Value    Sodium 136      Potassium 4.2      Carbon Dioxide (CO2) 20 (*)     Anion Gap 15      Urea Nitrogen 12.8      Creatinine 0.69      GFR Estimate >90      Calcium 9.2      Chloride 101      Glucose 91      Alkaline Phosphatase 110      AST 27      ALT 38      Protein Total 7.1      Albumin 4.1      Bilirubin Total 0.2     CBC WITH PLATELETS AND DIFFERENTIAL - Abnormal    WBC Count 8.7      RBC Count 4.41      Hemoglobin 12.7      Hematocrit 38.6      MCV 88      MCH 28.8      MCHC 32.9      RDW 13.3      Platelet Count 610 (*)     % Neutrophils 58      % Lymphocytes 31      % Monocytes 6      % Eosinophils 4      % Basophils 1      % Immature Granulocytes 0      NRBCs per 100 WBC 0      Absolute Neutrophils 5.1      Absolute Lymphocytes 2.7      Absolute Monocytes 0.5      Absolute Eosinophils 0.3      Absolute Basophils 0.1      Absolute Immature Granulocytes 0.0      Absolute NRBCs 0.0     MAGNESIUM - Normal    Magnesium 2.0     PROTEIN RANDOM URINE    Total Protein Urine mg/dL 12.4      Total Protein Urine mg/mg Creat 0.14      Creatinine Urine mg/dL 88.8         Imaging   No orders to display       EKG   ECG taken at 0311, ECG read at " 0320  Sinus rhythm   Possible Left atrial enlargement    No significant change as compared to prior, dated 9/3/24.  Rate 79 bpm. OH interval 136 ms. QRS duration 72 ms. QT/QTc 410/470 ms. P-R-T axes 56 27 40.    Independent Interpretation   None    ED Course      Medications Administered   Medications   labetalol (NORMODYNE/TRANDATE) injection 20-80 mg (has no administration in time range)   hydrALAZINE (APRESOLINE) injection 10 mg (has no administration in time range)   ondansetron (ZOFRAN) injection 4 mg (has no administration in time range)   acetaminophen (TYLENOL) tablet 975 mg (975 mg Oral $Given 24 0351)       Procedures   Procedures     Discussion of Management   OB/GYN, Dr. Limon    ED Course   ED Course as of 24 0647   Sat 2024   0356 I obtained history and performed a physical exam as noted above.    9567 I spoke with Dr. Lmion of OBGYN regarding the patient's presentation and plan of care.        Additional Documentation  None    Medical Decision Making / Diagnosis     CMS Diagnoses: None    MIPS       None    MDM   Corinne N Palm is a 29 year old female who is 12 days status post emergent  for category 2 tracings presenting for evaluation of headache and elevated blood pressures at home.  She was seen in the emergency department postop day 3 for the same, started on nifedipine.  She checked her blood pressure at home and she was 140s over low 100s with a mild headache but no right upper quadrant pain.  On arrival here, she was 150s over 100 and improved without intervention but no true severe range blood pressures.  Screening labs including LFTs, platelets and urine protein were normal.  Had improvement in her headache with Tylenol.  I did touch base with on-call OB/GYN who recommended continued blood pressure monitoring at home, continuation of her nifedipine and close outpatient follow-up.    Disposition   The patient was discharged.     Diagnosis     ICD-10-CM    1.  Postpartum hypertension  O16.5            Inocencio Cantu MD  [employer]  6:47 AM 11/16/24       Scribe Disclosure:  I, Jocelyn Worthington, am serving as a scribe at 3:26 AM on 11/16/2024 to document services personally performed by Inocencio Cantu MD based on my observations and the provider's statements to me.        Inocencio Cantu MD  11/16/24 0647

## 2024-11-16 NOTE — DISCHARGE INSTRUCTIONS
You should keep taking the nifedipine as prescribed.  You should continue to check your blood pressure several times a day.  It is okay to take Tylenol and ibuprofen for headache.  Certainly if you develop worsening headache, right-sided abdominal pain,

## 2024-11-16 NOTE — ED TRIAGE NOTES
Patient reports headache this evening and elevated BP at home.  She reports emergent c section on 11/4.  ABCs intact, A&Ox4.     Triage Assessment (Adult)       Row Name 11/16/24 0304          Triage Assessment    Airway WDL WDL        Respiratory WDL    Respiratory WDL WDL        Skin Circulation/Temperature WDL    Skin Circulation/Temperature WDL WDL        Cardiac WDL    Cardiac WDL X  HTN        Peripheral/Neurovascular WDL    Peripheral Neurovascular WDL WDL        Cognitive/Neuro/Behavioral WDL    Cognitive/Neuro/Behavioral WDL X  HA

## 2024-11-18 LAB
ATRIAL RATE - MUSE: 79 BPM
DIASTOLIC BLOOD PRESSURE - MUSE: NORMAL MMHG
INTERPRETATION ECG - MUSE: NORMAL
P AXIS - MUSE: 56 DEGREES
PR INTERVAL - MUSE: 136 MS
QRS DURATION - MUSE: 72 MS
QT - MUSE: 410 MS
QTC - MUSE: 470 MS
R AXIS - MUSE: 27 DEGREES
SYSTOLIC BLOOD PRESSURE - MUSE: NORMAL MMHG
T AXIS - MUSE: 40 DEGREES
VENTRICULAR RATE- MUSE: 79 BPM

## 2025-07-19 ENCOUNTER — HEALTH MAINTENANCE LETTER (OUTPATIENT)
Age: 30
End: 2025-07-19

## (undated) DEVICE — BAG CLEAR TRASH 1.3M 39X33" P4040C

## (undated) DEVICE — GLOVE BIOGEL PI MICRO SZ 6.5 48565

## (undated) DEVICE — Device

## (undated) DEVICE — PAD CHUX UNDERPAD 30X36" P3036C

## (undated) DEVICE — PACK C-SECTION LF PL15OTA83B

## (undated) DEVICE — GLOVE BIOGEL PI MICRO INDICATOR UNDERGLOVE SZ 6.5 48965

## (undated) DEVICE — SOL WATER IRRIG 1000ML BOTTLE 2F7114

## (undated) DEVICE — ESU GROUND PAD ADULT W/CORD E7507

## (undated) DEVICE — BLADE CLIPPER SGL USE 9680

## (undated) DEVICE — CATH TRAY FOLEY SURESTEP 16FR DRAIN BAG STATOCK A899916

## (undated) DEVICE — SOL NACL 0.9% IRRIG 1000ML BOTTLE 2F7124

## (undated) DEVICE — SU DERMABOND ADVANCED .7ML DNX12

## (undated) DEVICE — SU VICRYL 0 CT-1 36" J346H

## (undated) DEVICE — STOCKING SLEEVE VASOPRESS COMPRESSION CALF MED 18" VP501M

## (undated) DEVICE — LINEN HALF SHEET 5512

## (undated) DEVICE — CAP BABY PINK/BLUE IC-2

## (undated) DEVICE — LINEN TOWEL PACK X10 5473

## (undated) DEVICE — SU MONOCRYL 0 CT-1 36" UND Y946H

## (undated) DEVICE — PREP CHLORAPREP 26ML TINTED HI-LITE ORANGE 930815

## (undated) DEVICE — SYR TRANSFER DEVICE BLOOD NDL HOLDER 3648800

## (undated) DEVICE — LINEN DRAPE 54X72" 5467

## (undated) DEVICE — LINEN FULL SHEET 5511

## (undated) RX ORDER — FENTANYL CITRATE 50 UG/ML
INJECTION, SOLUTION INTRAMUSCULAR; INTRAVENOUS
Status: DISPENSED
Start: 2024-11-04

## (undated) RX ORDER — KETOROLAC TROMETHAMINE 30 MG/ML
INJECTION, SOLUTION INTRAMUSCULAR; INTRAVENOUS
Status: DISPENSED
Start: 2024-11-04

## (undated) RX ORDER — FENTANYL CITRATE-0.9 % NACL/PF 10 MCG/ML
PLASTIC BAG, INJECTION (ML) INTRAVENOUS
Status: DISPENSED
Start: 2024-11-04

## (undated) RX ORDER — MORPHINE SULFATE 1 MG/ML
INJECTION, SOLUTION EPIDURAL; INTRATHECAL; INTRAVENOUS
Status: DISPENSED
Start: 2024-11-04

## (undated) RX ORDER — OXYTOCIN/0.9 % SODIUM CHLORIDE 30/500 ML
PLASTIC BAG, INJECTION (ML) INTRAVENOUS
Status: DISPENSED
Start: 2024-11-04